# Patient Record
Sex: FEMALE | Race: BLACK OR AFRICAN AMERICAN | Employment: OTHER | URBAN - METROPOLITAN AREA
[De-identification: names, ages, dates, MRNs, and addresses within clinical notes are randomized per-mention and may not be internally consistent; named-entity substitution may affect disease eponyms.]

---

## 2017-04-15 PROBLEM — I10 HTN (HYPERTENSION), BENIGN: Chronic | Status: ACTIVE | Noted: 2017-04-15

## 2017-04-15 PROBLEM — Z95.0 PACEMAKER: Chronic | Status: ACTIVE | Noted: 2017-04-15

## 2017-04-15 PROBLEM — R55 NEAR SYNCOPE: Status: ACTIVE | Noted: 2017-04-15

## 2018-05-08 ENCOUNTER — OFFICE VISIT (OUTPATIENT)
Dept: NON INVASIVE DIAGNOSTICS | Age: 83
End: 2018-05-08
Payer: MEDICARE

## 2018-05-08 VITALS
BODY MASS INDEX: 39.34 KG/M2 | WEIGHT: 222 LBS | HEART RATE: 60 BPM | SYSTOLIC BLOOD PRESSURE: 144 MMHG | RESPIRATION RATE: 16 BRPM | DIASTOLIC BLOOD PRESSURE: 80 MMHG | HEIGHT: 63 IN

## 2018-05-08 DIAGNOSIS — Z95.0 PACEMAKER: Primary | Chronic | ICD-10-CM

## 2018-05-08 PROCEDURE — 4040F PNEUMOC VAC/ADMIN/RCVD: CPT | Performed by: NURSE PRACTITIONER

## 2018-05-08 PROCEDURE — 93280 PM DEVICE PROGR EVAL DUAL: CPT | Performed by: INTERNAL MEDICINE

## 2018-05-08 PROCEDURE — 1123F ACP DISCUSS/DSCN MKR DOCD: CPT | Performed by: NURSE PRACTITIONER

## 2018-05-08 PROCEDURE — 1090F PRES/ABSN URINE INCON ASSESS: CPT | Performed by: NURSE PRACTITIONER

## 2018-05-08 PROCEDURE — G8417 CALC BMI ABV UP PARAM F/U: HCPCS | Performed by: NURSE PRACTITIONER

## 2018-05-08 PROCEDURE — 1036F TOBACCO NON-USER: CPT | Performed by: NURSE PRACTITIONER

## 2018-05-08 PROCEDURE — G8427 DOCREV CUR MEDS BY ELIG CLIN: HCPCS | Performed by: NURSE PRACTITIONER

## 2018-05-08 PROCEDURE — 99213 OFFICE O/P EST LOW 20 MIN: CPT | Performed by: NURSE PRACTITIONER

## 2018-05-08 RX ORDER — VITAMIN B COMPLEX
1 CAPSULE ORAL DAILY
COMMUNITY

## 2018-05-08 RX ORDER — OMEGA-3S/DHA/EPA/FISH OIL 300-1000MG
900 CAPSULE ORAL DAILY
COMMUNITY
End: 2021-10-27

## 2018-08-20 ENCOUNTER — NURSE ONLY (OUTPATIENT)
Dept: NON INVASIVE DIAGNOSTICS | Age: 83
End: 2018-08-20
Payer: MEDICARE

## 2018-08-20 DIAGNOSIS — Z95.0 PACEMAKER: Primary | Chronic | ICD-10-CM

## 2018-08-20 DIAGNOSIS — I48.0 PAF (PAROXYSMAL ATRIAL FIBRILLATION) (HCC): Chronic | ICD-10-CM

## 2018-08-20 PROCEDURE — 93294 REM INTERROG EVL PM/LDLS PM: CPT | Performed by: INTERNAL MEDICINE

## 2018-08-20 PROCEDURE — 93296 REM INTERROG EVL PM/IDS: CPT | Performed by: INTERNAL MEDICINE

## 2018-08-31 ENCOUNTER — TELEPHONE (OUTPATIENT)
Dept: NON INVASIVE DIAGNOSTICS | Age: 83
End: 2018-08-31

## 2018-08-31 NOTE — PROGRESS NOTES
See [facility] [place] report. Remote monitoring reviewed over a 90 day period. End of 90 day monitoring period date of service 8.19.2018.

## 2018-09-13 ENCOUNTER — OFFICE VISIT (OUTPATIENT)
Dept: CARDIOLOGY CLINIC | Age: 83
End: 2018-09-13
Payer: MEDICARE

## 2018-09-13 VITALS
RESPIRATION RATE: 18 BRPM | WEIGHT: 217.2 LBS | BODY MASS INDEX: 38.48 KG/M2 | SYSTOLIC BLOOD PRESSURE: 132 MMHG | HEIGHT: 63 IN | DIASTOLIC BLOOD PRESSURE: 60 MMHG | HEART RATE: 62 BPM

## 2018-09-13 DIAGNOSIS — Z78.9 STATIN INTOLERANCE: ICD-10-CM

## 2018-09-13 DIAGNOSIS — M79.604 LEG PAIN, BILATERAL: ICD-10-CM

## 2018-09-13 DIAGNOSIS — N18.9 CHRONIC KIDNEY DISEASE, UNSPECIFIED CKD STAGE: ICD-10-CM

## 2018-09-13 DIAGNOSIS — Z95.0 STATUS POST PLACEMENT OF CARDIAC PACEMAKER: ICD-10-CM

## 2018-09-13 DIAGNOSIS — R60.0 EDEMA OF BOTH FEET: ICD-10-CM

## 2018-09-13 DIAGNOSIS — E78.2 MIXED HYPERLIPIDEMIA: Chronic | ICD-10-CM

## 2018-09-13 DIAGNOSIS — M79.605 LEG PAIN, BILATERAL: ICD-10-CM

## 2018-09-13 DIAGNOSIS — I48.0 PAF (PAROXYSMAL ATRIAL FIBRILLATION) (HCC): Primary | Chronic | ICD-10-CM

## 2018-09-13 PROCEDURE — 93000 ELECTROCARDIOGRAM COMPLETE: CPT | Performed by: INTERNAL MEDICINE

## 2018-09-13 PROCEDURE — 1123F ACP DISCUSS/DSCN MKR DOCD: CPT | Performed by: INTERNAL MEDICINE

## 2018-09-13 PROCEDURE — 1101F PT FALLS ASSESS-DOCD LE1/YR: CPT | Performed by: INTERNAL MEDICINE

## 2018-09-13 PROCEDURE — 1036F TOBACCO NON-USER: CPT | Performed by: INTERNAL MEDICINE

## 2018-09-13 PROCEDURE — G8417 CALC BMI ABV UP PARAM F/U: HCPCS | Performed by: INTERNAL MEDICINE

## 2018-09-13 PROCEDURE — 4040F PNEUMOC VAC/ADMIN/RCVD: CPT | Performed by: INTERNAL MEDICINE

## 2018-09-13 PROCEDURE — G8427 DOCREV CUR MEDS BY ELIG CLIN: HCPCS | Performed by: INTERNAL MEDICINE

## 2018-09-13 PROCEDURE — 1090F PRES/ABSN URINE INCON ASSESS: CPT | Performed by: INTERNAL MEDICINE

## 2018-09-13 PROCEDURE — 99214 OFFICE O/P EST MOD 30 MIN: CPT | Performed by: INTERNAL MEDICINE

## 2018-11-19 NOTE — PROGRESS NOTES
Electrophysiology Outpatient Progress Note    Arelis Lal  5/26/1931  Date of Service: 11/20/18   Referring Provider/PCP: Renita Rose DO   Cardiology: Dee Valadez MD  Electrophysiologist: Patrice Harrison DO     Patient Active Problem List    Diagnosis Date Noted    Near syncope 04/15/2017    HTN (hypertension), benign 04/15/2017    Pacemaker 04/15/2017    [facility] 11/11/2016    LVH (left ventricular hypertrophy) 11/11/2016    [facility]     Mixed hyperlipidemia 03/11/2015    Asthma 03/11/2015       Current Outpatient Prescriptions   Medication Sig Dispense Refill    pitavastatin (LIVALO) 2 MG TABS tablet 2 mg nightly       loperamide (IMODIUM) 2 MG capsule Take 2 mg by mouth as needed for Diarrhea      XARELTO 15 MG TABS tablet TAKE 1 TABLET BY MOUTH DAILY 30 tablet 11    valsartan (DIOVAN) 160 MG tablet TAKE 1 TABLET BY MOUTH DAILY 90 tablet 3    b complex vitamins capsule Take 1 capsule by mouth daily      Omega-3 Fatty Acids (OMEGA-3 FISH OIL PO) Take 900 mg by mouth daily      amLODIPine (NORVASC) 2.5 MG tablet Take 1 tablet by mouth daily 30 tablet 11    Coenzyme Q10 (COQ-10) 100 MG CAPS Take 1 capsule by mouth daily      cetirizine (ZYRTEC) 10 MG tablet Take 10 mg by mouth daily      predniSONE (DELTASONE) 5 MG tablet Take 5 mg by mouth daily       metoprolol succinate (TOPROL XL) 50 MG extended release tablet Take 1 tablet by mouth 2 times daily 30 tablet 11    Cholecalciferol (VITAMIN D) 2000 UNITS CAPS capsule Take 2,000 capsules by mouth daily      polyethyl glycol-propyl glycol 0.4-0.3 % (SYSTANE) 0.4-0.3 % ophthalmic solution 1 drop as needed for Dry Eyes      ketotifen (ZADITOR) 0.025 % ophthalmic solution 1 drop 2 times daily as needed Itchy eyes       hydrOXYzine (ATARAX) 10 MG tablet Take 10 mg by mouth every 6 hours as needed for Itching      ranitidine (ZANTAC) 150 MG tablet Take 150 mg by mouth daily      hydrALAZINE

## 2018-11-20 ENCOUNTER — OFFICE VISIT (OUTPATIENT)
Dept: NON INVASIVE DIAGNOSTICS | Age: 83
End: 2018-11-20
Payer: MEDICARE

## 2018-11-20 VITALS
BODY MASS INDEX: 37.92 KG/M2 | RESPIRATION RATE: 18 BRPM | DIASTOLIC BLOOD PRESSURE: 70 MMHG | HEIGHT: 63 IN | SYSTOLIC BLOOD PRESSURE: 122 MMHG | WEIGHT: 214 LBS

## 2018-11-20 DIAGNOSIS — Z95.0 PACEMAKER: Primary | Chronic | ICD-10-CM

## 2018-11-20 PROCEDURE — G8417 CALC BMI ABV UP PARAM F/U: HCPCS | Performed by: INTERNAL MEDICINE

## 2018-11-20 PROCEDURE — 99214 OFFICE O/P EST MOD 30 MIN: CPT | Performed by: INTERNAL MEDICINE

## 2018-11-20 PROCEDURE — 1123F ACP DISCUSS/DSCN MKR DOCD: CPT | Performed by: INTERNAL MEDICINE

## 2018-11-20 PROCEDURE — G8427 DOCREV CUR MEDS BY ELIG CLIN: HCPCS | Performed by: INTERNAL MEDICINE

## 2018-11-20 PROCEDURE — 93280 PM DEVICE PROGR EVAL DUAL: CPT | Performed by: INTERNAL MEDICINE

## 2018-11-20 PROCEDURE — 1090F PRES/ABSN URINE INCON ASSESS: CPT | Performed by: INTERNAL MEDICINE

## 2018-11-20 PROCEDURE — 1036F TOBACCO NON-USER: CPT | Performed by: INTERNAL MEDICINE

## 2018-11-20 PROCEDURE — 4040F PNEUMOC VAC/ADMIN/RCVD: CPT | Performed by: INTERNAL MEDICINE

## 2018-11-20 PROCEDURE — 1101F PT FALLS ASSESS-DOCD LE1/YR: CPT | Performed by: INTERNAL MEDICINE

## 2018-11-20 PROCEDURE — G8484 FLU IMMUNIZE NO ADMIN: HCPCS | Performed by: INTERNAL MEDICINE

## 2018-11-20 RX ORDER — LOPERAMIDE HYDROCHLORIDE 2 MG/1
2 CAPSULE ORAL PRN
Status: ON HOLD | COMMUNITY
End: 2021-06-25 | Stop reason: HOSPADM

## 2018-11-20 ASSESSMENT — ENCOUNTER SYMPTOMS
CHOKING: 0
SHORTNESS OF BREATH: 0
COUGH: 0

## 2018-11-21 ENCOUNTER — NURSE ONLY (OUTPATIENT)
Dept: NON INVASIVE DIAGNOSTICS | Age: 83
End: 2018-11-21
Payer: MEDICARE

## 2018-11-21 DIAGNOSIS — I48.0 PAF (PAROXYSMAL ATRIAL FIBRILLATION) (HCC): Chronic | ICD-10-CM

## 2018-11-21 DIAGNOSIS — Z95.0 PACEMAKER: Primary | Chronic | ICD-10-CM

## 2018-11-21 PROCEDURE — 93296 REM INTERROG EVL PM/IDS: CPT | Performed by: INTERNAL MEDICINE

## 2018-11-21 PROCEDURE — 93294 REM INTERROG EVL PM/LDLS PM: CPT | Performed by: INTERNAL MEDICINE

## 2018-11-30 ENCOUNTER — TELEPHONE (OUTPATIENT)
Dept: NON INVASIVE DIAGNOSTICS | Age: 83
End: 2018-11-30

## 2018-11-30 NOTE — TELEPHONE ENCOUNTER
----- Message from Charla Rodney RN sent at 11/30/2018 11:05 AM EST -----  Successful transmission received. Please call patient and give next appointment.

## 2019-01-06 ENCOUNTER — HOSPITAL ENCOUNTER (EMERGENCY)
Age: 84
Discharge: HOME | End: 2019-01-06
Attending: EMERGENCY MEDICINE
Payer: MEDICARE

## 2019-01-06 VITALS
HEART RATE: 60 BPM | WEIGHT: 214 LBS | HEIGHT: 63 IN | DIASTOLIC BLOOD PRESSURE: 80 MMHG | RESPIRATION RATE: 16 BRPM | BODY MASS INDEX: 37.92 KG/M2 | OXYGEN SATURATION: 94 % | TEMPERATURE: 98.1 F | SYSTOLIC BLOOD PRESSURE: 172 MMHG

## 2019-01-06 DIAGNOSIS — R04.0 EPISTAXIS: Primary | ICD-10-CM

## 2019-01-06 PROCEDURE — 99282 EMERGENCY DEPT VISIT SF MDM: CPT

## 2019-01-06 PROCEDURE — 6370000000 HC RX 637 (ALT 250 FOR IP): Performed by: EMERGENCY MEDICINE

## 2019-01-06 RX ORDER — OXYMETAZOLINE HYDROCHLORIDE 0.05 G/100ML
2 SPRAY NASAL ONCE
Status: COMPLETED | OUTPATIENT
Start: 2019-01-06 | End: 2019-01-06

## 2019-01-06 RX ADMIN — OXYMETAZOLINE HYDROCHLORIDE 2 SPRAY: 5 SPRAY NASAL at 12:57

## 2019-01-14 ENCOUNTER — ANESTHESIA (OUTPATIENT)
Dept: INTERVENTIONAL RADIOLOGY/VASCULAR | Age: 84
DRG: 982 | End: 2019-01-14
Payer: MEDICARE

## 2019-01-14 ENCOUNTER — TELEPHONE (OUTPATIENT)
Dept: ENT CLINIC | Age: 84
End: 2019-01-14

## 2019-01-14 ENCOUNTER — ANESTHESIA EVENT (OUTPATIENT)
Dept: INTERVENTIONAL RADIOLOGY/VASCULAR | Age: 84
DRG: 982 | End: 2019-01-14
Payer: MEDICARE

## 2019-01-14 ENCOUNTER — HOSPITAL ENCOUNTER (INPATIENT)
Age: 84
LOS: 1 days | Discharge: HOME | DRG: 982 | End: 2019-01-15
Attending: EMERGENCY MEDICINE | Admitting: INTERNAL MEDICINE
Payer: MEDICARE

## 2019-01-14 ENCOUNTER — APPOINTMENT (OUTPATIENT)
Dept: INTERVENTIONAL RADIOLOGY/VASCULAR | Age: 84
DRG: 982 | End: 2019-01-14
Payer: MEDICARE

## 2019-01-14 ENCOUNTER — HOSPITAL ENCOUNTER (EMERGENCY)
Age: 84
Discharge: HOME | DRG: 982 | End: 2019-01-14
Attending: EMERGENCY MEDICINE
Payer: MEDICARE

## 2019-01-14 VITALS
SYSTOLIC BLOOD PRESSURE: 155 MMHG | RESPIRATION RATE: 13 BRPM | OXYGEN SATURATION: 100 % | DIASTOLIC BLOOD PRESSURE: 75 MMHG

## 2019-01-14 VITALS
DIASTOLIC BLOOD PRESSURE: 90 MMHG | OXYGEN SATURATION: 97 % | HEART RATE: 57 BPM | TEMPERATURE: 98.8 F | RESPIRATION RATE: 16 BRPM | BODY MASS INDEX: 37.92 KG/M2 | SYSTOLIC BLOOD PRESSURE: 180 MMHG | WEIGHT: 214 LBS | HEIGHT: 63 IN

## 2019-01-14 DIAGNOSIS — I10 HYPERTENSION, UNSPECIFIED TYPE: ICD-10-CM

## 2019-01-14 DIAGNOSIS — R04.0 EPISTAXIS: Primary | ICD-10-CM

## 2019-01-14 LAB
ABO/RH: NORMAL
ANION GAP SERPL CALCULATED.3IONS-SCNC: 10 MMOL/L (ref 7–16)
ANTIBODY SCREEN: NORMAL
APTT: 32.8 SEC (ref 24.5–35.1)
BASOPHILS ABSOLUTE: 0.02 E9/L (ref 0–0.2)
BASOPHILS RELATIVE PERCENT: 0.4 % (ref 0–2)
BUN BLDV-MCNC: 23 MG/DL (ref 8–23)
CALCIUM SERPL-MCNC: 9.5 MG/DL (ref 8.6–10.2)
CHLORIDE BLD-SCNC: 104 MMOL/L (ref 98–107)
CO2: 29 MMOL/L (ref 22–29)
CREAT SERPL-MCNC: 1.2 MG/DL (ref 0.5–1)
EOSINOPHILS ABSOLUTE: 0.05 E9/L (ref 0.05–0.5)
EOSINOPHILS RELATIVE PERCENT: 0.9 % (ref 0–6)
GFR AFRICAN AMERICAN: 51
GFR NON-AFRICAN AMERICAN: 51 ML/MIN/1.73
GLUCOSE BLD-MCNC: 109 MG/DL (ref 74–99)
HCT VFR BLD CALC: 33.6 % (ref 34–48)
HCT VFR BLD CALC: 35.2 % (ref 34–48)
HEMOGLOBIN: 10.4 G/DL (ref 11.5–15.5)
HEMOGLOBIN: 10.9 G/DL (ref 11.5–15.5)
IMMATURE GRANULOCYTES #: 0.04 E9/L
IMMATURE GRANULOCYTES %: 0.8 % (ref 0–5)
INR BLD: 1.5
LYMPHOCYTES ABSOLUTE: 1.9 E9/L (ref 1.5–4)
LYMPHOCYTES RELATIVE PERCENT: 35.8 % (ref 20–42)
MCH RBC QN AUTO: 29.9 PG (ref 26–35)
MCH RBC QN AUTO: 30.4 PG (ref 26–35)
MCHC RBC AUTO-ENTMCNC: 31 % (ref 32–34.5)
MCHC RBC AUTO-ENTMCNC: 31 % (ref 32–34.5)
MCV RBC AUTO: 96.6 FL (ref 80–99.9)
MCV RBC AUTO: 98.1 FL (ref 80–99.9)
MONOCYTES ABSOLUTE: 0.57 E9/L (ref 0.1–0.95)
MONOCYTES RELATIVE PERCENT: 10.7 % (ref 2–12)
NEUTROPHILS ABSOLUTE: 2.73 E9/L (ref 1.8–7.3)
NEUTROPHILS RELATIVE PERCENT: 51.4 % (ref 43–80)
PDW BLD-RTO: 13.5 FL (ref 11.5–15)
PDW BLD-RTO: 13.6 FL (ref 11.5–15)
PLATELET # BLD: 85 E9/L (ref 130–450)
PLATELET # BLD: 96 E9/L (ref 130–450)
PLATELET CONFIRMATION: NORMAL
PLATELET CONFIRMATION: NORMAL
PMV BLD AUTO: 11.8 FL (ref 7–12)
PMV BLD AUTO: 12 FL (ref 7–12)
POTASSIUM SERPL-SCNC: 4 MMOL/L (ref 3.5–5)
PROTHROMBIN TIME: 17.3 SEC (ref 9.3–12.4)
RBC # BLD: 3.48 E12/L (ref 3.5–5.5)
RBC # BLD: 3.59 E12/L (ref 3.5–5.5)
SODIUM BLD-SCNC: 143 MMOL/L (ref 132–146)
TROPONIN: 0.08 NG/ML (ref 0–0.03)
WBC # BLD: 5.3 E9/L (ref 4.5–11.5)
WBC # BLD: 5.3 E9/L (ref 4.5–11.5)

## 2019-01-14 PROCEDURE — 86850 RBC ANTIBODY SCREEN: CPT

## 2019-01-14 PROCEDURE — 36227 PLACE CATH XTRNL CAROTID: CPT | Performed by: RADIOLOGY

## 2019-01-14 PROCEDURE — 75898 FOLLOW-UP ANGIOGRAPHY: CPT | Performed by: RADIOLOGY

## 2019-01-14 PROCEDURE — 6360000002 HC RX W HCPCS: Performed by: EMERGENCY MEDICINE

## 2019-01-14 PROCEDURE — 86900 BLOOD TYPING SEROLOGIC ABO: CPT

## 2019-01-14 PROCEDURE — 6370000000 HC RX 637 (ALT 250 FOR IP): Performed by: STUDENT IN AN ORGANIZED HEALTH CARE EDUCATION/TRAINING PROGRAM

## 2019-01-14 PROCEDURE — 36415 COLL VENOUS BLD VENIPUNCTURE: CPT

## 2019-01-14 PROCEDURE — 85027 COMPLETE CBC AUTOMATED: CPT

## 2019-01-14 PROCEDURE — 6360000002 HC RX W HCPCS: Performed by: STUDENT IN AN ORGANIZED HEALTH CARE EDUCATION/TRAINING PROGRAM

## 2019-01-14 PROCEDURE — 75894 X-RAYS TRANSCATH THERAPY: CPT | Performed by: RADIOLOGY

## 2019-01-14 PROCEDURE — 2060000000 HC ICU INTERMEDIATE R&B

## 2019-01-14 PROCEDURE — C1769 GUIDE WIRE: HCPCS

## 2019-01-14 PROCEDURE — 6360000002 HC RX W HCPCS: Performed by: RADIOLOGY

## 2019-01-14 PROCEDURE — 96374 THER/PROPH/DIAG INJ IV PUSH: CPT

## 2019-01-14 PROCEDURE — 94761 N-INVAS EAR/PLS OXIMETRY MLT: CPT

## 2019-01-14 PROCEDURE — 7100000000 HC PACU RECOVERY - FIRST 15 MIN

## 2019-01-14 PROCEDURE — 7100000001 HC PACU RECOVERY - ADDTL 15 MIN

## 2019-01-14 PROCEDURE — 6360000004 HC RX CONTRAST MEDICATION: Performed by: RADIOLOGY

## 2019-01-14 PROCEDURE — 93005 ELECTROCARDIOGRAM TRACING: CPT | Performed by: STUDENT IN AN ORGANIZED HEALTH CARE EDUCATION/TRAINING PROGRAM

## 2019-01-14 PROCEDURE — 2580000003 HC RX 258: Performed by: INTERNAL MEDICINE

## 2019-01-14 PROCEDURE — 96375 TX/PRO/DX INJ NEW DRUG ADDON: CPT

## 2019-01-14 PROCEDURE — 80048 BASIC METABOLIC PNL TOTAL CA: CPT

## 2019-01-14 PROCEDURE — 3700000001 HC ADD 15 MINUTES (ANESTHESIA)

## 2019-01-14 PROCEDURE — 85025 COMPLETE CBC W/AUTO DIFF WBC: CPT

## 2019-01-14 PROCEDURE — B314ZZZ FLUOROSCOPY OF LEFT COMMON CAROTID ARTERY: ICD-10-PCS | Performed by: RADIOLOGY

## 2019-01-14 PROCEDURE — 36222 PLACE CATH CAROTID/INOM ART: CPT | Performed by: RADIOLOGY

## 2019-01-14 PROCEDURE — 99285 EMERGENCY DEPT VISIT HI MDM: CPT

## 2019-01-14 PROCEDURE — 03LM3DZ OCCLUSION OF RIGHT EXTERNAL CAROTID ARTERY WITH INTRALUMINAL DEVICE, PERCUTANEOUS APPROACH: ICD-10-PCS | Performed by: RADIOLOGY

## 2019-01-14 PROCEDURE — 61626 TCAT PERM OCCLS/EMBOL NONCNS: CPT | Performed by: RADIOLOGY

## 2019-01-14 PROCEDURE — 86901 BLOOD TYPING SEROLOGIC RH(D): CPT

## 2019-01-14 PROCEDURE — 30905 CONTROL OF NOSEBLEED: CPT

## 2019-01-14 PROCEDURE — 3700000000 HC ANESTHESIA ATTENDED CARE

## 2019-01-14 PROCEDURE — 6370000000 HC RX 637 (ALT 250 FOR IP): Performed by: INTERNAL MEDICINE

## 2019-01-14 PROCEDURE — 6360000002 HC RX W HCPCS: Performed by: NURSE ANESTHETIST, CERTIFIED REGISTERED

## 2019-01-14 PROCEDURE — 84484 ASSAY OF TROPONIN QUANT: CPT

## 2019-01-14 PROCEDURE — 2580000003 HC RX 258: Performed by: NURSE ANESTHETIST, CERTIFIED REGISTERED

## 2019-01-14 PROCEDURE — 2500000003 HC RX 250 WO HCPCS: Performed by: NURSE ANESTHETIST, CERTIFIED REGISTERED

## 2019-01-14 PROCEDURE — 99283 EMERGENCY DEPT VISIT LOW MDM: CPT

## 2019-01-14 PROCEDURE — 85610 PROTHROMBIN TIME: CPT

## 2019-01-14 PROCEDURE — 6370000000 HC RX 637 (ALT 250 FOR IP): Performed by: EMERGENCY MEDICINE

## 2019-01-14 PROCEDURE — 03LN3DZ OCCLUSION OF LEFT EXTERNAL CAROTID ARTERY WITH INTRALUMINAL DEVICE, PERCUTANEOUS APPROACH: ICD-10-PCS | Performed by: RADIOLOGY

## 2019-01-14 PROCEDURE — 85730 THROMBOPLASTIN TIME PARTIAL: CPT

## 2019-01-14 RX ORDER — PROPOFOL 10 MG/ML
INJECTION, EMULSION INTRAVENOUS CONTINUOUS PRN
Status: DISCONTINUED | OUTPATIENT
Start: 2019-01-14 | End: 2019-01-14 | Stop reason: SDUPTHER

## 2019-01-14 RX ORDER — METOPROLOL TARTRATE 5 MG/5ML
INJECTION INTRAVENOUS PRN
Status: DISCONTINUED | OUTPATIENT
Start: 2019-01-14 | End: 2019-01-14 | Stop reason: SDUPTHER

## 2019-01-14 RX ORDER — MORPHINE SULFATE 2 MG/ML
2 INJECTION, SOLUTION INTRAMUSCULAR; INTRAVENOUS EVERY 5 MIN PRN
Status: DISCONTINUED | OUTPATIENT
Start: 2019-01-14 | End: 2019-01-14

## 2019-01-14 RX ORDER — SODIUM CHLORIDE 9 MG/ML
INJECTION, SOLUTION INTRAVENOUS CONTINUOUS PRN
Status: DISCONTINUED | OUTPATIENT
Start: 2019-01-14 | End: 2019-01-14 | Stop reason: SDUPTHER

## 2019-01-14 RX ORDER — CLONIDINE HYDROCHLORIDE 0.1 MG/1
0.1 TABLET ORAL ONCE
Status: COMPLETED | OUTPATIENT
Start: 2019-01-14 | End: 2019-01-14

## 2019-01-14 RX ORDER — ONDANSETRON 2 MG/ML
4 INJECTION INTRAMUSCULAR; INTRAVENOUS EVERY 6 HOURS PRN
Status: DISCONTINUED | OUTPATIENT
Start: 2019-01-14 | End: 2019-01-15 | Stop reason: HOSPADM

## 2019-01-14 RX ORDER — SODIUM CHLORIDE 0.9 % (FLUSH) 0.9 %
10 SYRINGE (ML) INJECTION EVERY 12 HOURS SCHEDULED
Status: DISCONTINUED | OUTPATIENT
Start: 2019-01-14 | End: 2019-01-15 | Stop reason: HOSPADM

## 2019-01-14 RX ORDER — MEPERIDINE HYDROCHLORIDE 50 MG/ML
12.5 INJECTION INTRAMUSCULAR; INTRAVENOUS; SUBCUTANEOUS EVERY 5 MIN PRN
Status: DISCONTINUED | OUTPATIENT
Start: 2019-01-14 | End: 2019-01-14

## 2019-01-14 RX ORDER — CEFAZOLIN SODIUM 2 G/50ML
SOLUTION INTRAVENOUS PRN
Status: DISCONTINUED | OUTPATIENT
Start: 2019-01-14 | End: 2019-01-14 | Stop reason: SDUPTHER

## 2019-01-14 RX ORDER — MORPHINE SULFATE 2 MG/ML
1 INJECTION, SOLUTION INTRAMUSCULAR; INTRAVENOUS EVERY 5 MIN PRN
Status: DISCONTINUED | OUTPATIENT
Start: 2019-01-14 | End: 2019-01-14

## 2019-01-14 RX ORDER — OXYMETAZOLINE HYDROCHLORIDE 0.05 G/100ML
2 SPRAY NASAL ONCE
Status: COMPLETED | OUTPATIENT
Start: 2019-01-14 | End: 2019-01-14

## 2019-01-14 RX ORDER — AMLODIPINE BESYLATE 2.5 MG/1
2.5 TABLET ORAL DAILY
Status: DISCONTINUED | OUTPATIENT
Start: 2019-01-15 | End: 2019-01-15 | Stop reason: HOSPADM

## 2019-01-14 RX ORDER — PROMETHAZINE HYDROCHLORIDE 25 MG/ML
6.25 INJECTION, SOLUTION INTRAMUSCULAR; INTRAVENOUS EVERY 10 MIN PRN
Status: DISCONTINUED | OUTPATIENT
Start: 2019-01-14 | End: 2019-01-14

## 2019-01-14 RX ORDER — HYDROCODONE BITARTRATE AND ACETAMINOPHEN 5; 325 MG/1; MG/1
2 TABLET ORAL PRN
Status: DISCONTINUED | OUTPATIENT
Start: 2019-01-14 | End: 2019-01-14

## 2019-01-14 RX ORDER — SODIUM CHLORIDE 0.9 % (FLUSH) 0.9 %
10 SYRINGE (ML) INJECTION PRN
Status: DISCONTINUED | OUTPATIENT
Start: 2019-01-14 | End: 2019-01-15 | Stop reason: HOSPADM

## 2019-01-14 RX ORDER — HYDRALAZINE HYDROCHLORIDE 50 MG/1
50 TABLET, FILM COATED ORAL 3 TIMES DAILY
Status: DISCONTINUED | OUTPATIENT
Start: 2019-01-14 | End: 2019-01-15 | Stop reason: HOSPADM

## 2019-01-14 RX ORDER — HYDRALAZINE HYDROCHLORIDE 20 MG/ML
10 INJECTION INTRAMUSCULAR; INTRAVENOUS ONCE
Status: COMPLETED | OUTPATIENT
Start: 2019-01-14 | End: 2019-01-14

## 2019-01-14 RX ORDER — HEPARIN SODIUM 10000 [USP'U]/ML
5000 INJECTION, SOLUTION INTRAVENOUS; SUBCUTANEOUS EVERY 5 MIN PRN
Status: COMPLETED | OUTPATIENT
Start: 2019-01-14 | End: 2019-01-14

## 2019-01-14 RX ORDER — METOPROLOL SUCCINATE 50 MG/1
50 TABLET, EXTENDED RELEASE ORAL 2 TIMES DAILY
Status: DISCONTINUED | OUTPATIENT
Start: 2019-01-14 | End: 2019-01-15 | Stop reason: HOSPADM

## 2019-01-14 RX ORDER — PREDNISONE 1 MG/1
5 TABLET ORAL DAILY
Status: DISCONTINUED | OUTPATIENT
Start: 2019-01-15 | End: 2019-01-15 | Stop reason: HOSPADM

## 2019-01-14 RX ORDER — VALSARTAN 160 MG/1
160 TABLET ORAL DAILY
Status: DISCONTINUED | OUTPATIENT
Start: 2019-01-15 | End: 2019-01-15 | Stop reason: HOSPADM

## 2019-01-14 RX ORDER — HYDROCODONE BITARTRATE AND ACETAMINOPHEN 5; 325 MG/1; MG/1
1 TABLET ORAL PRN
Status: DISCONTINUED | OUTPATIENT
Start: 2019-01-14 | End: 2019-01-14

## 2019-01-14 RX ORDER — HYDRALAZINE HYDROCHLORIDE 20 MG/ML
INJECTION INTRAMUSCULAR; INTRAVENOUS PRN
Status: DISCONTINUED | OUTPATIENT
Start: 2019-01-14 | End: 2019-01-14 | Stop reason: SDUPTHER

## 2019-01-14 RX ORDER — CEPHALEXIN 500 MG/1
500 CAPSULE ORAL 3 TIMES DAILY
Qty: 21 CAPSULE | Refills: 0 | Status: ON HOLD | OUTPATIENT
Start: 2019-01-14 | End: 2019-01-15 | Stop reason: HOSPADM

## 2019-01-14 RX ORDER — LABETALOL HYDROCHLORIDE 5 MG/ML
INJECTION, SOLUTION INTRAVENOUS PRN
Status: DISCONTINUED | OUTPATIENT
Start: 2019-01-14 | End: 2019-01-14 | Stop reason: SDUPTHER

## 2019-01-14 RX ORDER — METHYLPREDNISOLONE SODIUM SUCCINATE 125 MG/2ML
250 INJECTION, POWDER, LYOPHILIZED, FOR SOLUTION INTRAMUSCULAR; INTRAVENOUS ONCE
Status: COMPLETED | OUTPATIENT
Start: 2019-01-14 | End: 2019-01-14

## 2019-01-14 RX ORDER — DIPHENHYDRAMINE HYDROCHLORIDE 50 MG/ML
25 INJECTION INTRAMUSCULAR; INTRAVENOUS ONCE
Status: COMPLETED | OUTPATIENT
Start: 2019-01-14 | End: 2019-01-14

## 2019-01-14 RX ORDER — RANITIDINE 150 MG/1
150 TABLET ORAL DAILY
Status: DISCONTINUED | OUTPATIENT
Start: 2019-01-15 | End: 2019-01-15 | Stop reason: HOSPADM

## 2019-01-14 RX ORDER — ALLOPURINOL 100 MG/1
100 TABLET ORAL DAILY
Status: DISCONTINUED | OUTPATIENT
Start: 2019-01-15 | End: 2019-01-15 | Stop reason: HOSPADM

## 2019-01-14 RX ADMIN — HYDRALAZINE HYDROCHLORIDE 10 MG: 20 INJECTION INTRAMUSCULAR; INTRAVENOUS at 18:15

## 2019-01-14 RX ADMIN — METOPROLOL SUCCINATE 50 MG: 50 TABLET, EXTENDED RELEASE ORAL at 23:02

## 2019-01-14 RX ADMIN — Medication 5000 UNITS: at 17:15

## 2019-01-14 RX ADMIN — Medication 10 ML: at 23:02

## 2019-01-14 RX ADMIN — METHYLPREDNISOLONE SODIUM SUCCINATE 250 MG: 125 INJECTION, POWDER, FOR SOLUTION INTRAMUSCULAR; INTRAVENOUS at 14:25

## 2019-01-14 RX ADMIN — Medication 5000 UNITS: at 17:20

## 2019-01-14 RX ADMIN — CEFAZOLIN SODIUM 2 G: 2 SOLUTION INTRAVENOUS at 17:25

## 2019-01-14 RX ADMIN — DIPHENHYDRAMINE HYDROCHLORIDE 25 MG: 50 INJECTION, SOLUTION INTRAMUSCULAR; INTRAVENOUS at 14:26

## 2019-01-14 RX ADMIN — METOPROLOL TARTRATE 5 MG: 5 INJECTION, SOLUTION INTRAVENOUS at 18:08

## 2019-01-14 RX ADMIN — Medication 5000 UNITS: at 17:25

## 2019-01-14 RX ADMIN — IOVERSOL 280 ML: 678 INJECTION INTRA-ARTERIAL; INTRAVENOUS at 20:18

## 2019-01-14 RX ADMIN — HYDRALAZINE HYDROCHLORIDE 10 MG: 20 INJECTION INTRAMUSCULAR; INTRAVENOUS at 17:45

## 2019-01-14 RX ADMIN — SODIUM CHLORIDE: 9 INJECTION, SOLUTION INTRAVENOUS at 16:56

## 2019-01-14 RX ADMIN — HYDRALAZINE HYDROCHLORIDE 10 MG: 20 INJECTION INTRAMUSCULAR; INTRAVENOUS at 11:40

## 2019-01-14 RX ADMIN — Medication 2 SPRAY: at 05:18

## 2019-01-14 RX ADMIN — Medication 2 SPRAY: at 12:33

## 2019-01-14 RX ADMIN — CLONIDINE HYDROCHLORIDE 0.1 MG: 0.1 TABLET ORAL at 05:58

## 2019-01-14 RX ADMIN — METOPROLOL TARTRATE 5 MG: 5 INJECTION, SOLUTION INTRAVENOUS at 18:15

## 2019-01-14 RX ADMIN — Medication 5000 UNITS: at 17:35

## 2019-01-14 RX ADMIN — Medication 5000 UNITS: at 17:45

## 2019-01-14 RX ADMIN — PROPOFOL 50 MCG/KG/MIN: 10 INJECTION, EMULSION INTRAVENOUS at 17:07

## 2019-01-14 RX ADMIN — HYDRALAZINE HYDROCHLORIDE 50 MG: 50 TABLET, FILM COATED ORAL at 23:02

## 2019-01-14 RX ADMIN — Medication 5000 UNITS: at 17:30

## 2019-01-14 RX ADMIN — LABETALOL HYDROCHLORIDE 5 MG: 5 INJECTION INTRAVENOUS at 18:47

## 2019-01-14 ASSESSMENT — PULMONARY FUNCTION TESTS
PIF_VALUE: 0
PIF_VALUE: 1
PIF_VALUE: 0
PIF_VALUE: 1
PIF_VALUE: 0

## 2019-01-14 ASSESSMENT — PAIN DESCRIPTION - PAIN TYPE
TYPE: REFERRED PAIN
TYPE: SURGICAL PAIN

## 2019-01-14 ASSESSMENT — PAIN SCALES - GENERAL
PAINLEVEL_OUTOF10: 0

## 2019-01-14 ASSESSMENT — PAIN DESCRIPTION - PROGRESSION: CLINICAL_PROGRESSION: GRADUALLY WORSENING

## 2019-01-14 ASSESSMENT — ENCOUNTER SYMPTOMS
EYE DISCHARGE: 0
COUGH: 0
ABDOMINAL DISTENTION: 0
SHORTNESS OF BREATH: 0
NAUSEA: 0
EYE REDNESS: 0
SINUS PRESSURE: 0
SORE THROAT: 0
EYE PAIN: 0
VOMITING: 0
WHEEZING: 0
DIARRHEA: 0
BACK PAIN: 0

## 2019-01-14 ASSESSMENT — PAIN DESCRIPTION - ORIENTATION: ORIENTATION: RIGHT

## 2019-01-14 ASSESSMENT — PAIN DESCRIPTION - FREQUENCY: FREQUENCY: INTERMITTENT

## 2019-01-14 ASSESSMENT — PAIN DESCRIPTION - DESCRIPTORS: DESCRIPTORS: ACHING;CONSTANT;POUNDING

## 2019-01-14 ASSESSMENT — PAIN DESCRIPTION - LOCATION
LOCATION: TEETH;MOUTH
LOCATION: NOSE

## 2019-01-14 ASSESSMENT — PAIN DESCRIPTION - ONSET: ONSET: ON-GOING

## 2019-01-15 VITALS
SYSTOLIC BLOOD PRESSURE: 160 MMHG | WEIGHT: 218.38 LBS | OXYGEN SATURATION: 96 % | HEIGHT: 63 IN | TEMPERATURE: 97.9 F | HEART RATE: 63 BPM | DIASTOLIC BLOOD PRESSURE: 70 MMHG | BODY MASS INDEX: 38.7 KG/M2 | RESPIRATION RATE: 20 BRPM

## 2019-01-15 LAB
ANION GAP SERPL CALCULATED.3IONS-SCNC: 14 MMOL/L (ref 7–16)
BUN BLDV-MCNC: 24 MG/DL (ref 8–23)
CALCIUM SERPL-MCNC: 8.5 MG/DL (ref 8.6–10.2)
CHLORIDE BLD-SCNC: 103 MMOL/L (ref 98–107)
CO2: 23 MMOL/L (ref 22–29)
CREAT SERPL-MCNC: 1.2 MG/DL (ref 0.5–1)
GFR AFRICAN AMERICAN: 51
GFR NON-AFRICAN AMERICAN: 51 ML/MIN/1.73
GLUCOSE BLD-MCNC: 177 MG/DL (ref 74–99)
HCT VFR BLD CALC: 31.1 % (ref 34–48)
HEMOGLOBIN: 10 G/DL (ref 11.5–15.5)
MCH RBC QN AUTO: 31 PG (ref 26–35)
MCHC RBC AUTO-ENTMCNC: 32.2 % (ref 32–34.5)
MCV RBC AUTO: 96.3 FL (ref 80–99.9)
PDW BLD-RTO: 13.6 FL (ref 11.5–15)
PLATELET # BLD: 93 E9/L (ref 130–450)
PLATELET CONFIRMATION: NORMAL
PMV BLD AUTO: 11.8 FL (ref 7–12)
POTASSIUM SERPL-SCNC: 4.8 MMOL/L (ref 3.5–5)
RBC # BLD: 3.23 E12/L (ref 3.5–5.5)
SODIUM BLD-SCNC: 140 MMOL/L (ref 132–146)
WBC # BLD: 6 E9/L (ref 4.5–11.5)

## 2019-01-15 PROCEDURE — 85027 COMPLETE CBC AUTOMATED: CPT

## 2019-01-15 PROCEDURE — 80048 BASIC METABOLIC PNL TOTAL CA: CPT

## 2019-01-15 PROCEDURE — 36415 COLL VENOUS BLD VENIPUNCTURE: CPT

## 2019-01-15 PROCEDURE — 2500000003 HC RX 250 WO HCPCS: Performed by: OTOLARYNGOLOGY

## 2019-01-15 PROCEDURE — 2580000003 HC RX 258: Performed by: INTERNAL MEDICINE

## 2019-01-15 PROCEDURE — 6370000000 HC RX 637 (ALT 250 FOR IP): Performed by: INTERNAL MEDICINE

## 2019-01-15 RX ORDER — ACETAMINOPHEN 325 MG/1
650 TABLET ORAL EVERY 4 HOURS PRN
Status: DISCONTINUED | OUTPATIENT
Start: 2019-01-15 | End: 2019-01-15 | Stop reason: HOSPADM

## 2019-01-15 RX ADMIN — Medication 1 KIT: at 10:36

## 2019-01-15 RX ADMIN — ALLOPURINOL 100 MG: 100 TABLET ORAL at 08:22

## 2019-01-15 RX ADMIN — ACETAMINOPHEN 650 MG: 325 TABLET ORAL at 13:39

## 2019-01-15 RX ADMIN — HYDRALAZINE HYDROCHLORIDE 50 MG: 50 TABLET, FILM COATED ORAL at 08:22

## 2019-01-15 RX ADMIN — HYDRALAZINE HYDROCHLORIDE 50 MG: 50 TABLET, FILM COATED ORAL at 14:15

## 2019-01-15 RX ADMIN — Medication 10 ML: at 08:24

## 2019-01-15 RX ADMIN — VALSARTAN 160 MG: 160 TABLET ORAL at 08:24

## 2019-01-15 RX ADMIN — AMLODIPINE BESYLATE 2.5 MG: 2.5 TABLET ORAL at 08:22

## 2019-01-15 RX ADMIN — METOPROLOL SUCCINATE 50 MG: 50 TABLET, EXTENDED RELEASE ORAL at 08:22

## 2019-01-15 RX ADMIN — PREDNISONE 5 MG: 5 TABLET ORAL at 08:22

## 2019-01-15 RX ADMIN — RANITIDINE 150 MG: 150 TABLET ORAL at 08:22

## 2019-01-15 ASSESSMENT — PAIN SCALES - GENERAL
PAINLEVEL_OUTOF10: 0
PAINLEVEL_OUTOF10: 3
PAINLEVEL_OUTOF10: 0

## 2019-01-17 LAB
EKG ATRIAL RATE: 64 BPM
EKG P AXIS: 62 DEGREES
EKG P-R INTERVAL: 228 MS
EKG Q-T INTERVAL: 490 MS
EKG QRS DURATION: 174 MS
EKG QTC CALCULATION (BAZETT): 505 MS
EKG R AXIS: -66 DEGREES
EKG T AXIS: 85 DEGREES
EKG VENTRICULAR RATE: 64 BPM

## 2019-02-20 ENCOUNTER — NURSE ONLY (OUTPATIENT)
Dept: NON INVASIVE DIAGNOSTICS | Age: 84
End: 2019-02-20
Payer: MEDICARE

## 2019-02-20 DIAGNOSIS — I48.0 PAF (PAROXYSMAL ATRIAL FIBRILLATION) (HCC): Chronic | ICD-10-CM

## 2019-02-20 DIAGNOSIS — Z95.0 PACEMAKER: Primary | Chronic | ICD-10-CM

## 2019-02-20 PROCEDURE — 93294 REM INTERROG EVL PM/LDLS PM: CPT | Performed by: INTERNAL MEDICINE

## 2019-02-20 PROCEDURE — 93296 REM INTERROG EVL PM/IDS: CPT | Performed by: INTERNAL MEDICINE

## 2019-03-05 ENCOUNTER — HOSPITAL ENCOUNTER (OUTPATIENT)
Age: 84
Discharge: HOME | End: 2019-03-07
Payer: MEDICARE

## 2019-03-05 ENCOUNTER — HOSPITAL ENCOUNTER (OUTPATIENT)
Dept: GENERAL RADIOLOGY | Age: 84
Discharge: HOME | End: 2019-03-07
Payer: MEDICARE

## 2019-03-05 DIAGNOSIS — M79.5 RESIDUAL FOREIGN BODY IN SOFT TISSUE: ICD-10-CM

## 2019-03-05 PROCEDURE — 73630 X-RAY EXAM OF FOOT: CPT

## 2019-03-22 ENCOUNTER — HOSPITAL ENCOUNTER (OUTPATIENT)
Dept: INTERVENTIONAL RADIOLOGY/VASCULAR | Age: 84
Discharge: HOME | End: 2019-03-24
Payer: MEDICARE

## 2019-03-22 DIAGNOSIS — I70.213 ATHEROSCLER OF NATIVE ARTERY OF BOTH LEGS WITH INTERMIT CLAUDICATION (HCC): ICD-10-CM

## 2019-03-22 PROCEDURE — 93923 UPR/LXTR ART STDY 3+ LVLS: CPT

## 2019-05-20 ASSESSMENT — ENCOUNTER SYMPTOMS
SHORTNESS OF BREATH: 0
COUGH: 0

## 2019-05-20 NOTE — PROGRESS NOTES
Electrophysiology Outpatient Progress Note    Carrie Spring  5/26/1931  Date of Service: 5/21/19   Referring Provider/PCP: Nicholas Hutchinson DO   Cardiology: Lucio Osorio MD  Electrophysiologist: Kylie Suarez DO     Patient Active Problem List    Diagnosis Date Noted    Epistaxis 01/14/2019    Near syncope 04/15/2017    HTN (hypertension), benign 04/15/2017    Pacemaker 04/15/2017    Pulmonary HTN ([facility] 11/11/2016    LVH (left ventricular hypertrophy) 11/11/2016    PAF (paroxysmal atrial fibrillation) ([facility]      Overview Note:     Xarelto held d/t epistaxis requiring cautery  Now on Eliquis 5 mg BID      Mixed hyperlipidemia 03/11/2015    Asthma 03/11/2015       Current Outpatient Medications   Medication Sig Dispense Refill    ELIQUIS 5 MG TABS tablet Take 5 mg by mouth 2 times daily       fexofenadine (ALLEGRA ALLERGY) 180 MG tablet Take 180 mg by mouth daily      acetaminophen (TYLENOL) 500 MG tablet Take 500 mg by mouth every 6 hours as needed for Pain      valsartan (DIOVAN) 160 MG tablet TAKE 1 TABLET BY MOUTH DAILY 90 tablet 3    pitavastatin (LIVALO) 2 MG TABS tablet Take 2 mg by mouth every other day       loperamide (IMODIUM) 2 MG capsule Take 2 mg by mouth as needed for Diarrhea      b complex vitamins capsule Take 1 capsule by mouth daily      Omega-3 Fatty Acids (OMEGA-3 FISH OIL PO) Take 900 mg by mouth daily      amLODIPine (NORVASC) 2.5 MG tablet Take 1 tablet by mouth daily 30 tablet 11    Coenzyme Q10 (COQ-10) 100 MG CAPS Take 1 capsule by mouth daily      predniSONE (DELTASONE) 5 MG tablet Take 5 mg by mouth daily       metoprolol succinate (TOPROL XL) 50 MG extended release tablet Take 1 tablet by mouth 2 times daily 30 tablet 11    Cholecalciferol (VITAMIN D) 2000 UNITS CAPS capsule Take 2,000 capsules by mouth daily      polyethyl glycol-propyl glycol 0.4-0.3 % (SYSTANE) 0.4-0.3 % ophthalmic solution 1 drop as needed for Dry Eyes      ketotifen (ZADITOR) 0.025 % ophthalmic solution 1 drop 2 times daily as needed Itchy eyes       ranitidine (ZANTAC) 150 MG tablet Take 150 mg by mouth 2 times daily       hydrALAZINE (APRESOLINE) 50 MG tablet Take 1 tablet by mouth 3 times daily. 90 tablet 11    allopurinol (ZYLOPRIM) 100 MG tablet Take 100 mg by mouth daily       Probiotic Product (PROBIOTIC FORMULA PO) Take 10 Units by mouth 2 times daily        No current facility-administered medications for this visit. Allergies   Allergen Reactions    Ampicillin Itching    Iodine Itching       SUBJECTIVE: Cameron Brito presents to the office today for the management of: symptomatic bradycardia, recurrent syncope, PAF with SVR, and pacemaker in situ. She presents today for her 6 month office follow-up. From an EP perspective she is feeling well. She has had short episodes of PAF lasting 10 sec -20 min. In January she underwent endovascular embolization of bilateral internal maxillary arteries by Dr Ephraim Euceda secondary to recurrent epstaxis on Xarelto. Her Xarelto was discontinued and she was started on Eliquis 5 mg BID with no bleeding issues. Today she complains of nausea and not feeling well starting ~10 minutes prior to leaving home. She denies fever and/or chills, vomiting/diarrhea or other associated symptoms. If her symptoms continue she will notify Dr Brenda Xavier office. She follows with Dr Samaria Santiago from a cardiology perspective and was dosing well at her last visit 9/13/18. She is enrolled in [facility] remote monitoring. She denies angina, dyspnea, lightheadedness, near syncope/syncope, orthopnea and PND. Review of Systems   Constitutional: Positive for appetite change. Respiratory: Negative for cough and shortness of breath. Cardiovascular: Negative for chest pain and palpitations. Gastrointestinal: Positive for abdominal distention and nausea. Neurological: Negative for dizziness, syncope and light-headedness.    All other systems reviewed and are negative. PHYSICAL EXAM:  Vitals:    05/21/19 1029 05/21/19 1057   BP: (!) 160/78 (!) 158/70   Pulse: 60    Resp: 16    Weight: 218 lb (98.9 kg)    Height: 5' 2\" (1.575 m)      Constitutional: Oriented to person, place, and time. Well-developed and cooperative. Head: Normocephalic and atraumatic. Eyes: Conjunctivae are normal.   Neck: No hepatojugular reflux and no JVD present. Cardiovascular: S1 normal, S2 normal and intact distal pulses. Normal rate and rhythm. PMI is not displaced. Pulmonary/Chest: Effort normal and breath sounds normal. No respiratory distress. Abdominal: Soft. No tenderness. Musculoskeletal: Normal range of motion of all extremities, no muscle weakness. Neurological: Alert and oriented to person, place, and time. Gait normal.   Skin: Skin is warm and dry. No bruising, no ecchymosis and no rash noted. Extremity: No clubbing or cyanosis. No edema. Psychiatric: Normal mood and affect. Thought content normal.   Pacemaker site: stable, well healed, no evidence of erosion. Pacemaker Interrogation: (dependent)  Qasim Lomax, [place] [id number]. DOI: 5/6/15  DDD  ppm.  P wave 5.3mV  Impedance: 361 ohms   Threshold: 0.8 V @ 0.4 ms  RV R wave: Paced mV  Impedance:469 ohms   Threshold: 0.7 V @ 0.4 ms  Pacing: A: 89%  RV:99%     Battery Voltage/Longevity: 3.5 years    Arrhythmias: 1. ATRs up to 20 min. Programming: changed MV response from 8-10 to allow for better HR variability. Impressions:     1. H/O recurrent syncope in the setting of severe bradycardia, PAF with SVR  - [id number]- VASc score: 5  - Xarelto 15 mg daily based on CrCl.  - LHC 3/12/15: normal coronaries. LVEF 65%  - Echocardiogram(4/17): Normal LVEF, no significant valvular disease. Stage I DD.  - recurrent epistaxis: now on Eliquis    2. Pacemaker in situ  - BSCI  - DOI: 5/6/15  - Normal function    3. HTN    4. HLD    5. Asthma    6. Gout    7.  Paroxysmal AF  - currently in SR  - Xarelto 15 mg QD  - Now on Eliquis; see #8    8. Recurrent epistaxis  - on Xarleto  - 1/14/19: S/p  endovascular embolization of bilateral internal maxillary arteries by Dr Kassandra Madrigal:     1. Ms Tejeda's pacemaker function is stable and programmed accordingly based on the above interrogation. She complains of fatigue and nausea which started this morning. In review of her HR histogram the device was programmed to to allow for HR variability. She has had brief episodes of PAF. She is now on Eliquis with no bleeding issues. 2. No changes were made in her medications today. 3. She will send a remote transmission in 3 months followed by a 6 month office visit and device interrogation. 4. She was asked to call the office with concerns prior to her scheduled follow-up. Thank you for allowing me to participate in their care. I have spent a total of 25 minutes with the patient and her family reviewing the above stated recommendations.  And a total of >50% of that time involved face-to-face time providing counseling and or coordination of care with the other providers    TERESA Sloo-LEÓN, [address] [place] [address] Physicians      CC: Dr Ken Mobley Friends

## 2019-05-21 ENCOUNTER — OFFICE VISIT (OUTPATIENT)
Dept: NON INVASIVE DIAGNOSTICS | Age: 84
End: 2019-05-21
Payer: MEDICARE

## 2019-05-21 VITALS
HEART RATE: 60 BPM | SYSTOLIC BLOOD PRESSURE: 158 MMHG | HEIGHT: 62 IN | DIASTOLIC BLOOD PRESSURE: 70 MMHG | BODY MASS INDEX: 40.12 KG/M2 | WEIGHT: 218 LBS | RESPIRATION RATE: 16 BRPM

## 2019-05-21 DIAGNOSIS — Z95.0 PACEMAKER: Primary | ICD-10-CM

## 2019-05-21 DIAGNOSIS — I48.0 PAF (PAROXYSMAL ATRIAL FIBRILLATION) (HCC): Chronic | ICD-10-CM

## 2019-05-21 PROCEDURE — 99214 OFFICE O/P EST MOD 30 MIN: CPT | Performed by: NURSE PRACTITIONER

## 2019-05-21 PROCEDURE — G8598 ASA/ANTIPLAT THER USED: HCPCS | Performed by: NURSE PRACTITIONER

## 2019-05-21 PROCEDURE — G8417 CALC BMI ABV UP PARAM F/U: HCPCS | Performed by: NURSE PRACTITIONER

## 2019-05-21 PROCEDURE — 1123F ACP DISCUSS/DSCN MKR DOCD: CPT | Performed by: NURSE PRACTITIONER

## 2019-05-21 PROCEDURE — G8427 DOCREV CUR MEDS BY ELIG CLIN: HCPCS | Performed by: NURSE PRACTITIONER

## 2019-05-21 PROCEDURE — 93280 PM DEVICE PROGR EVAL DUAL: CPT | Performed by: NURSE PRACTITIONER

## 2019-05-21 PROCEDURE — 1036F TOBACCO NON-USER: CPT | Performed by: NURSE PRACTITIONER

## 2019-05-21 PROCEDURE — 1090F PRES/ABSN URINE INCON ASSESS: CPT | Performed by: NURSE PRACTITIONER

## 2019-05-21 PROCEDURE — 4040F PNEUMOC VAC/ADMIN/RCVD: CPT | Performed by: NURSE PRACTITIONER

## 2019-05-21 RX ORDER — APIXABAN 5 MG/1
5 TABLET, FILM COATED ORAL 2 TIMES DAILY
Status: ON HOLD | COMMUNITY
Start: 2019-05-20 | End: 2021-06-25 | Stop reason: HOSPADM

## 2019-05-21 RX ORDER — FEXOFENADINE HCL 180 MG/1
180 TABLET ORAL DAILY PRN
Status: ON HOLD | COMMUNITY
End: 2021-06-25 | Stop reason: HOSPADM

## 2019-05-21 RX ORDER — ACETAMINOPHEN 500 MG
500-1000 TABLET ORAL EVERY 6 HOURS PRN
Status: ON HOLD | COMMUNITY
End: 2021-06-25 | Stop reason: HOSPADM

## 2019-05-21 ASSESSMENT — ENCOUNTER SYMPTOMS
ABDOMINAL DISTENTION: 1
NAUSEA: 1

## 2019-05-29 ENCOUNTER — NURSE ONLY (OUTPATIENT)
Dept: NON INVASIVE DIAGNOSTICS | Age: 84
End: 2019-05-29
Payer: MEDICARE

## 2019-05-29 DIAGNOSIS — R55 NEAR SYNCOPE: ICD-10-CM

## 2019-05-29 DIAGNOSIS — I48.0 PAF (PAROXYSMAL ATRIAL FIBRILLATION) (HCC): Chronic | ICD-10-CM

## 2019-05-29 DIAGNOSIS — Z95.0 PACEMAKER: Primary | Chronic | ICD-10-CM

## 2019-05-29 PROCEDURE — 93294 REM INTERROG EVL PM/LDLS PM: CPT | Performed by: INTERNAL MEDICINE

## 2019-05-29 PROCEDURE — 93296 REM INTERROG EVL PM/IDS: CPT | Performed by: INTERNAL MEDICINE

## 2019-05-31 ENCOUNTER — OFFICE VISIT (OUTPATIENT)
Dept: CARDIOLOGY CLINIC | Age: 84
End: 2019-05-31
Payer: MEDICARE

## 2019-05-31 VITALS
WEIGHT: 217 LBS | SYSTOLIC BLOOD PRESSURE: 130 MMHG | BODY MASS INDEX: 38.45 KG/M2 | HEIGHT: 63 IN | DIASTOLIC BLOOD PRESSURE: 80 MMHG | RESPIRATION RATE: 14 BRPM | HEART RATE: 60 BPM

## 2019-05-31 DIAGNOSIS — I10 ESSENTIAL HYPERTENSION: ICD-10-CM

## 2019-05-31 DIAGNOSIS — E78.5 DYSLIPIDEMIA: ICD-10-CM

## 2019-05-31 DIAGNOSIS — I48.0 PAF (PAROXYSMAL ATRIAL FIBRILLATION) (HCC): Primary | ICD-10-CM

## 2019-05-31 PROCEDURE — 99213 OFFICE O/P EST LOW 20 MIN: CPT | Performed by: CLINICAL NURSE SPECIALIST

## 2019-05-31 PROCEDURE — 93000 ELECTROCARDIOGRAM COMPLETE: CPT | Performed by: CLINICAL NURSE SPECIALIST

## 2019-05-31 NOTE — PROGRESS NOTES
[facility] PHYSICIAN CARDIOLOGY   OFFICE VISIT        PRIMARY CARE PHYSICIAN:      Edilberto Godinez DO         ALLERGIES / SENSITIVITIES:        Allergies   Allergen Reactions    Ampicillin Itching    Iodine Itching          REVIEWED MEDICATIONS:       Current Outpatient Medications   Medication Sig Dispense Refill    ELIQUIS 5 MG TABS tablet Take 5 mg by mouth 2 times daily       fexofenadine (ALLEGRA ALLERGY) 180 MG tablet Take 180 mg by mouth daily      acetaminophen (TYLENOL) 500 MG tablet Take 500 mg by mouth every 6 hours as needed for Pain      valsartan (DIOVAN) 160 MG tablet TAKE 1 TABLET BY MOUTH DAILY 90 tablet 3    pitavastatin (LIVALO) 2 MG TABS tablet Take 2 mg by mouth every other day       loperamide (IMODIUM) 2 MG capsule Take 2 mg by mouth as needed for Diarrhea      b complex vitamins capsule Take 1 capsule by mouth daily      Omega-3 Fatty Acids (OMEGA-3 FISH OIL PO) Take 900 mg by mouth daily      amLODIPine (NORVASC) 2.5 MG tablet Take 1 tablet by mouth daily 30 tablet 11    Coenzyme Q10 (COQ-10) 100 MG CAPS Take 1 capsule by mouth daily      predniSONE (DELTASONE) 5 MG tablet Take 5 mg by mouth daily       metoprolol succinate (TOPROL XL) 50 MG extended release tablet Take 1 tablet by mouth 2 times daily 30 tablet 11    Cholecalciferol (VITAMIN D) 2000 UNITS CAPS capsule Take 2,000 capsules by mouth daily      polyethyl glycol-propyl glycol 0.4-0.3 % (SYSTANE) 0.4-0.3 % ophthalmic solution 1 drop as needed for Dry Eyes      ketotifen (ZADITOR) 0.025 % ophthalmic solution 1 drop 2 times daily as needed Itchy eyes       ranitidine (ZANTAC) 150 MG tablet Take 150 mg by mouth daily       hydrALAZINE (APRESOLINE) 50 MG tablet Take 1 tablet by mouth 3 times daily.  90 tablet 11    allopurinol (ZYLOPRIM) 100 MG tablet Take 100 mg by mouth daily       Probiotic Product (PROBIOTIC FORMULA PO) Take 10 Units by mouth 2 times daily        No current facility-administered medications for this visit. S: REASON FOR VISIT:  Mrs. Chante Julio presents today in routine follow up for her PAF and valvular heart disease. Since her last appointment here, she developed recurrent epistaxis and underwent endovascular embolization of the internal maxillary arteries. She was changed from Xarelto to Eliquis and has had no further bleeding issues. She does report swelling and weakness in her legs, and has been diagnosed with both neuropathy and spinal stenosis. She is now taking pitavastatin every other day, but wonders if it is causing weakness in her calves. She denies claudication or proximal muscle myalgias. She denies chest pain, palpitations, orthpnea, or PND. REVIEW OF SYSTEMS:     Constitutional: denies fevers, chills, night sweats, or fatigue. HEENT: denies tinnitus, or oral bleeding. Epistaxis as above. Positive for \"sinus headaches\"   Endocrine: denies temperature intolerance or weight changes   Musculoskeletal: positive for back pain, but denies other myalgias or arthralgias   Skin: she has had no recent facial rashes or peeling, and remains on prednisone   Heme/Lymph: denies unusual bruising. She has had no further bleeding since switching to Eliquis. Heart: denies chest pain or palpitations. Lower extremity edema as above. She has difficulty putting her compression stockings on, but does wear non prescription support hose. Lungs: denies orthopnea, PND, cough, or exertional dyspnea. She wheezes at times when outside. GI: denies abdominal pain, nausea, vomiting, diarrhea, or dark/bloody stools. : denies hematuria, dysuria   Psych: denies mood change, anxiety, depression. Neuro: denies syncope, numbness, or tingling. She becomes dizzy when she smells chlorine in a swimming pool or bleach. Denies positional dizziness. CARDIOVASCULAR HISTORY:     1. Obesity   2. Hypertension   3. Hyperlipidemia  A.  intolerant of Mevacor due to myalgias   4.  Paroyxsmal atrial pacemaker interrogated and adjusted by EP and amlodipine decreased. 11. Asthma    11. Anemia   12. Questionable bullous pemphigoid: later felt to be contact dermatitis per dermatology at [facility]   13. History of cholecystectomy   14. History of appendectomy   15. History of hysterectomy   16. Chronic prednisone therapy (5 mg daily for over two years) due to dermatologic condition.       SOCIAL HISTORY:   Denies tobacco, alcohol, or illicit drug use. O:  COMPLETE PHYSICAL EXAM:         /80   Pulse 60   Resp 14   Ht 5' 3\" (1.6 m)   Wt 217 lb (98.4 kg)   BMI 38.44 kg/m²       General:   Well developed well nourished elderly lady who appears younger than her stated age. No apparent acute distress. Skin                  Warm and dry, no rashes   Head & Neck:  No JVD. No carotid bruits. Mucous membranes moist.   Chest:   Respirations unlabored. Site of pacemaker left upper chest unremarkable   Lungs:   Clear to auscultation bilaterally. Heart:   Regular rate and rhythm, no murmur, rub, or gallop. Abdomen:  Soft without organomegaly or masses. Nontender, Bowel sound    Extremities:  2+ bilateral lower extremity edema. Wearing compression stockings. Feet warm to touch. Moves all extremities. Neuro:   Alert & orient x 3, no focal deficits     REVIEW OF DIAGNOSTIC TESTS:      EKG today:  atrial pacing      ASSESSMENT / DIAGNOSIS:    1. Paroxysmal atrial fibrillation    2. Symptomatic bradycardia s/p dual chamber pacemaker. Now pacemaker dependent   3. Hypertension    4. Orthostatic hypotension   5. Hyperlipidemia with past intolerance to Mevacor: now appears to be tolerating pitavastatin      TREATMENT PLAN:   1. Continue current medications   2. Lengthy discussion was held regarding her epistaxis while on Xarelto and rationale for continuing [address] with Eliquis for now.     3. She was encouraged to elevate her legs, perform range of motion exercises, and consider being measured for new compression stockings. 4. She was also encouraged to gradually increase her activity, and on the importance of physical activity and weight loss for cardiovascular health. 5. Return to see Dr. Shantell Tse in six months. She was asked to contact our office with questions or concerns prior to then. The patient's current medication list, allergies, problem list and results of all previously ordered tests were reviewed at today's visit    [address]  [facility] [address]  [phone]

## 2019-06-03 NOTE — PROGRESS NOTES
See [facility] [place] report. Remote monitoring reviewed over a 90 day period. End of 90 day monitoring period date of service 5-29-19.

## 2019-08-03 ENCOUNTER — HOSPITAL ENCOUNTER (OUTPATIENT)
Age: 84
Setting detail: OBSERVATION
Discharge: HOME | End: 2019-08-04
Attending: EMERGENCY MEDICINE | Admitting: INTERNAL MEDICINE
Payer: MEDICARE

## 2019-08-03 ENCOUNTER — APPOINTMENT (OUTPATIENT)
Dept: GENERAL RADIOLOGY | Age: 84
End: 2019-08-03
Payer: MEDICARE

## 2019-08-03 DIAGNOSIS — R07.9 CHEST PAIN, UNSPECIFIED TYPE: Primary | ICD-10-CM

## 2019-08-03 LAB
ALBUMIN SERPL-MCNC: 3.5 G/DL (ref 3.5–5.2)
ALP BLD-CCNC: 97 U/L (ref 35–104)
ALT SERPL-CCNC: 17 U/L (ref 0–32)
ANION GAP SERPL CALCULATED.3IONS-SCNC: 11 MMOL/L (ref 7–16)
AST SERPL-CCNC: 27 U/L (ref 0–31)
BACTERIA: ABNORMAL /HPF
BASOPHILS ABSOLUTE: 0.03 E9/L (ref 0–0.2)
BASOPHILS RELATIVE PERCENT: 0.4 % (ref 0–2)
BILIRUB SERPL-MCNC: 0.4 MG/DL (ref 0–1.2)
BILIRUBIN URINE: NEGATIVE
BLOOD, URINE: ABNORMAL
BUN BLDV-MCNC: 35 MG/DL (ref 8–23)
CALCIUM SERPL-MCNC: 9 MG/DL (ref 8.6–10.2)
CHLORIDE BLD-SCNC: 109 MMOL/L (ref 98–107)
CLARITY: CLEAR
CO2: 25 MMOL/L (ref 22–29)
COLOR: YELLOW
CREAT SERPL-MCNC: 1.4 MG/DL (ref 0.5–1)
EOSINOPHILS ABSOLUTE: 0.03 E9/L (ref 0.05–0.5)
EOSINOPHILS RELATIVE PERCENT: 0.4 % (ref 0–6)
GFR AFRICAN AMERICAN: 43
GFR NON-AFRICAN AMERICAN: 43 ML/MIN/1.73
GLUCOSE BLD-MCNC: 115 MG/DL (ref 74–99)
GLUCOSE URINE: NEGATIVE MG/DL
HCT VFR BLD CALC: 33.9 % (ref 34–48)
HEMOGLOBIN: 10.7 G/DL (ref 11.5–15.5)
IMMATURE GRANULOCYTES #: 0.03 E9/L
IMMATURE GRANULOCYTES %: 0.4 % (ref 0–5)
KETONES, URINE: NEGATIVE MG/DL
LACTIC ACID: 1 MMOL/L (ref 0.5–2.2)
LEUKOCYTE ESTERASE, URINE: NEGATIVE
LIPASE: 64 U/L (ref 13–60)
LYMPHOCYTES ABSOLUTE: 1.51 E9/L (ref 1.5–4)
LYMPHOCYTES RELATIVE PERCENT: 19.5 % (ref 20–42)
MCH RBC QN AUTO: 30.6 PG (ref 26–35)
MCHC RBC AUTO-ENTMCNC: 31.6 % (ref 32–34.5)
MCV RBC AUTO: 96.9 FL (ref 80–99.9)
MONOCYTES ABSOLUTE: 0.91 E9/L (ref 0.1–0.95)
MONOCYTES RELATIVE PERCENT: 11.7 % (ref 2–12)
NEUTROPHILS ABSOLUTE: 5.25 E9/L (ref 1.8–7.3)
NEUTROPHILS RELATIVE PERCENT: 67.6 % (ref 43–80)
NITRITE, URINE: NEGATIVE
PDW BLD-RTO: 13.8 FL (ref 11.5–15)
PH UA: 6 (ref 5–9)
PLATELET # BLD: 54 E9/L (ref 130–450)
PLATELET CONFIRMATION: NORMAL
PMV BLD AUTO: 13 FL (ref 7–12)
POTASSIUM SERPL-SCNC: 3.5 MMOL/L (ref 3.5–5)
PRO-BNP: 1375 PG/ML (ref 0–450)
PROTEIN UA: NEGATIVE MG/DL
RBC # BLD: 3.5 E12/L (ref 3.5–5.5)
RBC UA: ABNORMAL /HPF (ref 0–2)
SODIUM BLD-SCNC: 145 MMOL/L (ref 132–146)
SPECIFIC GRAVITY UA: 1.01 (ref 1–1.03)
TOTAL PROTEIN: 6.6 G/DL (ref 6.4–8.3)
TROPONIN: 0.04 NG/ML (ref 0–0.03)
TROPONIN: 0.05 NG/ML (ref 0–0.03)
UROBILINOGEN, URINE: 0.2 E.U./DL
WBC # BLD: 7.8 E9/L (ref 4.5–11.5)
WBC UA: ABNORMAL /HPF (ref 0–5)

## 2019-08-03 PROCEDURE — 85025 COMPLETE CBC W/AUTO DIFF WBC: CPT

## 2019-08-03 PROCEDURE — 81001 URINALYSIS AUTO W/SCOPE: CPT

## 2019-08-03 PROCEDURE — G0378 HOSPITAL OBSERVATION PER HR: HCPCS

## 2019-08-03 PROCEDURE — 84484 ASSAY OF TROPONIN QUANT: CPT

## 2019-08-03 PROCEDURE — 93005 ELECTROCARDIOGRAM TRACING: CPT | Performed by: NURSE PRACTITIONER

## 2019-08-03 PROCEDURE — 71046 X-RAY EXAM CHEST 2 VIEWS: CPT

## 2019-08-03 PROCEDURE — 80053 COMPREHEN METABOLIC PANEL: CPT

## 2019-08-03 PROCEDURE — 83690 ASSAY OF LIPASE: CPT

## 2019-08-03 PROCEDURE — 99285 EMERGENCY DEPT VISIT HI MDM: CPT

## 2019-08-03 PROCEDURE — 83880 ASSAY OF NATRIURETIC PEPTIDE: CPT

## 2019-08-03 PROCEDURE — 83605 ASSAY OF LACTIC ACID: CPT

## 2019-08-03 PROCEDURE — 36415 COLL VENOUS BLD VENIPUNCTURE: CPT

## 2019-08-03 RX ORDER — TRIAMCINOLONE ACETONIDE 1 MG/G
CREAM TOPICAL PRN
COMMUNITY
End: 2021-02-03 | Stop reason: ALTCHOICE

## 2019-08-03 RX ORDER — THIAMINE MONONITRATE (VIT B1) 100 MG
250 TABLET ORAL DAILY
Status: ON HOLD | COMMUNITY
End: 2021-06-25 | Stop reason: HOSPADM

## 2019-08-03 RX ORDER — ONDANSETRON 4 MG/1
4 TABLET, ORALLY DISINTEGRATING ORAL ONCE
Status: DISCONTINUED | OUTPATIENT
Start: 2019-08-03 | End: 2019-08-04 | Stop reason: HOSPADM

## 2019-08-03 ASSESSMENT — PAIN DESCRIPTION - PAIN TYPE: TYPE: ACUTE PAIN

## 2019-08-03 ASSESSMENT — PAIN DESCRIPTION - ORIENTATION: ORIENTATION: LEFT

## 2019-08-03 ASSESSMENT — PAIN DESCRIPTION - LOCATION: LOCATION: CHEST

## 2019-08-03 ASSESSMENT — PAIN DESCRIPTION - DESCRIPTORS: DESCRIPTORS: PRESSURE

## 2019-08-03 ASSESSMENT — PAIN SCALES - GENERAL: PAINLEVEL_OUTOF10: 6

## 2019-08-04 ENCOUNTER — APPOINTMENT (OUTPATIENT)
Dept: NUCLEAR MEDICINE | Age: 84
End: 2019-08-04
Payer: MEDICARE

## 2019-08-04 VITALS
HEART RATE: 61 BPM | DIASTOLIC BLOOD PRESSURE: 78 MMHG | OXYGEN SATURATION: 97 % | RESPIRATION RATE: 16 BRPM | HEIGHT: 63 IN | BODY MASS INDEX: 38.45 KG/M2 | WEIGHT: 217 LBS | TEMPERATURE: 98.2 F | SYSTOLIC BLOOD PRESSURE: 141 MMHG

## 2019-08-04 PROBLEM — R07.9 CHEST PAIN: Status: RESOLVED | Noted: 2019-08-03 | Resolved: 2019-08-04

## 2019-08-04 LAB
ANION GAP SERPL CALCULATED.3IONS-SCNC: -6 MMOL/L (ref 7–16)
BUN BLDV-MCNC: 32 MG/DL (ref 8–23)
CALCIUM SERPL-MCNC: 8.8 MG/DL (ref 8.6–10.2)
CHLORIDE BLD-SCNC: 114 MMOL/L (ref 98–107)
CO2: 27 MMOL/L (ref 22–29)
CREAT SERPL-MCNC: 1.4 MG/DL (ref 0.5–1)
EKG ATRIAL RATE: 64 BPM
EKG P AXIS: 72 DEGREES
EKG P-R INTERVAL: 228 MS
EKG Q-T INTERVAL: 478 MS
EKG QRS DURATION: 178 MS
EKG QTC CALCULATION (BAZETT): 493 MS
EKG R AXIS: -54 DEGREES
EKG T AXIS: 110 DEGREES
EKG VENTRICULAR RATE: 64 BPM
GFR AFRICAN AMERICAN: 43
GFR NON-AFRICAN AMERICAN: 43 ML/MIN/1.73
GLUCOSE BLD-MCNC: 99 MG/DL (ref 74–99)
LV EF: 64 %
LVEF MODALITY: NORMAL
POTASSIUM SERPL-SCNC: 3.2 MMOL/L (ref 3.5–5)
SODIUM BLD-SCNC: 135 MMOL/L (ref 132–146)
TROPONIN: 0.03 NG/ML (ref 0–0.03)
TROPONIN: 0.04 NG/ML (ref 0–0.03)

## 2019-08-04 PROCEDURE — 93010 ELECTROCARDIOGRAM REPORT: CPT | Performed by: INTERNAL MEDICINE

## 2019-08-04 PROCEDURE — 93016 CV STRESS TEST SUPVJ ONLY: CPT | Performed by: INTERNAL MEDICINE

## 2019-08-04 PROCEDURE — 3430000000 HC RX DIAGNOSTIC RADIOPHARMACEUTICAL: Performed by: RADIOLOGY

## 2019-08-04 PROCEDURE — APPSS45 APP SPLIT SHARED TIME 31-45 MINUTES: Performed by: PHYSICIAN ASSISTANT

## 2019-08-04 PROCEDURE — 6370000000 HC RX 637 (ALT 250 FOR IP): Performed by: INTERNAL MEDICINE

## 2019-08-04 PROCEDURE — G0378 HOSPITAL OBSERVATION PER HR: HCPCS

## 2019-08-04 PROCEDURE — 80048 BASIC METABOLIC PNL TOTAL CA: CPT

## 2019-08-04 PROCEDURE — 2580000003 HC RX 258: Performed by: INTERNAL MEDICINE

## 2019-08-04 PROCEDURE — 93018 CV STRESS TEST I&R ONLY: CPT | Performed by: INTERNAL MEDICINE

## 2019-08-04 PROCEDURE — 78452 HT MUSCLE IMAGE SPECT MULT: CPT

## 2019-08-04 PROCEDURE — 6360000002 HC RX W HCPCS: Performed by: INTERNAL MEDICINE

## 2019-08-04 PROCEDURE — 36415 COLL VENOUS BLD VENIPUNCTURE: CPT

## 2019-08-04 PROCEDURE — 99215 OFFICE O/P EST HI 40 MIN: CPT | Performed by: INTERNAL MEDICINE

## 2019-08-04 PROCEDURE — A9500 TC99M SESTAMIBI: HCPCS | Performed by: RADIOLOGY

## 2019-08-04 PROCEDURE — 93017 CV STRESS TEST TRACING ONLY: CPT

## 2019-08-04 PROCEDURE — 84484 ASSAY OF TROPONIN QUANT: CPT

## 2019-08-04 RX ORDER — HYDRALAZINE HYDROCHLORIDE 50 MG/1
50 TABLET, FILM COATED ORAL 3 TIMES DAILY
Status: DISCONTINUED | OUTPATIENT
Start: 2019-08-04 | End: 2019-08-04 | Stop reason: HOSPADM

## 2019-08-04 RX ORDER — CHOLECALCIFEROL (VITAMIN D3) 50 MCG
2000 TABLET ORAL DAILY
Status: DISCONTINUED | OUTPATIENT
Start: 2019-08-04 | End: 2019-08-04 | Stop reason: HOSPADM

## 2019-08-04 RX ORDER — THIAMINE MONONITRATE (VIT B1) 100 MG
100 TABLET ORAL DAILY
Status: DISCONTINUED | OUTPATIENT
Start: 2019-08-04 | End: 2019-08-04 | Stop reason: HOSPADM

## 2019-08-04 RX ORDER — VITAMIN C
1 TAB ORAL DAILY
Status: DISCONTINUED | OUTPATIENT
Start: 2019-08-04 | End: 2019-08-04 | Stop reason: HOSPADM

## 2019-08-04 RX ORDER — ALLOPURINOL 100 MG/1
100 TABLET ORAL DAILY
Status: DISCONTINUED | OUTPATIENT
Start: 2019-08-04 | End: 2019-08-04 | Stop reason: HOSPADM

## 2019-08-04 RX ORDER — FUROSEMIDE 10 MG/ML
20 INJECTION INTRAMUSCULAR; INTRAVENOUS DAILY
Status: DISCONTINUED | OUTPATIENT
Start: 2019-08-04 | End: 2019-08-04

## 2019-08-04 RX ORDER — CETIRIZINE HYDROCHLORIDE 10 MG/1
5 TABLET ORAL DAILY
Status: DISCONTINUED | OUTPATIENT
Start: 2019-08-04 | End: 2019-08-04 | Stop reason: HOSPADM

## 2019-08-04 RX ORDER — POTASSIUM CHLORIDE 20 MEQ/1
40 TABLET, EXTENDED RELEASE ORAL ONCE
Status: DISCONTINUED | OUTPATIENT
Start: 2019-08-04 | End: 2019-08-04 | Stop reason: HOSPADM

## 2019-08-04 RX ORDER — ACETAMINOPHEN 500 MG
500 TABLET ORAL EVERY 6 HOURS PRN
Status: DISCONTINUED | OUTPATIENT
Start: 2019-08-04 | End: 2019-08-04 | Stop reason: HOSPADM

## 2019-08-04 RX ORDER — OMEGA-3S/DHA/EPA/FISH OIL 300-1000MG
900 CAPSULE ORAL DAILY
Status: DISCONTINUED | OUTPATIENT
Start: 2019-08-04 | End: 2019-08-04 | Stop reason: CLARIF

## 2019-08-04 RX ORDER — SODIUM CHLORIDE 9 MG/ML
INJECTION, SOLUTION INTRAVENOUS CONTINUOUS
Status: ACTIVE | OUTPATIENT
Start: 2019-08-04 | End: 2019-08-04

## 2019-08-04 RX ORDER — LOPERAMIDE HYDROCHLORIDE 2 MG/1
2 CAPSULE ORAL PRN
Status: DISCONTINUED | OUTPATIENT
Start: 2019-08-04 | End: 2019-08-04 | Stop reason: HOSPADM

## 2019-08-04 RX ORDER — SODIUM CHLORIDE 0.9 % (FLUSH) 0.9 %
10 SYRINGE (ML) INJECTION PRN
Status: DISCONTINUED | OUTPATIENT
Start: 2019-08-04 | End: 2019-08-04 | Stop reason: HOSPADM

## 2019-08-04 RX ORDER — FAMOTIDINE 20 MG/1
20 TABLET, FILM COATED ORAL DAILY
Status: DISCONTINUED | OUTPATIENT
Start: 2019-08-04 | End: 2019-08-04 | Stop reason: HOSPADM

## 2019-08-04 RX ORDER — SIMVASTATIN 40 MG
40 TABLET ORAL NIGHTLY
Status: DISCONTINUED | OUTPATIENT
Start: 2019-08-04 | End: 2019-08-04 | Stop reason: HOSPADM

## 2019-08-04 RX ORDER — VALSARTAN 160 MG/1
160 TABLET ORAL DAILY
Status: DISCONTINUED | OUTPATIENT
Start: 2019-08-04 | End: 2019-08-04 | Stop reason: HOSPADM

## 2019-08-04 RX ORDER — METOPROLOL SUCCINATE 50 MG/1
50 TABLET, EXTENDED RELEASE ORAL 2 TIMES DAILY
Status: DISCONTINUED | OUTPATIENT
Start: 2019-08-04 | End: 2019-08-04 | Stop reason: HOSPADM

## 2019-08-04 RX ORDER — PREDNISONE 1 MG/1
5 TABLET ORAL DAILY
Status: DISCONTINUED | OUTPATIENT
Start: 2019-08-04 | End: 2019-08-04 | Stop reason: HOSPADM

## 2019-08-04 RX ORDER — AMLODIPINE BESYLATE 2.5 MG/1
2.5 TABLET ORAL DAILY
Status: DISCONTINUED | OUTPATIENT
Start: 2019-08-04 | End: 2019-08-04 | Stop reason: HOSPADM

## 2019-08-04 RX ADMIN — APIXABAN 5 MG: 5 TABLET, FILM COATED ORAL at 00:50

## 2019-08-04 RX ADMIN — Medication 34.6 MILLICURIE: at 13:05

## 2019-08-04 RX ADMIN — REGADENOSON 0.4 MG: 0.08 INJECTION, SOLUTION INTRAVENOUS at 13:04

## 2019-08-04 RX ADMIN — SODIUM CHLORIDE: 9 INJECTION, SOLUTION INTRAVENOUS at 10:45

## 2019-08-04 RX ADMIN — AMLODIPINE BESYLATE 2.5 MG: 2.5 TABLET ORAL at 09:17

## 2019-08-04 RX ADMIN — APIXABAN 5 MG: 5 TABLET, FILM COATED ORAL at 09:17

## 2019-08-04 SDOH — HEALTH STABILITY: MENTAL HEALTH: HOW OFTEN DO YOU HAVE A DRINK CONTAINING ALCOHOL?: NEVER

## 2019-08-04 ASSESSMENT — PAIN SCALES - GENERAL
PAINLEVEL_OUTOF10: 0

## 2019-08-04 ASSESSMENT — PAIN DESCRIPTION - ORIENTATION: ORIENTATION: LEFT

## 2019-08-04 ASSESSMENT — PAIN DESCRIPTION - PAIN TYPE: TYPE: ACUTE PAIN

## 2019-08-04 ASSESSMENT — PAIN DESCRIPTION - DESCRIPTORS: DESCRIPTORS: PRESSURE

## 2019-08-04 ASSESSMENT — PAIN DESCRIPTION - LOCATION: LOCATION: CHEST

## 2019-08-04 NOTE — PROCEDURES
[address]                  [address] [facility],  [facility]                              CARDIAC STRESS TEST  PATIENT NAME: Rolanda Dwyer                        :        1931  MED REC NO:   [id number]                            ROOM:       8202  ACCOUNT NO:   [de-identified]                           ADMIT DATE: 2019  PROVIDER:     Bong Ennis DO    CARDIOVASCULAR DIAGNOSTIC DEPARTMENT    DATE OF STUDY:  2019    LEXISCAN CARDIOLITE STRESS TEST    RESTING BLOOD PRESSURE:  180/86. RESTING HEART RATE:  61.    TARGET HEART RATE:  N/A. PEAK INFUSION HEART RATE:  64. PEAK INFUSION BLOOD PRESSURE:  148/60. SYMPTOMS:  None. REASON FOR TERMINATION:  Protocol completion. PROTOCOL:  She was infused with Lexiscan 0.4 mg bolus followed by  Cardiolite bolus. BASELINE EC% AV paced, 63 beats per minute. STRESS EC% AV paced. ASSESSMENT:  1. Nondiagnostic stress ECG for ischemia. 2.  Perfusion imaging is pending.         Shad Meza DO    D: 2019 13:14:52       T: 2019 13:25:50     [email or web address]  Job#: [id number]     Doc#: [id number]    CC:

## 2019-08-04 NOTE — CONSULTS
by mouth daily  5/12/17  Yes Historical Provider, MD   metoprolol succinate (TOPROL XL) 50 MG extended release tablet Take 1 tablet by mouth 2 times daily 4/17/17  Yes Tiki Swann,    Cholecalciferol (VITAMIN D) 2000 UNITS CAPS capsule Take 2,000 capsules by mouth daily   Yes Historical Provider, MD   ranitidine (ZANTAC) 150 MG tablet Take 150 mg by mouth daily    Yes Historical Provider, MD   hydrALAZINE (APRESOLINE) 50 MG tablet Take 1 tablet by mouth 3 times daily.  3/13/15  Yes Tiki Swann,    allopurinol (ZYLOPRIM) 100 MG tablet Take 100 mg by mouth daily    Yes Historical Provider, MD   triamcinolone (KENALOG) 0.1 % cream Apply topically as needed    Historical Provider, MD   acetaminophen (TYLENOL) 500 MG tablet Take 500-1,000 mg by mouth every 6 hours as needed for Pain     Historical Provider, MD   loperamide (IMODIUM) 2 MG capsule Take 2 mg by mouth as needed for Diarrhea    Historical Provider, MD   polyethyl glycol-propyl glycol 0.4-0.3 % (SYSTANE) 0.4-0.3 % ophthalmic solution 1 drop as needed for Dry Eyes    Historical Provider, MD   ketotifen (ZADITOR) 0.025 % ophthalmic solution 1 drop 2 times daily as needed Itchy eyes     Historical Provider, MD   Probiotic Product (PROBIOTIC FORMULA PO) Take 10 Units by mouth 2 times daily     Historical Provider, MD       Current Medications:    Current Facility-Administered Medications: allopurinol (ZYLOPRIM) tablet 100 mg, 100 mg, Oral, Daily  famotidine (PEPCID) tablet 20 mg, 20 mg, Oral, Daily  vitamin D capsule CAPS 4,000,000 Int'l Units, 2,000 capsule, Oral, Daily  metoprolol succinate (TOPROL XL) extended release tablet 50 mg, 50 mg, Oral, BID  predniSONE (DELTASONE) tablet 5 mg, 5 mg, Oral, Daily  amLODIPine (NORVASC) tablet 2.5 mg, 2.5 mg, Oral, Daily  vitamin B and C (TOTAL B-C) 1 tablet, 1 tablet, Oral, Daily  simvastatin (ZOCOR) tablet 40 mg, 40 mg, Oral, Nightly  loperamide (IMODIUM) capsule 2 mg, 2 mg, Oral, PRN  valsartan (DIOVAN) edema. No varicosities. Pedal pulses palpable, no clubbing or cyanosis   ABDOMEN: Soft, non-tender to light palpation. Bowel sounds present. No palpable masses no organomegaly; no abdominal bruit  MS: Good muscle strength and tone. No atrophy or abnormal movements. : Deferred  SKIN: Warm and dry no statis dermatitis or ulcers   NEURO / PSYCH: Oriented to person, place and time. Speech clear and appropriate. Follows all commands. Pleasant affect     DATA:    ECG / Tele strips: AV paced rate 64    Diagnostic:  CXR:  FINDINGS:    The heart is normal in size. Left pacemaker present.  No focal  airspace opacity. There is no pleural effusion. There is no  pneumothorax. No free air beneath diaphragm.      Impression:         No airspace opacities or pleural effusion. No intake or output data in the 24 hours ending 08/04/19 0931    Labs:   CBC:   Recent Labs     08/03/19 1753   WBC 7.8   HGB 10.7*   HCT 33.9*   PLT 54*     BMP:   Recent Labs     08/03/19 1753 08/04/19  0507    135   K 3.5 3.2*   CO2 25 27   BUN 35* 32*   CREATININE 1.4* 1.4*   LABGLOM 43 43   CALCIUM 9.0 8.8     Mag: No results for input(s): MG in the last 72 hours. Phos: No results for input(s): PHOS in the last 72 hours. TSH: No results for input(s): TSH in the last 72 hours. HgA1c: No results found for: LABA1C  No results found for: EAG  proBNP:   Recent Labs     08/03/19  1753   PROBNP 1,375*     PT/INR: No results for input(s): PROTIME, INR in the last 72 hours. APTT:No results for input(s): APTT in the last 72 hours. CARDIAC ENZYMES:  Recent Labs     08/03/19 2016 08/04/19  0028 08/04/19  0507   TROPONINI 0.04* 0.03 0.04*     FASTING LIPID PANEL:  Lab Results   Component Value Date    CHOL 144 03/12/2015    HDL 48 03/12/2015    LDLCALC 78 03/12/2015    TRIG 90 03/12/2015     LIVER PROFILE:  Recent Labs     08/03/19  1753   AST 27   ALT 17   LABALBU 3.5     A & P are to follow as per Dr. Yee Gamboa.  Jacque Delcid bowel sounds. Neuro: Full ROM X 4, EOMI, no tremors. EXT: No bilateral lower extremity edema  Skin: warm, dry, intact. Good turgor. Psych: A&O x 3, normal behavior, not anxious. Patient seen and examined. Chart, labs & data reviewed. A:  1. Atypical chest discomfort. Negative heart catheterization 2015 and negative stress test in 2017.  2. Acute on chronic renal insufficiency. Probable dehydration. 3. Chronically elevated troponins. 4. Paroxysmal atrial fibrillation on Eliquis. 5. Pacemaker. 6. Asthma. 7. Hypertension. 8. Hypercholesterolemia. 9. Possible bicuspid aortic valve. 10. Spinal stenosis. Rec:  1. Lexiscan Cardiolite stress test.  2. I will stop the Lasix. 3. Okay for discharge per cardiology if the stress test is negative.     Electronically signed by Rosa Almanza DO on 8/4/2019 at 10:13 AM

## 2019-08-26 ENCOUNTER — TELEPHONE (OUTPATIENT)
Dept: CARDIOLOGY CLINIC | Age: 84
End: 2019-08-26

## 2019-08-28 ENCOUNTER — NURSE ONLY (OUTPATIENT)
Dept: NON INVASIVE DIAGNOSTICS | Age: 84
End: 2019-08-28
Payer: MEDICARE

## 2019-08-28 DIAGNOSIS — Z95.0 PACEMAKER: Primary | ICD-10-CM

## 2019-08-28 DIAGNOSIS — I48.0 PAF (PAROXYSMAL ATRIAL FIBRILLATION) (HCC): ICD-10-CM

## 2019-08-28 PROCEDURE — 93296 REM INTERROG EVL PM/IDS: CPT | Performed by: INTERNAL MEDICINE

## 2019-08-28 PROCEDURE — 93294 REM INTERROG EVL PM/LDLS PM: CPT | Performed by: INTERNAL MEDICINE

## 2019-09-16 ENCOUNTER — TELEPHONE (OUTPATIENT)
Dept: NON INVASIVE DIAGNOSTICS | Age: 84
End: 2019-09-16

## 2019-11-11 RX ORDER — VALSARTAN 160 MG/1
TABLET ORAL
Qty: 90 TABLET | Refills: 3 | Status: SHIPPED | OUTPATIENT
Start: 2019-11-11 | End: 2021-06-23 | Stop reason: ALTCHOICE

## 2019-11-19 ENCOUNTER — OFFICE VISIT (OUTPATIENT)
Dept: NON INVASIVE DIAGNOSTICS | Age: 84
End: 2019-11-19
Payer: MEDICARE

## 2019-11-19 VITALS
DIASTOLIC BLOOD PRESSURE: 88 MMHG | BODY MASS INDEX: 36.86 KG/M2 | HEART RATE: 60 BPM | RESPIRATION RATE: 20 BRPM | HEIGHT: 63 IN | SYSTOLIC BLOOD PRESSURE: 136 MMHG | WEIGHT: 208 LBS

## 2019-11-19 DIAGNOSIS — Z95.0 PACEMAKER: Primary | Chronic | ICD-10-CM

## 2019-11-19 PROCEDURE — G8417 CALC BMI ABV UP PARAM F/U: HCPCS | Performed by: INTERNAL MEDICINE

## 2019-11-19 PROCEDURE — 93280 PM DEVICE PROGR EVAL DUAL: CPT | Performed by: INTERNAL MEDICINE

## 2019-11-19 PROCEDURE — 99214 OFFICE O/P EST MOD 30 MIN: CPT | Performed by: INTERNAL MEDICINE

## 2019-11-19 PROCEDURE — G8484 FLU IMMUNIZE NO ADMIN: HCPCS | Performed by: INTERNAL MEDICINE

## 2019-11-19 PROCEDURE — G8427 DOCREV CUR MEDS BY ELIG CLIN: HCPCS | Performed by: INTERNAL MEDICINE

## 2019-11-19 PROCEDURE — 1036F TOBACCO NON-USER: CPT | Performed by: INTERNAL MEDICINE

## 2019-11-19 PROCEDURE — 1090F PRES/ABSN URINE INCON ASSESS: CPT | Performed by: INTERNAL MEDICINE

## 2019-11-19 PROCEDURE — G8598 ASA/ANTIPLAT THER USED: HCPCS | Performed by: INTERNAL MEDICINE

## 2019-11-19 PROCEDURE — 1123F ACP DISCUSS/DSCN MKR DOCD: CPT | Performed by: INTERNAL MEDICINE

## 2019-11-19 PROCEDURE — 4040F PNEUMOC VAC/ADMIN/RCVD: CPT | Performed by: INTERNAL MEDICINE

## 2019-11-19 ASSESSMENT — ENCOUNTER SYMPTOMS
COUGH: 0
SHORTNESS OF BREATH: 0

## 2019-11-27 ENCOUNTER — NURSE ONLY (OUTPATIENT)
Dept: NON INVASIVE DIAGNOSTICS | Age: 84
End: 2019-11-27
Payer: MEDICARE

## 2019-11-27 DIAGNOSIS — I48.0 PAF (PAROXYSMAL ATRIAL FIBRILLATION) (HCC): ICD-10-CM

## 2019-11-27 DIAGNOSIS — Z95.0 PACEMAKER: Primary | ICD-10-CM

## 2019-11-27 PROCEDURE — 93296 REM INTERROG EVL PM/IDS: CPT | Performed by: INTERNAL MEDICINE

## 2019-11-27 PROCEDURE — 93294 REM INTERROG EVL PM/LDLS PM: CPT | Performed by: INTERNAL MEDICINE

## 2019-12-11 ENCOUNTER — TELEPHONE (OUTPATIENT)
Dept: NON INVASIVE DIAGNOSTICS | Age: 84
End: 2019-12-11

## 2020-02-26 ENCOUNTER — NURSE ONLY (OUTPATIENT)
Dept: NON INVASIVE DIAGNOSTICS | Age: 85
End: 2020-02-26
Payer: MEDICARE

## 2020-02-26 PROCEDURE — 93294 REM INTERROG EVL PM/LDLS PM: CPT | Performed by: INTERNAL MEDICINE

## 2020-02-26 PROCEDURE — 93296 REM INTERROG EVL PM/IDS: CPT | Performed by: INTERNAL MEDICINE

## 2020-04-13 ENCOUNTER — TELEPHONE (OUTPATIENT)
Dept: NON INVASIVE DIAGNOSTICS | Age: 85
End: 2020-04-13

## 2020-05-27 ENCOUNTER — NURSE ONLY (OUTPATIENT)
Dept: NON INVASIVE DIAGNOSTICS | Age: 85
End: 2020-05-27
Payer: MEDICARE

## 2020-05-27 PROCEDURE — 93296 REM INTERROG EVL PM/IDS: CPT | Performed by: INTERNAL MEDICINE

## 2020-05-27 PROCEDURE — 93294 REM INTERROG EVL PM/LDLS PM: CPT | Performed by: INTERNAL MEDICINE

## 2020-08-26 ENCOUNTER — NURSE ONLY (OUTPATIENT)
Dept: NON INVASIVE DIAGNOSTICS | Age: 85
End: 2020-08-26
Payer: MEDICARE

## 2020-08-26 PROCEDURE — 93294 REM INTERROG EVL PM/LDLS PM: CPT | Performed by: INTERNAL MEDICINE

## 2020-08-26 PROCEDURE — 93296 REM INTERROG EVL PM/IDS: CPT | Performed by: INTERNAL MEDICINE

## 2020-08-31 NOTE — PROGRESS NOTES
See [facility] [place] report. Remote monitoring reviewed over a 90 day period. End of 90 day monitoring period date of service 2020    [place], [place] [id number].  DOI: 5/6/15  DDDR  ppm.  P wave paced mV  Impedance: 334ohms   Threshold: NA  RV R wave: paced Impedance: 416 ohms   Threshold: 0.9 V @ 0.4 ms  Pacing: A: 89%  RV:99%     Battery Voltage/Longevity: 2.5 years    Arrhythmias:  -mult ATRs lastin sec- 2 min  -AF burden 1%    Medications:  -Diovan 160 mg QD  -Eliquis 5 mg BID (ser cr 1.4; 98.9 kg - 2019)  -Norvasc 2.5 mg QD  -Toprol XL 50 mg BID  -Hydralazine 50 mg TID    Plan:   -91 day remote transmission  -OV recall (scheduling) overdue      TERESA Flaherty-CNP, [facility]

## 2020-11-25 ENCOUNTER — NURSE ONLY (OUTPATIENT)
Dept: NON INVASIVE DIAGNOSTICS | Age: 85
End: 2020-11-25
Payer: MEDICARE

## 2020-11-25 PROCEDURE — 93294 REM INTERROG EVL PM/LDLS PM: CPT | Performed by: INTERNAL MEDICINE

## 2020-11-25 PROCEDURE — 93296 REM INTERROG EVL PM/IDS: CPT | Performed by: INTERNAL MEDICINE

## 2020-12-10 NOTE — PROGRESS NOTES
See [facility] [place] report. Remote monitoring reviewed over a 90 day period. End of 90 day monitoring period date of service 11-.

## 2021-02-01 NOTE — PROGRESS NOTES
Electrophysiology Outpatient Progress Note    Max Taveras  5/26/1931  Date of Service: 2/3/21   Referring Provider/PCP: Alok Flood DO   Cardiology: Faisal White MD  Electrophysiologist: Nixon Pulliam DO     Patient Active Problem List    Diagnosis Date Noted    Epistaxis 01/14/2019    Near syncope 04/15/2017    HTN (hypertension), benign 04/15/2017    Pacemaker 04/15/2017    Pulmonary HTN ([facility] [address] 11/11/2016    LVH (left ventricular hypertrophy) 11/11/2016    PAF (paroxysmal atrial fibrillation) ([facility] [address]      Overview Note:     Xarelto held d/t epistaxis requiring cautery  Now on Eliquis 5 mg BID      Mixed hyperlipidemia 03/11/2015    Asthma 03/11/2015       Current Outpatient Medications   Medication Sig Dispense Refill    losartan (COZAAR) 50 MG tablet take 1 tablet by mouth once daily      ESTRACE VAGINAL 0.1 MG/GM vaginal cream apply 1 gram TWICE WEEKLY      valsartan (DIOVAN) 160 MG tablet TAKE 1 TABLET BY MOUTH DAILY 90 tablet 3    vitamin B-1 (THIAMINE) 100 MG tablet Take 100 mg by mouth daily      ELIQUIS 5 MG TABS tablet Take 5 mg by mouth 2 times daily       acetaminophen (TYLENOL) 500 MG tablet Take 500-1,000 mg by mouth every 6 hours as needed for Pain       pitavastatin (LIVALO) 2 MG TABS tablet Take 2 mg by mouth every other day       loperamide (IMODIUM) 2 MG capsule Take 2 mg by mouth as needed for Diarrhea      b complex vitamins capsule Take 1 capsule by mouth daily      Omega-3 Fatty Acids (OMEGA-3 FISH OIL) 300 MG CAPS Take 900 mg by mouth daily       amLODIPine (NORVASC) 2.5 MG tablet Take 1 tablet by mouth daily 30 tablet 11    Coenzyme Q10 (COQ-10) 100 MG CAPS Take 1 capsule by mouth daily      predniSONE (DELTASONE) 5 MG tablet Take 5 mg by mouth daily       metoprolol succinate (TOPROL XL) 50 MG extended release tablet Take 1 tablet by mouth 2 times daily 30 tablet 11    Cholecalciferol (VITAMIN D) 2000 UNITS CAPS capsule Take 2,000 Units by mouth daily  polyethyl glycol-propyl glycol 0.4-0.3 % (SYSTANE) 0.4-0.3 % ophthalmic solution 1 drop as needed for Dry Eyes      hydrALAZINE (APRESOLINE) 50 MG tablet Take 1 tablet by mouth 3 times daily. 90 tablet 11    allopurinol (ZYLOPRIM) 100 MG tablet Take 100 mg by mouth daily       Probiotic Product (PROBIOTIC FORMULA PO) Take 10 Units by mouth 2 times daily       triamcinolone (KENALOG) 0.1 % cream Apply topically as needed      fexofenadine (ALLEGRA ALLERGY) 180 MG tablet Take 180 mg by mouth daily      ketotifen (ZADITOR) 0.025 % ophthalmic solution 1 drop 2 times daily as needed Itchy eyes       ranitidine (ZANTAC) 150 MG tablet Take 150 mg by mouth daily        No current facility-administered medications for this visit. Allergies   Allergen Reactions    Amlodipine     Ampicillin Itching    Iodine Itching       SUBJECTIVE:    5/21/19: Denia Rodríguez presents to the office today for the management of: symptomatic bradycardia, recurrent syncope, PAF with SVR, and pacemaker in situ. She presents today for her 6 month office follow-up. From an EP perspective she is feeling well. She has had short episodes of PAF lasting 10 sec -20 min. In January she underwent endovascular embolization of bilateral internal maxillary arteries by Dr Diana Sumner secondary to recurrent epstaxis on Xarelto. Her Xarelto was discontinued and she was started on Eliquis 5 mg BID with no bleeding issues. Today she complains of nausea and not feeling well starting ~10 minutes prior to leaving home. She denies fever and/or chills, vomiting/diarrhea or other associated symptoms. If her symptoms continue she will notify Dr Genie Domínguez office. She follows with Dr Shan Rodriguez from a cardiology perspective and was dosing well at her last visit 9/13/18. She is enrolled in [facility] remote monitoring. She denies angina, dyspnea, lightheadedness, near syncope/syncope, orthopnea and PND. 11/19/19: Since her last office visit Ms. Yates Both states she feels well. She had a stress test in August 2019 which was unremarkable. She is enrolled in [facility] remote monitoring. She denies angina, dyspnea, lightheadedness, near syncope/syncope, orthopnea and PND.    02/03/2021 Saran Patel presents for outpatient EP follow-up and device check accompanied by her daughter. Ms Pereras Donita Krishnan is followed remotely through our office clinic. Her device interrogations have been stable with an AF burden <1%. She remains on Eliquis 5 mg BID with no reported bleeding issues. She follows with Dr Monica Kent from a cardiology perspective. Today she is feeling well from an EP perspective. She does complain of fatigue and mild lightheadedness. In discussion with her, she has not eaten or hydrated. She is also doing a \"fasting to lose weight. \" We discussed the importance of maintaining hydration, not skipping meals, and limiting her caffeine consumption. Her overall AF burden today is 2% with the longest episode ~10 hours; she was asymptomatic. She denies angina, dyspnea, syncope, orthopnea and PND. Review of Systems   Respiratory: Negative. Cardiovascular: Negative. Neurological: Negative. All other systems reviewed and are negative. PHYSICAL EXAM:  Vitals:    02/03/21 1024   BP: 102/74   Pulse: 60   Resp: 18   Weight: 188 lb 12.8 oz (85.6 kg)   Height: 5' 3\" (1.6 m)     Constitutional: Oriented to person, place, and time. Well-developed and cooperative. Head: Normocephalic and atraumatic. Eyes: Conjunctivae are normal. Cardiovascular: S1 normal, S2 normal and intact distal pulses. A regular rhythm present. PMI is not displaced. Pulmonary/Chest: Effort normal and breath sounds normal. No respiratory distress. Abdominal: Soft. No tenderness. Musculoskeletal: Normal range of motion of all extremities, no muscle weakness. Neurological: Alert and oriented to person, place, and time. Gait normal.   Skin: Skin is warm and dry.  No bruising, no ecchymosis and no rash noted. Extremity: No clubbing or cyanosis. No edema. Psychiatric: Normal mood and affect. Thought content normal.  Pacemaker site: stable, well healed, no evidence of erosion. Last Stress Test 8/3/19:  Impression   1. No reversible perfusion defect. 2. Ejection fraction is 64 %. 3. Mild inferoseptal hypokinesis.           Pacemaker Interrogation: (dependent) 02/03/21  Qasim Lomax [place] [id number]. DOI: 5/6/15  DDDR  ppm.  P wave 3.5mV  Impedance: 359ohms   Threshold:0.4 V @0.5 ms  RV R wave: paced Impedance:472 ohms   Threshold:0.8 V @ 0.4 ms  Pacing: A: 88%  RV:99%     Battery Voltage/Longevity: 2.5 years    Arrhythmias: AF burden 2%  Programming: reprogrammed sensitivity due to undersensing    I have independently reviewed all of the ECGs and rhythm strips per above. I have personally reviewed the laboratory, cardiac diagnostic and radiographic testing as outlined above. I have reviewed previous records noted in [address] [facility]. Impressions:     1. Syncope  - recurrent  - [id number]- VASc score: 5  - [facility] 3/12/15: normal coronaries. LVEF 65%  - Echocardiogram(4/17): Normal LVEF, no significant valvular disease. Stage I DD. 2. Pacemaker in situ  - BSCI  - DOI: 5/6/15  - Normal function    3. HTN    4. HLD  Lab Results   Component Value Date    CHOL 144 03/12/2015     Lab Results   Component Value Date    TRIG 90 03/12/2015     Lab Results   Component Value Date    HDL 48 03/12/2015     Lab Results   Component Value Date    LDLCALC 78 03/12/2015     Lab Results   Component Value Date    LABVLDL 18 03/12/2015     No results found for: CHOLHDLRATIO    5. Asthma    6. Gout    7. Paroxysmal AF  - currently in SR  - Eliquis OAC 5 mg BID  - AF burden 2%     8. Recurrent epistaxis  - on Xarleto  - 1/14/19: S/p  endovascular embolization of bilateral internal maxillary arteries by Dr Herlinda Guadalupe   - now on Eliquis with no recurrent bleeding    9.  Sick sinus syndrome  - s/p PPM in situ      Summary/Plan:     Ms Tejeda's pacemaker function remains stable and programmed accordingly based on the above interrogation. She is on Eliquis 5 mg BID dosing for stroke risk reduction. Given her age, if her serum creatinine increases to  >/= 1.5 mg/dL or weight reduction </= 60 kg, Eliquis should be reduced to 2.5 mg BID. 1. No changes were made in her medications today. 2. 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 3. She was asked to call the office with concerns prior to follow-up. Thank you for allowing me to participate in their care. I have spent a total of 30 minutes with the patient and her family reviewing the above stated recommendations.  And a total of >50% of that time involved face-to-face time providing counseling and or coordination of care with the other providers    ALEC Mckinney, [address] [facility]        CC: Dr Singh Barfield

## 2021-02-03 ENCOUNTER — OFFICE VISIT (OUTPATIENT)
Dept: NON INVASIVE DIAGNOSTICS | Age: 86
End: 2021-02-03
Payer: MEDICARE

## 2021-02-03 VITALS
BODY MASS INDEX: 33.45 KG/M2 | HEART RATE: 60 BPM | DIASTOLIC BLOOD PRESSURE: 74 MMHG | WEIGHT: 188.8 LBS | HEIGHT: 63 IN | SYSTOLIC BLOOD PRESSURE: 102 MMHG | RESPIRATION RATE: 18 BRPM

## 2021-02-03 DIAGNOSIS — R55 NEAR SYNCOPE: ICD-10-CM

## 2021-02-03 DIAGNOSIS — Z95.0 PACEMAKER: Primary | ICD-10-CM

## 2021-02-03 PROCEDURE — G8484 FLU IMMUNIZE NO ADMIN: HCPCS | Performed by: NURSE PRACTITIONER

## 2021-02-03 PROCEDURE — 1036F TOBACCO NON-USER: CPT | Performed by: NURSE PRACTITIONER

## 2021-02-03 PROCEDURE — G8427 DOCREV CUR MEDS BY ELIG CLIN: HCPCS | Performed by: NURSE PRACTITIONER

## 2021-02-03 PROCEDURE — 1123F ACP DISCUSS/DSCN MKR DOCD: CPT | Performed by: NURSE PRACTITIONER

## 2021-02-03 PROCEDURE — 1090F PRES/ABSN URINE INCON ASSESS: CPT | Performed by: NURSE PRACTITIONER

## 2021-02-03 PROCEDURE — 93280 PM DEVICE PROGR EVAL DUAL: CPT | Performed by: NURSE PRACTITIONER

## 2021-02-03 PROCEDURE — 4040F PNEUMOC VAC/ADMIN/RCVD: CPT | Performed by: NURSE PRACTITIONER

## 2021-02-03 PROCEDURE — G8417 CALC BMI ABV UP PARAM F/U: HCPCS | Performed by: NURSE PRACTITIONER

## 2021-02-03 PROCEDURE — 99214 OFFICE O/P EST MOD 30 MIN: CPT | Performed by: NURSE PRACTITIONER

## 2021-02-03 RX ORDER — LOSARTAN POTASSIUM 50 MG/1
TABLET ORAL
COMMUNITY
Start: 2020-12-13

## 2021-02-03 RX ORDER — ESTRADIOL 0.1 MG/G
CREAM VAGINAL
COMMUNITY
Start: 2020-12-16 | End: 2022-01-07 | Stop reason: ALTCHOICE

## 2021-02-03 ASSESSMENT — ENCOUNTER SYMPTOMS: RESPIRATORY NEGATIVE: 1

## 2021-02-08 ENCOUNTER — TELEPHONE (OUTPATIENT)
Dept: CARDIOLOGY CLINIC | Age: 86
End: 2021-02-08

## 2021-02-08 NOTE — TELEPHONE ENCOUNTER
Pt called hoping the schedule an appointment. She said she was canceled and never called back.  I put her on the recall list.

## 2021-02-24 ENCOUNTER — NURSE ONLY (OUTPATIENT)
Dept: NON INVASIVE DIAGNOSTICS | Age: 86
End: 2021-02-24
Payer: MEDICARE

## 2021-02-24 DIAGNOSIS — I48.0 PAF (PAROXYSMAL ATRIAL FIBRILLATION) (HCC): ICD-10-CM

## 2021-02-24 DIAGNOSIS — Z95.0 PACEMAKER: Primary | ICD-10-CM

## 2021-02-24 PROCEDURE — 93296 REM INTERROG EVL PM/IDS: CPT | Performed by: INTERNAL MEDICINE

## 2021-02-24 PROCEDURE — 93294 REM INTERROG EVL PM/LDLS PM: CPT | Performed by: INTERNAL MEDICINE

## 2021-03-04 ENCOUNTER — OFFICE VISIT (OUTPATIENT)
Dept: CARDIOLOGY CLINIC | Age: 86
End: 2021-03-04
Payer: MEDICARE

## 2021-03-04 VITALS
DIASTOLIC BLOOD PRESSURE: 70 MMHG | HEIGHT: 63 IN | SYSTOLIC BLOOD PRESSURE: 136 MMHG | HEART RATE: 72 BPM | BODY MASS INDEX: 33.49 KG/M2 | RESPIRATION RATE: 16 BRPM | WEIGHT: 189 LBS

## 2021-03-04 DIAGNOSIS — I51.7 LVH (LEFT VENTRICULAR HYPERTROPHY): Chronic | ICD-10-CM

## 2021-03-04 DIAGNOSIS — I48.0 PAF (PAROXYSMAL ATRIAL FIBRILLATION) (HCC): Primary | Chronic | ICD-10-CM

## 2021-03-04 DIAGNOSIS — I10 HTN (HYPERTENSION), BENIGN: Chronic | ICD-10-CM

## 2021-03-04 DIAGNOSIS — I27.20 PULMONARY HTN (HCC): Chronic | ICD-10-CM

## 2021-03-04 PROCEDURE — 4040F PNEUMOC VAC/ADMIN/RCVD: CPT | Performed by: INTERNAL MEDICINE

## 2021-03-04 PROCEDURE — 1123F ACP DISCUSS/DSCN MKR DOCD: CPT | Performed by: INTERNAL MEDICINE

## 2021-03-04 PROCEDURE — G8427 DOCREV CUR MEDS BY ELIG CLIN: HCPCS | Performed by: INTERNAL MEDICINE

## 2021-03-04 PROCEDURE — 1036F TOBACCO NON-USER: CPT | Performed by: INTERNAL MEDICINE

## 2021-03-04 PROCEDURE — 1090F PRES/ABSN URINE INCON ASSESS: CPT | Performed by: INTERNAL MEDICINE

## 2021-03-04 PROCEDURE — G8484 FLU IMMUNIZE NO ADMIN: HCPCS | Performed by: INTERNAL MEDICINE

## 2021-03-04 PROCEDURE — G8417 CALC BMI ABV UP PARAM F/U: HCPCS | Performed by: INTERNAL MEDICINE

## 2021-03-04 PROCEDURE — 93000 ELECTROCARDIOGRAM COMPLETE: CPT | Performed by: INTERNAL MEDICINE

## 2021-03-04 PROCEDURE — 99213 OFFICE O/P EST LOW 20 MIN: CPT | Performed by: INTERNAL MEDICINE

## 2021-03-04 RX ORDER — LORATADINE 10 MG/1
10 TABLET ORAL PRN
Status: ON HOLD | COMMUNITY
End: 2021-06-23

## 2021-03-04 NOTE — PROGRESS NOTES
OFFICE VISIT     PRIMARY CARE PHYSICIAN:      Lance Hawthorne DO       ALLERGIES / SENSITIVITIES:        Allergies   Allergen Reactions    Amlodipine     Ampicillin Itching    Iodine Itching          REVIEWED MEDICATIONS:        Current Outpatient Medications:     IRON, FERROUS SULFATE, PO, Take 250 mg by mouth daily, Disp: , Rfl:     loratadine (CLARITIN) 10 MG tablet, Take 10 mg by mouth as needed, Disp: , Rfl:     losartan (COZAAR) 50 MG tablet, take 1 tablet by mouth once daily, Disp: , Rfl:     ESTRACE VAGINAL 0.1 MG/GM vaginal cream, apply 1 gram TWICE WEEKLY, Disp: , Rfl:     valsartan (DIOVAN) 160 MG tablet, TAKE 1 TABLET BY MOUTH DAILY, Disp: 90 tablet, Rfl: 3    vitamin B-1 (THIAMINE) 100 MG tablet, Take 100 mg by mouth daily, Disp: , Rfl:     ELIQUIS 5 MG TABS tablet, Take 5 mg by mouth 2 times daily , Disp: , Rfl:     acetaminophen (TYLENOL) 500 MG tablet, Take 500-1,000 mg by mouth every 6 hours as needed for Pain , Disp: , Rfl:     pitavastatin (LIVALO) 2 MG TABS tablet, Take 2 mg by mouth once a week , Disp: , Rfl:     loperamide (IMODIUM) 2 MG capsule, Take 2 mg by mouth as needed for Diarrhea, Disp: , Rfl:     b complex vitamins capsule, Take 1 capsule by mouth daily, Disp: , Rfl:     Omega-3 Fatty Acids (OMEGA-3 FISH OIL) 300 MG CAPS, Take 900 mg by mouth daily , Disp: , Rfl:     amLODIPine (NORVASC) 2.5 MG tablet, Take 1 tablet by mouth daily, Disp: 30 tablet, Rfl: 11    Coenzyme Q10 (COQ-10) 100 MG CAPS, Take 1 capsule by mouth daily, Disp: , Rfl:     metoprolol succinate (TOPROL XL) 50 MG extended release tablet, Take 1 tablet by mouth 2 times daily, Disp: 30 tablet, Rfl: 11    Cholecalciferol (VITAMIN D) 2000 UNITS CAPS capsule, Take 2,000 Units by mouth daily , Disp: , Rfl:     polyethyl glycol-propyl glycol 0.4-0.3 % (SYSTANE) 0.4-0.3 % ophthalmic solution, 1 drop as needed for Dry Eyes, Disp: , Rfl:     hydrALAZINE (APRESOLINE) 50 MG tablet, Take 1 tablet by mouth 3 times daily. , Disp: 90 tablet, Rfl: 11    allopurinol (ZYLOPRIM) 100 MG tablet, Take 100 mg by mouth daily , Disp: , Rfl:     Probiotic Product (PROBIOTIC FORMULA PO), Take 10 Units by mouth 2 times daily , Disp: , Rfl:     fexofenadine (ALLEGRA ALLERGY) 180 MG tablet, Take 180 mg by mouth daily, Disp: , Rfl:     predniSONE (DELTASONE) 5 MG tablet, Take 5 mg by mouth daily , Disp: , Rfl:     ketotifen (ZADITOR) 0.025 % ophthalmic solution, 1 drop 2 times daily as needed Itchy eyes , Disp: , Rfl:       S: REASON FOR VISIT:       Chief Complaint   Patient presents with    Atrial Fibrillation     over due ov; no c/o     Hypertension    Hyperlipidemia          History of Present Illness:       Office Visit for follow up of A fib, HTN, pacemaker, Pulmonary HTN   80 yr old Chris Brown with history of PAF, Pulmonary HTN, hypertension, pacemaker came for f/u visit    She is active at home   Her Pacer interrogated by [employer] EP   No hospitalizations or surgeries since last visit   Patient is compliant with all medications   Joe any exertional chest pain or short of breath   No palpitations, dizzy or syncope. No PND or orthopnea            Past Medical History:   Diagnosis Date    Anemia     Asthma     Atrial fibrillation (HCC)     [facility] retinopathy ([facility] [address]     Gout     Hyperlipidemia     Hypertension     MGUS (monoclonal gammopathy of unknown significance)     Mild tricuspid regurgitation 3/11/15    PAC (premature atrial contraction)     Pemphigoid     Pulmonary hypertension ([address] 3/11/15    mild    Symptomatic bradycardia             Past Surgical History:   Procedure Laterality Date    APPENDECTOMY      CARDIAC CATHETERIZATION  3/12/2015    Dr Josesito Keith  05/06/2015    D-PPM  ([facility])   GEMMA    CATARACT REMOVAL      CHOLECYSTECTOMY      HYSTERECTOMY          No family history on file.        Social History     Tobacco Use    Smoking status: Never Smoker    Smokeless Reviewed, A/V paced              A/P:   ASSESSMENT / PLAN:    Manuel Maldonado was seen today for atrial fibrillation, hypertension and hyperlipidemia. Follows with [facility]    Diagnoses and all orders for this visit:    PAF ([facility]oxysmal atrial fibrillation) ([facility] [address] [facility] [address] [place] [address] Road  -     EKG 12 Lead    HTN (hypertension), benign - Controlled on current Meds  -     EKG 12 Lead    S/p Pacemaker -- May 2015, Normal Fn - Follows with [facility]    Pulmonary HTN ([facility] [address] - Stable    LVH (left ventricular hypertrophy)     Dyslipidemia - On Statin    Preventive Cardiology: Low cholesterol diet, regular exercise as tolerate, and gradual weight loss discussed. Monitor BP and heart rates. Above recommendations discussed with her. All questions answered about cardiac diagnoses and cardiac medications. Continue current medications. Compliance with medications and f/u with all physicians discussed. Risk factor modification based on risk profile discussed. Call if any exertional chest pain, short of breath, dizzy or palpitations   Follow up in 9 months or earlier if needed.          [facility]  [address], [place], [address] [place] [address]  [phone]

## 2021-03-10 NOTE — PROGRESS NOTES
See [employer] [place] report. Remote monitoring reviewed over a 90 day period.   End of 90 day monitoring period date of service 03/10/2021

## 2021-04-08 ENCOUNTER — IMMUNIZATION (OUTPATIENT)
Dept: PRIMARY CARE CLINIC | Age: 86
End: 2021-04-08
Payer: MEDICARE

## 2021-04-08 PROCEDURE — 91301 COVID-19, MODERNA VACCINE 100MCG/0.5ML DOSE: CPT | Performed by: NURSE PRACTITIONER

## 2021-04-08 PROCEDURE — 0011A COVID-19, MODERNA VACCINE 100MCG/0.5ML DOSE: CPT | Performed by: NURSE PRACTITIONER

## 2021-05-06 ENCOUNTER — IMMUNIZATION (OUTPATIENT)
Dept: PRIMARY CARE CLINIC | Age: 86
End: 2021-05-06
Payer: MEDICARE

## 2021-05-06 PROCEDURE — 0012A COVID-19, MODERNA VACCINE 100MCG/0.5ML DOSE: CPT | Performed by: NURSE PRACTITIONER

## 2021-05-06 PROCEDURE — 91301 COVID-19, MODERNA VACCINE 100MCG/0.5ML DOSE: CPT | Performed by: NURSE PRACTITIONER

## 2021-06-23 ENCOUNTER — APPOINTMENT (OUTPATIENT)
Dept: ULTRASOUND IMAGING | Age: 86
End: 2021-06-23
Payer: COMMERCIAL

## 2021-06-23 ENCOUNTER — HOSPITAL ENCOUNTER (OUTPATIENT)
Age: 86
Setting detail: OBSERVATION
Discharge: HOME | End: 2021-06-25
Attending: EMERGENCY MEDICINE | Admitting: INTERNAL MEDICINE
Payer: COMMERCIAL

## 2021-06-23 ENCOUNTER — APPOINTMENT (OUTPATIENT)
Dept: CT IMAGING | Age: 86
End: 2021-06-23
Payer: COMMERCIAL

## 2021-06-23 ENCOUNTER — APPOINTMENT (OUTPATIENT)
Dept: GENERAL RADIOLOGY | Age: 86
End: 2021-06-23
Payer: COMMERCIAL

## 2021-06-23 DIAGNOSIS — I63.9 ACUTE CVA (CEREBROVASCULAR ACCIDENT) (HCC): Primary | ICD-10-CM

## 2021-06-23 LAB
ANION GAP SERPL CALCULATED.3IONS-SCNC: 8 MMOL/L (ref 7–16)
BASOPHILS ABSOLUTE: 0.02 E9/L (ref 0–0.2)
BASOPHILS RELATIVE PERCENT: 0.2 % (ref 0–2)
BILIRUBIN URINE: NEGATIVE
BLOOD, URINE: NEGATIVE
BUN BLDV-MCNC: 24 MG/DL (ref 6–23)
CALCIUM SERPL-MCNC: 9.2 MG/DL (ref 8.6–10.2)
CHLORIDE BLD-SCNC: 107 MMOL/L (ref 98–107)
CLARITY: CLEAR
CO2: 28 MMOL/L (ref 22–29)
COLOR: NORMAL
CREAT SERPL-MCNC: 0.9 MG/DL (ref 0.5–1)
EKG ATRIAL RATE: 60 BPM
EKG P-R INTERVAL: 228 MS
EKG Q-T INTERVAL: 508 MS
EKG QRS DURATION: 180 MS
EKG QTC CALCULATION (BAZETT): 508 MS
EKG R AXIS: -63 DEGREES
EKG T AXIS: 76 DEGREES
EKG VENTRICULAR RATE: 60 BPM
EOSINOPHILS ABSOLUTE: 0.04 E9/L (ref 0.05–0.5)
EOSINOPHILS RELATIVE PERCENT: 0.5 % (ref 0–6)
GFR AFRICAN AMERICAN: >60
GFR NON-AFRICAN AMERICAN: >60 ML/MIN/1.73
GLUCOSE BLD-MCNC: 86 MG/DL (ref 74–99)
GLUCOSE URINE: NEGATIVE MG/DL
HCT VFR BLD CALC: 30.3 % (ref 34–48)
HEMOGLOBIN: 9.5 G/DL (ref 11.5–15.5)
IMMATURE GRANULOCYTES #: 0.04 E9/L
IMMATURE GRANULOCYTES %: 0.5 % (ref 0–5)
KETONES, URINE: NEGATIVE MG/DL
LEUKOCYTE ESTERASE, URINE: NEGATIVE
LYMPHOCYTES ABSOLUTE: 1.15 E9/L (ref 1.5–4)
LYMPHOCYTES RELATIVE PERCENT: 14.1 % (ref 20–42)
MCH RBC QN AUTO: 29.9 PG (ref 26–35)
MCHC RBC AUTO-ENTMCNC: 31.4 % (ref 32–34.5)
MCV RBC AUTO: 95.3 FL (ref 80–99.9)
MONOCYTES ABSOLUTE: 1.19 E9/L (ref 0.1–0.95)
MONOCYTES RELATIVE PERCENT: 14.6 % (ref 2–12)
NEUTROPHILS ABSOLUTE: 5.72 E9/L (ref 1.8–7.3)
NEUTROPHILS RELATIVE PERCENT: 70.1 % (ref 43–80)
NITRITE, URINE: NEGATIVE
PDW BLD-RTO: 14.2 FL (ref 11.5–15)
PH UA: 7 (ref 5–9)
PLATELET # BLD: 38 E9/L (ref 130–450)
PLATELET CONFIRMATION: NORMAL
PMV BLD AUTO: 12 FL (ref 7–12)
POTASSIUM SERPL-SCNC: 3.5 MMOL/L (ref 3.5–5)
PROTEIN UA: NEGATIVE MG/DL
RBC # BLD: 3.18 E12/L (ref 3.5–5.5)
SODIUM BLD-SCNC: 143 MMOL/L (ref 132–146)
SPECIFIC GRAVITY UA: 1.01 (ref 1–1.03)
TROPONIN, HIGH SENSITIVITY: 71 NG/L (ref 0–9)
TROPONIN, HIGH SENSITIVITY: 90 NG/L (ref 0–9)
UROBILINOGEN, URINE: 0.2 E.U./DL
WBC # BLD: 8.2 E9/L (ref 4.5–11.5)

## 2021-06-23 PROCEDURE — 6370000000 HC RX 637 (ALT 250 FOR IP): Performed by: INTERNAL MEDICINE

## 2021-06-23 PROCEDURE — 2580000003 HC RX 258: Performed by: STUDENT IN AN ORGANIZED HEALTH CARE EDUCATION/TRAINING PROGRAM

## 2021-06-23 PROCEDURE — 2060000000 HC ICU INTERMEDIATE R&B

## 2021-06-23 PROCEDURE — 93880 EXTRACRANIAL BILAT STUDY: CPT | Performed by: RADIOLOGY

## 2021-06-23 PROCEDURE — 2580000003 HC RX 258: Performed by: INTERNAL MEDICINE

## 2021-06-23 PROCEDURE — 70450 CT HEAD/BRAIN W/O DYE: CPT

## 2021-06-23 PROCEDURE — 84484 ASSAY OF TROPONIN QUANT: CPT

## 2021-06-23 PROCEDURE — 99283 EMERGENCY DEPT VISIT LOW MDM: CPT

## 2021-06-23 PROCEDURE — 71045 X-RAY EXAM CHEST 1 VIEW: CPT

## 2021-06-23 PROCEDURE — 93005 ELECTROCARDIOGRAM TRACING: CPT | Performed by: PHYSICIAN ASSISTANT

## 2021-06-23 PROCEDURE — G0378 HOSPITAL OBSERVATION PER HR: HCPCS

## 2021-06-23 PROCEDURE — 81003 URINALYSIS AUTO W/O SCOPE: CPT

## 2021-06-23 PROCEDURE — 93010 ELECTROCARDIOGRAM REPORT: CPT | Performed by: INTERNAL MEDICINE

## 2021-06-23 PROCEDURE — 80048 BASIC METABOLIC PNL TOTAL CA: CPT

## 2021-06-23 PROCEDURE — 93880 EXTRACRANIAL BILAT STUDY: CPT

## 2021-06-23 PROCEDURE — 36415 COLL VENOUS BLD VENIPUNCTURE: CPT

## 2021-06-23 PROCEDURE — 85025 COMPLETE CBC W/AUTO DIFF WBC: CPT

## 2021-06-23 RX ORDER — METOPROLOL SUCCINATE 50 MG/1
50 TABLET, EXTENDED RELEASE ORAL 2 TIMES DAILY
Status: DISCONTINUED | OUTPATIENT
Start: 2021-06-23 | End: 2021-06-25 | Stop reason: HOSPADM

## 2021-06-23 RX ORDER — LOSARTAN POTASSIUM 50 MG/1
50 TABLET ORAL DAILY
Status: DISCONTINUED | OUTPATIENT
Start: 2021-06-23 | End: 2021-06-25 | Stop reason: HOSPADM

## 2021-06-23 RX ORDER — ALLOPURINOL 100 MG/1
100 TABLET ORAL DAILY
Status: DISCONTINUED | OUTPATIENT
Start: 2021-06-23 | End: 2021-06-25 | Stop reason: HOSPADM

## 2021-06-23 RX ORDER — ONDANSETRON 4 MG/1
4 TABLET, ORALLY DISINTEGRATING ORAL EVERY 8 HOURS PRN
Status: DISCONTINUED | OUTPATIENT
Start: 2021-06-23 | End: 2021-06-25 | Stop reason: HOSPADM

## 2021-06-23 RX ORDER — POLYETHYLENE GLYCOL 3350 17 G/17G
17 POWDER, FOR SOLUTION ORAL DAILY PRN
Status: DISCONTINUED | OUTPATIENT
Start: 2021-06-23 | End: 2021-06-25 | Stop reason: HOSPADM

## 2021-06-23 RX ORDER — SODIUM CHLORIDE 0.9 % (FLUSH) 0.9 %
5-40 SYRINGE (ML) INJECTION PRN
Status: DISCONTINUED | OUTPATIENT
Start: 2021-06-23 | End: 2021-06-25 | Stop reason: HOSPADM

## 2021-06-23 RX ORDER — ONDANSETRON 2 MG/ML
4 INJECTION INTRAMUSCULAR; INTRAVENOUS EVERY 6 HOURS PRN
Status: DISCONTINUED | OUTPATIENT
Start: 2021-06-23 | End: 2021-06-25 | Stop reason: HOSPADM

## 2021-06-23 RX ORDER — 0.9 % SODIUM CHLORIDE 0.9 %
500 INTRAVENOUS SOLUTION INTRAVENOUS ONCE
Status: COMPLETED | OUTPATIENT
Start: 2021-06-23 | End: 2021-06-23

## 2021-06-23 RX ORDER — ACETAMINOPHEN 650 MG/1
650 SUPPOSITORY RECTAL EVERY 6 HOURS PRN
Status: DISCONTINUED | OUTPATIENT
Start: 2021-06-23 | End: 2021-06-25 | Stop reason: HOSPADM

## 2021-06-23 RX ORDER — SODIUM CHLORIDE 9 MG/ML
25 INJECTION, SOLUTION INTRAVENOUS PRN
Status: DISCONTINUED | OUTPATIENT
Start: 2021-06-23 | End: 2021-06-25 | Stop reason: HOSPADM

## 2021-06-23 RX ORDER — POTASSIUM CHLORIDE 20 MEQ/1
40 TABLET, EXTENDED RELEASE ORAL ONCE
Status: COMPLETED | OUTPATIENT
Start: 2021-06-23 | End: 2021-06-23

## 2021-06-23 RX ORDER — SODIUM CHLORIDE 0.9 % (FLUSH) 0.9 %
5-40 SYRINGE (ML) INJECTION EVERY 12 HOURS SCHEDULED
Status: DISCONTINUED | OUTPATIENT
Start: 2021-06-23 | End: 2021-06-25 | Stop reason: HOSPADM

## 2021-06-23 RX ORDER — ACETAMINOPHEN 325 MG/1
650 TABLET ORAL EVERY 6 HOURS PRN
Status: DISCONTINUED | OUTPATIENT
Start: 2021-06-23 | End: 2021-06-25 | Stop reason: HOSPADM

## 2021-06-23 RX ORDER — AMLODIPINE BESYLATE 2.5 MG/1
2.5 TABLET ORAL DAILY
Status: DISCONTINUED | OUTPATIENT
Start: 2021-06-23 | End: 2021-06-25 | Stop reason: HOSPADM

## 2021-06-23 RX ORDER — HYDRALAZINE HYDROCHLORIDE 50 MG/1
50 TABLET, FILM COATED ORAL 3 TIMES DAILY
Status: DISCONTINUED | OUTPATIENT
Start: 2021-06-23 | End: 2021-06-25 | Stop reason: HOSPADM

## 2021-06-23 RX ADMIN — LOSARTAN POTASSIUM 50 MG: 50 TABLET, FILM COATED ORAL at 20:19

## 2021-06-23 RX ADMIN — AMLODIPINE BESYLATE 2.5 MG: 2.5 TABLET ORAL at 20:20

## 2021-06-23 RX ADMIN — ALLOPURINOL 100 MG: 100 TABLET ORAL at 20:20

## 2021-06-23 RX ADMIN — POTASSIUM CHLORIDE 40 MEQ: 1500 TABLET, EXTENDED RELEASE ORAL at 18:48

## 2021-06-23 RX ADMIN — METOPROLOL SUCCINATE 50 MG: 50 TABLET, EXTENDED RELEASE ORAL at 20:19

## 2021-06-23 RX ADMIN — HYDRALAZINE HYDROCHLORIDE 50 MG: 50 TABLET, FILM COATED ORAL at 20:19

## 2021-06-23 RX ADMIN — SODIUM CHLORIDE, PRESERVATIVE FREE 10 ML: 5 INJECTION INTRAVENOUS at 20:20

## 2021-06-23 RX ADMIN — SODIUM CHLORIDE 500 ML: 9 INJECTION, SOLUTION INTRAVENOUS at 11:11

## 2021-06-23 RX ADMIN — APIXABAN 5 MG: 5 TABLET, FILM COATED ORAL at 20:19

## 2021-06-23 ASSESSMENT — ENCOUNTER SYMPTOMS
VOMITING: 0
CONSTIPATION: 0
CHEST TIGHTNESS: 0
SORE THROAT: 0
WHEEZING: 0
COUGH: 0
PHOTOPHOBIA: 0
APNEA: 0
RHINORRHEA: 0
ABDOMINAL PAIN: 0
SHORTNESS OF BREATH: 0
DIARRHEA: 0
BACK PAIN: 0
NAUSEA: 0
TROUBLE SWALLOWING: 0
EYE PAIN: 0

## 2021-06-23 ASSESSMENT — PAIN DESCRIPTION - LOCATION: LOCATION: LEG

## 2021-06-23 ASSESSMENT — PAIN DESCRIPTION - PAIN TYPE: TYPE: ACUTE PAIN

## 2021-06-23 ASSESSMENT — PAIN SCALES - GENERAL
PAINLEVEL_OUTOF10: 0
PAINLEVEL_OUTOF10: 10
PAINLEVEL_OUTOF10: 0

## 2021-06-23 ASSESSMENT — PAIN DESCRIPTION - DESCRIPTORS: DESCRIPTORS: ACHING

## 2021-06-23 NOTE — PROCEDURES
6/23 6:56 pm - Please complete the MRI checklist so we can schedule pt for MRI once reviewed and cleared, thank you

## 2021-06-23 NOTE — ED TRIAGE NOTES
FIRST PROVIDER CONTACT ASSESSMENT NOTE      Department of Emergency Medicine   6/23/21  10:15 AM EDT    Chief Complaint: Extremity Weakness (left leg weakness, slurred speech, thinks she had a stroke. Symptoms started on Monday. ) and Aphasia      History of Present Illness:    Angelica Victoria is a 80 y.o. female who presents to the ED by private car for b/l extremity weakness (Left worse than right), slurred speech starting Monday. Focused Screening Exam:  Constitutional:  Alert, appears stated age and is in no distress.       *ALLERGIES*     Amlodipine, Ampicillin, and Iodine     ED Triage Vitals   BP Temp Temp src Pulse Resp SpO2 Height Weight   06/23/21 1014 06/23/21 1006 -- 06/23/21 1006 06/23/21 1006 06/23/21 1006 06/23/21 1014 06/23/21 1014   (!) 150/83 98 °F (36.7 °C)  61 16 95 % 5' 2\" (1.575 m) 180 lb (81.6 kg)        Initial Plan of Care:  Initiate Treatment-Testing, Proceed toTreatment Area When Bed Available for ED Attending/MLP to Continue Care    -----------------END OF FIRST PROVIDER CONTACT ASSESSMENT NOTE--------------  Electronically signed by Bala Lopez PA-C   DD: 6/23/21

## 2021-06-23 NOTE — ED NOTES
Bed: 22  Expected date:   Expected time:   Means of arrival:   Comments:  osb     Ladona Cooks, RN  06/23/21 0246

## 2021-06-23 NOTE — ED PROVIDER NOTES
[address]      Pt Name: Trey Fleming  MRN: [id number]  [place] 5/26/1931  Date of evaluation: 6/23/2021      CHIEF COMPLAINT       Chief Complaint   Patient presents with    Extremity Weakness     left leg weakness, slurred speech, thinks she had a stroke. Symptoms started on Monday.  Aphasia        HPI  Trey Fleming is a 80 y.o. female history of hyperlipidemia, atrial fibrillation, hypertension who presents to the emergency department with complaints of weakness. Patient normally ambulates with a cane. On Monday she began to have increased generalized weakness. She felt like her left leg was more weak than her right leg. She also felt like she had a little bit of weakness in her left upper extremity. States that symptoms of been constant since Monday. This morning she was unable to get off the toilet for approximately an hour. Family members had to help her off the toilet called EMS. Family member also states that it seems like she has mild slurred speech which also started on Monday. Patient denies any headache or injury or fall. Denies any chest pain or shortness of breath. Except as noted above the remainder of the review of systems was reviewed and negative. Review of Systems   Constitutional: Negative for chills, diaphoresis, fatigue and fever. HENT: Negative for rhinorrhea, sore throat and trouble swallowing. Eyes: Negative for photophobia and pain. Respiratory: Negative for apnea, cough, chest tightness, shortness of breath and wheezing. Cardiovascular: Negative for chest pain, palpitations and leg swelling. Gastrointestinal: Negative for abdominal pain, constipation, diarrhea, nausea and vomiting. Endocrine: Negative for polyuria. Genitourinary: Negative for difficulty urinating and dysuria. Musculoskeletal: Negative for back pain, neck pain and neck stiffness.    Skin: Negative for pallor and rash. Neurological: Positive for speech difficulty and weakness. Negative for dizziness, light-headedness and headaches. Psychiatric/Behavioral: Negative for confusion. The patient is not nervous/anxious. Physical Exam  Vitals and nursing note reviewed. Constitutional:       General: She is not in acute distress. Appearance: She is well-developed. Comments: Awake and alert. Sitting in the gurney in no obvious distress. HENT:      Head: Normocephalic and atraumatic. Right Ear: External ear normal.      Left Ear: External ear normal.      Mouth/Throat:      Mouth: Mucous membranes are moist.   Eyes:      General: No scleral icterus. Pupils: Pupils are equal, round, and reactive to light. Cardiovascular:      Rate and Rhythm: Normal rate and regular rhythm. Heart sounds: No murmur heard. Comments: 2+ radial and dorsal pedis pulses bilaterally  Pulmonary:      Effort: Pulmonary effort is normal. No respiratory distress. Breath sounds: Normal breath sounds. No wheezing. Abdominal:      Palpations: Abdomen is soft. Tenderness: There is no abdominal tenderness. There is no guarding or rebound. Musculoskeletal:         General: No tenderness or deformity. Normal range of motion. Cervical back: Normal range of motion and neck supple. Right lower leg: No edema. Left lower leg: No edema. Skin:     General: Skin is warm and dry. Capillary Refill: Capillary refill takes less than 2 seconds. Neurological:      General: No focal deficit present. Mental Status: She is alert and oriented to person, place, and time. Cranial Nerves: No cranial nerve deficit. Sensory: No sensory deficit. Motor: Weakness present. No abnormal muscle tone. Comments: 4/5 strength to the left upper extremity and left lower extremity. No facial droop. No change in sensation.    Psychiatric:         Mood and Affect: Mood normal.         Behavior: Behavior normal.          Procedures     MDM   This is a 72-year-old female with a history of atrial fibrillation, hypertension who comes from home with concern for possible stroke. Symptoms started more than 24 hours ago. Outside the window for TPA or nonalert. NIH of 2 examination. CT showed no acute intracranial abnormality. Metabolic panel showed normal electrolytes, normal renal function. EKG showed no ischemic changes troponin 90. Patient not complaining of chest pain. Patient was stable anemia. Urinalysis not consistent with urinary tract infection. Discussed with Dr. Usman Gaitan, who accepts patient for further evaluation.                --------------------------------------------- PAST HISTORY ---------------------------------------------  Past Medical History:  has a past medical history of Anemia, Asthma, Atrial fibrillation ([facility] [address] Diabetic retinopathy ([facility] [address] Gout, Hyperlipidemia, Hypertension, MGUS (monoclonal gammopathy of unknown significance), Mild tricuspid regurgitation, PAC (premature atrial contraction), Pemphigoid, Pulmonary hypertension ([address] and Symptomatic bradycardia. Past Surgical History:  has a past surgical history that includes Appendectomy; Cholecystectomy; Hysterectomy; Cataract removal; Cardiac catheterization (3/12/2015); and Cardiac pacemaker placement (05/06/2015). Social History:  reports that she has never smoked. She has never used smokeless tobacco. She reports that she does not drink alcohol and does not use drugs. Family History: family history is not on file. The patients home medications have been reviewed.     Allergies: Amlodipine, Ampicillin, and Iodine    -------------------------------------------------- RESULTS -------------------------------------------------    LABS:  Results for orders placed or performed during the hospital encounter of 06/23/21   CBC Auto Differential   Result Value Ref Range    WBC 8.2 4.5 - 11.5 E9/L    RBC 3.18 (L) 3.50 - 5.50 E12/L    Hemoglobin 9.5 (L) 11.5 - 15.5 g/dL    Hematocrit 30.3 (L) 34.0 - 48.0 %    MCV 95.3 80.0 - 99.9 fL    MCH 29.9 26.0 - 35.0 pg    MCHC 31.4 (L) 32.0 - 34.5 %    RDW 14.2 11.5 - 15.0 fL    Platelets 38 (L) 326 - 450 E9/L    MPV 12.0 7.0 - 12.0 fL    Neutrophils % 70.1 43.0 - 80.0 %    Immature Granulocytes % 0.5 0.0 - 5.0 %    Lymphocytes % 14.1 (L) 20.0 - 42.0 %    Monocytes % 14.6 (H) 2.0 - 12.0 %    Eosinophils % 0.5 0.0 - 6.0 %    Basophils % 0.2 0.0 - 2.0 %    Neutrophils Absolute 5.72 1.80 - 7.30 E9/L    Immature Granulocytes # 0.04 E9/L    Lymphocytes Absolute 1.15 (L) 1.50 - 4.00 E9/L    Monocytes Absolute 1.19 (H) 0.10 - 0.95 E9/L    Eosinophils Absolute 0.04 (L) 0.05 - 0.50 E9/L    Basophils Absolute 0.02 0.00 - 0.20 R8/A   Basic Metabolic Panel   Result Value Ref Range    Sodium 143 132 - 146 mmol/L    Potassium 3.5 3.5 - 5.0 mmol/L    Chloride 107 98 - 107 mmol/L    CO2 28 22 - 29 mmol/L    Anion Gap 8 7 - 16 mmol/L    Glucose 86 74 - 99 mg/dL    BUN 24 (H) 6 - 23 mg/dL    CREATININE 0.9 0.5 - 1.0 mg/dL    GFR Non-African American >60 >=60 mL/min/1.73    GFR African American >60     Calcium 9.2 8.6 - 10.2 mg/dL   Troponin   Result Value Ref Range    Troponin, High Sensitivity 90 (H) 0 - 9 ng/L   Urinalysis   Result Value Ref Range    Color, UA Straw Straw/Yellow    Clarity, UA Clear Clear    Glucose, Ur Negative Negative mg/dL    Bilirubin Urine Negative Negative    Ketones, Urine Negative Negative mg/dL    Specific Gravity, UA 1.010 1.005 - 1.030    Blood, Urine Negative Negative    pH, UA 7.0 5.0 - 9.0    Protein, UA Negative Negative mg/dL    Urobilinogen, Urine 0.2 <2.0 E.U./dL    Nitrite, Urine Negative Negative    Leukocyte Esterase, Urine Negative Negative   Platelet Confirmation   Result Value Ref Range    Platelet Confirmation CONFIRMED    EKG 12 Lead   Result Value Ref Range    Ventricular Rate 60 BPM    Atrial Rate 60 BPM    P-R Interval 228 ms    QRS Duration 180 ms    Q-T Interval 508 ms    QTc Calculation (Bazett) 508 ms    R Axis -63 degrees    T Axis 76 degrees       RADIOLOGY:  CT HEAD WO CONTRAST   Final Result   No acute intracranial abnormality. US CAROTID ARTERY BILATERAL    (Results Pending)   XR CHEST PORTABLE    (Results Pending)       EKG: This EKG is signed and interpreted by me. Rate: 60bpm  Rhythm: av dual paced rhythm  Interpretation: av dual paced rhythm  Comparison: stable as compared to patient's most recent EKG      ------------------------- NURSING NOTES AND VITALS REVIEWED ---------------------------  Date / Time Roomed:  6/23/2021 10:27 AM  ED Bed Assignment:  22/22    The nursing notes within the ED encounter and vital signs as below have been reviewed. Patient Vitals for the past 24 hrs:   BP Temp Pulse Resp SpO2 Height Weight   06/23/21 1014 (!) 150/83 -- -- -- -- 5' 2\" (1.575 m) 180 lb (81.6 kg)   06/23/21 1006 -- 98 °F (36.7 °C) 61 16 95 % -- --       Oxygen Saturation Interpretation: Normal    ------------------------------------------ PROGRESS NOTES ------------------------------------------    Counseling:  I have spoken with the patient and discussed todays results, in addition to providing specific details for the plan of care and counseling regarding the diagnosis and prognosis. Their questions are answered at this time and they are agreeable with the plan of admission.    --------------------------------- ADDITIONAL PROVIDER NOTES ---------------------------------  Consultations:   Spoke with Dr. Maria Del Carmen Phillips. Discussed case. They will admit the patient. This patient's ED course included: a personal history and physicial examination, re-evaluation prior to disposition, multiple bedside re-evaluations, IV medications, cardiac monitoring and continuous pulse oximetry    This patient has remained hemodynamically stable during their ED course. Diagnosis:  1.  Acute CVA (cerebrovascular accident) ([address] Disposition:  Patient's disposition: Admit to telemetry  Patient's condition is stable.          Haleigh Navarro DO  Resident  06/23/21 0234

## 2021-06-23 NOTE — PROGRESS NOTES
Danika Ann was ordered pitavastatin (Livalo) which is a nonformulary medication. Nurse is going to check with patient to see if home supply of this medication will be brought in to the hospital for inpatient use. A pharmacist will follow-up with the nurse of the patient to assess the capability of the patient to bring in the medication. If it is determined that the patient cannot supply the medication and it is not available to be dispensed from the pharmacy, a call will be placed to the ordering provider to discuss alternative options.      Kayleigh Nelson, PharmD  06/23/21 6:27 PM

## 2021-06-24 ENCOUNTER — APPOINTMENT (OUTPATIENT)
Dept: CT IMAGING | Age: 86
End: 2021-06-24
Payer: COMMERCIAL

## 2021-06-24 PROBLEM — I73.9 PVD (PERIPHERAL VASCULAR DISEASE) (HCC): Status: ACTIVE | Noted: 2021-06-24

## 2021-06-24 PROBLEM — I65.23 BILATERAL CAROTID ARTERY STENOSIS: Status: ACTIVE | Noted: 2021-06-24

## 2021-06-24 LAB
ANION GAP SERPL CALCULATED.3IONS-SCNC: 8 MMOL/L (ref 7–16)
BUN BLDV-MCNC: 24 MG/DL (ref 6–23)
CALCIUM SERPL-MCNC: 8.5 MG/DL (ref 8.6–10.2)
CHLORIDE BLD-SCNC: 109 MMOL/L (ref 98–107)
CHOLESTEROL, FASTING: 132 MG/DL (ref 0–199)
CO2: 24 MMOL/L (ref 22–29)
CREAT SERPL-MCNC: 1 MG/DL (ref 0.5–1)
GFR AFRICAN AMERICAN: >60
GFR NON-AFRICAN AMERICAN: >60 ML/MIN/1.73
GLUCOSE BLD-MCNC: 78 MG/DL (ref 74–99)
HCT VFR BLD CALC: 26.2 % (ref 34–48)
HDLC SERPL-MCNC: 42 MG/DL
HEMOGLOBIN: 8.2 G/DL (ref 11.5–15.5)
LDL CHOLESTEROL CALCULATED: 63 MG/DL (ref 0–99)
MCH RBC QN AUTO: 30.1 PG (ref 26–35)
MCHC RBC AUTO-ENTMCNC: 31.3 % (ref 32–34.5)
MCV RBC AUTO: 96.3 FL (ref 80–99.9)
PDW BLD-RTO: 14.2 FL (ref 11.5–15)
PLATELET # BLD: 20 E9/L (ref 130–450)
PLATELET CONFIRMATION: NORMAL
PMV BLD AUTO: ABNORMAL FL (ref 7–12)
POTASSIUM SERPL-SCNC: 5.2 MMOL/L (ref 3.5–5)
RBC # BLD: 2.72 E12/L (ref 3.5–5.5)
SODIUM BLD-SCNC: 141 MMOL/L (ref 132–146)
TRIGLYCERIDE, FASTING: 134 MG/DL (ref 0–149)
VLDLC SERPL CALC-MCNC: 27 MG/DL
WBC # BLD: 5.2 E9/L (ref 4.5–11.5)

## 2021-06-24 PROCEDURE — 80048 BASIC METABOLIC PNL TOTAL CA: CPT

## 2021-06-24 PROCEDURE — 36415 COLL VENOUS BLD VENIPUNCTURE: CPT

## 2021-06-24 PROCEDURE — 92523 SPEECH SOUND LANG COMPREHEN: CPT

## 2021-06-24 PROCEDURE — 80061 LIPID PANEL: CPT

## 2021-06-24 PROCEDURE — 97165 OT EVAL LOW COMPLEX 30 MIN: CPT

## 2021-06-24 PROCEDURE — 6370000000 HC RX 637 (ALT 250 FOR IP): Performed by: INTERNAL MEDICINE

## 2021-06-24 PROCEDURE — 72131 CT LUMBAR SPINE W/O DYE: CPT

## 2021-06-24 PROCEDURE — 99222 1ST HOSP IP/OBS MODERATE 55: CPT | Performed by: PSYCHIATRY & NEUROLOGY

## 2021-06-24 PROCEDURE — 97535 SELF CARE MNGMENT TRAINING: CPT

## 2021-06-24 PROCEDURE — G0378 HOSPITAL OBSERVATION PER HR: HCPCS

## 2021-06-24 PROCEDURE — 97530 THERAPEUTIC ACTIVITIES: CPT

## 2021-06-24 PROCEDURE — 2580000003 HC RX 258: Performed by: INTERNAL MEDICINE

## 2021-06-24 PROCEDURE — 85027 COMPLETE CBC AUTOMATED: CPT

## 2021-06-24 PROCEDURE — 2060000000 HC ICU INTERMEDIATE R&B

## 2021-06-24 PROCEDURE — 97161 PT EVAL LOW COMPLEX 20 MIN: CPT

## 2021-06-24 PROCEDURE — 99223 1ST HOSP IP/OBS HIGH 75: CPT | Performed by: SURGERY

## 2021-06-24 RX ORDER — ATORVASTATIN CALCIUM 10 MG/1
10 TABLET, FILM COATED ORAL NIGHTLY
Status: DISCONTINUED | OUTPATIENT
Start: 2021-06-24 | End: 2021-06-25 | Stop reason: HOSPADM

## 2021-06-24 RX ADMIN — HYDRALAZINE HYDROCHLORIDE 50 MG: 50 TABLET, FILM COATED ORAL at 08:28

## 2021-06-24 RX ADMIN — ALLOPURINOL 100 MG: 100 TABLET ORAL at 08:28

## 2021-06-24 RX ADMIN — ATORVASTATIN CALCIUM 10 MG: 10 TABLET, FILM COATED ORAL at 20:28

## 2021-06-24 RX ADMIN — METOPROLOL SUCCINATE 50 MG: 50 TABLET, EXTENDED RELEASE ORAL at 20:29

## 2021-06-24 RX ADMIN — AMLODIPINE BESYLATE 2.5 MG: 2.5 TABLET ORAL at 08:28

## 2021-06-24 RX ADMIN — METOPROLOL SUCCINATE 50 MG: 50 TABLET, EXTENDED RELEASE ORAL at 08:28

## 2021-06-24 RX ADMIN — HYDRALAZINE HYDROCHLORIDE 50 MG: 50 TABLET, FILM COATED ORAL at 14:50

## 2021-06-24 RX ADMIN — LOSARTAN POTASSIUM 50 MG: 50 TABLET, FILM COATED ORAL at 08:28

## 2021-06-24 RX ADMIN — HYDRALAZINE HYDROCHLORIDE 50 MG: 50 TABLET, FILM COATED ORAL at 20:29

## 2021-06-24 RX ADMIN — APIXABAN 5 MG: 5 TABLET, FILM COATED ORAL at 08:28

## 2021-06-24 RX ADMIN — APIXABAN 5 MG: 5 TABLET, FILM COATED ORAL at 20:29

## 2021-06-24 RX ADMIN — ACETAMINOPHEN 650 MG: 325 TABLET ORAL at 19:47

## 2021-06-24 RX ADMIN — SODIUM CHLORIDE, PRESERVATIVE FREE 10 ML: 5 INJECTION INTRAVENOUS at 08:28

## 2021-06-24 RX ADMIN — SODIUM CHLORIDE, PRESERVATIVE FREE 10 ML: 5 INJECTION INTRAVENOUS at 20:29

## 2021-06-24 ASSESSMENT — PAIN SCALES - GENERAL
PAINLEVEL_OUTOF10: 5
PAINLEVEL_OUTOF10: 0
PAINLEVEL_OUTOF10: 8
PAINLEVEL_OUTOF10: 0

## 2021-06-24 NOTE — CARE COORDINATION
SW spoke with patient in her room. She is alert and oriented x3. She states she lives at home with her daughter Jorge Little who is retired, in a 1 story home. There is a basement and the laundry is in basement, however she states she tries not to go down there at all. She states she uses a cane at home but after working with therapy this am (notes not in yet) she states she would like to look into getting a walker and shower chair at discharge. She feels this will be helpful at home. She states she does not get in the shower at home unless daughter is there to help. Her PCP is Dr Courtney Rouse, pharmacy is [facility] on [address] [place] [address]. She states history of [facility], would like to use again and is agreeable to having at discharge. Tentative referral made to Vickie at [facility]. Will need order for [facility] and a DME order for Wheeled Walker and tub bench prior to discharge.

## 2021-06-24 NOTE — PROGRESS NOTES
[address]  [address] [place] [address]        Date:2021                                                  Patient Name: Trey Fleming    MRN: [id number]    : 1931    Room: [address] [place] [address]      Evaluating OT: Binh Dunn, [address] OTR/L; [id number]      Referring Provider: Magdalena Martin DO    Specific Provider Orders/Date: OT Eval and Treat 21      Diagnosis: Acute CVA     Surgery: none     Pertinent Medical History: Asthma, mixed HLD, PAF, Pulmonary HTN, LVH, Pacemakder, Epistaxis     Recommended Adaptive Equipment: tub bench, ww - TBD      Precautions:  Fall Risk, pacemaker     Assessment of current deficits:    [x] Functional mobility  [x]ADLs  [x] Strength               [x]Cognition    [x] Functional transfers   [x] IADLs         [x] Safety Awareness   [x]Endurance    [] Fine Coordination              [x] Balance      [x] Vision/perception   []Sensation     []Gross Motor Coordination  [] ROM  [] Delirium                   [] Motor Control     OT PLAN OF CARE   OT POC based on physician orders, patient diagnosis and results of clinical assessment    Frequency/Duration: 2-4 days/wk for 2 weeks PRN   Specific OT Treatment Interventions to include:   * Instruction/training on adapted ADL techniques and AE recommendations to increase functional independence within precautions       * Training on energy conservation strategies, correct breathing pattern and techniques to improve independence/tolerance for self-care routine  * Functional transfer/mobility training/DME recommendations for increased independence, safety, and fall prevention  * Patient/Family education to increase follow through with safety techniques and functional independence  * Recommendation of environmental modifications for increased safety with functional transfers/mobility and ADLs  * Visual-perceptual training to improve environmental scanning, visual attention/focus, and oculomotor skills for increased safety/independence with functional transfers/mobility and ADLs  * Therapeutic exercise to improve motor endurance, ROM, and functional strength for ADLs/functional transfers  * Therapeutic activities to facilitate/challenge dynamic balance, stand tolerance for increased safety and independence with ADLs    Modified Dimas Scale (MRS)  Score     Description  0             No symptoms  1             No significant disability despite symptoms  2             Slight disability; able to look after own affairs  3             Moderate disability; able to ambulate without assist/ requires assist with ADLs  4             Moderate/Severe disability;requires assist to ambulate/assist with ADLs  5             Severe disability;bedridden/incontinent   6               Score: 4     Home Living: Pt lives at home in 1 story home with daughter and granddaughter with 5 steps to enter back door of home with 1 handrail. 14 stairs and one handrail to reach basement where laundry is.    Bathroom setup: tub/shower unit   Equipment owned: quad cane    Prior Level of Function: pt required assist with UB dressing due to BUE weakness and limited AROM with ADLs, pt previously completing some laundry with assist from daughter for all other IADLs; ambulated using quad cane  Driving: no    Pain Level: 0/10  Cognition: A&O: 4/4; Follows multi step directions   Memory:  good   Sequencing:  fair   Problem solving: fair   Judgement/safety:  fair     Functional Assessment:  AM-PAC Daily Activity Raw Score:    Initial Eval Status  Date: 21 Treatment Status  Date: STGs = LTGs  Time frame: 10-14 days   Feeding Stand by Assist   Opening packages and meal set up  Independent   Grooming Moderate Assist  BUE weakness/limited AROM  Increased fatigue     Min Assist  Patients baseline due to hx of decrease shoulder AROM   UB Dressing Moderate Assist   German/doff tingling   Tone: WFL   Edema: none noted    Comment: Cleared by RN to see pt. Upon arrival patient lying supine in bed with HOB elevated and agreeable to OT session. At end of session, patient lying supine in bed with HOB elevated with call light and phone within reach, all lines and tubes intact. Overall patient demonstrated decreased independence and safety during completion of ADL/functional transfer/mobility tasks. Pt would benefit from continued skilled OT to increase safety and independence with completion of ADL/IADL tasks for functional independence and quality of life. Treatment:  Pt educated on bed mobility using hand rail. Pt required vc's for hand placement on EOB for sit<> stand and proper body mechanics for ww management. Pt Min A for sit to stand due to BUE and BLE chronic weakness. Pt required vc's for sequencing/initiation of ADLs/functional transfers. Pt able to sit EOB ~5 minutes and stand at sink ~2 mins to increase core strength/balance/activity tolerance for ease with ADLs. Pt required increased time EOB \"to wake eyes up\" pt stated hx of retina complications affecting vision. Visual tracking WFL. Pt required increased time to complete ADLs/functional transfers due to management of ww and increased fatigue. Pt ambulated to bathroom requiring set up for brief changing and min assist to manage over hips. Pt completed LB wash up seated on commode. Educated on use of tub bemch for ease with showering to prevent further fatigue and energy conservation techniques. Pt appeared to have tolerated session well and appears motivated/cooperative/pleasant. Pt instructed on use of call light for assistance and fall prevention. Pt demo'ing fair understanding of education provided. Continue to educate.        Rehab Potential: Good  for established goals     LTG: maximize independence with ADLs to return to PLOF    Patient and/or family were instructed on functional diagnosis, prognosis/goals and OT plan of care. Demonstrated fair understanding. [] Malnutrition indicators have been identified and nursing has been notified to ensure a dietitian consult is ordered. Eval Complexity: Low     Evaluation time includes thorough review of current medical information, gathering information on past medical & social history & PLOF, completion of standardized testing, informal observation of tasks, consultation with other medical professions/disciplines, assessment of data & development of POC/goals. Time In: 0908  Time Out: 3800  Total Treatment Time: 15    Min Units   OT Eval Low 68418  x     OT Eval Medium 50036      OT Eval High 06766      OT Re-Eval [id number]       Therapeutic Ex 20301       Therapeutic Activities 67381       ADL/Self Care 06130  15 1   Orthotic Management 16186       Manual [id number]     Neuro Re-Ed 44163       Non-Billable Time          Evaluation Time additionally includes thorough review of current medical information, gathering information on past medical history/social history and prior level of function, interpretation of standardized testing/informal observation of tasks, assessment of data and development of plan of care and goals.           OLEKSANDR Guaman OTR/L; [id number]

## 2021-06-24 NOTE — CONSULTS
Vascular Surgery Inpatient Consultation Note      Reason for Consultation: Carotid artery stenosis. HISTORY OF PRESENT ILLNESS:                The patient is a 80 y.o. female who is admitted to the hospital for treatment of stroke symptoms of slurred speech and left leg weakness. CT scan of the head was negative for acute bleed. The patient did have a carotid ultrasound that reported bilateral disease. Vascular surgery is consulted for evaluation and treatment. The patient has history of atrial fibrillation and is chronically anticoagulated with Eliquis. There is concern that her leg pain is secondary to arterial insufficiency. IMPRESSION: Mild carotid artery disease with stroke symptoms, good arterial perfusion to both feet. The patient has palpable pulses in both feet and I do not feel that her left leg symptoms are due to arterial insufficiency. I do not feel that she requires any lower extremity testing. Also her carotid disease based upon the ultrasound is not significant. This is consistent with the carotid arteriogram she had in 2019 during embolization of a maxillary artery. RECOMMENDATIONS: No evidence of significant carotid or lower extremity arterial disease. No additional vascular testing or intervention from my standpoint. Okay for discharge from vascular surgery standpoint. Follow-up as needed.     Patient Active Problem List   Diagnosis Code    Mixed hyperlipidemia E78.2    Asthma J45.909    PAF (paroxysmal atrial fibrillation) ([facility]) I48.0    Pulmonary HTN ([facility] I27.20    LVH (left ventricular hypertrophy) I51.7    Near syncope R55    HTN (hypertension), benign I10    Pacemaker Z95.0    Epistaxis R04.0    Acute cerebrovascular accident (CVA) ([facility] I63.9    PVD (peripheral vascular disease) ([facility]) I73.9    Bilateral carotid artery stenosis I65.23       Past Medical History:   Diagnosis Date    Anemia     Asthma     Atrial fibrillation ([facility]     Diabetic retinopathy ([facility]     Gout     Hyperlipidemia     Hypertension     MGUS (monoclonal gammopathy of unknown significance)     Mild tricuspid regurgitation 3/11/15    PAC (premature atrial contraction)     Pemphigoid     Pulmonary hypertension (HCC) 3/11/15    mild    Symptomatic bradycardia         Past Surgical History:   Procedure Laterality Date    APPENDECTOMY      CARDIAC CATHETERIZATION  3/12/2015    Dr Gupta Males  05/06/2015    D[facility]PPM  ([facility])   GEMMA    CATARACT REMOVAL      CHOLECYSTECTOMY      HYSTERECTOMY         Current Medications:    sodium chloride        sodium chloride flush, sodium chloride, ondansetron **OR** ondansetron, polyethylene glycol, acetaminophen **OR** acetaminophen    atorvastatin  10 mg Oral Nightly    allopurinol  100 mg Oral Daily    amLODIPine  2.5 mg Oral Daily    apixaban  5 mg Oral BID    hydrALAZINE  50 mg Oral TID    losartan  50 mg Oral Daily    metoprolol succinate  50 mg Oral BID    sodium chloride flush  5-40 mL Intravenous 2 times per day        Allergies:  Ampicillin and Iodine    Social History     Socioeconomic History    Marital status:      Spouse name: Not on file    Number of children: Not on file    Years of education: Not on file    Highest education level: Not on file   Occupational History    Not on file   Tobacco Use    Smoking status: Never Smoker    Smokeless tobacco: Never Used   Vaping Use    Vaping Use: Never used   Substance and Sexual Activity    Alcohol use: Never     Alcohol/week: 0.0 standard drinks     Comment: rare;  drinks 1-2 cups of coffee/tea weekly    Drug use: No    Sexual activity: Not on file   Other Topics Concern    Not on file   Social History Narrative    Pt drinks lemon merle honey tea.  Coffee about 3 cups per week     Social Determinants of Health     Financial Resource Strain:     Difficulty of Paying Living Expenses:    Food Insecurity:     Worried About Running Out of Food in the Last Year:    [address] in the Last Year:    Transportation Needs:     Lack of Transportation (Medical):  Lack of Transportation (Non-Medical):    Physical Activity:     Days of Exercise per Week:     Minutes of Exercise per Session:    Stress:     Feeling of Stress :    Social Connections:     Frequency of Communication with Friends and Family:     Frequency of Social Gatherings with Friends and Family:     Attends Jewish Services:     Active Member of Clubs or Organizations:     Attends Club or Organization Meetings:     Marital Status:    Intimate Partner Violence:     Fear of Current or Ex-Partner:     Emotionally Abused:     Physically Abused:     Sexually Abused:         History reviewed. No pertinent family history.     REVIEW OF SYSTEMS:      Eyes:      Blurred vision:  No [x]/Yes []               Diplopia:   No [x]/Yes []               Vision loss:       No [x]/Yes []   Ears, nose, throat:             Hearing loss:    No [x]/Yes []      Vertigo:   No [x]/Yes []                       Swallowing problem:  No [x]/Yes []               Nose bleeds:   No [x]/Yes []      Voice hoarseness:  No [x]/Yes []  Respiratory:             Cough:   No [x]/Yes []      Pleuritic chest pain:  No [x]/Yes []                        Dyspnea:   No [x]/Yes []      Wheezing:   No [x]/Yes []  Cardiovascular:             Angina:   No [x]/Yes []      Palpitations:   No [x]/Yes []          Claudication:    No [x]/Yes []      Leg swelling:   No [x]/Yes []  Gastrointestinal:             Nausea or vomiting:  No [x]/Yes []               Abdominal pain:  No [x]/Yes []                     Intestinal bleeding: No [x]/Yes []  Musculoskeletal:             Leg pain:   No [x]/Yes []      Back/neck  pain:  No []/Yes [x]                    Weakness:   No [x]/Yes []  Neurologic:             Numbness:   No [x]/Yes []      Paralysis:   No [x]/Yes []                       Headaches:   No [x]/Yes

## 2021-06-24 NOTE — PROGRESS NOTES
Physical Therapy    Physical Therapy Initial Assessment     Name: Dai Villalta  : 1931  MRN: [id number]    Date of Service: 2021    Evaluating PT: Luisjenaro Hanson PT, [id number]    Room #:  5646/4302-L  Diagnosis:  Ac[facility] accident [facility] [I63.9]  PMHx/PSHx:  HTN, HLD, Gout, Tricuspid Regurgitation, A-Fib, Asthma, Anemia, PAC, Diabetic Retinopathy, Symptomatic Bradycardia  Precautions:  Fall Risk  Equipment Needs:  [employer]    SUBJECTIVE:  Pt lives with daughter and granddaughter in a 1 story house with 4 stair(s) and 1 rail(s) to enter. Bed is on the first floor and bath is on the first floor. Pt ambulated with [facility] prior to admission. OBJECTIVE:   Initial Evaluation  Date: 21 Treatment Date: Short Term/ Long Term   Goals   AM-PAC 6 Clicks 90/25     Was pt agreeable to Eval/treatment? Yes     Does pt have pain? No current complaints of pain     Bed Mobility  Rolling: SBA  Supine to sit: Min A  Sit to supine: Min A  Scooting: Min A  Rolling: Independent   Supine to sit: Independent   Sit to supine: Independent   Scooting: Independent    Transfers Sit to stand: Min A from EOB, Mod A from chair  Stand to sit: Min A  Stand pivot: Min A with [employer]  Sit to stand: Supervision  Stand to sit: Supervision  Stand pivot: Supervision with [employer]   Ambulation   40 feet with [employer] with Min A  100 feet with [employer] with Supervision   Stair negotiation: ascended and descended NT  >5 step(s) with 1 rail(s) with Supervision   ROM BUE: See OT Note  BLE: WFL     Strength BUE: See OT Note  BLE: 4/5     Balance Sitting EOB: SBA  Dynamic Standing: Min A with [employer]  Sitting EOB: Independent   Dynamic Standing: Supervision with [employer]     Pt is A & O x: 4 to person, place, month/year, and situation. Sensation: Pt reported numbness and tingling in B shoulders. Edema: Unremarkable. Patient education  Pt educated on hand placement during sit <> stand transfer for safety and technique.     Patient response to education:   Pt verbalized understanding Pt demonstrated skill Pt requires further education in this area   Yes Yes Reinforce     ASSESSMENT:  Conditions Requiring Skilled Therapeutic Intervention:  [x]Decreased strength     []Decreased ROM  [x]Decreased functional mobility  [x]Decreased balance   [x]Decreased endurance   [x]Decreased posture  []Decreased sensation  []Decreased coordination    []Decreased vision  [x]Decreased safety awareness   []Increased pain     Comments:    Pt was supine in bed with daughter in room upon arrival, agreeable to PT evaluation. Pt completed supine to sit transfer and scooted to EOB, Pt presented with forward flexed head and downward gaze. Sat EOB for >3 minutes, presenting with steady sitting balance. Pt completed sit to stand transfer with light assistance and pivoted to chair with [employer]. Pt presented with slow, shuffling gait and forward flexed posture. Pt sat in chair and completed sit to stand transfer with moderate assist from a lower surface. Pt ambulated in room with [employer], presenting with slow, shuffling gait, forward flexed posture, and downward gaze. Pt was cued for [employer] management and approximation for safety and technique. Pt reported feeling steadiness \"improved\" with [employer] compared to using [employer]. Pt ambulated back to bedside chair. Pt verbalized understanding to call for assistance when she wished to return to bed. Pt was left sitting in bedside chair with all questions and concerns addressed at end of session. Treatment:  Patient practiced and was instructed in the following treatment:     Therapeutic Activites:  o Bed Mobility: Pt completed supine to sit transfer, challenging strength, dynamic sitting balance, and tolerance to activity/upright. Pt completed scooting to EOB, challenging strength, dynamic sitting balance, and tolerance to activity/upright.  Pt sat EOB for >3 minutes, challenging static sitting balance and tolerance to upright.   o Transfers: Pt completed sit to stand transfers from surfaces of varying heights (1x EOB, 1x chair), challenging strength, dynamic standing balance, and tolerance to activity/upright. Pt was cued for hand positioning for technique and safety. o Ambulation: Pt ambulated with [address], challenging endurance, strength, dynamic standing balance, and tolerance to activity/upright. Pt was cued for [address] management and approximation for technique and safety. Pt was cued for upright posture during ambulation to assist with altered sensation in shoulders. Pt's/family goals:  1. Return Home. Prognosis is Good for reaching above PT goals. Patient and or family understand(s) diagnosis, prognosis, and plan of care. Yes.     PHYSICAL THERAPY PLAN OF CARE:    PT POC is established based on physician order and patient diagnosis     Referring provider/PT Order:    Start   Ordering Provider    06/23/21 1545  PT eval and treat Start: 06/23/21 1545, End: 06/23/21 1545, ONE TIME, Standing Count: 1 Occurrences, R     Order went unreviewed at transfer on Wed Jun 23, 2021  6:16 PM    Romi Course, DO      Diagnosis:  Acute cerebrovascular accident (CVA) [facility]) [I63.9]  Specific instructions for next treatment:  Increase ambulation distance with [address].    Current Treatment Recommendations:   [x] Strengthening to improve independence with functional mobility   [] ROM to improve independence with functional mobility   [x] Balance Training to improve static/dynamic balance and to reduce fall risk  [x] Endurance Training to improve activity tolerance during functional mobility   [x] Transfer Training to improve safety and independence with all functional transfers   [x] Gait Training to improve gait mechanics, endurance and asses need for appropriate assistive device  [] Stair Training in preparation for safe discharge home and/or into the community   [] Positioning to prevent skin breakdown and contractures  [x] Safety and Education Training   [x] Patient/Caregiver Education [] HEP  [] Other     PT long term treatment goals are located in above grid    Frequency of treatments: 2-5x/week x 2-3 days. Time in  1345  Time out  1410    Total Treatment Time  15 minutes     Evaluation Time includes thorough review of current medical information, gathering information on past medical history/social history and prior level of function, completion of standardized testing/informal observation of tasks, assessment of data and education on plan of care and goals.     CPT codes:  [x] Low Complexity PT evaluation 45118  [] Moderate Complexity PT evaluation 74138  [] High Complexity PT evaluation 75810  [] PT Re-evaluation 01962  [] Gait training 30775 0 minutes  [] Manual therapy 86876 0 minutes  [x] Therapeutic activities 99768 15 minutes  [] Therapeutic exercises 17944 0 minutes  [] Neuromuscular reeducation 83998 0 minutes     Melvina Chapman, PT, DPT  [id number]    Pat Spann, SPT

## 2021-06-24 NOTE — H&P
blood pressure 150/83. HEAD:  Normocephalic, atraumatic. EYES:  Pupils equal and reactive to light. Extraocular muscles intact. Fundi not well visualized. NOSE:  No obstruction, polyp, or discharge noted. MOUTH:  Mucosa without lesion. Teeth edentulous. Pharynx noninjected  without exudate. NECK:  Supple. No JVD, no thyromegaly, no carotid bruits. HEART:  Regular rate and rhythm without murmur. LUNGS:  Clear to auscultation bilaterally. ABDOMEN:  Positive bowel sounds, soft, nontender. No rebound, no  guarding, no hepatosplenomegaly. There is an umbilical hernia. BACK:  With increased thoracic kyphosis. EXTREMITIES:  There is good strength in the upper and lower extremities. LYMPH NODES:  No adenopathy noted. SKIN:  Without rash or lesion. IMPRESSION:  Slurred speech; left leg weakness; hypertension; paroxysmal  atrial fibrillation; chronic Eliquis therapy; status post pacemaker;  diabetes mellitus type 2, diet controlled; obesity; hyperlipidemia;  right carotid stenosis; anemia, chronic; thrombocytopenia, chronic;  monoclonal gammopathy of unknown significance    PLAN:  Admit. PT, OT, Speech eval.  Neurology and Vascular to see. Discharge plan home when stable.         Karlie Rasheed DO    D: 06/24/2021 7:27:39       T: 06/24/2021 7:35:28     [id number]  Job#: [id number]     Doc#: [id number]    CC:

## 2021-06-24 NOTE — HOME CARE
[facility] received referral. Will follow after discharge. Spoke with patient dtr and verified demographics. Lalo Sousa LPN, [facility].

## 2021-06-24 NOTE — PROGRESS NOTES
limits  Intensity: Within functional limits  Fluency:  Intact  Prosody Intact    RECEPTIVE LANGUAGE    Comprehension of Yes/No Questions: Within functional limits    Process  Simple Verbal Commands:   Within functional limits  Process Intermediate Verbal Commands:   Within functional limits  Process Complex Verbal Commands:     Within functional limits    Comprehension of Conversation:      Within functional limits      EXPRESSIVE LANGUAGE     Serials: Functional    Imitation:  Words   Functional   Sentences Functional    Naming:  (Modality used:  Verbal)  Confrontation Naming  Functional  Functional Description  Functional  Response Naming: Functional    Conversation:      Conversation was within functional limits    COGNITION     Attention/Orientation  Attention: Sustained attention   Orientation:  Oriented to Person, Place, Date, Reason for hospitalization    Memory   Immediate Recall: Repeated 3/3    Delayed Recall:   Recalled  2/3  Long Term Recall:   Recalled Address, Birthdate, Age, and Family    Organization/Problem Solving/Reasoning   Verbal Sequencing:   Functional        Verbal Problem solving:   Functional          CLINICAL OBSERVATIONS NOTED DURING THE EVALUATION  Within functional limits                  EDUCATION:   The Speech Language Pathologist (SLP) completed education regarding results of evaluation and that intervention is not warranted at this time. Learner: Patient  Education: Reviewed results and recommendations of this evaluation  Evaluation of Education:  Verbalizes understanding    Evaluation Time includes thorough review of current medical information, gathering information on past medical history/social history and prior level of function, completion of standardized testing/informal observation of tasks, assessment of data and education on plan of care and goals.       CPT code:    85332  eval speech sound lang comprehension      The admitting diagnosis and active problem list, as listed below have been reviewed prior to initiation of this evaluation.         ACTIVE PROBLEM LIST:   Patient Active Problem List   Diagnosis    Mixed hyperlipidemia    Asthma    PAF (paroxysmal atrial fibrillation) (HCC)    [facility] ([facility]    LVH (left ventricular hypertrophy)    Near syncope    HTN (hypertension), benign    Pacemaker    Epistaxis    Acute cerebrovascular accident (CVA) ([facility]

## 2021-06-24 NOTE — CONSULTS
[facility]  Neurology Consult    Date:  6/24/2021  Patient Name:  Trey Fleming  YOB: 1931  MRN: [id number]     PCP:  Magdalena Martin DO   Referring:  No ref. provider found      Chief Complaint: left leg weakness and pain    History obtained from: patient, chart    Assessment  Trey Fleming is a 80 y.o. female admitted for left leg weakness/pain concerning for neurogenic vs vascular claudication. It is possible she may have elements of both by history. I am less suspicious for an acute infarct at this time. Plan  CT L-spine w/o contrast  Continue home [address]  Consider need for vascular etiology of claudication  PT        History of Present Illness:  Trey Fleming is a 80 y.o. right handed female presenting for evaluation of difficulty walking and an episode of slurred speech. She noted some left leg weakness which worsened this past Tuesday (2 days ago). She states that this leg was painful while trying to go to the store and she wasn't able to walk around as she normally does prompting her daughter to get a wheelchair. She describes pain in the left calf which she describes as muscular in nature. Pain somewhat relieved by walking wheil bent over and worsened when walking up stairs. No radiating pain from low back. No paresthesiass noted. She also notes difficulty looking down due to neck stiffness this past week. She also describes pain in her shoulders for which she received a recent injection from Dr. Cira Cornelius at [address] which helped with her neck and shoulder pain. She did note some slurred speech yesterday morning while feeling weak all over while trying to get up off the toilet. No lateralizing weakness or loss of consciousness noted. She takes Eliquis for afib.     Review of Systems:  As per HPI    Medical History:   Past Medical History:   Diagnosis Date    Anemia     Asthma     Atrial fibrillation ([address]     Diabetic retinopathy ([address]     Gout  Hyperlipidemia     Hypertension     MGUS (monoclonal gammopathy of unknown significance)     Mild tricuspid regurgitation 3/11/15    PAC (premature atrial contraction)     Pemphigoid     Pulmonary hypertension (HCC) 3/11/15    mild    Symptomatic bradycardia         Surgical History:   Past Surgical History:   Procedure Laterality Date    APPENDECTOMY      CARDIAC CATHETERIZATION  3/12/2015    Dr Erin De La Garza  05/06/2015    D-PPM  ([facility])   GEMMA    CATARACT REMOVAL      CHOLECYSTECTOMY      HYSTERECTOMY          Family History:   History reviewed. No pertinent family history.     Social History:  Social History     Tobacco Use    Smoking status: Never Smoker    Smokeless tobacco: Never Used   Vaping Use    Vaping Use: Never used   Substance Use Topics    Alcohol use: Never     Alcohol/week: 0.0 standard drinks     Comment: rare;  drinks 1-2 cups of coffee/tea weekly    Drug use: No        Current Medications:      Current Facility-Administered Medications   Medication Dose Route Frequency Provider Last Rate Last Admin    allopurinol (ZYLOPRIM) tablet 100 mg  100 mg Oral Daily Lida Pleitezmer, DO   100 mg at 06/24/21 0828    amLODIPine (NORVASC) tablet 2.5 mg  2.5 mg Oral Daily Lida Tran Malmer, DO   2.5 mg at 06/24/21 4406    apixaban (ELIQUIS) tablet 5 mg  5 mg Oral BID Marcelont Marc Malmer, DO   5 mg at 06/24/21 6500    hydrALAZINE (APRESOLINE) tablet 50 mg  50 mg Oral TID Marcelont Marc Malmer, DO   50 mg at 06/24/21 8034    losartan (COZAAR) tablet 50 mg  50 mg Oral Daily Marcelont Marc Malmer, DO   50 mg at 06/24/21 6694    metoprolol succinate (TOPROL XL) extended release tablet 50 mg  50 mg Oral BID Marcelont Marc Malmer, DO   50 mg at 06/24/21 9347    pitavastatin (LIVALO) tablet 2 mg  2 mg Oral Weekly Marcelont Marc Malmer, DO        sodium chloride flush 0.9 % injection 5-40 mL  5-40 mL Intravenous 2 times per day Lida Tran Malmer, DO   10 mL at 06/24/21 0828    sodium chloride flush 0.9 % injection 5-40 mL  5-40 mL Intravenous PRN Cathalene Armida Malmer, DO        0.9 % sodium chloride infusion  25 mL Intravenous PRN Cathalene Armida Malmer, DO        ondansetron (ZOFRAN-ODT) disintegrating tablet 4 mg  4 mg Oral Q8H PRN Cathalene Armida Malmer, DO        Or    ondansetron [facility]) injection 4 mg  4 mg Intravenous Q6H PRN Cathalene Armida Malmer, DO        polyethylene glycol (GLYCOLAX) packet 17 g  17 g Oral Daily PRN Cathalene Armida Malmer, DO        acetaminophen (TYLENOL) tablet 650 mg  650 mg Oral Q6H PRN Cathalene Armida Malmer, DO        Or    acetaminophen (TYLENOL) suppository 650 mg  650 mg Rectal Q6H PRN Cathalene Armida Malmer, DO            Allergies: Allergies   Allergen Reactions    Ampicillin Itching    Iodine Itching        Physical Examination  Vitals   Vitals:    06/23/21 2216 06/24/21 0000 06/24/21 0417 06/24/21 0715   BP: (!) 147/67 (!) 166/64 (!) 148/63 (!) 128/57   Pulse: 61 64 66 65   Resp:  18 21 19   Temp:  98.9 °F (37.2 °C) 98.6 °F (37 °C) 99.2 °F (37.3 °C)   TempSrc:  Temporal Temporal Temporal   SpO2:  95% 95% 97%   Weight:       Height:            General: Patient appears in no acute distress with a normal body habitus  HEENT: Normocephalic, atraumatic  Chest: Clear to auscultation bilaterally  Heart: Regular rate and rhythm, no murmurs appreciated  Extremities: No edema or cyanosis noted    Neurologic Examination    Mental Status  Alert, and oriented to person, place and time with normal speech and language. No evidence of aphasia during conversation. No evidence of memory impairment. Attention and concentration appeared normal.     Cranial Nerves  II. Visual fields full to confrontation bilaterally. III, IV, VI: Pupils equally round and reactive to light, 3 to 2 mm bilaterally. EOMs: full, no nystagmus. V. Facial sensation intact to light touch bilaterally  VII: Facial movements symmetric and strong  VIII: Hearing intact to voice  IX,X: Palate elevates symmetrically.  No dysarthria  XI: Sternocleidomastoid and trapezius 5/5 bilaterally   XII: Tongue is midline    Motor     Right Left   Right Left   Deltoid 4* 4*  Hip Flexion 5 5   Biceps      5  5  Knee Extension 5 5   Triceps 5 5  Knee Flexion 5 5   Handgrip 5 5  Ankle Dorsiflexion 5 5       Ankle Plantarflexion 5 5   *Pain limited    Tone: Normal in all four limbs    Bulk: Diminished in right calf    Sensation  Light Touch: Intact distally in all four limbs  Temperature: Intact distally in all four limbs  Vibration: Intact distally in all four limbs    Reflexes     Right Left   Biceps 2 2   Brachioradialis 2 2   Triceps 2 2   Patellar 2 2   Achilles 1 1   ankle clonus none none     Toes down going bilaterally. Coordination  Rapid alternating movements normal in bilateral upper extremities  Finger to nose testing normal bilaterally    Gait  Deferred at present      Labs  Results for Vanessa Salinas (MRN [id number]) as of 6/24/2021 10:39   Ref.  Range 6/24/2021 07:43   Sodium Latest Ref Range: 132 - 146 mmol/L 141   Potassium Latest Ref Range: 3.5 - 5.0 mmol/L 5.2 (H)   Chloride Latest Ref Range: 98 - 107 mmol/L 109 (H)   CO2 Latest Ref Range: 22 - 29 mmol/L 24   BUN Latest Ref Range: 6 - 23 mg/dL 24 (H)   Creatinine Latest Ref Range: 0.5 - 1.0 mg/dL 1.0   Anion Gap Latest Ref Range: 7 - 16 mmol/L 8   GFR Non- Latest Ref Range: >=60 mL/min/1.73 >60   GFR African American Unknown >60   Glucose Latest Ref Range: 74 - 99 mg/dL 78   Calcium Latest Ref Range: 8.6 - 10.2 mg/dL 8.5 (L)   Cholesterol, Fasting Latest Ref Range: 0 - 199 mg/dL 132   HDL Cholesterol Latest Ref Range: >40 mg/dL 42   LDL Calculated Latest Ref Range: 0 - 99 mg/dL 63   Triglyceride, Fasting Latest Ref Range: 0 - 149 mg/dL 134   VLDL Cholesterol Calculated Latest Units: mg/dL 27   WBC Latest Ref Range: 4.5 - 11.5 E9/L 5.2   RBC Latest Ref Range: 3.50 - 5.50 E12/L 2.72 (L)   Hemoglobin Quant Latest Ref Range: 11.5 - 15.5 g/dL 8.2 (L)   Hematocrit Latest Ref Range: 34.0 - 48.0 % 26.2 (L)   MCV Latest Ref Range: 80.0 - 99.9 fL 96.3   MCH Latest Ref Range: 26.0 - 35.0 pg 30.1   MCHC Latest Ref Range: 32.0 - 34.5 % 31.3 (L)   MPV Latest Ref Range: 7.0 - 12.0 fL NOT CALC   RDW Latest Ref Range: 11.5 - 15.0 fL 14.2   Platelet Count Latest Ref Range: 130 - 450 E9/L 20 (L)   Platelet Confirmation Unknown CONFIRMED       Imaging  CUS  Impression   Atherosclerotic disease. Estimated stenosis by NASCET criteria in the proximal right carotid   artery is between 50% to 69% . Estimated stenosis by NASCET criteria in the proximal left carotid   artery is between 0%  to 49 %         CT Head w/o contrast     Impression   No acute intracranial abnormality. Lacunar infarcts noted in region of right internal capsule and left subcortical insular region. These appear new since CT head in 2017.       Electronically signed by: Steffanei Gomez DO 6/24/2021 9:48 AM

## 2021-06-25 VITALS
HEART RATE: 60 BPM | BODY MASS INDEX: 32.79 KG/M2 | HEIGHT: 62 IN | TEMPERATURE: 97.1 F | RESPIRATION RATE: 17 BRPM | WEIGHT: 178.2 LBS | DIASTOLIC BLOOD PRESSURE: 68 MMHG | SYSTOLIC BLOOD PRESSURE: 132 MMHG | OXYGEN SATURATION: 96 %

## 2021-06-25 PROBLEM — I63.9 ACUTE CEREBROVASCULAR ACCIDENT (CVA) (HCC): Status: RESOLVED | Noted: 2021-06-23 | Resolved: 2021-06-25

## 2021-06-25 PROBLEM — I63.9 ACUTE CVA (CEREBROVASCULAR ACCIDENT) (HCC): Status: RESOLVED | Noted: 2021-06-25 | Resolved: 2021-06-25

## 2021-06-25 PROBLEM — M47.27 SPONDYLOSIS OF LUMBOSACRAL SPINE WITH RADICULOPATHY: Status: ACTIVE | Noted: 2021-06-25

## 2021-06-25 PROBLEM — I63.9 ACUTE CVA (CEREBROVASCULAR ACCIDENT) (HCC): Status: ACTIVE | Noted: 2021-06-25

## 2021-06-25 PROBLEM — D69.6 THROMBOCYTOPENIA (HCC): Status: ACTIVE | Noted: 2021-06-25

## 2021-06-25 PROCEDURE — 36415 COLL VENOUS BLD VENIPUNCTURE: CPT

## 2021-06-25 PROCEDURE — 82784 ASSAY IGA/IGD/IGG/IGM EACH: CPT

## 2021-06-25 PROCEDURE — 6370000000 HC RX 637 (ALT 250 FOR IP): Performed by: INTERNAL MEDICINE

## 2021-06-25 PROCEDURE — 84165 PROTEIN E-PHORESIS SERUM: CPT

## 2021-06-25 PROCEDURE — G0378 HOSPITAL OBSERVATION PER HR: HCPCS

## 2021-06-25 PROCEDURE — 86334 IMMUNOFIX E-PHORESIS SERUM: CPT

## 2021-06-25 PROCEDURE — 2580000003 HC RX 258: Performed by: INTERNAL MEDICINE

## 2021-06-25 PROCEDURE — 99226 PR SBSQ OBSERVATION CARE/DAY 35 MINUTES: CPT | Performed by: NURSE PRACTITIONER

## 2021-06-25 PROCEDURE — 82232 ASSAY OF BETA-2 PROTEIN: CPT

## 2021-06-25 PROCEDURE — 85810 BLOOD VISCOSITY EXAMINATION: CPT

## 2021-06-25 RX ORDER — ACETAMINOPHEN 325 MG/1
650 TABLET ORAL EVERY 6 HOURS PRN
Qty: 120 TABLET | Refills: 3 | COMMUNITY
Start: 2021-06-25 | End: 2022-01-07 | Stop reason: DRUGHIGH

## 2021-06-25 RX ADMIN — LOSARTAN POTASSIUM 50 MG: 50 TABLET, FILM COATED ORAL at 08:02

## 2021-06-25 RX ADMIN — SODIUM CHLORIDE, PRESERVATIVE FREE 10 ML: 5 INJECTION INTRAVENOUS at 08:04

## 2021-06-25 RX ADMIN — HYDRALAZINE HYDROCHLORIDE 50 MG: 50 TABLET, FILM COATED ORAL at 08:02

## 2021-06-25 RX ADMIN — ACETAMINOPHEN 650 MG: 325 TABLET ORAL at 11:55

## 2021-06-25 RX ADMIN — ACETAMINOPHEN 650 MG: 325 TABLET ORAL at 04:38

## 2021-06-25 RX ADMIN — APIXABAN 5 MG: 5 TABLET, FILM COATED ORAL at 08:02

## 2021-06-25 RX ADMIN — AMLODIPINE BESYLATE 2.5 MG: 2.5 TABLET ORAL at 08:02

## 2021-06-25 RX ADMIN — ALLOPURINOL 100 MG: 100 TABLET ORAL at 08:02

## 2021-06-25 RX ADMIN — METOPROLOL SUCCINATE 50 MG: 50 TABLET, EXTENDED RELEASE ORAL at 08:02

## 2021-06-25 RX ADMIN — HYDRALAZINE HYDROCHLORIDE 50 MG: 50 TABLET, FILM COATED ORAL at 14:28

## 2021-06-25 ASSESSMENT — PAIN SCALES - GENERAL
PAINLEVEL_OUTOF10: 0
PAINLEVEL_OUTOF10: 2
PAINLEVEL_OUTOF10: 6
PAINLEVEL_OUTOF10: 3
PAINLEVEL_OUTOF10: 0

## 2021-06-25 NOTE — CARE COORDINATION
CLARICE spoke with patient in room. Discussed discharge and transition of care. She states plan is home with Vivi Espinoza. [facility] notified of discharge today and orders, spoke with Ramo Laughlin there. Patient is agreeable and would like a Wheeled Walker and Extended [employer]. Offered DME companies and she chose [id number]. Referral to Dianne Gamez, she will process and get delivery prior to discharge.

## 2021-06-25 NOTE — PROGRESS NOTES
Call placed to Dr. Aaron Marks him that oncology ok with the patient discharging and following up at the [facility].  No thinners on discharge

## 2021-06-25 NOTE — PROGRESS NOTES
x4    Appropriate attention/concentration  Intact fundus of knowledge  Intact memories    Speech: no dysarthria  Language: no aphasias    Cranial Nerves:  I: smell NA   II: visual acuity  NA   II: visual fields Full to confrontation   II: pupils HARPAL   III,VII: ptosis None   III,IV,VI: extraocular muscles  Full ROM   V: mastication Normal   V: facial light touch sensation  Normal   V,VII: corneal reflex     VII: facial muscle function - upper  Normal   VII: facial muscle function - lower Normal   VIII: hearing Normal   IX: soft palate elevation  Normal   IX,X: gag reflex    XI: trapezius strength  5/5   XI: sternocleidomastoid strength 5/5   XI: neck extension strength  5/5   XII: tongue strength  Normal     Motor:   5/5   5/5 bilateral biceps and triceps  4/5 right deltoid; 3+/5 left deltoid--limited range of motion left shoulder  4+/5 BLE    Overweight bulk; normal tone  No abnormal movements    Sensory:  LT and PP normal in all limbs  Vib mildly impaired both ankles    Coordination:   FFM and FN slow b/l but not ataxic    DTR:   Right Brachioradialis reflex 1+  Left Brachioradialis reflex 1+  Right Biceps reflex 1+  Left Biceps reflex 1+  Right Quadriceps reflex 1+  Left Quadriceps reflex 1+  Right Achilles reflex 0  Left Achilles reflex 0    ? L [place]is  No Corea's    No pathological reflexes    Laboratory/Radiology:     CBC with Differential:    Lab Results   Component Value Date    WBC 5.2 06/24/2021    RBC 2.72 06/24/2021    HGB 8.2 06/24/2021    HCT 26.2 06/24/2021    PLT 20 06/24/2021    MCV 96.3 06/24/2021    MCH 30.1 06/24/2021    MCHC 31.3 06/24/2021    RDW 14.2 06/24/2021    LYMPHOPCT 14.1 06/23/2021    MONOPCT 14.6 06/23/2021    BASOPCT 0.2 06/23/2021    MONOSABS 1.19 06/23/2021    LYMPHSABS 1.15 06/23/2021    EOSABS 0.04 06/23/2021    BASOSABS 0.02 06/23/2021     CMP:    Lab Results   Component Value Date     06/24/2021    K 5.2 06/24/2021     06/24/2021    CO2 24 06/24/2021    BUN 24 06/24/2021    CREATININE 1.0 06/24/2021    GFRAA >60 06/24/2021    LABGLOM >60 06/24/2021    GLUCOSE 78 06/24/2021    PROT 6.6 08/03/2019    LABALBU 3.5 08/03/2019    CALCIUM 8.5 06/24/2021    BILITOT 0.4 08/03/2019    ALKPHOS 97 08/03/2019    AST 27 08/03/2019    ALT 17 08/03/2019     FLP:    Lab Results   Component Value Date    TRIG 90 03/12/2015    HDL 42 06/24/2021    LDLCALC 63 06/24/2021    LABVLDL 27 06/24/2021     Ultrasound carotids: Atherosclerotic disease. Estimated stenosis by NASCET criteria in the proximal right carotid artery is between 50% to 69% . Estimated stenosis by NASCET criteria in the proximal left carotid artery is between 0%  to 49 %     CT head: Unremarkable    CT lumbar spine: Mid to lower lumbar facet arthropathy. L4-5 mild diffuse disc bulge with prominent facet arthropathy causing mild spinal stenosis. There is also L4 mild grade 1 spondylolisthesis. No spondylolysis. 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 Recommend follow-up every 3 years. All labs and images personally reviewed today    Assessment:     Lumbar spondylolisthesis with radiculopathy: Causing her LLE weakness and pain. No evidence to suggest acute stroke and none seen on CT of the head. No major spinal canal stenosis seen on CT of the lumbar spine. She also has various orthopedic complaints in shoulders and hips. No cauda equina signs or other worrisome findings on exam.    Asymptomatic right ICA stenosis    MGUS    Thrombocytopenia: platelets trending WRAF--23 today    A.  Fib    Pacer    Plan:     Continue PT/OT    Consider EDX studies of legs as OP    Recommend heme/onc consult for thrombocytopenia--defer to primary    Hold Eliquis until seen by primary    No other inpatient neuro recommendations    Signing off--follow up with me in the office in 1 month    TERESA Hernandez CNP  10:43 AM  6/25/2021

## 2021-06-25 NOTE — PROGRESS NOTES
Differential:    Lab Results   Component Value Date    WBC 5.2 06/24/2021    RBC 2.72 06/24/2021    HGB 8.2 06/24/2021    HCT 26.2 06/24/2021    PLT 20 06/24/2021    MCV 96.3 06/24/2021    MCH 30.1 06/24/2021    MCHC 31.3 06/24/2021    RDW 14.2 06/24/2021    LYMPHOPCT 14.1 06/23/2021    MONOPCT 14.6 06/23/2021    BASOPCT 0.2 06/23/2021    MONOSABS 1.19 06/23/2021    LYMPHSABS 1.15 06/23/2021    EOSABS 0.04 06/23/2021    BASOSABS 0.02 06/23/2021     CMP:    Lab Results   Component Value Date     06/24/2021    K 5.2 06/24/2021     06/24/2021    CO2 24 06/24/2021    BUN 24 06/24/2021    CREATININE 1.0 06/24/2021    GFRAA >60 06/24/2021    LABGLOM >60 06/24/2021    GLUCOSE 78 06/24/2021    PROT 6.6 08/03/2019    LABALBU 3.5 08/03/2019    CALCIUM 8.5 06/24/2021    BILITOT 0.4 08/03/2019    ALKPHOS 97 08/03/2019    AST 27 08/03/2019    ALT 17 08/03/2019     Warfarin PT/INR:    Lab Results   Component Value Date    INR 1.5 01/14/2019    INR 1.3 04/15/2017    INR 1.3 09/17/2016    PROTIME 17.3 (H) 01/14/2019    PROTIME 14.4 (H) 04/15/2017    PROTIME 14.9 (H) 09/17/2016       Assessment:    Active Problems:    PVD (peripheral vascular disease) (HCC)    Bilateral carotid artery stenosis    Spondylosis of lumbosacral spine with radiculopathy    Thrombocytopenia (HCC)  Resolved Problems:    Acute cerebrovascular accident (CVA) ([facility]    Acute CVA (cerebrovascular accident) ([facility]      Plan:  eliquis on hold for now / will hold discharge and ask hematology to see re worsening thrombocytopenia [facility]        Lou Gleason DO  12:56 PM  6/25/2021

## 2021-06-25 NOTE — PROGRESS NOTES
Subjective: The patient is awake and alert. No problems overnight. Denies chest pain, angina, dyspnea or abdominal discomfort. No nausea or vomiting. Tolerating diet. Daughter at bedside. Speech and weakness are now improved. Objective:    /68   Pulse 60   Temp 97.1 °F (36.2 °C) (Temporal)   Resp 17   Ht 5' 2\" (1.575 m)   Wt 178 lb 3.2 oz (80.8 kg)   SpO2 96%   BMI 32.59 kg/m²     General: NAD  HEENT: No thrush or mucositis, EOMI, PERRLA  Heart:  RRR, no murmurs, gallops, or rubs. Lungs:  CTA bilaterally, no wheeze, rales or rhonchi  Abd: BS present, nontender, nondistended, no masses  Extrem:  No clubbing, cyanosis, or edema  Lymphatics: No palpable adenopathy in cervical and supraclavicular regions  Skin: Intact, no petechia or purpura  Neuro: Moving all 4 extremities.   Adequate strength 5/5 bilaterally    CBC with Differential:    Lab Results   Component Value Date    WBC 5.2 06/24/2021    RBC 2.72 06/24/2021    HGB 8.2 06/24/2021    HCT 26.2 06/24/2021    PLT 20 06/24/2021    MCV 96.3 06/24/2021    MCH 30.1 06/24/2021    MCHC 31.3 06/24/2021    RDW 14.2 06/24/2021    LYMPHOPCT 14.1 06/23/2021    MONOPCT 14.6 06/23/2021    BASOPCT 0.2 06/23/2021    MONOSABS 1.19 06/23/2021    LYMPHSABS 1.15 06/23/2021    EOSABS 0.04 06/23/2021    BASOSABS 0.02 06/23/2021     CMP:    Lab Results   Component Value Date     06/24/2021    K 5.2 06/24/2021     06/24/2021    CO2 24 06/24/2021    BUN 24 06/24/2021    CREATININE 1.0 06/24/2021    GFRAA >60 06/24/2021    LABGLOM >60 06/24/2021    GLUCOSE 78 06/24/2021    PROT 6.6 08/03/2019    LABALBU 3.5 08/03/2019    CALCIUM 8.5 06/24/2021    BILITOT 0.4 08/03/2019    ALKPHOS 97 08/03/2019    AST 27 08/03/2019    ALT 17 08/03/2019      EPCP:[id number]}     Current Facility-Administered Medications:     atorvastatin (LIPITOR) tablet 10 mg, 10 mg, Oral, Nightly, Sj Ruvalcaba DO, 10 mg at 06/24/21 2028    allopurinol (ZYLOPRIM) tablet 100 mg, 100 mg, Oral, Daily, Eleuterio Curling Malmer, DO, 100 mg at 06/25/21 0802    amLODIPine (NORVASC) tablet 2.5 mg, 2.5 mg, Oral, Daily, Eleuterio Curling Malmer, DO, 2.5 mg at 06/25/21 0802    [Held by provider] apixaban (ELIQUIS) tablet 5 mg, 5 mg, Oral, BID, Eleuterio Curling Malmer, DO, 5 mg at 06/25/21 0802    hydrALAZINE (APRESOLINE) tablet 50 mg, 50 mg, Oral, TID, Eleuterio Curling Malmer, DO, 50 mg at 06/25/21 0802    losartan (COZAAR) tablet 50 mg, 50 mg, Oral, Daily, Eleuterio Curling Malmer, DO, 50 mg at 06/25/21 0802    metoprolol succinate (TOPROL XL) extended release tablet 50 mg, 50 mg, Oral, BID, Eleuterio Curling Malmer, DO, 50 mg at 06/25/21 0802    sodium chloride flush 0.9 % injection 5-40 mL, 5-40 mL, Intravenous, 2 times per day, Recardo Nabesna, DO, 10 mL at 06/25/21 0804    sodium chloride flush 0.9 % injection 5-40 mL, 5-40 mL, Intravenous, PRN, Eleuterio Curling Malmer, DO    0.9 % sodium chloride infusion, 25 mL, Intravenous, PRN, Eleuterio Curling Malmer, DO    ondansetron (ZOFRAN-ODT) disintegrating tablet 4 mg, 4 mg, Oral, Q8H PRN **OR** ondansetron (ZOFRAN) injection 4 mg, 4 mg, Intravenous, Q6H PRN, Eleuterio Curling Malmer, DO    polyethylene glycol (GLYCOLAX) packet 17 g, 17 g, Oral, Daily PRN, Eleuterio Curling Malmer, DO    acetaminophen (TYLENOL) tablet 650 mg, 650 mg, Oral, Q6H PRN, 650 mg at 06/25/21 1155 **OR** acetaminophen (TYLENOL) suppository 650 mg, 650 mg, Rectal, Q6H PRN, Eleuterio Curling Malmer, DO    CT LUMBAR SPINE WO CONTRAST   Final Result   Mid to lower lumbar facet arthropathy. L4-5 mild diffuse disc bulge with   prominent facet arthropathy causing mild spinal stenosis. There is also L4   mild grade 1 spondylolisthesis. No spondylolysis. Aortoiliac atherosclerosis with 3 cm infrarenal abdominal aortic aneurysm and   mild aneurysmal dilatation of common iliac arteries. Recommend follow-up   every 3 years. XR CHEST PORTABLE   Final Result   No acute process.          US CAROTID ARTERY BILATERAL   Final Result Atherosclerotic disease. Estimated stenosis by NASCET criteria in the proximal right carotid   artery is between 50% to 69% . Estimated stenosis by NASCET criteria in the proximal left carotid   artery is between 0%  to 49 %         CT HEAD WO CONTRAST   Final Result   No acute intracranial abnormality. Assessment:    Active Problems:    PVD (peripheral vascular disease) (HCC)    Bilateral carotid artery stenosis    Spondylosis of lumbosacral spine with radiculopathy    Thrombocytopenia (HCC)  Resolved Problems:    Acute cerebrovascular accident (CVA) ([address]    Acute CVA (cerebrovascular accident) ([address]    79 yo elderly female known to Dr. Clemencia Wilson with hx of IgM MGUS and chronic anemia thrombocytopenia. Also hx of parox Afib on Eliquis and CKD. Admitted yesterday with slurred speech and LLE weakness. Concern for CVA vs vascular claudication. Plan:  She has had a slow decline in her platelets over the last 6-12 mo. Update plasma cell dyscrasia parameters, check SPEP, Ig assay, serum viscosity  She is not a candidate for Eliquis given the thrombocytopenia. Follow up with Dr. Clemencia Wilson after discharge.           Electronically signed by Belle Gamez MD on 6/25/2021 at 1:17 PM

## 2021-06-28 LAB
ALBUMIN SERPL-MCNC: 3.1 G/DL (ref 3.5–4.7)
ALPHA-1-GLOBULIN: 0.2 G/DL (ref 0.2–0.4)
ALPHA-2-GLOBULIN: 0.5 G/FL (ref 0.5–1)
BETA GLOBULIN: 1.2 G/DL (ref 0.8–1.3)
ELECTROPHORESIS: ABNORMAL
GAMMA GLOBULIN: 1.6 G/DL (ref 0.7–1.6)
IGA: 233 MG/DL (ref 70–400)
IGG: 945 MG/DL (ref 700–1600)
IGM: 934 MG/DL (ref 40–230)
IMMUNOFIXATION RESULT, SERUM: NORMAL
TOTAL PROTEIN: 6.6 G/DL (ref 6.4–8.3)

## 2021-06-29 LAB — BETA-2 MICROGLOBULIN: 5.3 MG/L (ref 0.6–2.4)

## 2021-07-08 LAB — VISCOSITY, SERUM: 1.38 CP (ref 1.1–1.8)

## 2021-07-19 NOTE — PROGRESS NOTES
Samir. Supriya Adams M.D., F.A.C.P. Toño Leblanc, DAMIEN, APRN, CNS  Benson Ross. Melany Frederick, MSN, APRN-FNP-C  Leslee Marroquin MSN, APRN, FNP-C  Jelly MULLEN, SONA  [address] MSN, APRN, FNP-C     [address], [address]              [place] [address] [facility] is 80 y.o. right handed female     We are seeing her as a hospital follow-up for LS radiculopathy    She presents with her daughter and is a good historian    She was seen as an inpatient in June after presenting with left leg weakness beginning 2 days prior and associated with left calf pain and difficulty walking, with symptoms improving when bent over and worsen when walking up stairs. Questionable episode of slurred speech and weakness when trying to get off the toilet the day prior. Unable to have MRI due to incompatible pacemaker, but no acute events on CT of the head and it was not felt that she suffered a stroke. She was thrombocytopenic during admission with platelets of 15,385    Unfortunately, she has not yet had the recommended physical therapy as an outpatient, stating they were scheduling difficulties. She continues to note low back and bilateral lower extremity pains, with left leg weakness, difficulty ascending stairs,and decreased ability to walk distances. Her left leg and the bottoms of both feet will feel numb. No cauda equina symptoms or falls. She is using a cane, but also has a rolling walker. She is currently taking nothing for her back discomforts. She also notes bilateral shoulder, upper back, and neck pains. She had injections in both shoulders for chronic rotator cuff disease prior to admission, but this has not helped her neck pains. She denies any weakness in her arms or radicular symptoms down her arms. No recent neck imaging.     She has not yet followed up with heme-onc for her MGUS and thrombocytopenia (platelets were 20 in the hospital), and her Eliquis has been on hold since a hospitalization. There have been scheduling issues getting in with her  Heme/onc provider    She will be going to [place] to visit family at the beginning of August.    Current Outpatient Medications   Medication Sig Dispense Refill    acetaminophen (TYLENOL) 325 MG tablet Take 2 tablets by mouth every 6 hours as needed for Pain 120 tablet 3    IRON, FERROUS SULFATE, PO Take 250 mg by mouth daily      losartan (COZAAR) 50 MG tablet take 1 tablet by mouth once daily      ESTRACE VAGINAL 0.1 MG/GM vaginal cream apply 1 gram TWICE WEEKLY      pitavastatin (LIVALO) 2 MG TABS tablet Take 2 mg by mouth once a week       b complex vitamins capsule Take 1 capsule by mouth daily      Omega-3 Fatty Acids (OMEGA-3 FISH OIL) 300 MG CAPS Take 900 mg by mouth daily       amLODIPine (NORVASC) 2.5 MG tablet Take 1 tablet by mouth daily 30 tablet 11    Coenzyme Q10 (COQ-10) 100 MG CAPS Take 1 capsule by mouth daily      metoprolol succinate (TOPROL XL) 50 MG extended release tablet Take 1 tablet by mouth 2 times daily 30 tablet 11    Cholecalciferol (VITAMIN D) 2000 UNITS CAPS capsule Take 2,000 Units by mouth daily       polyethyl glycol-propyl glycol 0.4-0.3 % (SYSTANE) 0.4-0.3 % ophthalmic solution 1 drop as needed for Dry Eyes      hydrALAZINE (APRESOLINE) 50 MG tablet Take 1 tablet by mouth 3 times daily. 90 tablet 11    allopurinol (ZYLOPRIM) 100 MG tablet Take 100 mg by mouth daily        No current facility-administered medications for this visit.      Objective:     BP (!) 186/75 (Site: Left Upper Arm, Position: Sitting, Cuff Size: Medium Adult)   Pulse 63   Temp 97 °F (36.1 °C) (Infrared)   Ht 5' 2\" (1.575 m)   Wt 178 lb (80.7 kg)   SpO2 100%   BMI 32.56 kg/m²     General appearance: alert, appears stated age, cooperative and no distress  Head: normocephalic, without obvious abnormality, atraumatic  Eyes: conjunctivae/corneas clear--arcus senilis bilaterally  Neck: Mildly limited range of motion but no significant cervicalgia; spasticity to right trapezius and cervical paraspinals  Lungs: clear to auscultation bilaterally  Heart: RRR today  Extremities: normal, atraumatic, no cyanosis or edema; lipoma R wrist; wearing compression hose  Pulses: 2+ and symmetric  Skin: color, texture, turgor normal---no rashes or lesions      Mental Status: Alert, oriented x4--very pleasant    Appropriate attention/concentration  Intact fundus of knowledge  Intact memories    Speech: no dysarthria  Language: no aphasias    Cranial Nerves:  I: smell NA   II: visual acuity  NA   II: visual fields Full to confrontation   II: pupils HARPAL   III,VII: ptosis None   III,IV,VI: extraocular muscles  Full ROM   V: mastication Normal   V: facial light touch sensation  Normal   V,VII: corneal reflex     VII: facial muscle function - upper  Normal   VII: facial muscle function - lower Normal   VIII: hearing Normal   IX: soft palate elevation  Normal   IX,X: gag reflex    XI: trapezius strength  5/5   XI: sternocleidomastoid strength 5/5   XI: neck extension strength  5/5   XII: tongue strength  Normal     Motor:  5/5   5/5 bilateral biceps   5/5 b/l triceps  3+/5 b/l deltoids  Limited ROM both shoulders    4/5 L IPS; 4+/5 R IPS  5/5 both quads  5/5 b/l gastrocs and ant tibs    Overweight bulk; normal tone  No abnormal movements    Sensory:  LT normal in all limbs  Vib impaired at both wrists, intact at ankles today    Coordination:   FFM and FN slow b/l but not ataxic    Gait:  Slow, antalgic, limps on L with cane    DTR:   Right Brachioradialis reflex 1+  Left Brachioradialis reflex 1+  Right Biceps reflex 1+  Left Biceps reflex 1+  Right Quadriceps reflex 2+  Left Quadriceps reflex 2+  Right Achilles reflex 0  Left Achilles reflex 0    No Corea's    No pathological reflexes    Laboratory/Radiology:     CBC with Differential:    Lab Results   Component Value Date    WBC 5.2 06/24/2021    RBC 2.72 06/24/2021    HGB 8.2 06/24/2021    HCT 26.2 06/24/2021    PLT 20 06/24/2021    MCV 96.3 06/24/2021    MCH 30.1 06/24/2021    MCHC 31.3 06/24/2021    RDW 14.2 06/24/2021    LYMPHOPCT 14.1 06/23/2021    MONOPCT 14.6 06/23/2021    BASOPCT 0.2 06/23/2021    MONOSABS 1.19 06/23/2021    LYMPHSABS 1.15 06/23/2021    EOSABS 0.04 06/23/2021    BASOSABS 0.02 06/23/2021     CMP:    Lab Results   Component Value Date     06/24/2021    K 5.2 06/24/2021     06/24/2021    CO2 24 06/24/2021    BUN 24 06/24/2021    CREATININE 1.0 06/24/2021    GFRAA >60 06/24/2021    LABGLOM >60 06/24/2021    GLUCOSE 78 06/24/2021    PROT 6.6 06/25/2021    LABALBU 3.1 06/25/2021    CALCIUM 8.5 06/24/2021    BILITOT 0.4 08/03/2019    ALKPHOS 97 08/03/2019    AST 27 08/03/2019    ALT 17 08/03/2019     FLP:    Lab Results   Component Value Date    TRIG 90 03/12/2015    HDL 42 06/24/2021    LDLCALC 63 06/24/2021    LABVLDL 27 06/24/2021     All labs and images personally reviewed today    Assessment:     LS radiculopathy: Secondary to spondylolisthesis seen on CT of the lumbar spine and causing her leg weakness and low back pain. Her various orthopedic issues are also contributing. She does have proximal weakness in her left IPS and gait abnormalities, which may respond to physical therapy. Unfortunately, she cannot have MRI, but will get EDX studies of both legs to eval severity. We will start low-dose gabapentin as tolerated for her pain. She may need lumbar CATY in the future if she fails the above. Asymptomatic right ICA stenosis: 50-69% on R; follows with vascular. Antiplatelet contraindicated at this time. On very low dose statin    MGUS: follows with heme/onc    Thrombocytopenia: needs direction from heme/onc about safety of resuming [address]    A.  Fib: [address] on hold    Pacer    Plan:     Start gabapentin 100 mg BID PRN    EMG both legs (if able with low platelets)    Referral to [facility] PT    She will call Dr. Breanne Torres office today for direction on anticoagulant and platelets    Will send message to Dr. Rosalind Briggs myself regarding the above    Fall precautions reviewed    RTO in 3 mos or sooner TERESA Jennings - CNP  3:32 PM  7/19/2021

## 2021-07-20 ENCOUNTER — OFFICE VISIT (OUTPATIENT)
Dept: NEUROLOGY | Age: 86
End: 2021-07-20
Payer: COMMERCIAL

## 2021-07-20 VITALS
WEIGHT: 178 LBS | BODY MASS INDEX: 32.76 KG/M2 | TEMPERATURE: 97 F | HEIGHT: 62 IN | SYSTOLIC BLOOD PRESSURE: 186 MMHG | HEART RATE: 63 BPM | OXYGEN SATURATION: 100 % | DIASTOLIC BLOOD PRESSURE: 75 MMHG

## 2021-07-20 DIAGNOSIS — M47.27 SPONDYLOSIS OF LUMBOSACRAL SPINE WITH RADICULOPATHY: Primary | ICD-10-CM

## 2021-07-20 DIAGNOSIS — R29.898 LEFT LEG WEAKNESS: ICD-10-CM

## 2021-07-20 PROBLEM — R55 NEAR SYNCOPE: Status: RESOLVED | Noted: 2017-04-15 | Resolved: 2021-07-20

## 2021-07-20 PROBLEM — R04.0 EPISTAXIS: Status: RESOLVED | Noted: 2019-01-14 | Resolved: 2021-07-20

## 2021-07-20 PROCEDURE — G8417 CALC BMI ABV UP PARAM F/U: HCPCS | Performed by: NURSE PRACTITIONER

## 2021-07-20 PROCEDURE — 4040F PNEUMOC VAC/ADMIN/RCVD: CPT | Performed by: NURSE PRACTITIONER

## 2021-07-20 PROCEDURE — 1123F ACP DISCUSS/DSCN MKR DOCD: CPT | Performed by: NURSE PRACTITIONER

## 2021-07-20 PROCEDURE — G8427 DOCREV CUR MEDS BY ELIG CLIN: HCPCS | Performed by: NURSE PRACTITIONER

## 2021-07-20 PROCEDURE — 1090F PRES/ABSN URINE INCON ASSESS: CPT | Performed by: NURSE PRACTITIONER

## 2021-07-20 PROCEDURE — 1036F TOBACCO NON-USER: CPT | Performed by: NURSE PRACTITIONER

## 2021-07-20 PROCEDURE — 99214 OFFICE O/P EST MOD 30 MIN: CPT | Performed by: NURSE PRACTITIONER

## 2021-07-20 RX ORDER — GABAPENTIN 100 MG/1
100 CAPSULE ORAL 2 TIMES DAILY PRN
Qty: 60 CAPSULE | Refills: 11 | Status: SHIPPED
Start: 2021-07-20 | End: 2022-03-03

## 2021-07-20 NOTE — PATIENT INSTRUCTIONS
Patient Education        Electromyogram (EMG) and Nerve Conduction Studies: About These Tests  What are they? An electromyogram (EMG) measures the electrical activity of your muscles when you are not using them (at rest) and when you tighten them (muscle contraction). Nerve conduction studies (NCS) measure how well and how fast the nerves can send electrical signals. EMG and nerve conduction studies are often done together. If they are done together, the nerve conduction studies are done before the EMG. Why are they done? You may need an EMG to find diseases that damage your muscles or nerves or to find why you can't move your muscles (paralysis), why they feel weak, or why they twitch. These problems may include a herniated disc, amyotrophic lateral sclerosis (ALS), or myasthenia gravis (MG). You may need nerve conduction studies to find damage to the nerves that lead from the brain and spinal cord to the rest of the body. (This is called the peripheral nervous system.) These studies are often used to help find nerve disorders, such as carpal tunnel syndrome. How do you prepare for these tests?    · Wear loose-fitting clothing. You may be given a hospital gown to wear.     · The electrodes for the test are attached to your skin. Your skin needs to be clean and free of sprays, oils, creams, and lotions.     · You may be asked to sign a consent form that says you understand the risks of the test and agree to have it done. · Tell your doctor ALL the medicines, vitamins, supplements, and herbal remedies you take. Some may increase the risk of problems during your test. Your doctor will tell you if you should stop taking any of them before the test and how soon to do it.     · If you take aspirin or some other blood thinner, ask your doctor if you should stop taking it before your test. Make sure that you understand exactly what your doctor wants you to do. These medicines increase the risk of bleeding. How are the tests done? You lie on a table or bed or sit in a reclining chair so your muscles are relaxed. For an EMG:   · Your doctor will insert a needle electrode into a muscle. This will record the electrical activity while the muscle is at rest.  · Your doctor will ask you to tighten the same muscle slowly and steadily while the electrical activity is recorded. · Your doctor may move the electrode to a different area of the muscle or a different muscle. For nerve conduction studies:   · Your doctor will attach two types of electrodes to your skin. ? One type of electrode is placed over a nerve and will give the nerve an electrical pulse. ? The other type of electrode is placed over the muscle that the nerve controls. It will record how long it takes the muscle to react to the electrical pulse. How does having electromyogram (EMG) and nerve conduction studies feel? During an EMG test, you may feel a quick, sharp pain when the needle electrode is put into a muscle. With nerve conduction studies, you will be able to feel the electrical pulses. The tests make some people anxious. Keep in mind that only a very low-voltage electrical current is used. And each electrical pulse is very quick. It lasts less than a second. How long do they take? · An EMG may take 30 to 60 minutes. · Nerve conduction tests may take from 15 minutes to 1 hour or more. It depends on how many nerves and muscles your doctor tests. What happens after these tests? · After the test, you may be sore and feel a tingling in your muscles. This may last for up to 2 days. · If any of the test areas are sore:  ? Put ice or a cold pack on the area for 10 to 20 minutes at a time. Put a thin cloth between the ice and your skin. ? Take an over-the-counter pain medicine, such as acetaminophen (Tylenol), ibuprofen (Advil, Motrin), or naproxen (Aleve). Be safe with medicines. Read and follow all instructions on the label.   · You will probably be able to go home right away. It depends on the reason for the test.  · You can go back to your usual activities right away. When should you call for help? Watch closely for changes in your health, and be sure to contact your doctor if:  · Muscle pain from an EMG test gets worse or you have swelling, tenderness, or pus at any of the needle sites. · You have any problems that you think may be from the test.  · You have any questions about the test or have not received your results. Follow-up care is a key part of your treatment and safety. Be sure to make and go to all appointments, and call your doctor if you are having problems. It's also a good idea to keep a list of the medicines you take. Ask your doctor when you can expect to have your test results. Where can you learn more? Go to [email or web address] and sign in to your [employer] account. Enter Z168 in the [employer] box to learn more about \"Electromyogram (EMG) and Nerve Conduction Studies: About These Tests. \"     If you do not have an account, please click on the \"Sign Up Now\" link. Current as of: August 4, 2020               Content Version: 12.9  © 2006-2021 Healthwise, [employer]. Care instructions adapted under license by [facility] ([facility]). If you have questions about a medical condition or this instruction, always ask your healthcare professional. [place] [address] any warranty or liability for your use of this information. Patient Education        gabapentin  Pronunciation:  GA ba PEN tin  Brand:  Gralise, Horizant, Neurontin  What is the most important information I should know about gabapentin? Gabapentin can cause life-threatening breathing problems, especially if you already have a breathing disorder or if you use other medicines that can make you drowsy or slow your breathing. Seek emergency medical attention if you have very slow breathing.   Some people have thoughts about suicide or behavior changes while taking gabapentin. Stay alert to changes in your mood or symptoms. Report any new or worsening symptoms to your doctor. Do not stop using gabapentin suddenly, even if you feel fine. What is gabapentin? Gabapentin is used together with other medicines to treat partial seizures in adults and children at least 1years old. Gabapentin is also used to treat nerve pain caused by herpes virus or shingles (herpes zoster) in adults. Use only the brand and form of gabapentin your doctor has prescribed. Check your medicine each time you get a refill to make sure you receive the correct form. Gralise is used only to treat nerve pain. Horizant is used to treat nerve pain and restless legs syndrome (RLS). Neurontin is used to treat nerve pain and seizures. Gabapentin may also be used for purposes not listed in this medication guide. What should I discuss with my healthcare provider before taking gabapentin? You should not use gabapentin if you are allergic to it. Tell your doctor if you have ever had:  · breathing problems or lung disease, such as chronic obstructive pulmonary disease (COPD);  · kidney disease (or if you are on dialysis);  · diabetes;  · depression, a mood disorder, or suicidal thoughts or actions;  · a drug addiction;  · a seizure (unless you take gabapentin to treat seizures);  · liver disease;  · heart disease; or  · (for patients with RLS) if you are a day sleeper or work a night shift. Some people have thoughts about suicide while taking this medicine. Children taking gabapentin may have behavior changes. Stay alert to changes in your mood or symptoms. Report any new or worsening symptoms to your doctor. It is not known whether this medicine will harm an unborn baby. Tell your doctor if you are pregnant or plan to become pregnant. Seizure control is very important during pregnancy, and having a seizure could harm both mother and baby.  Do not start or stop taking gabapentin for seizures without your doctor's advice, and tell your doctor right away if you become pregnant. If you are pregnant, your name may be listed on a pregnancy registry to track the effects of gabapentin on the baby. It may not be safe to breastfeed while using this medicine. Ask your doctor about any risk. How should I take gabapentin? Follow all directions on your prescription label. Do not take this medicine in larger or smaller amounts or for longer than recommended. If your doctor changes your brand, strength, or type of gabapentin, your dosage needs may change. Ask your pharmacist if you have any questions about the new kind of gabapentin you receive at the pharmacy. Both Gralise and Horizant should be taken with food. Neurontin can be taken with or without food. If you break a Neurontin tablet and take only half of it, take the other half at your next dose. Any tablet that has been broken should be used as soon as possible or within a few days. Swallow the capsule  or tablet  whole and do not crush, chew, break, or open it. Measure liquid medicine carefully. Use the dosing syringe provided, or use a medicine dose-measuring device (not a kitchen spoon). Do not stop using gabapentin suddenly, even if you feel fine. Stopping suddenly may cause increased seizures. Follow your doctor's instructions about tapering your dose. In case of emergency, wear or carry medical identification to let others know you have seizures. This medicine can cause unusual results with certain medical tests. Tell any doctor who treats you that you are using gabapentin. Store gabapentin tablets and capsules at room temperature away from light and moisture. Store the liquid medicine in the refrigerator. Do not freeze. What happens if I miss a dose? Take the medicine as soon as you can, but skip the missed dose if it is almost time for your next dose. Do not take two doses at one time.   If you take Horizant: Skip the missed dose and use your next dose at the regular time. Do not use two doses of Horizant at one time. What happens if I overdose? Seek emergency medical attention or call the Poison Help line at 1-602.554.9418. What should I avoid while taking gabapentin? Avoid driving or hazardous activity until you know how this medicine will affect you. Your reactions could be impaired. Dizziness or drowsiness can cause falls, accidents, or severe injuries. Avoid taking an antacid within 2 hours before you take gabapentin. Antacids can make it harder for your body to absorb gabapentin. Avoid drinking alcohol while taking gabapentin. What are the possible side effects of gabapentin? Get emergency medical help if you have signs of an allergic reaction: hives; difficult breathing; swelling of your face, lips, tongue, or throat. Seek medical treatment if you have a serious drug reaction that can affect many parts of your body. Symptoms may include: skin rash, fever, swollen glands, muscle aches, severe weakness, unusual bruising, upper stomach pain, or yellowing of your skin or eyes. Report any new or worsening symptoms to your doctor, such as: mood or behavior changes, anxiety, panic attacks, trouble sleeping, or if you feel impulsive, irritable, agitated, hostile, aggressive, restless, hyperactive (mentally or physically), depressed, or have thoughts about suicide or hurting yourself. Call your doctor at once if you have:  · weak or shallow breathing;  · blue-colored skin, lips, fingers, and toes;  · confusion, extreme drowsiness or weakness;  · problems with balance or muscle movement;  · unusual or involuntary eye movements; or  · increased seizures. Gabapentin can cause life-threatening breathing problems. A person caring for you should seek emergency medical attention if you have slow breathing with long pauses, blue colored lips, or if you are hard to wake up.  Breathing problems may be more likely in older adults or in people with COPD. Some side effects are more likely in children taking gabapentin. Contact your doctor if the child taking this medicine has any of the following side effects:  · changes in behavior;  · memory problems;  · trouble concentrating; or  · acting restless, hostile, or aggressive. Common side effects may include:  · fever, chills, sore throat, body aches, unusual tiredness;  · jerky movements;  · headache;  · double vision;  · swelling of your legs and feet;  · tremors;  · trouble speaking;  · dizziness, drowsiness, tiredness;  · problems with balance or eye movements; or  · nausea, vomiting. This is not a complete list of side effects and others may occur. Call your doctor for medical advice about side effects. You may report side effects to FDA at 4-640-FDA-3357. What other drugs will affect gabapentin? Using gabapentin with other drugs that make you drowsy or slow your breathing can cause dangerous side effects or death. Ask your doctor before using opioid medication, a sleeping pill, cold or allergy medicine, a muscle relaxer, or medicine for anxiety or seizures. Other drugs may affect gabapentin, including prescription and over-the-counter medicines, vitamins, and herbal products. Tell your doctor about all your current medicines and any medicine you start or stop using. Where can I get more information? Your pharmacist can provide more information about gabapentin. Remember, keep this and all other medicines out of the reach of children, never share your medicines with others, and use this medication only for the indication prescribed. Every effort has been made to ensure that the information provided by [address] is accurate, up-to-date, and complete, but no guarantee is made to that effect. Drug information contained herein may be time sensitive.  [employer] information has been compiled for use by healthcare practitioners and consumers in the [place]

## 2021-07-27 ENCOUNTER — HOSPITAL ENCOUNTER (OUTPATIENT)
Dept: PHYSICAL THERAPY | Age: 86
Setting detail: THERAPIES SERIES
Discharge: HOME | End: 2021-07-27
Payer: COMMERCIAL

## 2021-07-27 PROCEDURE — 97161 PT EVAL LOW COMPLEX 20 MIN: CPT

## 2021-07-27 NOTE — PROGRESS NOTES
[address] [facility]                Phone: [phone]   Fax: [phone]    Physical Therapy Initial Evaluation  Date:  2021    Patient Name:  Leopold Catching    :  1931  MRN: [id number]    Referring Physician:  Billy Manning DO  Insurance Information:  [id number] [employer]     Evaluation date:  2021  Diagnosis:  L LE weakness  Precautions:  Pacemaker   Cert Dates:  4727 - 10/27/2021  ICD-10 Codes:  R29.90, Z72.3   Evaluating Physical Therapist:  Randie Dubin, PT, DPT      Subjective:    History/Onset of Symptoms:  Pt stated she was in the hospital at the end of /early July after not being able to get up off the commode. Pt stated she had been using quad cane prior but started using [employer] within the last couple of days due to feeling like her LE's were weaker. Pt reported difficulty when ascending steps at home and requires occasional assistance for sit>stand transfers. Per pt's daughter, pt is scheduled for an EMG of her L LE tomorrow 2021. Additional Comments:  Pt's daughter present for evaluation and assisted in providing history information. Pt lives with her daughter and granddaughter in a 1 story home with 4 steps and 1 rail to enter. Pt also has 14 steps with 1 rail to the basement. Objective:    AROM Deficits:  AROM B LE's WFL     Strength Deficits:  B hips 4-/5, B knees and ankles 4+/5    Posture:  Fair - increased thoracic kyphosis, forward head posture, slight forward flexion of lumbar spine in standing    Endurance:  Fair-poor - fatigued quickly with minimal activity    Transfers:  Sit<>stand SBA but required increased time and attempts for sit>stand     Gait:  Pt ambulated throughout PT clinic with [employer] SBA. Pt demonstrated slow gait speed but no LOB noted. Steps:  Pt ascended/descended 4 steps with 1 HR and SBQC min A. Pt used step-to pattern.     Additional Comments:  Pt's daughter stated they are going out of town to visit family this weekend and they will be gone for 2 weeks. Pt's next visit scheduled in 2 weeks when she returns. Summary/Assessment:    Pt presents to outpatient physical therapy with c/o weakness and fatigue. Pt would benefit from further PT to improve B hip strength as well as endurance. Plan:     Below checked are areas for improvement during physical therapy POC:        []  Pain reduction  []  Balance Improvement       [x]  Strengthening  []  Postural Improvement   []  Range of Motion  []  Soft Tissue Improvement    []  Gait Training   [x]  Other: Endurance Improvement     [x]  Home Exercise Program      Pt will be see for 2 visits per week for 4-6 weeks for a total of 8-12 visits to accomplish goals set below:        Short/Long Term Goals: (4-6 weeks)      1. Pt will improve B hip strength to at least 4/5 throughout. 2.  Pt will complete sit<>stand transfers modified independent. 3.  Pt will ambulate > 300' with AAD modified independent. 4.  Pt will ascend/descend 12 steps with 1 HR and AAD if needed modified independent. 5.  Pt will be independent with HEP. Pt's potential for reaching Physical Therapy goals: fair. CPT codes 7/27/2021 Units  Minutes   Low Complexity PT evaluation  66243 1    Moderate Complexity PT evaluation  91876     High Complexity PT evaluation [id number]     PT Re-evaluation  [id number]     Gait training [id number]     Manual therapy  26384     Therapeutic activities  56799     Therapeutic exercises  40280     Neuromuscular reeducation  [phone]           Time In:  1018  Time Out:  Mike Mancera PT, DPT      REC[facility]ERY [facility]  T: [phone]   F: [phone]     If you have any questions or concerns, please don't hesitate to call. Thank you for your referral.    Physician Signature:________________________________Date:__________________  By signing above, therapists plan is approved by physician.  All patients under [employer]   must be signed by physician.

## 2021-07-27 NOTE — PROGRESS NOTES
[address] [facility]                Phone: [phone]   Fax: [phone]    Physical Therapy Daily Treatment Note  Date:  2021    Patient Name:  Soheila Lima    :  1931  MRN: [id number]    Referring Physician:  Malena Alva DO  Insurance Information:  [id number] [employer]      Evaluation date:  2021  Diagnosis:  L LE weakness  Precautions:  Pacemaker   Cert Dates:  382 - 10/27/2021  ICD-10 Codes:  R29.90, Z72.3   Evaluating Physical Therapist:  Lior Donohue PT, DPT     Visit# / total visits:  -     Time In:    Time Out:      Subjective:      Exercises:   Exercise/Equipment Resistance/Repetitions Other comments      Bike                 Standing hip 3-way        Sidestepping                 Sit<>stand                 Step-ups  Forward  Lateral                Gait training                   Stair training                                                                 Assessment/Comments:      Home Exercise Program:        Treatment/Activity Tolerance:  [] Patient tolerated treatment well [] Patient limited by fatigue  [] Patient limited by pain  [] Patient limited by other medical complications  [] Other:     Prognosis: [] Good [x] Fair  [] Poor    Patient Requires Follow-up: [x] Yes  [] No    Plan:   [] Continue per plan of care [] Alter current plan (see comments)  [x] Plan of care initiated [] Hold pending MD visit [] Discharge    Plan for Next Session:  Begin hip strengthening and general conditioning exercises. Short/Long Term Goals:                         1.  Pt will improve B hip strength to at least 4/5 throughout. 2.  Pt will complete sit<>stand transfers modified independent. 3.  Pt will ambulate > 300' with AAD modified independent. 4.  Pt will ascend/descend 12 steps with 1 HR and AAD if needed modified independent.                         5.  Pt will be independent with HEP.    See Progress Note: []  Yes  [x]  No  Next due:        CPT codes 7/27/2021 Units  Minutes   Low Complexity PT evaluation  03803     Moderate Complexity PT evaluation  08759     High Complexity PT evaluation [id number]     PT Re-evaluation  [id number]     Gait training [id number]     Manual therapy  88605     Therapeutic activities  75607     Therapeutic exercises  08718     Neuromuscular reeducation  12073         Electronically signed by:  Tino Tracey, PT, DPT

## 2021-07-28 ENCOUNTER — OFFICE VISIT (OUTPATIENT)
Dept: PHYSICAL MEDICINE AND REHAB | Age: 86
End: 2021-07-28
Payer: COMMERCIAL

## 2021-07-28 VITALS — HEIGHT: 62 IN | BODY MASS INDEX: 32.76 KG/M2 | WEIGHT: 178 LBS

## 2021-07-28 DIAGNOSIS — R29.898 LEFT LEG WEAKNESS: ICD-10-CM

## 2021-07-28 DIAGNOSIS — M47.27 SPONDYLOSIS OF LUMBOSACRAL SPINE WITH RADICULOPATHY: ICD-10-CM

## 2021-07-28 PROCEDURE — 1090F PRES/ABSN URINE INCON ASSESS: CPT | Performed by: PHYSICAL MEDICINE & REHABILITATION

## 2021-07-28 PROCEDURE — G8428 CUR MEDS NOT DOCUMENT: HCPCS | Performed by: PHYSICAL MEDICINE & REHABILITATION

## 2021-07-28 PROCEDURE — 99202 OFFICE O/P NEW SF 15 MIN: CPT | Performed by: PHYSICAL MEDICINE & REHABILITATION

## 2021-07-28 PROCEDURE — 95910 NRV CNDJ TEST 7-8 STUDIES: CPT | Performed by: PHYSICAL MEDICINE & REHABILITATION

## 2021-07-28 PROCEDURE — 95886 MUSC TEST DONE W/N TEST COMP: CPT | Performed by: PHYSICAL MEDICINE & REHABILITATION

## 2021-07-28 PROCEDURE — G8417 CALC BMI ABV UP PARAM F/U: HCPCS | Performed by: PHYSICAL MEDICINE & REHABILITATION

## 2021-07-28 NOTE — PATIENT INSTRUCTIONS
Electrodiagnotic Laboratory  Acc[facility] the [facility] with Ex[facility]  LUIS Barrios D.O. [facility]  [address] [place]  Phone: [phone]  Fax: [phone]        Today you had an electrodiagnostic exam which included nerve conduction studies (NCS) and electromyography (EMG). This test evaluated the electrical activity of your nerves and muscles to help determine if you have a nerve or muscle disease. This test can help determine the location and type of a nerve or muscle problem. This will help your referring doctor diagnose your condition and determine the appropriate next step in your treatment plan. After your test:    1. There are no long lasting side effects of the test.     2. You may resume your normal activities without restrictions. 3.  Resume any medications that were stopped for the test.     4  If you have sore areas or bruising in your muscles where the needle was placed, apply a cold pack to the sore area for 15-20 minutes three to four times a day as needed for pain. The soreness should go away in about 1-2 days. 5. Your results were provided  Briefly at the end of your test and the final detailed report will be provided to your referring physician, and/or primary care physician and any other parties you requested within 1-2 days of the examination. You may wish to contact your referring provider after a few days to determine what they would like you to do next. 6.  Please call [phone] with any questions or concerns and if you develop increased body temperature/fever, swelling, tenderness, increased pain and/or drainage from the sites where the needle was placed. Thank you for choosing us for your health care needs.

## 2021-07-28 NOTE — PROGRESS NOTES
[address] [facility]  Electrodiagnostic Laboratory  *Accredited by the [address] [place] [address] with exemplary status  [address] [place]  Phone: [phone]  Fax: [phone]      Date of Examination: 07/28/21  Patient Name: Gomez Loza  is a 80y.o. year old female who was seen due to complaints of   Chief Complaint   Patient presents with    Extremity Pain     pain in the back of the leg. getting worse. pain for about 6 months. pain is 10/10.  Numbness     numbness/tingling in the calf and foot.  Extremity Weakness     decrease strength. unable to step up. Physical Exam: MSK: There is no joint effusion, deformity, instability, swelling, erythema or warmth. AROM is full in the spine and extremities. Neurologic:  Distal sensory deficit, no focal motor deficit. Reflexes 2+ and symmetric. Gait is normal.    Impression:     1. Spondylosis of lumbosacral spine with radiculopathy    2. Left leg weakness        Plan:   · EMG is indicated to evaluate the above diagnosis. Orders Placed This Encounter   Procedures    NC NEEDLE EMG EA EXTREMTY W/PARASPINL AREA COMPLETE    NC MOTOR &/SENS 7-8 NRV CNDJ PRECONF ELTRODE LIMB     · EMG was done today and showed polyneuropathy. The patient was educated about the diagnosis and the prognosis. · A work up for the cause of polyneuropathy is recommended. · Advised patient to follow up with referring provider. Thank you for allowing me to participate in the care of your patient.       Sincerely,     Madai Sharp DO

## 2021-07-28 NOTE — PROGRESS NOTES
[address] [facility]  Electrodiagnostic Laboratory  *Accredited by the [address] [place] [address] with exemplary status  [address] [facility] [place]  Phone: [phone]  Fax: [phone]    Referring Provider: TERESA Tellez -*  Primary Care Physician: Dolores Wiley DO  Patient Name: Yas Saul  Patient YOB: 1931  Gender: female  BMI: Body mass index is 32.56 kg/m². Height 5' 2\" (1.575 m), weight 178 lb (80.7 kg), not currently breastfeeding. 7/28/2021    Description of clinical problem:   Chief Complaint   Patient presents with    Extremity Pain     pain in the back of the leg. getting worse. pain for about 6 months. pain is 10/10.  Numbness     numbness/tingling in the calf and foot.  Extremity Weakness     decrease strength. unable to step up. Sensory NCS      Nerve / Sites Rec. Site Peak Lat PP Amp Segments Distance Velocity Temp.      ms µV  cm m/s °C   L Sural - Ankle (Calf)      Calf Ankle NR* NR Calf - Ankle 14 NR 31.1   L Superficial peroneal - Ankle      Lat leg Ankle NR* NR Lat leg - Ankle 10 NR 31.1   R Superficial peroneal - Ankle      Lat leg Ankle NR* NR Lat leg - Ankle 10 NR 31       Motor NCS      Nerve / Sites Muscle Onset Amplitude Segments Distance Velocity Temp.     ms mV  cm m/s °C   L Peroneal - EDB      Ankle EDB 4.64 2.4 Ankle - EDB 8  31      Fib head EDB 11.04 1.8 Fib head - Ankle 26 41 31.2      Above Knee EDB 12.76 1.8 Above Knee - Fib head 10 58 31.1       Above Knee - Ankle   31.1   R Peroneal - EDB      Ankle EDB 4.38 2.3 Ankle - EDB 8  31      Fib head EDB 10.47 2.1 Fib head - Ankle 26 43 31      Above Knee EDB 12.60 2.0 Above Knee - Fib head 10 47 31   L Tibial - AH      Ankle AH 5.99 2.4 Ankle - AH 8  31.1      Pop fossa AH 16.46 1.3 Pop fossa - Ankle 39 37 31.1       F Wave      Nerve Fmin % F    ms %   L Peroneal - EDB NR* NR   L Tibial - AH 60.10* 90   R Peroneal - EDB NR* NR       H Reflex      Nerve H Lat    ms   L Tibial - Soleus NR*   R Tibial - Soleus NR*       EMG      EMG Summary Table     Spontaneous MUAP Recruitment   Muscle Nerve Roots IA Fib PSW Fasc Amp Dur. PPP Pattern   R. Vastus medialis Femoral L2-L4 N 2+ 2+ None N N N Reduced   R. Tibialis anterior Deep peroneal (Fibular) L4-L5 N None None None N N N N   R. Gastrocnemius (Medial head) Tibial S1-S2 N None None None N N N N   R. Extensor hallucis longus Deep peroneal (Fibular) L5-S1 N None None None N N N Discrete   L. Vastus medialis Femoral L2-L4 N None None None N N N N   L. Tibialis anterior Deep peroneal (Fibular) L4-L5 N None None None N N N N   L. Gastrocnemius (Medial head) Tibial S1-S2 N 2+ None None N N N Reduced   L. Extensor hallucis longus Deep peroneal (Fibular) L5-S1 N None None None N N N N   L. Lumbar paraspinals (low)  - N None None None N N N N   L. Lumbar paraspinals (mid)  - N None None None N N N N   R. Lumbar paraspinals (low)  - N None None None N N N N   R. Lumbar paraspinals (mid)  - N None None None N N N N          Study Limitations:  none    Summary of Findings:   Nerve conduction studies:   · The following nerve conduction studies were abnormal:   · Bilateral superficial peroneal and left sural sensory responses were absent. · Bilateral peroneal F waves were absent. · Left tibial F wave was prolonged. · Bilateral tibial H reflexes were absent. · All other nerve conduction studies listed in the table above were normal in latency, amplitude and conduction velocity. Needle EMG:   · Needle EMG was performed using a concentric needle. · The following abnormalities were seen on needle EMG: positive sharp waves and fibrillation potentials were present in the right vastus medialis with decreased motor unit recruitment. Fibrillation potentials were present in the left gastrocnemius with decreased motor unit recruitment. The right extensor hallucis longus also was decreased in recruitment.     All other muscles tested, as listed in the table above demonstrated normal amplitude, duration, phases and recruitment and no active denervation signs were seen. Diagnostic Interpretation: This study was abnormal.     Electrodiagnosis: There is electrodiagnostic evidence of a peripheral polyneuropathy. · Location: bilateral symmetric; Distal.   · Nature: Marilyn.Mends  ] Axonal   [  ] Demyelinating  [  ] Mixed axonal and demyelinating     [  ] Sensory [  ] Motor               [ X] Mixed sensorimotor     [ X ] with active denervation       [  ] without active denervation  · Duration: Subacute  · Severity: severe  · Prognosis: Poor. The prognosis for recovery of axonal lesions is poor and dependant on collateral sprouting and reinnervation. Previous Study: no      Follow up EMG is recommended if clinically warranted. Technologist: Lindsay Jane  Physician:    Zain Mccormick D.O., P.T. Board Certified Physical Medicine and Rehabilitation  Board Certified Electrodiagnostic Medicine      Nerve conduction studies and electromyography were performed according to our laboratory policies and procedures which can be provided upon request. All abnormal values are identified in the table.  Laboratory normal values can also be provided upon request.       Cc: TERESA Ariza -*  Leodan Cuellar DO

## 2021-07-30 ENCOUNTER — TELEPHONE (OUTPATIENT)
Dept: NEUROLOGY | Age: 86
End: 2021-07-30

## 2021-07-30 NOTE — TELEPHONE ENCOUNTER
Left message to call the office for EMG results.     Electronically signed by Lloyd Vizcaino on 7/30/2021 at 2:02 PM

## 2021-07-30 NOTE — TELEPHONE ENCOUNTER
Please let her know that her EMG showed that she has neuropathy in both legs, which means there is damage to the smaller nerves in her legs. There was no mention of nerve damage coming from her low back on the testing. Her neuropathy is most likely due to her long standing diabetes. She must continue to keep this under control. She must also exercise as much as possible, which helps neuropathy. We can discuss this more at her office visit. We will continue the gabapentin for her pain and increase as necessary.

## 2021-08-03 NOTE — DISCHARGE SUMMARY
[address]                  [address] [facility],  [facility]                               DISCHARGE SUMMARY    PATIENT NAME: Ty Humphries                        :        1931  MED REC NO:   [id number]                            ROOM:       8506  ACCOUNT NO:   [de-identified]                           ADMIT DATE: 2021  PROVIDER:     Bang Melo DO                  DISCHARGE DATE:  2021    DISCHARGE DIAGNOSES:  1. Slurred speech. 2.  Left leg weakness. 3.  Hypertension. 4.  Paroxysmal atrial fibrillation. 5.  Chronic Eliquis therapy. 6.  Status post pacemaker. 7.  Diabetes mellitus type 2.  8.  Obesity. 9.  Hyperlipidemia. 10.  Right carotid stenosis. 11.  Chronic anemia. 12.  Thrombocytopenia. 13.  Monoclonal gammopathy of unknown significance. HOSPITAL COURSE:  The patient is a 68-year-old black female who  presented to the emergency room with left leg weakness and slurred  speech. She was admitted to the hospital.  She was seen by Vascular and  Neurology and Hematology. No vascular intervention was recommended by  Vascular. Her platelet counts were very low. Her Eliquis was held. Her symptoms resolved and she returned to her baseline level of  function. She was discharged home in stable condition on 2021. DISCHARGE MEDICATIONS:  As per discharge med rec which include,  1. Tylenol p.r.n.  2.  Allopurinol. 3.  Amlodipine. 4.  Vitamin B complex. 5.  CoQ10.  6.  Estrace vaginal cream.  7.  Hydralazine. 8.  Iron. 9.  Livalo. 10.  Losartan. 11.  Toprol XL.  12.  Omega-3 fish oil. 13.  Systane eyedrops. 14.  Vitamin D. The patient instructed to hold Eliquis until seen by her hematologist as  an outpatient. The patient instructed to follow up with Dr. Bang Melo in four days, call office for appointment.   Follow up with  Hematology,  _____ in one week and Neurology in one month, call  office for

## 2021-08-16 ENCOUNTER — TELEPHONE (OUTPATIENT)
Dept: NEUROLOGY | Age: 86
End: 2021-08-16

## 2021-08-16 NOTE — TELEPHONE ENCOUNTER
Patient would like results of EMG done on 7/28,  Electronically signed by Nadine Stark on 8/16/21 at 3:43 PM EDT

## 2021-08-17 ENCOUNTER — HOSPITAL ENCOUNTER (OUTPATIENT)
Dept: PHYSICAL THERAPY | Age: 86
Setting detail: THERAPIES SERIES
Discharge: HOME | End: 2021-08-17
Payer: COMMERCIAL

## 2021-08-17 PROCEDURE — 97110 THERAPEUTIC EXERCISES: CPT

## 2021-08-17 PROCEDURE — 97530 THERAPEUTIC ACTIVITIES: CPT

## 2021-08-17 NOTE — PROGRESS NOTES
[address] [facility]                Phone: [phone]   Fax: [phone]    Physical Therapy Daily Treatment Note  Date:  2021    Patient Name:  Joselito Guadalupe    :  1931  MRN: [id number]    Referring Physician:  Kamala Gregg DO  Insurance Information:  [id number] [employer]      Evaluation date:  2021  Diagnosis:  L LE weakness  Precautions:  Pacemaker   Cert Dates:  7902 - 10/27/2021  ICD-10 Codes:  R29.90, Z72.3   Evaluating Physical Therapist:  Willa Smith, PT, DPT     Visit# / total visits:  -     Time In:  910  Time Out:  958    Subjective:  Pt stated she feels weak but no other new complaints. Exercises:   Exercise/Equipment Resistance/Repetitions Other comments       Step One 10:00, L3               Standing hip 3-way B LE's  3x20 each direction       Sidestepping                 Sit<>stand                 Step-ups  Forward  Lateral                Gait training                   Stair training                                                                 Assessment/Comments:  Pt given verbal cues and demonstrations for all exercises this morning. Pt required frequent rest breaks due to fatigue. Home Exercise Program:        Treatment/Activity Tolerance:  [] Patient tolerated treatment well [x] Patient limited by fatigue  [] Patient limited by pain  [] Patient limited by other medical complications  [] Other:     Prognosis: [] Good [x] Fair  [] Poor    Patient Requires Follow-up: [x] Yes  [] No    Plan:   [x] Continue per plan of care [] Alter current plan (see comments)  [] Plan of care initiated [] Hold pending MD visit [] Discharge    Plan for Next Session:  Progress hip strengthening and general conditioning exercises. Short/Long Term Goals:                         1.  Pt will improve B hip strength to at least 4/5 throughout. 2.  Pt will complete sit<>stand transfers modified independent. 3.  Pt will ambulate > 300' with AAD modified independent. 4.  Pt will ascend/descend 12 steps with 1 HR and AAD if needed modified independent. 5.  Pt will be independent with HEP.     See Progress Note: []  Yes  [x]  No  Next due:        CPT codes 8/17/2021 Units  Minutes   Low Complexity PT evaluation  41048     Moderate Complexity PT evaluation  56915     High Complexity PT evaluation [id number]     PT Re-evaluation  [id number]     Gait training [id number]     Manual therapy  00959     Therapeutic activities  05755 1    Therapeutic exercises  80578 2    Neuromuscular reeducation  14405         Electronically signed by:  Reza Landry, PT, DPT

## 2021-08-17 NOTE — TELEPHONE ENCOUNTER
Please refer to my telephone encounter sent on 7/30 asking that she be called with the results.  I am not sure this got done

## 2021-08-17 NOTE — TELEPHONE ENCOUNTER
Patient was called three times, notes are attached to  Results note. I called the patient again and left another message.     Electronically signed by Karlene De Paz MA on 8/17/2021 at 11:25 AM

## 2021-08-20 ENCOUNTER — HOSPITAL ENCOUNTER (OUTPATIENT)
Dept: PHYSICAL THERAPY | Age: 86
Setting detail: THERAPIES SERIES
Discharge: HOME | End: 2021-08-20
Payer: COMMERCIAL

## 2021-08-20 PROCEDURE — 97110 THERAPEUTIC EXERCISES: CPT

## 2021-08-20 PROCEDURE — 97530 THERAPEUTIC ACTIVITIES: CPT

## 2021-08-20 NOTE — PROGRESS NOTES
[address] [facility]                Phone: [phone]   Fax: [phone]    Physical Therapy Daily Treatment Note  Date:  2021    Patient Name:  Slava Delarosa    :  1931  MRN: [id number]    Referring Physician:  Vasu Castelan DO  Insurance Information:  [employer] [employer]      Evaluation date:  2021  Diagnosis:  L LE weakness  Precautions:  Pacemaker   Cert Dates:  3/45/6318 - 10/27/2021  ICD-10 Codes:  R29.90, Z72.3   Evaluating Physical Therapist:  Richard Yates, PT, DPT     Visit# / total visits:  3/8-     Time In:  9035  Time Out:  1000    Subjective:  Pt c/o fatigue and B LE soreness following last session. Exercises:   Exercise/Equipment Resistance/Repetitions Other comments       Step One 10:00, L3               Standing hip 3-way B LE's  3x20 each direction       Sidestepping                 Sit<>stand  2x10               Step-ups  Forward  Lateral                Gait training                   Stair training                                                                 Assessment/Comments:  Pt given verbal cues and demonstrations for proper technique with sit<>stand transfers this morning. Pt fatigued quickly with exercise, especially sit<>stand, and required frequent and, at times, prolonged rest breaks. Home Exercise Program:        Treatment/Activity Tolerance:  [] Patient tolerated treatment well [x] Patient limited by fatigue  [] Patient limited by pain  [] Patient limited by other medical complications  [] Other:     Prognosis: [] Good [x] Fair  [] Poor    Patient Requires Follow-up: [x] Yes  [] No    Plan:   [x] Continue per plan of care [] Alter current plan (see comments)  [] Plan of care initiated [] Hold pending MD visit [] Discharge    Plan for Next Session:  Progress hip strengthening and general conditioning exercises. Short/Long Term Goals:                         1.  Pt will improve B hip strength to at least 4/5 throughout. 2.  Pt will complete sit<>stand transfers modified independent. 3.  Pt will ambulate > 300' with AAD modified independent. 4.  Pt will ascend/descend 12 steps with 1 HR and AAD if needed modified independent. 5.  Pt will be independent with HEP.     See Progress Note: []  Yes  [x]  No  Next due:        CPT codes 8/20/2021 Units  Minutes   Low Complexity PT evaluation  39020     Moderate Complexity PT evaluation  67623     High Complexity PT evaluation 62207     PT Re-evaluation  [id number]     Gait training 67440     Manual therapy  95552     Therapeutic activities  70301 1    Therapeutic exercises  83718 3    Neuromuscular reeducation  75826         Electronically signed by:  Xavier Tomas, PT, DPT

## 2021-08-24 ENCOUNTER — HOSPITAL ENCOUNTER (OUTPATIENT)
Dept: PHYSICAL THERAPY | Age: 86
Setting detail: THERAPIES SERIES
Discharge: HOME | End: 2021-08-24
Payer: COMMERCIAL

## 2021-08-24 PROCEDURE — 97110 THERAPEUTIC EXERCISES: CPT

## 2021-08-24 PROCEDURE — 97530 THERAPEUTIC ACTIVITIES: CPT

## 2021-08-24 NOTE — PROGRESS NOTES
[address] [facility]                Phone: [phone]   Fax: [phone]    Physical Therapy Daily Treatment Note  Date:  2021    Patient Name:  Jayne Wei    :  1931  MRN: [id number]    Referring Physician:  Mg Love DO  Insurance Information:  [id number] [employer] [employer]ordillo N4MD      Evaluation date:  2021  Diagnosis:  L LE weakness  Precautions:  Pacemaker   Cert Dates:   - 10/27/2021  ICD-10 Codes:  R29.90, Z72.3   Evaluating Physical Therapist:  Olvin Lawrence, PT, DPT     Visit# / total visits:  -     Time In:  0900  Time Out:  955    Subjective:  Pt with no new complaints this morning. Pt stated she received a shot in her eye yesterday and is having some trouble seeing this morning. Exercises:   Exercise/Equipment Resistance/Repetitions Other comments       Step One 10:00, L3               Standing hip 3-way B LE's, 1#  3x20 each direction       Sidestepping  10x R/L               Sit<>stand  3x10               Step-ups  Forward  Lateral                Gait training                   Stair training                                                                 Assessment/Comments:  Pt continues to require verbal cues throughout session for safety and hand placement with sit<>stand transfers. Pt c/o fatigue but tolerated exercises/activities better this morning compared to previous sessions. Home Exercise Program:        Treatment/Activity Tolerance:  [] Patient tolerated treatment well [x] Patient limited by fatigue  [] Patient limited by pain  [] Patient limited by other medical complications  [] Other:     Prognosis: [] Good [x] Fair  [] Poor    Patient Requires Follow-up: [x] Yes  [] No    Plan:   [x] Continue per plan of care [] Alter current plan (see comments)  [] Plan of care initiated [] Hold pending MD visit [] Discharge    Plan for Next Session:  Progress hip strengthening and general conditioning exercises.     Short/Long Term Goals: 1.  Pt will improve B hip strength to at least 4/5 throughout. 2.  Pt will complete sit<>stand transfers modified independent. 3.  Pt will ambulate > 300' with AAD modified independent. 4.  Pt will ascend/descend 12 steps with 1 HR and AAD if needed modified independent. 5.  Pt will be independent with HEP.     See Progress Note: []  Yes  [x]  No  Next due:        CPT codes 8/24/2021 Units  Minutes   Low Complexity PT evaluation  58583     Moderate Complexity PT evaluation  24695     High Complexity PT evaluation 37068     PT Re-evaluation  [id number]     Gait training 25009     Manual therapy  06803     Therapeutic activities  99569 1    Therapeutic exercises  58842 3    Neuromuscular reeducation  30695         Electronically signed by:  Rosaura Fitzpatrick, PT, DPT

## 2021-08-26 ENCOUNTER — OFFICE VISIT (OUTPATIENT)
Dept: NON INVASIVE DIAGNOSTICS | Age: 86
End: 2021-08-26
Payer: COMMERCIAL

## 2021-08-26 VITALS
DIASTOLIC BLOOD PRESSURE: 72 MMHG | HEIGHT: 62 IN | WEIGHT: 178 LBS | HEART RATE: 80 BPM | BODY MASS INDEX: 32.76 KG/M2 | SYSTOLIC BLOOD PRESSURE: 138 MMHG | RESPIRATION RATE: 18 BRPM

## 2021-08-26 DIAGNOSIS — I48.0 PAF (PAROXYSMAL ATRIAL FIBRILLATION) (HCC): Primary | ICD-10-CM

## 2021-08-26 DIAGNOSIS — Z95.0 PACEMAKER: ICD-10-CM

## 2021-08-26 PROCEDURE — G8427 DOCREV CUR MEDS BY ELIG CLIN: HCPCS | Performed by: STUDENT IN AN ORGANIZED HEALTH CARE EDUCATION/TRAINING PROGRAM

## 2021-08-26 PROCEDURE — 93000 ELECTROCARDIOGRAM COMPLETE: CPT | Performed by: STUDENT IN AN ORGANIZED HEALTH CARE EDUCATION/TRAINING PROGRAM

## 2021-08-26 PROCEDURE — 1036F TOBACCO NON-USER: CPT | Performed by: STUDENT IN AN ORGANIZED HEALTH CARE EDUCATION/TRAINING PROGRAM

## 2021-08-26 PROCEDURE — 1123F ACP DISCUSS/DSCN MKR DOCD: CPT | Performed by: STUDENT IN AN ORGANIZED HEALTH CARE EDUCATION/TRAINING PROGRAM

## 2021-08-26 PROCEDURE — 4040F PNEUMOC VAC/ADMIN/RCVD: CPT | Performed by: STUDENT IN AN ORGANIZED HEALTH CARE EDUCATION/TRAINING PROGRAM

## 2021-08-26 PROCEDURE — 99215 OFFICE O/P EST HI 40 MIN: CPT | Performed by: STUDENT IN AN ORGANIZED HEALTH CARE EDUCATION/TRAINING PROGRAM

## 2021-08-26 PROCEDURE — 1090F PRES/ABSN URINE INCON ASSESS: CPT | Performed by: STUDENT IN AN ORGANIZED HEALTH CARE EDUCATION/TRAINING PROGRAM

## 2021-08-26 PROCEDURE — G8417 CALC BMI ABV UP PARAM F/U: HCPCS | Performed by: STUDENT IN AN ORGANIZED HEALTH CARE EDUCATION/TRAINING PROGRAM

## 2021-08-26 NOTE — PATIENT INSTRUCTIONS
No medication changes at this time. Dr Miki Sanchez office will contact [facility] for clarification on plan regarding blood disorder as well as copy of recent labs. Continue remote monitoring every 91 days. Follow-up with Dr Miki Sanchez office in 1 year.

## 2021-08-27 ENCOUNTER — HOSPITAL ENCOUNTER (OUTPATIENT)
Dept: PHYSICAL THERAPY | Age: 86
Setting detail: THERAPIES SERIES
Discharge: HOME | End: 2021-08-27
Payer: COMMERCIAL

## 2021-08-27 PROCEDURE — 97110 THERAPEUTIC EXERCISES: CPT

## 2021-08-27 PROCEDURE — 97530 THERAPEUTIC ACTIVITIES: CPT

## 2021-08-27 NOTE — PROGRESS NOTES
Electrophysiology   Outpatient Progress Note  Jayne Wei  5/26/1931  Date of Service: 8/27/21   Referring Provider/PCP: Aniya Augustine DO   Cardiology: Ana Wallace MD  Electrophysiologist: Ashleigh Messer DO    SUBJECTIVE: Jayne Wei is a 80 y.o. female with a history of SND, AVB, nonvalvular PAF, HTN, mild pulmonary HTN, mild TR, DM, MGUS, peripheral neuropathy, anemia, and thrombocytopenia. She is managed by Dr Renato Sam with norvasc 2.5 mg daily, hydralazine 50 mg TID, metoprolol XL 50 mg BID, losartan 50 mg daily, and pitavastatin 2 mg weekly. She was diagnosed with atrial fibrillation in 3/2015. She presented with syncope in 5/2015 and noted to have AF with slow ventricular rate. She was referred to Dr Tank Daley, who recommended BSCI dc ppm. In 2019, she had epistaxis, which was managed by arterial embolization and change from Xarelto to apixaban. She denies any recurrence of epistaxis since that time. She last was evaluated by Dr Tank Daley in 11/19/19 and noted to have stable device function and low AF burden (10 episodes: 5 sec to 2.5 m). She was evaluated by EP NP on 2/1/21. She complained of fatigue and mild lightheadedness. She was encouraged to hydrate and change positions slowly. Since that time, she was admitted to [facility] in 6/2021. She was diagnosed with thrombocytopenia, so [address] discontinued per Hematology. Neurology evaluated patient and diagnosed peripheral neuropathy. She presents today, 8/26/21; for follow-up. She is enrolled in remote monitoring, which has reported stable device function and AF burden of 7%. She denies any complaints at this time other than her neuropathy. Prior cardiac testing:  · ECG (6/23/21): AV paced rhythm at 60 bpm.  · Pharmacologic Nuclear Stress Test (8/3/19): LVEF = 64%, mild inferoseptal hypokinesis, and no ischemia. · TTE (4/17/17): LVEF = 55%, stage I LVDD, mild RV dilation, mild TR, possible bicuspid AV.   · Pharmacologic Nuclear Stress (4/17/17): LVEF = 63%, small fixed inferoseptal likely due to subdiaphragmatic attenuation v infarct, and no ischemia. · LHC (3/12/15): normal coronaries. LVEF 65%. Past Medical History:   Diagnosis Date    Anemia     Asthma     Atrial fibrillation (HCC)     Diabetic retinopathy (HCC)     Gout     Hyperlipidemia     Hypertension     MGUS (monoclonal gammopathy of unknown significance)     Mild tricuspid regurgitation 3/11/15    PAC (premature atrial contraction)     Pemphigoid     Pulmonary hypertension ([address] 3/11/15    mild    Symptomatic bradycardia       Past Surgical History:   Procedure Laterality Date    APPENDECTOMY      CARDIAC CATHETERIZATION  3/12/2015    Dr Carolina Greene  05/06/2015    D-PPM  ([facility])   GEMMA    CATARACT REMOVAL      CHOLECYSTECTOMY      HYSTERECTOMY        Social History     Tobacco Use    Smoking status: Never Smoker    Smokeless tobacco: Never Used   Vaping Use    Vaping Use: Never used   Substance Use Topics    Alcohol use: Never     Alcohol/week: 0.0 standard drinks     Comment: rare;  drinks 1-2 cups of coffee/tea weekly    Drug use: No    History reviewed. No pertinent family history.    Current Outpatient Medications   Medication Sig Dispense Refill    acetaminophen (TYLENOL) 325 MG tablet Take 2 tablets by mouth every 6 hours as needed for Pain 120 tablet 3    IRON, FERROUS SULFATE, PO Take 250 mg by mouth daily      losartan (COZAAR) 50 MG tablet take 1 tablet by mouth once daily      ESTRACE VAGINAL 0.1 MG/GM vaginal cream apply 1 gram TWICE WEEKLY      pitavastatin (LIVALO) 2 MG TABS tablet Take 2 mg by mouth once a week       b complex vitamins capsule Take 1 capsule by mouth daily      Omega-3 Fatty Acids (OMEGA-3 FISH OIL) 300 MG CAPS Take 900 mg by mouth daily       amLODIPine (NORVASC) 2.5 MG tablet Take 1 tablet by mouth daily 30 tablet 11    Coenzyme Q10 (COQ-10) 100 MG CAPS Take 1 capsule by mouth daily      metoprolol succinate rhythm present. PMI is not displaced. Pulmonary/Chest: Effort normal and breath sounds normal. No respiratory distress. Abdominal: Soft. No tenderness. Musculoskeletal: Normal range of motion of all extremities, no muscle weakness. Neurological: Alert and oriented to person, place, and time. Gait normal.   Skin: Skin is warm and dry. No bruising, no ecchymosis and no rash noted. Extremity: No clubbing or cyanosis. No edema. Psychiatric: Normal mood and affect. Thought content normal.  Pacemaker site: stable, well healed, no evidence of erosion. Cardiac Testing Today:  · ECG (8/26/21): AP- at 80 bpm, PVC. Device Interrogation/Reprogramming (8/26/21)  Make/Model: [employer]ra [employer] Rowena  DOI: 5/6/15  Battery: 2 years  Dayanhugh Becerril therapy: DDDR 60/120 ppm  Pacing %: RA = 87%, RV = 100%  Lead function:  RA lead: sensing = 5.6 mV, impedance = 383 ohms, threshold = 0.5 V @ 0.5 msec  RV lead: sensing = paced (DEPENDENT), impedance = 461 ohms, threshold = 0.8 V @ 0.4 msec  Lead programming:  RA lead: sensitivity = 0.25 mV, output = 2.4 V @ 0.5 msec  RV lead: sensitivity = 1.5 mV, output = 0.8 V @ 0.4 msec  Arrhythmias: AF burden = 3% (longest: 5 m 51 s), 5 episodes of PMT (felt to not be true PMT per BSCI rep)  Reprogramming included: RA output decreased to 2.0 V @ 0.5 msec, minute ventilation and accelerometer response factor increased from 10 to 12 and 8 to 10 in an effort to improve patient symptoms of dyspnea with activity, PVARP shorted from 280-300 to 240-280 ms per BSCI tech recommendations  Overall device function is normal  All device programmable settings were evaluated per above and in the scanned document, along with iterative adjustments (capture thresholds) to assess and select the most appropriate final programming to provide for consistent delivery of the appropriate therapy and to verify function of the device. Assessment/Plan:   1.  SND / AVB sp BSCI dc ppm (DOI: 5/6/15)  -Device dependent.  -Not patient. · Performing a medical appropriate exam and/or evaluation.      Jumana Aranda [facility] Cardiac Electrophysiology  [address] [facility]    CC: Dr Richard Shell

## 2021-08-27 NOTE — PROGRESS NOTES
[address] [facility]                Phone: [phone]   Fax: [phone]    Physical Therapy Daily Treatment Note  Date:  2021    Patient Name:  Kandy Sanon    :  1931  MRN: [id number]    Referring Physician:  Alina Bryosn DO  Insurance Information:  [address] [employer] Gord[employer]      Evaluation date:  2021  Diagnosis:  L LE weakness  Precautions:  Pacemaker   Cert Dates:  3/26/4909 - 10/27/2021  ICD-10 Codes:  R29.90, Z72.3   Evaluating Physical Therapist:  Sundar Escobedo, PT, DPT     Visit# / total visits:  -     Time In:  0900  Time Out:  951    Subjective:  Pt reported soreness in B LE's following her last session. Exercises:   Exercise/Equipment Resistance/Repetitions Other comments       Step One 10:00, L3               Standing hip 3-way B LE's, 1#  3x20 each direction       Sidestepping  1# B LE's  3x10 R/L               Sit<>stand  3x10               Step-ups  Forward  Lateral                Gait training                   Stair training                                                                 Assessment/Comments:  Pt continues to require verbal cues throughout session for safety and hand placement with sit<>stand transfers. Pt c/o fatigue but tolerated exercises/activities well this morning. Home Exercise Program:        Treatment/Activity Tolerance:  [] Patient tolerated treatment well [x] Patient limited by fatigue  [] Patient limited by pain  [] Patient limited by other medical complications  [] Other:     Prognosis: [] Good [x] Fair  [] Poor    Patient Requires Follow-up: [x] Yes  [] No    Plan:   [x] Continue per plan of care [] Alter current plan (see comments)  [] Plan of care initiated [] Hold pending MD visit [] Discharge    Plan for Next Session:  Progress hip strengthening and general conditioning exercises. Short/Long Term Goals:                         1.  Pt will improve B hip strength to at least 4/5 throughout. 2.  Pt will complete sit<>stand transfers modified independent. 3.  Pt will ambulate > 300' with AAD modified independent. 4.  Pt will ascend/descend 12 steps with 1 HR and AAD if needed modified independent. 5.  Pt will be independent with HEP.     See Progress Note: []  Yes  [x]  No  Next due:        CPT codes 8/27/2021 Units  Minutes   Low Complexity PT evaluation  79799     Moderate Complexity PT evaluation  92357     High Complexity PT evaluation 63590     PT Re-evaluation  [id number]     Gait training 56623     Manual therapy  12343     Therapeutic activities  79013 1    Therapeutic exercises  66502 2    Neuromuscular reeducation  53869         Electronically signed by:  Rachele Francis, PT, DPT

## 2021-08-31 ENCOUNTER — HOSPITAL ENCOUNTER (OUTPATIENT)
Dept: PHYSICAL THERAPY | Age: 86
Setting detail: THERAPIES SERIES
Discharge: HOME | End: 2021-08-31
Payer: COMMERCIAL

## 2021-08-31 PROCEDURE — 97530 THERAPEUTIC ACTIVITIES: CPT

## 2021-08-31 PROCEDURE — 97110 THERAPEUTIC EXERCISES: CPT

## 2021-08-31 NOTE — PROGRESS NOTES
[address] [facility]                Phone: [phone]   Fax: [phone]    Physical Therapy Daily Treatment Note  Date:  2021    Patient Name:  Kei Weeks    :  1931  MRN: [id number]    Referring Physician:  Effie Marrero DO  Insurance Information:  [id number]      Evaluation date:  2021  Diagnosis:  L LE weakness  Precautions:  Pacemaker   Cert Dates:   - 10/27/2021  ICD-10 Codes:  R29.90, Z72.3   Evaluating Physical Therapist:  Bobbi Soriano PT, DPT     Visit# / total visits:  -     Time In:  222  Time Out:  1005    Subjective:  Pt denied anything new since her last visit. Exercises:   Exercise/Equipment Resistance/Repetitions Other comments       Step One 10:00, L3               Standing hip 3-way B LE's, 2#  3x20 each direction       Sidestepping  2# B LE's  2x10 R/L               Sit<>stand  3x10               Step-ups  Forward  Lateral                Gait training                   Stair training                                                                 Assessment/Comments:  Pt demonstrated improved technique with sit<>stand transfers this morning. Pt appeared to tolerate session better this morning compared to previous sessions. Home Exercise Program:    2021 - GTB and handout administered (standing hip 3-way, sidestepping, and sit<>stand)    Treatment/Activity Tolerance:  [] Patient tolerated treatment well [x] Patient limited by fatigue  [] Patient limited by pain  [] Patient limited by other medical complications  [] Other:     Prognosis: [] Good [x] Fair  [] Poor    Patient Requires Follow-up: [x] Yes  [] No    Plan:   [x] Continue per plan of care [] Alter current plan (see comments)  [] Plan of care initiated [] Hold pending MD visit [] Discharge    Plan for Next Session:  Progress hip strengthening and general conditioning exercises.     Short/Long Term Goals:                         1.  Pt will improve B hip strength to at least 4/5 throughout. 2.  Pt will complete sit<>stand transfers modified independent. 3.  Pt will ambulate > 300' with AAD modified independent. 4.  Pt will ascend/descend 12 steps with 1 HR and AAD if needed modified independent. 5.  Pt will be independent with HEP.     See Progress Note: []  Yes  [x]  No  Next due:        CPT codes 8/31/2021 Units  Minutes   Low Complexity PT evaluation  68010     Moderate Complexity PT evaluation  59279     High Complexity PT evaluation 47966     PT Re-evaluation  [id number]     Gait training 84060     Manual therapy  00331     Therapeutic activities  98321 1    Therapeutic exercises  65792 3    Neuromuscular reeducation  86283         Electronically signed by:  Titi Olsen, PT, DPT

## 2021-09-03 ENCOUNTER — HOSPITAL ENCOUNTER (OUTPATIENT)
Dept: PHYSICAL THERAPY | Age: 86
Setting detail: THERAPIES SERIES
Discharge: HOME | End: 2021-09-03
Payer: COMMERCIAL

## 2021-09-03 PROCEDURE — 97530 THERAPEUTIC ACTIVITIES: CPT

## 2021-09-03 PROCEDURE — 97110 THERAPEUTIC EXERCISES: CPT

## 2021-09-03 NOTE — PROGRESS NOTES
[address] [facility]                Phone: [phone]   Fax: [phone]    Physical Therapy Daily Treatment Note  Date:  9/3/2021    Patient Name:  Yas Saul    :  1931  MRN: [id number]    Referring Physician:  Dane Saha DO  Insurance Information:  [id number] [employer] [employer]      Evaluation date:  2021  Diagnosis:  L LE weakness  Precautions:  Pacemaker   Cert Dates:  6727 - 10/27/2021  ICD-10 Codes:  R29.90, Z72.3   Evaluating Physical Therapist:  Tino Tracey, PT, DPT     Visit# / total visits:  -     Time In:  902  Time Out:      Subjective:  Pt stated she tried HEP at home and has no questions regarding home exercises. Pt stated she has been stiff because she has been with her grandchildren a lot lately. Exercises:   Exercise/Equipment Resistance/Repetitions Other comments       Step One 10:00, L3               Standing hip 3-way B LE's, 2#  3x20 each direction       Sidestepping  2# B LE's  2x10 R/L               Sit<>stand  3x10               Step-ups  Forward  Lateral                Gait training                   Stair training                                                                 Assessment/Comments:  Pt required less verbal cues for hand placement with transfers this morning and also demonstrated improved form/technique. Pt also tolerated exercises well this morning.     Home Exercise Program:    2021 - GTB and handout administered (standing hip 3-way, sidestepping, and sit<>stand)    Treatment/Activity Tolerance:  [] Patient tolerated treatment well [x] Patient limited by fatigue  [] Patient limited by pain  [] Patient limited by other medical complications  [] Other:     Prognosis: [] Good [x] Fair  [] Poor    Patient Requires Follow-up: [x] Yes  [] No    Plan:   [x] Continue per plan of care [] Alter current plan (see comments)  [] Plan of care initiated [] Hold pending MD visit [] Discharge    Plan for Next Session: Progress hip strengthening and general conditioning exercises. Short/Long Term Goals:                         1.  Pt will improve B hip strength to at least 4/5 throughout. 2.  Pt will complete sit<>stand transfers modified independent. 3.  Pt will ambulate > 300' with AAD modified independent. 4.  Pt will ascend/descend 12 steps with 1 HR and AAD if needed modified independent. 5.  Pt will be independent with HEP.     See Progress Note: []  Yes  [x]  No  Next due:        CPT codes 9/3/2021 Units  Minutes   Low Complexity PT evaluation  79335     Moderate Complexity PT evaluation  88382     High Complexity PT evaluation 96946     PT Re-evaluation  [id number]     Gait training 23073     Manual therapy  03750     Therapeutic activities  78850 1    Therapeutic exercises  41351 3    Neuromuscular reeducation  07594         Electronically signed by:  Jordy Hudson, PT, DPT

## 2021-09-13 ENCOUNTER — HOSPITAL ENCOUNTER (OUTPATIENT)
Dept: PHYSICAL THERAPY | Age: 86
Setting detail: THERAPIES SERIES
Discharge: HOME | End: 2021-09-13
Payer: COMMERCIAL

## 2021-09-13 PROCEDURE — 97530 THERAPEUTIC ACTIVITIES: CPT

## 2021-09-13 PROCEDURE — 97110 THERAPEUTIC EXERCISES: CPT

## 2021-09-13 NOTE — PROGRESS NOTES
[address] [facility]                Phone: [phone]   Fax: [phone]    Physical Therapy Daily Treatment Note  Date:  2021    Patient Name:  Alaine Najjar    :  1931  MRN: [id number]    Referring Physician:  Miguelangel Sy DO  Insurance Information:  [id number] [employer]ay Gordillo [employer]      Evaluation date:  2021  Diagnosis:  L LE weakness  Precautions:  Pacemaker   Cert Dates:   - 10/27/2021  ICD-10 Codes:  R29.90, Z72.3   Evaluating Physical Therapist:  Yesenia Reyes PT, DPT     Visit# / total visits:  -     Time In:  901  Time Out:  955    Subjective:  Pt stated she worked on her HEP last week. Per pt, she needs to work on sit<>stand more. Exercises:   Exercise/Equipment Resistance/Repetitions Other comments       Step One 10:00, L3               Standing hip 3-way B LE's, 2#  3x20 each direction       Sidestepping  2# B LE's  2x10 R/L               Sit<>stand  3x12               Step-ups  Forward  Lateral                Gait training                   Stair training                                                                 Assessment/Comments:  Pt required less verbal cues for hand placement with transfers this morning and also demonstrated improved form/technique. Pt also tolerated exercises well this morning. Home Exercise Program:    2021 - GTB and handout administered (standing hip 3-way, sidestepping, and sit<>stand)    Treatment/Activity Tolerance:  [] Patient tolerated treatment well [x] Patient limited by fatigue  [] Patient limited by pain  [] Patient limited by other medical complications  [] Other:     Prognosis: [] Good [x] Fair  [] Poor    Patient Requires Follow-up: [x] Yes  [] No    Plan:   [x] Continue per plan of care [] Alter current plan (see comments)  [] Plan of care initiated [] Hold pending MD visit [] Discharge    Plan for Next Session:  Progress hip strengthening and general conditioning exercises.     Short/Long Term Goals: 1.  Pt will improve B hip strength to at least 4/5 throughout. 2.  Pt will complete sit<>stand transfers modified independent. 3.  Pt will ambulate > 300' with AAD modified independent. 4.  Pt will ascend/descend 12 steps with 1 HR and AAD if needed modified independent. 5.  Pt will be independent with HEP.     See Progress Note: []  Yes  [x]  No  Next due:        CPT codes 9/13/2021 Units  Minutes   Low Complexity PT evaluation  61782     Moderate Complexity PT evaluation  80870     High Complexity PT evaluation [id number]     PT Re-evaluation  [id number]     Gait training [id number]     Manual therapy  17747     Therapeutic activities  05507 1    Therapeutic exercises  74624 3    Neuromuscular reeducation  78770         Electronically signed by:  Lior Donohue, PT, DPT

## 2021-09-17 ENCOUNTER — HOSPITAL ENCOUNTER (OUTPATIENT)
Dept: PHYSICAL THERAPY | Age: 86
Setting detail: THERAPIES SERIES
Discharge: HOME | End: 2021-09-17
Payer: COMMERCIAL

## 2021-09-17 PROCEDURE — 97110 THERAPEUTIC EXERCISES: CPT

## 2021-09-17 PROCEDURE — 97530 THERAPEUTIC ACTIVITIES: CPT

## 2021-09-17 NOTE — DISCHARGE SUMMARY
[address] [facility]                Phone: [phone]   Fax: [phone]      Date: 9/17/2021     Referring Physician:  Effie Marrero DO  Insurance Information:  [address]      Evaluation date:  7/27/2021  Diagnosis:  L LE weakness  Precautions:  Pacemaker   Cert Dates:  7/25/1796 - 10/27/2021  ICD-10 Codes:  R29.90, Z72.3   Evaluating Physical Therapist:  Bobbi Soriano PT, ASHLEY    Physical Therapy Progress/Discharge Note    Total Visits to date:   9 Cancels/No-shows to date:  NA    Plan of Care/Treatment to date:  [x] Therapeutic Exercise    [] Modalities:  [x] Therapeutic Activity     [] Ultrasound  [] Electrical Stimulation  [] Gait Training      [] Cervical Traction   [] Lumbar Traction  [] Neuromuscular Re-education  [] Cold/hotpack [] Iontophoresis  [x] Instruction in HEP      Other:  [] Manual Therapy       []    [] Aquatic Therapy       []                          Significant Findings At Last Visit/Comments:    R hip 4/5, L hip 4-/5, B knees and ankles 4+/5      Progress towards goals:    Pt has made fair-good progress towards established physical therapy goals. Rehab Potential: [] Excellent [x] Good [x] Fair  [] Poor        Patient Status: [] Continue per initial plan of Care. [x] Patient now discharged. [] Additional visits requested, Please re-certify for additional visits:      Requested frequency/duration:  X/week for weeks    Electronically signed by:  Bobbi Soriano PT, ASHLEY    If you have any questions or concerns, please don't hesitate to call.   Thank you for your referral.

## 2021-09-17 NOTE — PROGRESS NOTES
[address] [facility]                Phone: [phone]   Fax: [phone]    Physical Therapy Daily Treatment Note  Date:  2021    Patient Name:  Miles Sepulveda    :  1931  MRN: [id number]    Referring Physician:  Andria Hills DO  Insurance Information:  [id number] Gordillo [employer]      Evaluation date:  2021  Diagnosis:  L LE weakness  Precautions:  Pacemaker   Cert Dates:   - 10/27/2021  ICD-10 Codes:  R29.90, Z72.3   Evaluating Physical Therapist:  Reza Landry, PT, DPT     Visit# / total visits:  -     Time In:  1005  Time Out:  1056    Subjective:  Pt stated she has been working on her HEP but \"not as much\" as she was before. Exercises:   Exercise/Equipment Resistance/Repetitions Other comments       Step One 10:00, L3               Standing hip 3-way B LE's, 2#  3x20 each direction       Sidestepping  2# B LE's  2x10 R/L               Sit<>stand  3x12               Step-ups  Forward  Lateral                Gait training                   Stair training                                                                 Assessment/Comments:  Pt required less verbal cues for hand placement with transfers this morning and also demonstrated improved form/technique. Pt instructed to continue with HEP on her own; pt verbalized understanding.     Home Exercise Program:    2021 - GTB and handout administered (standing hip 3-way, sidestepping, and sit<>stand)    Treatment/Activity Tolerance:  [] Patient tolerated treatment well [x] Patient limited by fatigue  [] Patient limited by pain  [] Patient limited by other medical complications  [] Other:     Prognosis: [] Good [x] Fair  [] Poor    Patient Requires Follow-up: [x] Yes  [] No    Plan:   [x] Continue per plan of care [] Alter current plan (see comments)  [] Plan of care initiated [] Hold pending MD visit [] Discharge    Plan for Next Session:  Progress hip strengthening and general conditioning

## 2021-09-29 ENCOUNTER — HOSPITAL ENCOUNTER (OUTPATIENT)
Dept: CT IMAGING | Age: 86
Discharge: HOME | End: 2021-10-01
Payer: COMMERCIAL

## 2021-09-29 DIAGNOSIS — D69.6 THROMBOCYTOPENIA, UNSPECIFIED (HCC): ICD-10-CM

## 2021-09-29 PROCEDURE — 74176 CT ABD & PELVIS W/O CONTRAST: CPT

## 2021-09-29 PROCEDURE — 6360000004 HC RX CONTRAST MEDICATION: Performed by: RADIOLOGY

## 2021-09-29 RX ADMIN — IOHEXOL 50 ML: 240 INJECTION, SOLUTION INTRATHECAL; INTRAVASCULAR; INTRAVENOUS; ORAL at 10:47

## 2021-10-25 LAB
BASOPHILS ABSOLUTE: NORMAL
BASOPHILS RELATIVE PERCENT: NORMAL
EOSINOPHILS ABSOLUTE: NORMAL
EOSINOPHILS RELATIVE PERCENT: NORMAL
HCT VFR BLD CALC: NORMAL %
HEMOGLOBIN: NORMAL
LYMPHOCYTES ABSOLUTE: NORMAL
LYMPHOCYTES RELATIVE PERCENT: NORMAL
MCH RBC QN AUTO: NORMAL PG
MCHC RBC AUTO-ENTMCNC: NORMAL G/DL
MCV RBC AUTO: NORMAL FL
MONOCYTES ABSOLUTE: NORMAL
MONOCYTES RELATIVE PERCENT: NORMAL
NEUTROPHILS ABSOLUTE: NORMAL
NEUTROPHILS RELATIVE PERCENT: NORMAL
PLATELET # BLD: NORMAL 10*3/UL
PMV BLD AUTO: NORMAL FL
RBC # BLD: NORMAL 10*6/UL
WBC # BLD: NORMAL 10*3/UL

## 2021-10-27 ENCOUNTER — OFFICE VISIT (OUTPATIENT)
Dept: NEUROLOGY | Age: 86
End: 2021-10-27
Payer: COMMERCIAL

## 2021-10-27 VITALS
SYSTOLIC BLOOD PRESSURE: 176 MMHG | HEIGHT: 62 IN | DIASTOLIC BLOOD PRESSURE: 88 MMHG | BODY MASS INDEX: 32.76 KG/M2 | OXYGEN SATURATION: 100 % | HEART RATE: 63 BPM | TEMPERATURE: 97.2 F | WEIGHT: 178 LBS

## 2021-10-27 DIAGNOSIS — G62.9 PERIPHERAL POLYNEUROPATHY: Primary | ICD-10-CM

## 2021-10-27 DIAGNOSIS — M47.27 SPONDYLOSIS OF LUMBOSACRAL SPINE WITH RADICULOPATHY: ICD-10-CM

## 2021-10-27 PROCEDURE — 1123F ACP DISCUSS/DSCN MKR DOCD: CPT | Performed by: NURSE PRACTITIONER

## 2021-10-27 PROCEDURE — 1036F TOBACCO NON-USER: CPT | Performed by: NURSE PRACTITIONER

## 2021-10-27 PROCEDURE — 1090F PRES/ABSN URINE INCON ASSESS: CPT | Performed by: NURSE PRACTITIONER

## 2021-10-27 PROCEDURE — 99214 OFFICE O/P EST MOD 30 MIN: CPT | Performed by: NURSE PRACTITIONER

## 2021-10-27 PROCEDURE — G8484 FLU IMMUNIZE NO ADMIN: HCPCS | Performed by: NURSE PRACTITIONER

## 2021-10-27 PROCEDURE — 4040F PNEUMOC VAC/ADMIN/RCVD: CPT | Performed by: NURSE PRACTITIONER

## 2021-10-27 PROCEDURE — G8427 DOCREV CUR MEDS BY ELIG CLIN: HCPCS | Performed by: NURSE PRACTITIONER

## 2021-10-27 PROCEDURE — G8417 CALC BMI ABV UP PARAM F/U: HCPCS | Performed by: NURSE PRACTITIONER

## 2021-10-27 RX ORDER — PREDNISONE 10 MG/1
20 TABLET ORAL
COMMUNITY
Start: 2021-10-18 | End: 2022-01-07

## 2021-10-27 NOTE — PROGRESS NOTES
Samir. Dai Lindo M.D., F.A.C.DIMAS Rosa, DNP, APRN, CNS  Korey Gregg. Cher Gross, MSN, APRN-FNP-C  Trinidad Neves MSN, APRN, FNP-C  Karlyn Romberg MSPAS, PA-C  [address] MSN, APRN, FNP-C     [address], [address]              [place] [address] [facility] is 80 y.o. right handed female     We are following her for LS radiculopathy and leg weakness    She presents with her daughter and remains a good historian    EDX study showed severe peripheral neuropathy but no mention of LS radiculopathy. She continues to note leg weakness, particularly on her left, and feels as if her legs will give out. They feel heavy and uncomfortable and she has low back pain but no radicular symptoms. No cauda equina symptoms. She uses a walker but continues to feel unstable. She attended PT but was discharged due to plateau and has been trying to exercise at home. She did not use the PRN low-dose gabapentin ordered at her last visit stating she feared side effects    She states that she was diabetic only for short period of time and that her last A1c's have been in the fives and she takes no diabetes medications. No history of smoking, drug use, exposure to toxic chemicals, history of chemo, or family history of neuropathy. 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 She states her platelets before starting the medication were 12-- and she follows with [facility]. No bleeding problems at present. Her anticoagulation for A. fib has been on hold. Current Outpatient Medications   Medication Sig Dispense Refill    predniSONE (DELTASONE) 10 MG tablet 20 mg       gabapentin (NEURONTIN) 100 MG capsule Take 1 capsule by mouth 2 times daily as needed (low back and leg pain/numbness).  Intended supply: 90 days 60 capsule 11    acetaminophen (TYLENOL) 325 MG tablet Take 2 tablets by mouth every 6 hours as needed for Pain 120 tablet 3    IRON, FERROUS SULFATE, PO Take 250 mg by mouth daily      losartan (COZAAR) 50 MG tablet take 1 tablet by mouth once daily      ESTRACE VAGINAL 0.1 MG/GM vaginal cream apply 1 gram TWICE WEEKLY      pitavastatin (LIVALO) 2 MG TABS tablet Take 2 mg by mouth once a week       b complex vitamins capsule Take 1 capsule by mouth daily      amLODIPine (NORVASC) 2.5 MG tablet Take 1 tablet by mouth daily 30 tablet 11    Coenzyme Q10 (COQ-10) 100 MG CAPS Take 1 capsule by mouth daily      metoprolol succinate (TOPROL XL) 50 MG extended release tablet Take 1 tablet by mouth 2 times daily 30 tablet 11    polyethyl glycol-propyl glycol 0.4-0.3 % (SYSTANE) 0.4-0.3 % ophthalmic solution 1 drop as needed for Dry Eyes      hydrALAZINE (APRESOLINE) 50 MG tablet Take 1 tablet by mouth 3 times daily. 90 tablet 11    allopurinol (ZYLOPRIM) 100 MG tablet Take 100 mg by mouth daily        No current facility-administered medications for this visit.      Objective:     BP (!) 176/88 (Site: Left Lower Arm, Position: Sitting, Cuff Size: Medium Adult)   Pulse 63   Temp 97.2 °F (36.2 °C) (Infrared)   Ht 5' 2\" (1.575 m)   Wt 178 lb (80.7 kg)   SpO2 100%   BMI 32.56 kg/m²     General appearance: alert, appears stated age, cooperative and no distress  Head: normocephalic, without obvious abnormality, atraumatic  Eyes: conjunctivae/corneas clear--arcus senilis bilaterally  Neck: Mildly limited range of motion but no cervicalgia  Lungs: clear to auscultation bilaterally  Heart: RRR today  Extremities: normal, atraumatic, no cyanosis or edema  Pulses: 2+ and symmetric  Skin: color, texture, turgor normal---no rashes or lesions      Mental Status: Alert, oriented x4--very pleasant    Appropriate attention/concentration  Intact fundus of knowledge  Intact memories    Speech: no dysarthria  Language: no aphasias    Cranial Nerves:  I: smell NA   II: visual acuity NA   II: visual fields Full to confrontation   II: pupils HARPAL   III,VII: ptosis None   III,IV,VI: extraocular muscles  Full ROM   V: mastication Normal   V: facial light touch sensation  Normal   V,VII: corneal reflex     VII: facial muscle function - upper  Normal   VII: facial muscle function - lower Normal   VIII: hearing Normal   IX: soft palate elevation  Normal   IX,X: gag reflex    XI: trapezius strength  5/5   XI: sternocleidomastoid strength 5/5   XI: neck extension strength  5/5   XII: tongue strength  Normal     Motor:  5/5 both arms with limited ROM shoulders    4/5 L IPS; 4+/5 R IPS  5/5 both quads  5/5 R gastroc  4/5 L gastroc  4+/5 b/l ant tibs    Overweight bulk; normal tone  No abnormal movements    Sensory:  LT normal in all limbs  Vib impaired at both ankles    Coordination:   FFM and FN slow b/l but not ataxic    Gait:  Slow, antalgic, limps on L and drags feet at times    DTR:   Right Brachioradialis reflex 1+  Left Brachioradialis reflex 1+  Right Biceps reflex 1+  Left Biceps reflex 1+  Right Quadriceps reflex 2+  Left Quadriceps reflex 2+  Right Achilles reflex 0  Left Achilles reflex 0    No Corea's    No pathological reflexes    Laboratory/Radiology:     No current labs to review    CT lumbar spine June 2021: Mid to lower lumbar facet arthropathy. L4-5 mild diffuse disc bulge with prominent facet arthropathy causing mild spinal stenosis. There is also L4 mild grade 1 spondylolisthesis. No spondylolysis. 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 Recommend follow-up every 3 years     EMG July 2021: Electrodiagnosis: There is electrodiagnostic evidence of a peripheral polyneuropathy.    · Location: bilateral symmetric; Distal.   · Nature: Blondell.Pay  ] Axonal   [  ] Demyelinating  [  ] Mixed axonal and demyelinating                     [  ] Sensory [  ] Motor               [ X] Mixed sensorimotor

## 2021-10-27 NOTE — PATIENT INSTRUCTIONS
Patient Education        Learning About Peripheral Neuropathy  What is peripheral neuropathy? Peripheral neuropathy is a problem that affects the peripheral nerves. These nerves lead from the spinal cord to other parts of the body. They control your sense of touch, how you feel pain and temperature, and your muscle strength. Most of the time the problem starts in the fingers and toes. As it gets worse, it moves into the limbs. It can cause pain and loss of feeling in the feet, legs, and hands. What causes it? There are several causes:  · Diabetes. This is the most common cause. If your blood sugar is too high for too long, it can damage the nerves. · Kidney problems. These can lead to toxic substances in the blood that damage nerves. · Low levels of thyroid hormone (hypothyroidism). This may cause swelling of the tissues around the nerves, which can put pressure on them. · Infectious or inflammatory diseases. Examples are HIV and Guillain-Barré syndrome. These diseases can damage the nerves. · Peripheral nerve injury. A physical injury can damage the nerves. Injuries can be from things like falls, car crashes, or playing sports. · Overusing alcohol and not eating a healthy diet. These can lead to your body not having enough of certain vitamins, such as vitamin B-12. This can damage nerves. · Being exposed to toxic substances. These include lead, mercury, and certain medicines, such as those used for chemotherapy. Sometimes the cause isn't known. What are the symptoms? Symptoms of peripheral neuropathy can occur slowly over time. The most common ones are:  · Numbness, tightness, and tingling, especially in the legs, hands, and feet. · Loss of feeling. · Burning, shooting, or stabbing pain in the legs, hands, and feet. Often the pain is worse at night. · Weakness and loss of balance. What can happen if you have it?   If peripheral neuropathy gets worse, it can lead to a complete lack of feeling in your hands or feet. This can make you more likely to injure them. It may lead to calluses and blisters. It can also lead to bone and joint problems, infection, and ulcers. For instance, small, repeated injuries to the foot may lead to bigger problems. This can happen because you can't feel the injuries. Reduced feeling in the feet can also change your step, leading to bone or joint problems. If untreated, foot problems can become so severe that the foot or lower leg may have to be amputated. But treatment can slow down peripheral neuropathy. And it's a good idea to take care to avoid injury. How is it diagnosed? To diagnose peripheral neuropathy, your doctor will ask you about:  · Your symptoms. · Your medical history. This may include your use of alcohol, risk of HIV infection, or exposure to toxic substances. · Your family's medical history, including nerve disease. Your doctor may test how well you can feel touch, temperature, and pain. You may also have blood tests. These tests will help the doctor find out if you have conditions that can cause neuropathy. Examples are diabetes, vitamin deficiencies, thyroid disease, and kidney problems. How is it treated? Treatment for peripheral neuropathy can relieve symptoms. This is done by treating the health problem that's causing it. For example, if you have diabetes, keeping your blood sugar within your target range may help. Or maybe your body lacks certain vitamins caused by drinking too much alcohol. In that case, treatment may include eating a healthy diet, taking vitamins, and stopping alcohol use. You may have physical therapy. This can increase muscle strength and help build muscle control. Over-the-counter medicine can relieve mild nerve pain. Your doctor may also prescribe medicine to help with severe pain, numbness, tingling, and weakness. If you have neuropathy in your feet, it's a good idea to have them checked during each office visit.  This can help prevent problems. Some people find that physical therapy, acupuncture, or transcutaneous electrical nerve stimulation (TENS) helps relieve pain. Follow-up care is a key part of your treatment and safety. Be sure to make and go to all appointments, and call your doctor if you are having problems. It's also a good idea to know your test results and keep a list of the medicines you take. Where can you learn more? Go to https://[email or web address]. org and sign in to your [employer] account. Enter P130 in the [employer] box to learn more about \"Learning About Peripheral Neuropathy. \"     If you do not have an account, please click on the \"Sign Up Now\" link. Current as of: August 31, 2020               Content Version: 13.0  © 2006-2021 [email or web address]. Care instructions adapted under license by [facility] ([facility]). If you have questions about a medical condition or this instruction, always ask your healthcare professional. [place] [address] any warranty or liability for your use of this information. Patient Education        gabapentin  Pronunciation:  GA ba PEN tin  Brand:  Gralise, Horizant, Neurontin  What is the most important information I should know about gabapentin? Gabapentin can cause life-threatening breathing problems, especially if you already have a breathing disorder or if you use other medicines that can make you drowsy or slow your breathing. Seek emergency medical attention if you have very slow breathing. Some people have thoughts about suicide or behavior changes while taking gabapentin. Stay alert to changes in your mood or symptoms. Report any new or worsening symptoms to your doctor. Do not stop using gabapentin suddenly, even if you feel fine. What is gabapentin? Gabapentin is used together with other medicines to treat partial seizures in adults and children at least 1years old.   Gabapentin is also used to treat nerve pain caused by herpes virus or shingles (herpes zoster) in adults. Use only the brand and form of gabapentin your doctor has prescribed. Check your medicine each time you get a refill to make sure you receive the correct form. Gralise is used only to treat nerve pain. Horizant is used to treat nerve pain and restless legs syndrome (RLS). Neurontin is used to treat nerve pain and seizures. Gabapentin may also be used for purposes not listed in this medication guide. What should I discuss with my healthcare provider before taking gabapentin? You should not use gabapentin if you are allergic to it. Tell your doctor if you have ever had:  · breathing problems or lung disease, such as chronic obstructive pulmonary disease (COPD);  · kidney disease (or if you are on dialysis);  · diabetes;  · depression, a mood disorder, or suicidal thoughts or actions;  · a drug addiction;  · a seizure (unless you take gabapentin to treat seizures);  · liver disease;  · heart disease; or  · (for patients with RLS) if you are a day sleeper or work a night shift. Some people have thoughts about suicide while taking this medicine. Children taking gabapentin may have behavior changes. Stay alert to changes in your mood or symptoms. Report any new or worsening symptoms to your doctor. It is not known whether this medicine will harm an unborn baby. Tell your doctor if you are pregnant or plan to become pregnant. Seizure control is very important during pregnancy, and having a seizure could harm both mother and baby. Do not start or stop taking gabapentin for seizures without your doctor's advice, and tell your doctor right away if you become pregnant. If you are pregnant, your name may be listed on a pregnancy registry to track the effects of gabapentin on the baby. It may not be safe to breastfeed while using this medicine. Ask your doctor about any risk. How should I take gabapentin? Follow all directions on your prescription label.  Do not take this medicine in larger or smaller amounts or for longer than recommended. If your doctor changes your brand, strength, or type of gabapentin, your dosage needs may change. Ask your pharmacist if you have any questions about the new kind of gabapentin you receive at the pharmacy. Both Gralise and Horizant should be taken with food. Neurontin can be taken with or without food. If you break a Neurontin tablet and take only half of it, take the other half at your next dose. Any tablet that has been broken should be used as soon as possible or within a few days. Swallow the capsule  or tablet  whole and do not crush, chew, break, or open it. Measure liquid medicine carefully. Use the dosing syringe provided, or use a medicine dose-measuring device (not a kitchen spoon). Do not stop using gabapentin suddenly, even if you feel fine. Stopping suddenly may cause increased seizures. Follow your doctor's instructions about tapering your dose. In case of emergency, wear or carry medical identification to let others know you have seizures. This medicine can cause unusual results with certain medical tests. Tell any doctor who treats you that you are using gabapentin. Store gabapentin tablets and capsules at room temperature away from light and moisture. Store the liquid medicine in the refrigerator. Do not freeze. What happens if I miss a dose? Take the medicine as soon as you can, but skip the missed dose if it is almost time for your next dose. Do not take two doses at one time. If you take Horizant:  Skip the missed dose and use your next dose at the regular time. Do not use two doses of Horizant at one time. What happens if I overdose? Seek emergency medical attention or call the Poison Help line at 1-201.939.7079. What should I avoid while taking gabapentin? Avoid driving or hazardous activity until you know how this medicine will affect you. Your reactions could be impaired.  Dizziness or drowsiness can cause falls, accidents, or severe injuries. Avoid taking an antacid within 2 hours before you take gabapentin. Antacids can make it harder for your body to absorb gabapentin. Avoid drinking alcohol while taking gabapentin. What are the possible side effects of gabapentin? Get emergency medical help if you have signs of an allergic reaction: hives; difficult breathing; swelling of your face, lips, tongue, or throat. Seek medical treatment if you have a serious drug reaction that can affect many parts of your body. Symptoms may include: skin rash, fever, swollen glands, muscle aches, severe weakness, unusual bruising, upper stomach pain, or yellowing of your skin or eyes. Report any new or worsening symptoms to your doctor, such as: mood or behavior changes, anxiety, panic attacks, trouble sleeping, or if you feel impulsive, irritable, agitated, hostile, aggressive, restless, hyperactive (mentally or physically), depressed, or have thoughts about suicide or hurting yourself. Call your doctor at once if you have:  · weak or shallow breathing;  · blue-colored skin, lips, fingers, and toes;  · confusion, extreme drowsiness or weakness;  · problems with balance or muscle movement;  · unusual or involuntary eye movements; or  · increased seizures. Gabapentin can cause life-threatening breathing problems. A person caring for you should seek emergency medical attention if you have slow breathing with long pauses, blue colored lips, or if you are hard to wake up. Breathing problems may be more likely in older adults or in people with COPD. Some side effects are more likely in children taking gabapentin. Contact your doctor if the child taking this medicine has any of the following side effects:  · changes in behavior;  · memory problems;  · trouble concentrating; or  · acting restless, hostile, or aggressive.   Common side effects may include:  · fever, chills, sore throat, body aches, unusual tiredness;  · jerky movements;  · headache;  · double vision;  · swelling of your legs and feet;  · tremors;  · trouble speaking;  · dizziness, drowsiness, tiredness;  · problems with balance or eye movements; or  · nausea, vomiting. This is not a complete list of side effects and others may occur. Call your doctor for medical advice about side effects. You may report side effects to FDA at 4-552-EEW-5283. What other drugs will affect gabapentin? Using gabapentin with other drugs that make you drowsy or slow your breathing can cause dangerous side effects or death. Ask your doctor before using opioid medication, a sleeping pill, cold or allergy medicine, a muscle relaxer, or medicine for anxiety or seizures. Other drugs may affect gabapentin, including prescription and over-the-counter medicines, vitamins, and herbal products. Tell your doctor about all your current medicines and any medicine you start or stop using. Where can I get more information? Your pharmacist can provide more information about gabapentin. Remember, keep this and all other medicines out of the reach of children, never share your medicines with others, and use this medication only for the indication prescribed. Every effort has been made to ensure that the information provided by [facility] [address] [address] is accurate, up-to-date, and complete, but no guarantee is made to that effect. Drug information contained herein may be time sensitive. Perk information has been compiled for use by healthcare practitioners and consumers in the [place] and therefore [employer] does not warrant that uses outside of the [place] are appropriate, unless specifically indicated otherwise. [facility]'s drug information does not endorse drugs, diagnose patients or recommend therapy.  [facility] drug information is an informational resource designed to assist licensed healthcare practitioners in caring for their patients and/or to serve consumers viewing this service as a supplement to, and not a substitute for, the expertise, skill, knowledge and judgment of healthcare practitioners. The absence of a warning for a given drug or drug combination in no way should be construed to indicate that the drug or drug combination is safe, effective or appropriate for any given patient. [facility] does not assume any responsibility for any aspect of healthcare administered with the aid of information [facility] provides. The information contained herein is not intended to cover all possible uses, directions, precautions, warnings, drug interactions, allergic reactions, or adverse effects. If you have questions about the drugs you are taking, check with your doctor, nurse or pharmacist.  Copyright 4772-8078 [address]: 17.01. Revision date: 1/26/2021. Care instructions adapted under license by [facility] ([facility]). If you have questions about a medical condition or this instruction, always ask your healthcare professional. [place] [address] any warranty or liability for your use of this information.

## 2021-10-28 ENCOUNTER — TELEPHONE (OUTPATIENT)
Dept: ADMINISTRATIVE | Age: 86
End: 2021-10-28

## 2021-10-28 PROBLEM — G62.9 PERIPHERAL POLYNEUROPATHY: Status: ACTIVE | Noted: 2021-10-28

## 2022-01-07 ENCOUNTER — OFFICE VISIT (OUTPATIENT)
Dept: CARDIOLOGY CLINIC | Age: 87
End: 2022-01-07
Payer: COMMERCIAL

## 2022-01-07 VITALS
HEART RATE: 73 BPM | OXYGEN SATURATION: 98 % | RESPIRATION RATE: 16 BRPM | HEIGHT: 63 IN | SYSTOLIC BLOOD PRESSURE: 138 MMHG | WEIGHT: 175.6 LBS | BODY MASS INDEX: 31.11 KG/M2 | DIASTOLIC BLOOD PRESSURE: 60 MMHG

## 2022-01-07 DIAGNOSIS — I51.7 LVH (LEFT VENTRICULAR HYPERTROPHY): ICD-10-CM

## 2022-01-07 DIAGNOSIS — Z95.0 STATUS POST PLACEMENT OF CARDIAC PACEMAKER: ICD-10-CM

## 2022-01-07 DIAGNOSIS — I27.20 PULMONARY HTN (HCC): ICD-10-CM

## 2022-01-07 DIAGNOSIS — I48.0 PAF (PAROXYSMAL ATRIAL FIBRILLATION) (HCC): Primary | ICD-10-CM

## 2022-01-07 DIAGNOSIS — I10 HTN (HYPERTENSION), BENIGN: ICD-10-CM

## 2022-01-07 PROCEDURE — G8427 DOCREV CUR MEDS BY ELIG CLIN: HCPCS | Performed by: INTERNAL MEDICINE

## 2022-01-07 PROCEDURE — 4040F PNEUMOC VAC/ADMIN/RCVD: CPT | Performed by: INTERNAL MEDICINE

## 2022-01-07 PROCEDURE — 1123F ACP DISCUSS/DSCN MKR DOCD: CPT | Performed by: INTERNAL MEDICINE

## 2022-01-07 PROCEDURE — 1090F PRES/ABSN URINE INCON ASSESS: CPT | Performed by: INTERNAL MEDICINE

## 2022-01-07 PROCEDURE — 93000 ELECTROCARDIOGRAM COMPLETE: CPT | Performed by: INTERNAL MEDICINE

## 2022-01-07 PROCEDURE — G8417 CALC BMI ABV UP PARAM F/U: HCPCS | Performed by: INTERNAL MEDICINE

## 2022-01-07 PROCEDURE — G8484 FLU IMMUNIZE NO ADMIN: HCPCS | Performed by: INTERNAL MEDICINE

## 2022-01-07 PROCEDURE — 99214 OFFICE O/P EST MOD 30 MIN: CPT | Performed by: INTERNAL MEDICINE

## 2022-01-07 PROCEDURE — 1036F TOBACCO NON-USER: CPT | Performed by: INTERNAL MEDICINE

## 2022-01-07 RX ORDER — CONJUGATED ESTROGENS 0.62 MG/G
CREAM VAGINAL
COMMUNITY
Start: 2021-12-08

## 2022-01-07 RX ORDER — CHLORAL HYDRATE 500 MG
CAPSULE ORAL DAILY
COMMUNITY

## 2022-01-07 RX ORDER — ELTROMBOPAG OLAMINE 75 MG/1
50 TABLET, FILM COATED ORAL DAILY
COMMUNITY

## 2022-01-07 RX ORDER — APIXABAN 5 MG/1
TABLET, FILM COATED ORAL
COMMUNITY
Start: 2022-01-03

## 2022-01-07 RX ORDER — FAMOTIDINE 20 MG
TABLET ORAL DAILY
COMMUNITY

## 2022-01-07 RX ORDER — ACETAMINOPHEN 500 MG
500 TABLET ORAL PRN
COMMUNITY

## 2022-01-07 NOTE — PROGRESS NOTES
OFFICE VISIT     PRIMARY CARE PHYSICIAN:      Candance Buck, DO       ALLERGIES / SENSITIVITIES:        Allergies   Allergen Reactions    Ampicillin Itching    Iodine Itching          REVIEWED MEDICATIONS:        Current Outpatient Medications:     ELIQUIS 5 MG TABS tablet, take 1 tablet by mouth every 12 hours, Disp: , Rfl:     PROMACTA 75 MG TABS tablet, Take 75 mg by mouth daily , Disp: , Rfl:     Probiotic Product (PROBIOTIC-10 PO), Take by mouth 2 times daily, Disp: , Rfl:     acetaminophen (TYLENOL) 500 MG tablet, Take 500 mg by mouth as needed, Disp: , Rfl:     Loperamide HCl (IMODIUM PO), Take by mouth as needed, Disp: , Rfl:     PREMARIN 0.625 MG/GM vaginal cream, insert 0.5 grams vaginally TWICE WEEKLY, Disp: , Rfl:     Vitamin D, Cholecalciferol, 25 MCG (1000 UT) CAPS, Take by mouth daily, Disp: , Rfl:     Omega-3 1000 MG CAPS, Take by mouth daily, Disp: , Rfl:     gabapentin (NEURONTIN) 100 MG capsule, Take 1 capsule by mouth 2 times daily as needed (low back and leg pain/numbness). Intended supply: 90 days, Disp: 60 capsule, Rfl: 11    losartan (COZAAR) 50 MG tablet, take 1 tablet by mouth once daily, Disp: , Rfl:     b complex vitamins capsule, Take 1 capsule by mouth daily, Disp: , Rfl:     amLODIPine (NORVASC) 2.5 MG tablet, Take 1 tablet by mouth daily, Disp: 30 tablet, Rfl: 11    metoprolol succinate (TOPROL XL) 50 MG extended release tablet, Take 1 tablet by mouth 2 times daily, Disp: 30 tablet, Rfl: 11    polyethyl glycol-propyl glycol 0.4-0.3 % (SYSTANE) 0.4-0.3 % ophthalmic solution, 1 drop as needed for Dry Eyes, Disp: , Rfl:     hydrALAZINE (APRESOLINE) 50 MG tablet, Take 1 tablet by mouth 3 times daily. , Disp: 90 tablet, Rfl: 11    allopurinol (ZYLOPRIM) 100 MG tablet, Take 100 mg by mouth daily , Disp: , Rfl:     pitavastatin (LIVALO) 2 MG TABS tablet, Take 2 mg by mouth once a week  (Patient not taking: Reported on 1/7/2022), Disp: , Rfl:       S: REASON FOR VISIT: Chief Complaint   Patient presents with    Atrial Fibrillation     10 month ov. To hosp 6/23/21 d/t CVA. Labs 6/21. Follows with [facility].   Is c/o LE edema. No other cardiac complaints.  Hypertension    Hyperlipidemia    Cerebrovascular Accident    Edema          History of Present Illness:       Office Visit for follow up of PAF, Pacemaker, HTN   80 yr old with Hx of PAF, HTN, pacer came for f/u visit   Had CVA 6/2021, seen by Neurology-leg weakness, for Neuropathy   Seen by Hem/Onc - for low Plt, off Eliquis, now resumed   Got her COVID vaccine   No hospitalizations or surgeries since last visit   Patient is compliant with all medications   Joe any exertional chest pain or short of breath   Weakness in legs -    No palpitations, dizzy or syncope. Active at home   No orthopnea   Try to watch diet          Past Medical History:   Diagnosis Date    Anemia     Asthma     Atrial fibrillation (HCC)     [facility]     Diabetic retin[facility]     Gout     Hyperlipidemia     Hypertension     MGUS (monoclonal gammopathy of unknown significance)     Mild tricuspid regurgitation 03/11/2015    PAC (premature atrial contraction)     Pemphigoid     Pulmonary hypertension ([facility] 03/11/2015    mild    Symptomatic bradycardia             Past Surgical History:   Procedure Laterality Date    APPENDECTOMY      CARDIAC CATHETERIZATION  3/12/2015    Dr Rivera Ny  05/06/2015    [facility]M  [facility])   GEMMA    CATARACT REMOVAL      CHOLECYSTECTOMY      HYSTERECTOMY          History reviewed. No pertinent family history.        Social History     Tobacco Use    Smoking status: Never Smoker    Smokeless tobacco: Never Used   Substance Use Topics    Alcohol use: Not Currently         Review of Systems:  Constitutional: negative for fever and chills, or significant weight loss  HEENT: negative for acute visual symptoms or auditory problems, no dysphagia  Respiratory: negative for cough, wheezing, or hemoptysis  Cardiovascular: negative for chest pain, palpitations, and dyspnea  Gastrointestinal: negative for abdominal pain, diarrhea, nausea and vomiting  Endocrine: Negative for polyuria and polydyspsia  Genitourinary:negative for dysuria and hematuria  Derm: negative for rash and skin lesion(s)  Neurological: negative for tingling, numbness, weakness, seizures and tremors  Endocrine: negative for polydipsia and polyuria  Musculoskeletal: negative for pain or tenderness  Psychiatric: negative for anxiety, depression, or suicidal ideations         O:  COMPLETE PHYSICAL EXAM:       /60   Pulse 73   Resp 16   Ht 5' 3\" (1.6 m)   Wt 175 lb 9.6 oz (79.7 kg)   SpO2 98%   BMI 31.11 kg/m²       General:   Patient alert, comfortable, no distress. Appears stated age. HEENT:    Pupils equal, no icterus, no nasal drainage, tongue moist.   Neck:              No masses, Thyroid not palpable. + elevated JVD, No carotid bruit. Chest:   Normal configuration, non tender. Pacer site OK   Lungs:   Clear to auscultation bilaterally, few scattered rhonchi. Cardiovascular:  Regular rhythm, 2/6 systolic murmur, No S3, no palpable thrills,    Abdomen:  Soft, Non tender, Bowel sounds normal, no palpable masses. Extremities:  + edema. Distal pulses palpable. No cyanosis, no clubbing. Skin:   Good turgor, warm and dry, no cyanosis. Musculoskeletal: No joint swelling or deformity. Neuro:   Cranial nerves grossly intact; No focal neurologic deficit. Psych:   Alert, good mood and effect. REVIEW OF DIAGNOSTIC TESTS:        Electrocardiogram: Reviewed    Labs:  Reviewed   Pacer interrogated 11/24/21 - 2 yrs on battery          A/P:   ASSESSMENT / PLAN:    Morgan Metzger was seen today for atrial fibrillation, hypertension, hyperlipidemia, cerebrovascular accident and edema.     Diagnoses and all orders for this visit:    PAF (paroxysmal atrial fibrillation) ([facility] - on Eliquis OAC  -     EKG 12 Lead     HTN (hypertension), benign - Controlled on current Meds     S/p Pacemaker - May 2015, Normal Fn - Follows with [facility]     Pulmonary HTN (HCC) - Stable     LVH (left ventricular hypertrophy)     Dyslipidemia - On Statin    Hx of Trombocytopemia - Follows with Dr Reyes Achilles     Preventive Cardiology: Low cholesterol diet, regular exercise as tolerate, and gradual weight loss discussed. Monitor BP and heart rates. Above recommendations discussed with her and her family. All questions answered about cardiac diagnoses and cardiac medications. Continue current medications. Compliance with medications and f/u with all physicians discussed. Risk factor modification based on risk profile discussed. Call if any exertional chest pain, short of breath, dizzy or palpitations   Follow up in 6 months or earlier if needed.          [facility]  [address], [place], [facility]  [phone]

## 2022-03-03 ENCOUNTER — OFFICE VISIT (OUTPATIENT)
Dept: NEUROLOGY | Age: 87
End: 2022-03-03
Payer: COMMERCIAL

## 2022-03-03 VITALS
DIASTOLIC BLOOD PRESSURE: 82 MMHG | OXYGEN SATURATION: 96 % | WEIGHT: 163 LBS | TEMPERATURE: 98.2 F | BODY MASS INDEX: 30 KG/M2 | SYSTOLIC BLOOD PRESSURE: 176 MMHG | HEIGHT: 62 IN | HEART RATE: 86 BPM

## 2022-03-03 DIAGNOSIS — G62.9 PERIPHERAL POLYNEUROPATHY: Primary | ICD-10-CM

## 2022-03-03 DIAGNOSIS — M47.27 SPONDYLOSIS OF LUMBOSACRAL SPINE WITH RADICULOPATHY: ICD-10-CM

## 2022-03-03 PROCEDURE — 1123F ACP DISCUSS/DSCN MKR DOCD: CPT | Performed by: NURSE PRACTITIONER

## 2022-03-03 PROCEDURE — G8417 CALC BMI ABV UP PARAM F/U: HCPCS | Performed by: NURSE PRACTITIONER

## 2022-03-03 PROCEDURE — G8427 DOCREV CUR MEDS BY ELIG CLIN: HCPCS | Performed by: NURSE PRACTITIONER

## 2022-03-03 PROCEDURE — 1090F PRES/ABSN URINE INCON ASSESS: CPT | Performed by: NURSE PRACTITIONER

## 2022-03-03 PROCEDURE — G8484 FLU IMMUNIZE NO ADMIN: HCPCS | Performed by: NURSE PRACTITIONER

## 2022-03-03 PROCEDURE — 1036F TOBACCO NON-USER: CPT | Performed by: NURSE PRACTITIONER

## 2022-03-03 PROCEDURE — 99214 OFFICE O/P EST MOD 30 MIN: CPT | Performed by: NURSE PRACTITIONER

## 2022-03-03 PROCEDURE — 4040F PNEUMOC VAC/ADMIN/RCVD: CPT | Performed by: NURSE PRACTITIONER

## 2022-03-03 RX ORDER — DULOXETIN HYDROCHLORIDE 30 MG/1
30 CAPSULE, DELAYED RELEASE ORAL DAILY
Qty: 90 CAPSULE | Refills: 3 | Status: SHIPPED
Start: 2022-03-03 | End: 2022-07-28

## 2022-03-03 NOTE — PATIENT INSTRUCTIONS
ever had:  · heart problems, high blood pressure;  · liver or kidney disease;  · slow digestion;  · a seizure;  · bleeding problems;  · narrow-angle glaucoma;  · bipolar disorder (manic depression);  · drug addiction or suicidal thoughts; or  · if you drink large amounts of alcohol. Some young people have thoughts about suicide when first taking an antidepressant. Your doctor should check your progress at regular visits. Your family or other caregivers should also be alert to changes in your mood or symptoms. Taking this medicine during pregnancy could harm the baby, but stopping the medicine may not be safe for you. Do not start or stop duloxetine without asking your doctor. If you are pregnant, your name may be listed on a pregnancy registry to track the effects of duloxetine on the baby. If you are breastfeeding, tell your doctor if you notice drowsiness, feeding problems, and slow weight gain in the nursing baby. How should I take duloxetine? Follow all directions on your prescription label and read all medication guides or instruction sheets. Your doctor may occasionally change your dose. Use the medicine exactly as directed. Taking duloxetine in higher doses or more often than prescribed will not make it more effective, and may increase side effects. Swallow the capsule whole and do not crush, chew, break, or open it. Take with or without food. Your blood pressure will need to be checked often. Your symptoms may not improve for up to 4 weeks. You may have unpleasant symptoms if you stop using duloxetine suddenly. Ask your doctor before stopping the medicine. Store at room temperature away from moisture and heat. What happens if I miss a dose? Take the medicine as soon as you can, but skip the missed dose if it is almost time for your next dose. Do not take two doses at one time. What happens if I overdose?   Seek emergency medical attention or call the Poison Help line at 1-364.325.5265. Overdose symptoms may include vomiting, dizziness or drowsiness, seizures, fast heartbeats, fainting, or coma. What should I avoid while taking duloxetine? Avoid driving or hazardous activity until you know how this medicine will affect you. Dizziness or drowsiness can cause falls, accidents, or severe injuries. Avoid getting up too fast from a sitting or lying position, or you may feel dizzy. Drinking alcohol may increase your risk of liver damage, especially if you take Drizalma. What are the possible side effects of duloxetine? Get emergency medical help if you have signs of an allergic reaction (hives, difficult breathing, swelling in your face or throat) or a severe skin reaction (fever, sore throat, burning eyes, skin pain, red or purple skin rash with blistering and peeling). Report any new or worsening symptoms to your doctor, such as: mood or behavior changes, anxiety, panic attacks, trouble sleeping, or if you feel impulsive, irritable, agitated, hostile, aggressive, restless, hyperactive (mentally or physically), more depressed, or have thoughts about suicide or hurting yourself. Call your doctor at once if you have:  · pounding heartbeats or fluttering in your chest;  · a light-headed feeling, like you might pass out;  · easy bruising, unusual bleeding;  · vision changes;  · painful or difficult urination;  · impotence, sexual problems;  · liver problems --right-sided upper stomach pain, itching, dark urine, jaundice (yellowing of the skin or eyes);  · low blood sodium --headache, confusion, problems with thinking or memory, weakness, feeling unsteady; or  · manic episodes --racing thoughts, increased energy, decreased need for sleep, risk-taking behavior, being agitated or talkative.   Seek medical attention right away if you have symptoms of serotonin syndrome, such as: agitation, hallucinations, fever, sweating, shivering, fast heart rate, muscle stiffness, twitching, loss of coordination, nausea, vomiting, or diarrhea. Common side effects may include:  · drowsiness;  · nausea, constipation, loss of appetite;  · dry mouth; or  · increased sweating. This is not a complete list of side effects and others may occur. Call your doctor for medical advice about side effects. You may report side effects to FDA at 3-295-QLA-6924. What other drugs will affect duloxetine? Sometimes it is not safe to use certain medications at the same time. Some drugs can affect your blood levels of other drugs you take, which may increase side effects or make the medications less effective. Ask your doctor before taking a nonsteroidal anti-inflammatory drug (NSAID) such as aspirin, ibuprofen, naproxen, Advil, Aleve, Motrin, and others. Using an NSAID with duloxetine may cause you to bruise or bleed easily. Many drugs can affect duloxetine. This includes prescription and over-the-counter medicines, vitamins, and herbal products. Not all possible interactions are listed here. Tell your doctor about all other medicines you use. Where can I get more information? Your pharmacist can provide more information about duloxetine. Remember, keep this and all other medicines out of the reach of children, never share your medicines with others, and use this medication only for the indication prescribed. Every effort has been made to ensure that the information provided by [facility] is accurate, up-to-date, and complete, but no guarantee is made to that effect. Drug information contained herein may be time sensitive. [facility] information has been compiled for use by healthcare practitioners and consumers in the [place] and therefore [facility] does not warrant that uses outside of the [place] are appropriate, unless specifically indicated otherwise. [facility] drug information does not endorse drugs, diagnose patients or recommend therapy.  [facility] drug information is an informational resource designed to assist licensed healthcare practitioners in caring for their patients and/or to serve consumers viewing this service as a supplement to, and not a substitute for, the expertise, skill, knowledge and judgment of healthcare practitioners. The absence of a warning for a given drug or drug combination in no way should be construed to indicate that the drug or drug combination is safe, effective or appropriate for any given patient. [facility] does not assume any responsibility for any aspect of healthcare administered with the aid of information [facility] provides. The information contained herein is not intended to cover all possible uses, directions, precautions, warnings, drug interactions, allergic reactions, or adverse effects. If you have questions about the drugs you are taking, check with your doctor, nurse or pharmacist.  Copyright 5571-0781 [address]. Version: 16.01. Revision date: 8/9/2021. Care instructions adapted under license by [facility] ([facility]). If you have questions about a medical condition or this instruction, always ask your healthcare professional. [place] [address] any warranty or liability for your use of this information.

## 2022-03-03 NOTE — PROGRESS NOTES
Samir. Johnny Moran M.D., F.A.C.P. Deb Altamirano, DNP, APRN, CNS  Earlene Pedraza. Javad Pardo, MSN, APRN-FNP-C  Henry Rojas MSN, APRN, FNP-C  Marlyn MULLEN, SONA  [address] MSN, APRN, FNP-C     [address], [address]              [place], [address] [facility] is 80 y.o. right handed female     We are following her for LS radiculopathy and leg weakness    She presents with her daughter and remains a good historian    She did follow-up and obtain orthotics for her legs, but found them uncomfortable and does not want to use them. She was unable to tolerate a trial of very low low-dose gabapentin and stopped this medication. She continues to feel weakness in both legs, and did not find physical therapy very beneficial.  She is using a walker, and thankfully has suffered no falls. She still feels diffuse pains in her back and legs, as well as neck stiffness and pain in both rotator cuffs. Her daughter tells me the patient will frequently nap during the day and is found in positions where her neck is extended uncomfortably    Thankfully, her MGUS and thrombocytopenia are all stable. She is back on anticoagulation for her A. fib. She is vaccinated from Covid.   Her blood pressure is elevated here x2, she took her medications and stated it was normal at PCPs office earlier this week    No chest pain or palpitations  No SOB  No vertigo, lightheadedness or loss of consciousness  No incontinence of bowels or bladder  No itching or bruising appreciated  No speech or swallowing troubles    ROS otherwise negative     Current Outpatient Medications   Medication Sig Dispense Refill    ELIQUIS 5 MG TABS tablet take 1 tablet by mouth every 12 hours      PROMACTA 75 MG TABS tablet Take 50 mg by mouth daily       Probiotic Product (PROBIOTIC-10 PO) Take by mouth 2 times daily      acetaminophen (TYLENOL) 500 MG tablet Take 500 mg by mouth as needed      Loperamide HCl (IMODIUM PO) Take by mouth as needed      PREMARIN 0.625 MG/GM vaginal cream insert 0.5 grams vaginally TWICE WEEKLY      Vitamin D, Cholecalciferol, 25 MCG (1000 UT) CAPS Take by mouth daily      Omega-3 1000 MG CAPS Take by mouth daily      losartan (COZAAR) 50 MG tablet take 1 tablet by mouth once daily      pitavastatin (LIVALO) 2 MG TABS tablet Take 2 mg by mouth once a week       b complex vitamins capsule Take 1 capsule by mouth daily      amLODIPine (NORVASC) 2.5 MG tablet Take 1 tablet by mouth daily 30 tablet 11    metoprolol succinate (TOPROL XL) 50 MG extended release tablet Take 1 tablet by mouth 2 times daily 30 tablet 11    polyethyl glycol-propyl glycol 0.4-0.3 % (SYSTANE) 0.4-0.3 % ophthalmic solution 1 drop as needed for Dry Eyes      hydrALAZINE (APRESOLINE) 50 MG tablet Take 1 tablet by mouth 3 times daily. 90 tablet 11    allopurinol (ZYLOPRIM) 100 MG tablet Take 100 mg by mouth daily        No current facility-administered medications for this visit.      Objective:     BP (!) 176/82 (Site: Left Upper Arm)   Pulse 86   Temp 98.2 °F (36.8 °C)   Ht 5' 2\" (1.575 m)   Wt 163 lb (73.9 kg)   SpO2 96%   BMI 29.81 kg/m²     General appearance: alert, appears stated age, cooperative and no distress  Head: normocephalic, without obvious abnormality, atraumatic  Eyes: conjunctivae/corneas clear--arcus senilis bilaterally  Neck: Mildly limited range of motion but no cervicalgia; spastic trapezius and cervical paraspinal  Lungs: clear to auscultation bilaterally  Heart: RRR   Extremities: normal, atraumatic, no cyanosis or edema  Pulses: 2+ and symmetric  Skin: color, texture, turgor normal---no rashes or lesions      Mental Status: Alert, oriented x4--very pleasant    Appropriate attention/concentration  Intact fundus of knowledge  Intact memories    Speech: no dysarthria  Language: no aphasias    Cranial Nerves:  I: smell NA   II: visual acuity  NA   II: visual fields Full to confrontation   II: pupils HARPAL   III,VII: ptosis None   III,IV,VI: extraocular muscles  Full ROM   V: mastication Normal   V: facial light touch sensation  Normal   V,VII: corneal reflex     VII: facial muscle function - upper  Normal   VII: facial muscle function - lower Normal   VIII: hearing Normal   IX: soft palate elevation  Normal   IX,X: gag reflex    XI: trapezius strength  5/5   XI: sternocleidomastoid strength 5/5   XI: neck extension strength  5/5   XII: tongue strength  Normal     Motor:  5/5 both arms with limited ROM shoulders    4/5 L IPS; 4+/5 R IPS  5/5 both quads  5/5 both gastrocs and anterior tibs    Overweight bulk; normal tone  No abnormal movements    Sensory:  LT normal in all limbs  Vib impaired at both ankles and wrists    Coordination:   FFM and FN slow b/l but not ataxic    Gait:  Gowers  Slow, antalgic, drags both feet left greater than right    DTR:   Right Brachioradialis reflex 1+  Left Brachioradialis reflex 1+  Right Biceps reflex 1+  Left Biceps reflex 1+  Right Quadriceps reflex 2+  Left Quadriceps reflex 2+  Right Achilles reflex 1  Left Achilles reflex 1    No Corea's    No pathological reflexes    Laboratory/Radiology:     Labs reviewed under media tab from 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 Her underlying MGUS may be contributing otherwise her neuropathy is most likely idiopathic. No clear genetic history for such and no history of diabetes or nutritional deficiencies. Unfortunately, her leg weakness and pains persist and she may benefit from addition of Cymbalta and physiatry evaluation.     Probable LS radiculopathy: Secondary to spondylolisthesis seen on CT of the lumbar spine, but not proven by EDX studies  Unfortunately, she cannot have MRI. She may benefit from lumbosacral injections as long as her thrombocytopenia remained stable    Asymptomatic right ICA stenosis: 50-69% on R; follows with vascular. She is currently anticoagulated.   On very low dose statin    MGUS: follows with heme/onc    Thrombocytopenia: Improving under hematology care    A. Fib: On anticoagulation    Pacer    Plan:     Start Cymbalta 30 mg daily    Referral to physiatry--Des    Patient declining to return to discuss orthotics    Consider pain mgmt referral     Falls and bleeding precautions reviewed    RTO in 6 mos or sooner TERESA Moreno CNP  12:10 PM  3/3/2022

## 2022-04-22 ENCOUNTER — HOSPITAL ENCOUNTER (OUTPATIENT)
Dept: ULTRASOUND IMAGING | Age: 87
Discharge: HOME | End: 2022-04-24
Payer: COMMERCIAL

## 2022-04-22 DIAGNOSIS — I65.21 OCCLUSION OF RIGHT CAROTID ARTERY: ICD-10-CM

## 2022-04-22 PROCEDURE — 93880 EXTRACRANIAL BILAT STUDY: CPT

## 2022-05-03 ENCOUNTER — OFFICE VISIT (OUTPATIENT)
Dept: PHYSICAL MEDICINE AND REHAB | Age: 87
End: 2022-05-03
Payer: COMMERCIAL

## 2022-05-03 ENCOUNTER — TELEPHONE (OUTPATIENT)
Dept: PHYSICAL MEDICINE AND REHAB | Age: 87
End: 2022-05-03

## 2022-05-03 VITALS
SYSTOLIC BLOOD PRESSURE: 178 MMHG | BODY MASS INDEX: 29.63 KG/M2 | WEIGHT: 161 LBS | HEART RATE: 83 BPM | HEIGHT: 62 IN | DIASTOLIC BLOOD PRESSURE: 82 MMHG

## 2022-05-03 DIAGNOSIS — M43.16 SPONDYLOLISTHESIS AT L4-L5 LEVEL: Primary | ICD-10-CM

## 2022-05-03 DIAGNOSIS — M16.0 PRIMARY OSTEOARTHRITIS OF BOTH HIPS: Primary | ICD-10-CM

## 2022-05-03 DIAGNOSIS — M25.559 HIP PAIN: ICD-10-CM

## 2022-05-03 DIAGNOSIS — M47.816 LUMBAR SPONDYLOSIS: ICD-10-CM

## 2022-05-03 PROCEDURE — 1123F ACP DISCUSS/DSCN MKR DOCD: CPT | Performed by: PHYSICAL MEDICINE & REHABILITATION

## 2022-05-03 PROCEDURE — 1090F PRES/ABSN URINE INCON ASSESS: CPT | Performed by: PHYSICAL MEDICINE & REHABILITATION

## 2022-05-03 PROCEDURE — 4040F PNEUMOC VAC/ADMIN/RCVD: CPT | Performed by: PHYSICAL MEDICINE & REHABILITATION

## 2022-05-03 PROCEDURE — 99214 OFFICE O/P EST MOD 30 MIN: CPT | Performed by: PHYSICAL MEDICINE & REHABILITATION

## 2022-05-03 PROCEDURE — 1036F TOBACCO NON-USER: CPT | Performed by: PHYSICAL MEDICINE & REHABILITATION

## 2022-05-03 PROCEDURE — G8417 CALC BMI ABV UP PARAM F/U: HCPCS | Performed by: PHYSICAL MEDICINE & REHABILITATION

## 2022-05-03 PROCEDURE — G8427 DOCREV CUR MEDS BY ELIG CLIN: HCPCS | Performed by: PHYSICAL MEDICINE & REHABILITATION

## 2022-05-03 NOTE — TELEPHONE ENCOUNTER
Called Dr. Feliciano Pham office and spoke with Sky Briceno to report patients high BP readings. Sky Briceno will report them to the doctor.

## 2022-05-03 NOTE — PROGRESS NOTES
Elder Umaña D.O. [facility]  [address] [facility] [address]  Phone: [phone]  Fax: [phone]        5/3/22    Chief Complaint   Patient presents with    Back Pain     New patient referred by Dr. Tequila Carroll       HPI:  Alaine Najjar is a 80y.o. year old woman seen today in follow up regarding left leg weakness. Interval history: Since the last visit the patient was seen by neurology and referred here for further evaluation and possible CATY. Reviewed her CT from June 2021 she has a mild L4-5 spondylolisthesis and significant degenerative changes. Flexion extension views have not been completed. Patient reports difficulty lifting her legs left more than right to don pants, get in car and go up stairs. .  Today, the pain is rated Pain Score:   8 where 0 is no pain and 10 is pain as bad as it can be. The pain is located in the low back,  radiates distally to the bilateral legs, and is described as aching. This pain occurs all day. The symptoms have been worse since onset. Symptoms are exacerbated by walking. Factors which relieve the pain include rest. Other associated symptoms include stiffness. Otherwise, the pain assessment has not changed since the last visit. As a part of today's visit, I have reviewed the following medical records: [facility] office notes, and:    Lab Results   Component Value Date     06/24/2021    K 5.2 (H) 06/24/2021     (H) 06/24/2021    CO2 24 06/24/2021    BUN 24 (H) 06/24/2021    CREATININE 1.0 06/24/2021    GLUCOSE 78 06/24/2021    CALCIUM 8.5 (L) 06/24/2021    PROT 6.6 06/25/2021    LABALBU 3.1 (L) 06/25/2021    BILITOT 0.4 08/03/2019    ALKPHOS 97 08/03/2019    AST 27 08/03/2019    ALT 17 08/03/2019    LABGLOM >60 06/24/2021    GFRAA >60 06/24/2021       Daughter assisted with history due to memory loss.      Past Medical History:   Diagnosis Date    Anemia     Asthma     Atrial fibrillation (HCC)  [facility]     Diabetic retinopathy [facility]     Gout     Hyperlipidemia     Hypertension     MGUS (monoclonal gammopathy of unknown significance)     Mild tricuspid regurgitation 03/11/2015    PAC (premature atrial contraction)     Pemphigoid     Pulmonary hypertension (HCC) 03/11/2015    mild    Symptomatic bradycardia        Past Surgical History:   Procedure Laterality Date    APPENDECTOMY      CARDIAC CATHETERIZATION  3/12/2015    Dr Gin Pina  05/06/2015    D-PPM  ([facility])   GEMMA    CATARACT REMOVAL      CHOLECYSTECTOMY      HYSTERECTOMY         Social History     Tobacco Use    Smoking status: Never Smoker    Smokeless tobacco: Never Used   Vaping Use    Vaping Use: Never used   Substance Use Topics    Alcohol use: Not Currently    Drug use: Never       History reviewed. No pertinent family history.     Current Outpatient Medications   Medication Sig Dispense Refill    DULoxetine (CYMBALTA) 30 MG extended release capsule Take 1 capsule by mouth daily 90 capsule 3    ELIQUIS 5 MG TABS tablet take 1 tablet by mouth every 12 hours      PROMACTA 75 MG TABS tablet Take 50 mg by mouth daily       Probiotic Product (PROBIOTIC-10 PO) Take by mouth 2 times daily      acetaminophen (TYLENOL) 500 MG tablet Take 500 mg by mouth as needed      Loperamide HCl (IMODIUM PO) Take by mouth as needed      PREMARIN 0.625 MG/GM vaginal cream insert 0.5 grams vaginally TWICE WEEKLY      Vitamin D, Cholecalciferol, 25 MCG (1000 UT) CAPS Take by mouth daily      Omega-3 1000 MG CAPS Take by mouth daily      losartan (COZAAR) 50 MG tablet take 1 tablet by mouth once daily      pitavastatin (LIVALO) 2 MG TABS tablet Take 2 mg by mouth once a week       b complex vitamins capsule Take 1 capsule by mouth daily      amLODIPine (NORVASC) 2.5 MG tablet Take 1 tablet by mouth daily 30 tablet 11    metoprolol succinate (TOPROL XL) 50 MG extended release tablet Take 1 tablet by mouth 2 times daily 30 tablet 11    polyethyl glycol-propyl glycol 0.4-0.3 % (SYSTANE) 0.4-0.3 % ophthalmic solution 1 drop as needed for Dry Eyes      hydrALAZINE (APRESOLINE) 50 MG tablet Take 1 tablet by mouth 3 times daily. 90 tablet 11    allopurinol (ZYLOPRIM) 100 MG tablet Take 100 mg by mouth daily        No current facility-administered medications for this visit. Allergies   Allergen Reactions    Ampicillin Itching    Iodine Itching       Review of Systems:  + for fatigue, malaise, weight loss, depressed mood, blurry vision, sensitive to light, hearing loss, tinnitus, seasonal allergies, watery eyes, itchy eyes, bruising easily, temperature intolerance, atrial fibrillation, no appetite, urinary urgency, joint pain, stiffness, swelling, muscle pain, muscle tenderness, difficulty walking, off balance, weakness, hair loss. No new weakness, paresthesia, incontinence of bowel or bladder, saddle anesthesia, falls or gait dysfunction. Otherwise, per HPI. Physical Exam:   Blood pressure (!) 178/82, pulse 83, height 5' 2\" (1.575 m), weight 161 lb (73 kg), not currently breastfeeding. BP Readings from Last 3 Encounters:   05/03/22 (!) 178/82   03/03/22 (!) 176/82   01/07/22 138/60     BMI Readings from Last 3 Encounters:   05/03/22 29.45 kg/m²   03/03/22 29.81 kg/m²   01/07/22 31.11 kg/m²     Wt Readings from Last 3 Encounters:   05/03/22 161 lb (73 kg)   03/03/22 163 lb (73.9 kg)   01/07/22 175 lb 9.6 oz (79.7 kg)       GENERAL: The patient is in no apparent distress. Body habitus is non-obese. HEENT: No rhinorrhea, sneezing, yawning, or lacrimation. No scleral icterus or conjunctival injection. SKIN: No piloerection. No tract marks. No rash. PSYCH: Mood and affect are appropriate. Hygiene is appropriate. CARDIOVASCULAR  Heart is regular rate and rhythm. There is no edema. RESPIRATORY: Respirations are regular and unlabored. There is no cyanosis. GASTROINTESTINAL: Soft abdomen, non-tender. MSK: There is no joint effusion, deformity, instability, swelling, erythema or warmth. AROM is full in the spine and extremities. Spinal curvatures reveal increased cervical lordosis and anterocollis. Severe ROM restriction in bilateral hips for rotation. NEURO: Gait is flexed, shuffling. Bilateral hip flexion 3+/5, otherwise, no focal sensorimotor deficit. Reflexes 2+ and symmetric in upper and absent in bilateral lower extremities. Impression:   1. Spondylolisthesis at L4-L5 level    2. Hip pain        Plan:  I have ordered the following unique test(s):  Orders Placed This Encounter   Procedures    XR LUMBAR SPINE FLEXION AND EXTENSION ONLY     Standing Status:   Future     Number of Occurrences:   1     Standing Expiration Date:   5/3/2023    XR HIP BILATERAL W AP PELVIS (2 VIEWS)     Standing Status:   Future     Number of Occurrences:   1     Standing Expiration Date:   5/4/2023     Order Specific Question:   Reason for exam:     Answer:   hip pain     Discussed CATY, patient does not wish to proceed. Continue current medications. Follow up with PCP regarding uncontrolled hypertension. Dr. Margy Ovalle notified of BP readings today in clinic.  The patient was educated about the diagnosis, prognosis, indications, risks and benefits of treatment. An opportunity to ask questions was given to the patient and questions were answered. The patient agreed to proceed with the recommended treatment as described above. Return for test results. Thank you for allowing me to participate in the care of your patient. Elmer Farley D.O., P.T.   Board Certified Physical Medicine and Rehabilitation  Board Certified Electrodiagnostic Medicine

## 2022-05-03 NOTE — TELEPHONE ENCOUNTER
----- Message from Rolly Bundy DO sent at 5/3/2022  3:16 PM EDT -----  Reviewed test abnormal, inform patient that it showed hip osteoarthritis. Please see if patient will consider PT and/or corticosteroid injection.

## 2022-05-03 NOTE — TELEPHONE ENCOUNTER
Message left for patient to go over x ray results, instructed to have patient call office back, will await return call.

## 2022-05-04 ENCOUNTER — TELEPHONE (OUTPATIENT)
Dept: PHYSICAL MEDICINE AND REHAB | Age: 87
End: 2022-05-04

## 2022-05-04 DIAGNOSIS — M53.2X6 SPINAL INSTABILITY OF LUMBAR REGION: ICD-10-CM

## 2022-05-04 DIAGNOSIS — M43.16 SPONDYLOLISTHESIS AT L4-L5 LEVEL: Primary | ICD-10-CM

## 2022-05-04 NOTE — TELEPHONE ENCOUNTER
----- Message from Claudetta Mulligan, DO sent at 5/4/2022  9:29 AM EDT -----  Reviewed test abnormal, inform patient that it showed instability. She needs evaluated by NSGY for this.

## 2022-05-04 NOTE — TELEPHONE ENCOUNTER
Called and spoke with patients daughter Monika Sierra, informed her of provider message below. She verbalized an understanding and asked that a referral be placed to [address] [place] [address]. She did not have anyone that she knew of to ask for so she would just like it placed.

## 2022-05-05 NOTE — TELEPHONE ENCOUNTER
Called and spoke to patient and she would like to try physical therapy, can you place a referral to [facility] per patient request   Thanks

## 2022-06-02 ENCOUNTER — INITIAL CONSULT (OUTPATIENT)
Dept: NEUROSURGERY | Age: 87
End: 2022-06-02
Payer: COMMERCIAL

## 2022-06-02 VITALS
WEIGHT: 161 LBS | SYSTOLIC BLOOD PRESSURE: 149 MMHG | OXYGEN SATURATION: 95 % | HEIGHT: 62 IN | RESPIRATION RATE: 20 BRPM | HEART RATE: 60 BPM | DIASTOLIC BLOOD PRESSURE: 62 MMHG | BODY MASS INDEX: 29.63 KG/M2 | TEMPERATURE: 97.9 F

## 2022-06-02 DIAGNOSIS — M54.50 CHRONIC BILATERAL LOW BACK PAIN WITHOUT SCIATICA: Primary | ICD-10-CM

## 2022-06-02 DIAGNOSIS — M25.512 CHRONIC LEFT SHOULDER PAIN: ICD-10-CM

## 2022-06-02 DIAGNOSIS — G89.29 CHRONIC BILATERAL LOW BACK PAIN WITHOUT SCIATICA: Primary | ICD-10-CM

## 2022-06-02 DIAGNOSIS — M25.511 CHRONIC RIGHT SHOULDER PAIN: ICD-10-CM

## 2022-06-02 DIAGNOSIS — G89.29 CHRONIC LEFT SHOULDER PAIN: ICD-10-CM

## 2022-06-02 DIAGNOSIS — G89.29 CHRONIC RIGHT SHOULDER PAIN: ICD-10-CM

## 2022-06-02 DIAGNOSIS — M47.9 SPONDYLOSIS: ICD-10-CM

## 2022-06-02 PROCEDURE — 1090F PRES/ABSN URINE INCON ASSESS: CPT | Performed by: STUDENT IN AN ORGANIZED HEALTH CARE EDUCATION/TRAINING PROGRAM

## 2022-06-02 PROCEDURE — 99202 OFFICE O/P NEW SF 15 MIN: CPT

## 2022-06-02 PROCEDURE — G8427 DOCREV CUR MEDS BY ELIG CLIN: HCPCS | Performed by: STUDENT IN AN ORGANIZED HEALTH CARE EDUCATION/TRAINING PROGRAM

## 2022-06-02 PROCEDURE — G8417 CALC BMI ABV UP PARAM F/U: HCPCS | Performed by: STUDENT IN AN ORGANIZED HEALTH CARE EDUCATION/TRAINING PROGRAM

## 2022-06-02 PROCEDURE — 1123F ACP DISCUSS/DSCN MKR DOCD: CPT | Performed by: STUDENT IN AN ORGANIZED HEALTH CARE EDUCATION/TRAINING PROGRAM

## 2022-06-02 PROCEDURE — 99203 OFFICE O/P NEW LOW 30 MIN: CPT | Performed by: STUDENT IN AN ORGANIZED HEALTH CARE EDUCATION/TRAINING PROGRAM

## 2022-06-02 PROCEDURE — 1036F TOBACCO NON-USER: CPT | Performed by: STUDENT IN AN ORGANIZED HEALTH CARE EDUCATION/TRAINING PROGRAM

## 2022-06-02 ASSESSMENT — ENCOUNTER SYMPTOMS
PHOTOPHOBIA: 0
BACK PAIN: 1
ABDOMINAL PAIN: 0
SHORTNESS OF BREATH: 0
TROUBLE SWALLOWING: 0

## 2022-06-02 NOTE — PROGRESS NOTES
Subjective:      Patient ID: Galina Rodriguez is a 80 y.o. female with a PMH of asthma, HTN, carotid artery stenosis, HLD, Afib s/p pacemaker insertion and PVD who presents to review XR results. Patient had XR Lumbar Flex/Ex which showed mild instability at L4-L5. Patient states she does not have much low back pain, just some here and there and denies any pain radiating down the legs. She does admit to some weakness in her legs as well as some numbness that she attributes to her neuropathy. She denies any bowel or bladder incontinence. She complains mostly of bilateral shoulder pain. She states she had torn her rotator cuff years ago and received injections for it which provided her with significant relief. She states this shoulder pain is gradually getting worse and is her main compliant. She is a nonsmoker and takes Eliquis daily. Review of Systems   Constitutional: Negative for fever. HENT: Negative for trouble swallowing. Eyes: Negative for photophobia. Respiratory: Negative for shortness of breath. Cardiovascular: Negative for chest pain. Gastrointestinal: Negative for abdominal pain. Endocrine: Negative for heat intolerance. Genitourinary: Negative for flank pain. Musculoskeletal: Positive for back pain. Negative for myalgias.        (+)Bilateral shoulder pain   Skin: Negative for wound. Neurological: Positive for weakness and numbness. Negative for headaches. Psychiatric/Behavioral: Negative for confusion. Objective:   Physical Exam  HENT:      Head: Normocephalic. Eyes:      Pupils: Pupils are equal, round, and reactive to light. Cardiovascular:      Rate and Rhythm: Normal rate. Pulmonary:      Effort: Pulmonary effort is normal.   Abdominal:      General: There is no distension. Musculoskeletal:      Cervical back: Normal range of motion. Comments: Walks with walker   Skin:     General: Skin is warm and dry. Neurological:      Mental Status: She is alert. Comments: A&Ox3  CN3-12 intact  Limited ROM of LUE secondary to pain  BUE Motor strength full  BLE 4/5 throughout   Sensation intact to light touch   Nontender to palpation of cervical and lumbar spine   Psychiatric:         Thought Content: Thought content normal.       Imagin2022 XR Lumbar Spine   Impression   Mild instability at L4-5 as outlined above.  Degenerative spondylosis.           Assessment:      -Trinidad Kennedy is 79 y/o female who presents for XR review. Lumbar Flex/Ex with mild instability at L4-L5, patient with intermittent back pain with no radiation. Will be starting PT on Monday. Has not has CATY in the back. Plan:      -Pain control and expectations discussed  -Continue PT for lower leg weakness  -Referral to [facility] for bilateral shoulder injections  -Patient not a surgical candidate due to age and multiple comorbidities, nor does patient wish to pursue any surgical interventions  -OARRS reviewed  -Please call with any questions or concerns.            Poonam Santo PA-C

## 2022-06-06 ENCOUNTER — HOSPITAL ENCOUNTER (OUTPATIENT)
Dept: PHYSICAL THERAPY | Age: 87
Setting detail: THERAPIES SERIES
Discharge: HOME | End: 2022-06-06
Payer: COMMERCIAL

## 2022-06-06 PROCEDURE — 97161 PT EVAL LOW COMPLEX 20 MIN: CPT | Performed by: PHYSICAL THERAPIST

## 2022-06-06 ASSESSMENT — PAIN DESCRIPTION - ORIENTATION: ORIENTATION: RIGHT;LEFT

## 2022-06-06 ASSESSMENT — PAIN DESCRIPTION - DESCRIPTORS: DESCRIPTORS: HEAVINESS;PRESSURE

## 2022-06-06 ASSESSMENT — PAIN DESCRIPTION - PAIN TYPE: TYPE: CHRONIC PAIN

## 2022-06-06 ASSESSMENT — PAIN SCALES - GENERAL: PAINLEVEL_OUTOF10: 8

## 2022-06-06 ASSESSMENT — PAIN DESCRIPTION - FREQUENCY: FREQUENCY: CONTINUOUS

## 2022-06-06 ASSESSMENT — PAIN DESCRIPTION - LOCATION: LOCATION: BACK;NECK;HIP;SHOULDER

## 2022-06-06 NOTE — PROGRESS NOTES
Physical Therapy      Physical Therapy: Initial Evaluation    Patient: Slava Delarosa (69 y.o. female)   Examination Date:   Plan of Care Certification Period: 2022 to        :  1931 ;    Confirmed: Yes MRN: [id number]  CSN: [id number]   Insurance: Payor: Yasmin Madiha / Plan: [address] / Product Type: *No Product type* /   Insurance ID: [id number] - (Medicare Managed) Secondary Insurance (if applicable): [address]   Referring Physician: Sherri Benites DO     PCP: Dawson Sullivan DO Visits to Date/Visits Approved: 1 /      No Show/Cancelled Appts:   /       Medical Diagnosis: Bilateral primary osteoarthritis of hip [M16.0]  Spondylosis without myelopathy or radiculopathy, lumbar region [M47.816] M16.0 (ICD-10-CM) - Primary osteoarthritis of both hipsM47.816 (ICD-10-CM) - Lumbar spondylosis  Treatment Diagnosis:       PERTINENT MEDICAL HISTORY           Medical History: Chart Reviewed: Yes   Past Medical History:   Diagnosis Date    Anemia     Asthma     Atrial fibrillation ([address]     CVA (cerebral vascular accident) ([address]     Diabetic retinopathy ([address]     Gout     Hyperlipidemia     Hypertension     MGUS (monoclonal gammopathy of unknown significance)     Mild tricuspid regurgitation 2015    PAC (premature atrial contraction)     Pemphigoid     Pulmonary hypertension ([address] 2015    mild    Symptomatic bradycardia      Surgical History:   Past Surgical History:   Procedure Laterality Date    APPENDECTOMY      CARDIAC CATHETERIZATION  3/12/2015    Dr Delgado [place]  2015    [facility]PPM  ([facility])   GEMMA    CATARACT REMOVAL      CHOLECYSTECTOMY      HYSTERECTOMY         Medications:   Current Outpatient Medications:     DULoxetine (CYMBALTA) 30 MG extended release capsule, Take 1 capsule by mouth daily, Disp: 90 capsule, Rfl: 3    ELIQUIS 5 MG TABS tablet, take 1 tablet by mouth every 12 hours, Disp: , Rfl:     PROMACTA 75 MG TABS tablet, Take 50 mg by mouth daily , Disp: , Rfl:     Probiotic Product (PROBIOTIC-10 PO), Take by mouth 2 times daily, Disp: , Rfl:     acetaminophen (TYLENOL) 500 MG tablet, Take 500 mg by mouth as needed, Disp: , Rfl:     Loperamide HCl (IMODIUM PO), Take by mouth as needed, Disp: , Rfl:     PREMARIN 0.625 MG/GM vaginal cream, insert 0.5 grams vaginally TWICE WEEKLY, Disp: , Rfl:     Vitamin D, Cholecalciferol, 25 MCG (1000 UT) CAPS, Take by mouth daily, Disp: , Rfl:     Omega-3 1000 MG CAPS, Take by mouth daily, Disp: , Rfl:     losartan (COZAAR) 50 MG tablet, take 1 tablet by mouth once daily, Disp: , Rfl:     pitavastatin (LIVALO) 2 MG TABS tablet, Take 2 mg by mouth once a week , Disp: , Rfl:     b complex vitamins capsule, Take 1 capsule by mouth daily, Disp: , Rfl:     amLODIPine (NORVASC) 2.5 MG tablet, Take 1 tablet by mouth daily, Disp: 30 tablet, Rfl: 11    metoprolol succinate (TOPROL XL) 50 MG extended release tablet, Take 1 tablet by mouth 2 times daily, Disp: 30 tablet, Rfl: 11    polyethyl glycol-propyl glycol 0.4-0.3 % (SYSTANE) 0.4-0.3 % ophthalmic solution, 1 drop as needed for Dry Eyes, Disp: , Rfl:     hydrALAZINE (APRESOLINE) 50 MG tablet, Take 1 tablet by mouth 3 times daily. , Disp: 90 tablet, Rfl: 11    allopurinol (ZYLOPRIM) 100 MG tablet, Take 100 mg by mouth daily , Disp: , Rfl:   Allergies: Ampicillin and Iodine      SUBJECTIVE EXAMINATION      ,           Subjective History:    Subjective: Patient presents to PT with c/o back and hip pain. Pain has been present for an extended time period. She reports having generalized weakness across LEs. She has difficulty with sit to stand transitions and with stair ambulation. She ambulates mainly with FWW. Does have cane however limits ambulation due to lack of balance. Pt relates lack of balance due to limited vision/hearing/neuropathy symptoms. Patient takes tylenol for symptom reduction.   Additional Pertinent Hx (if applicable):            Learning/Language: Learning  What is the preferred language of the patient/guardian?: English     Pain Screening    Pain Screening  Patient Currently in Pain: Yes  Pain Assessment: 0-10  Pain Level: 8  Best Pain Level: 6  Worst Pain Level: 10  Pain Type: Chronic pain  Pain Location: Back,Neck,Hip,Shoulder  Pain Orientation: Right,Left  Pain Descriptors: Heaviness,Pressure  Pain Frequency: Continuous    Functional Status    Dominant Hand: : Right    Social History:    Social History  Lives With: Daughter  Type of Home: House  Home Layout: One level  Home Access: Stairs to enter with rails  Entrance Stairs - Rails: Left  Entrance Stairs - Number of Steps: 3  Bathroom Shower/Tub: Tub/Shower unit  Bathroom Toilet: Standard  Bathroom Equipment: Toilet raiser  Bathroom Accessibility: Accessible         OBJECTIVE EXAMINATION   Restrictions:   Pacemaker    Review of Systems:  Follows Commands: Within Functional Limits    Palpation:   Lumbar Spine Palpation: pain noted across central LS region    Ambulation/Gait (if applicable):   Ambulation  Surface: level tile  Device: Rolling Walker  Assistance: Modified Independent  Quality of Gait: pt ambulates with FWW. fwd trunk flexion throughout with limited heel-toe mechanics  Gait Deviations: Slow Tonja,Decreased step length,Decreased step height    Balance Screen:   Balance  Posture: Poor (fwd trunk/rouinded shoulder posturing)  Sitting - Static: Good  Sitting - Dynamic: Good  Standing - Static: Fair  Standing - Dynamic: Fair      Left AROM  Right AROM       LLE- WFL      RLE- WFL         Left Strength  Right Strength         Strength LLE  L Hip Flexion: 3+/5  L Hip Extension: 3+/5  L Hip ABduction: 3+/5  L Hip ADduction: 4/5  L Knee Flexion: 4/5  L Knee Extension: 4/5    Strength RLE  R Hip Flexion: 3+/5  R Hip Extension: 3+/5  R Hip ABduction: 3+/5  R Hip ADduction: 4/5  R Knee Flexion: 4/5  R Knee Extension: 4/5     Lumbar Assessment AROM Lumbar Spine   Lumbar spine general AROM: Limited 25 %all planes       Trunk Strength     Trunk Strength  Trunk Flexion: 3+/5  Trunk Extension: 3+/5        ASSESSMENT     Impression: Assessment: Patient presents to PT with c/o back/hip pain; limited AROM/strength across trunk/B hips with hindered posturing and overall functional gait    Body Structures, Functions, Activity Limitations Requiring Skilled Therapeutic Intervention: Decreased functional mobility ,Decreased ROM,Decreased strength,Decreased balance,Decreased posture,Increased pain    Statement of Medical Necessity: Physical Therapy is both indicated and medically necessary as outlined in the POC to increase the likelihood of meeting the functionally related goals stated below. Patient's Activity Tolerance:        Patient's rehabilitation potential/prognosis is considered to be:  Fair    Factors which may impact rehabilitation potential include:          GOALS   Patient Goal(s):   to reduce pain   Short Term Goals Completed by  3 weeks  Goal Status    increase AROM of trunk to within 90% functional limits       increase strength of trunk/B hips to grossly 4/5 improving upright posture and functional gait       decrease pain to < 5/10 across back/B hips with activity                      Long Term Goals Completed by  5 weeks  Goal Status     increase AROM of trunk to [facility]          increase strength of trunk/B hips to grossly 4 to 4+/5 improving upright posture and functional gait        decrease pain to < 3/10 across back/B hips with activity       pt demonstrates independence with HEP/symptom management                 TREATMENT PLAN       Requires PT Follow-Up: Yes    Pt. actively involved in establishing Plan of Care and Goals: Yes  Patient/ Caregiver education and instruction:               Treatment may include any combination of the following: Strengthening,ROM,Gait training,Manual Therapy - Soft Tissue Mobilization,Home exercise program,Safety education & training,Patient/Caregiver education & training,Modalities     Frequency / Duration:  Patient to be seen 2 for 5 weeks      Eval Complexity:    Decision Making: Low Complexity    PT Treatment Completed:  N/A - Evaluation Only    Therapy Time  Individual Time In: [facility] Time Out: [address] [place]  Minutes: 35        Therapist Signature: Kylie Ramachandran, PT    Date: 7/9/2040     I certify that the above Therapy Services are being furnished while the patient is under my care. I agree with the treatment plan and certify that this therapy is necessary. [de-identified] Signature:  ___________________________   Date:_______                                                                   Ivelisse Ramey DO        Physician Comments: _______________________________________________    Please sign and return to [facility]. Please fax to the location listed below.  Tamir Banks for this referral!    [address] [facility]  [address] [address]  Dept: [address]: [phone]       POC NOTE

## 2022-06-08 ENCOUNTER — HOSPITAL ENCOUNTER (OUTPATIENT)
Dept: PHYSICAL THERAPY | Age: 87
Setting detail: THERAPIES SERIES
Discharge: HOME | End: 2022-06-08
Payer: COMMERCIAL

## 2022-06-08 PROCEDURE — 97110 THERAPEUTIC EXERCISES: CPT

## 2022-06-08 NOTE — PROGRESS NOTES
[facility]  Phone: [phone] Fax: [phone]       Physical Therapy Daily Treatment Note  Date:  2022    Patient Name:  Nico Isabel    :  1931  MRN: [id number]    Restrictions/Precautions:    Diagnosis:  Bilateral primary osteoarthritis of hip [M16.0]  Spondylosis without myelopathy or radiculopathy, lumbar region [M47.816] M16.0 (ICD-10-CM) - Primary osteoarthritis of both hipsM47.816 (ICD-10-CM) - Lumbar spondylosis  Treatment Diagnosis:    Insurance/Certification information: [address]   Referring Physician: Dr. Radha Umaña of care signed (Y/N):    Visit# / total visits:  2/5 weeks  Pain level: /10  Time In: 9:45       Time Out: 10:35         Subjective:  Pt to therapy with daughter observing. Pt states she is not moving around much at home. Exercises:  Exercise/Equipment Resistance/Repetitions Other comments   Bike    4 min    Seated flexion w/ball   10 x    Chest stretch  10\" 5 x  Seated-arms back   LTR 10 x B    SAQ 10 x B    LAQ 10 x B           Sit to stand from 24\" box   3 x                                                                                      Other Therapeutic Activities:  Adjusted walker height up to facilitate more upright posture. Home Exercise Program:  (22) Began instruction in breaking up prolonged sitting time with short walks around the house and standing marching/toe raises at her walker. Manual Treatments:      Modalities:      Timed Code Treatment Minutes:  45    Total Treatment Minutes:  50    Treatment/Activity Tolerance:  [x] Patient tolerated treatment well [] Patient limited by fatigue  [] Patient limited by pain  [] Patient limited by other medical complications  [x] Other: Good tolerance for initiation of exercises. Pt did note a pulling sensation across abdomen. Daugther reports she is being assessed for possible hernia. No other c/o.      Plan:   [x] Continue per plan of care [] Alter current plan (see comments)  [] Plan of care initiated [] Hold pending MD visit [] Discharge  Plan for Next Session:         Treatment Charges: Mins Units   Initial Evaluation     Re-Evaluation     Ther Exercise         TE 45 3   Manual Therapy     MT     Ther Activities        TA     Gait Training          GT     Neuro Re-education NR     Modalities     Non-Billable Service Time     Other 5    Total Time/Units 50 3     Electronically signed by:   Trinity Benjamin

## 2022-06-14 ENCOUNTER — HOSPITAL ENCOUNTER (OUTPATIENT)
Dept: PHYSICAL THERAPY | Age: 87
Setting detail: THERAPIES SERIES
Discharge: HOME | End: 2022-06-14
Payer: COMMERCIAL

## 2022-06-14 PROCEDURE — 97530 THERAPEUTIC ACTIVITIES: CPT

## 2022-06-14 PROCEDURE — 97110 THERAPEUTIC EXERCISES: CPT

## 2022-06-14 NOTE — PROGRESS NOTES
[facility]  Phone: [phone] Fax: [phone]       Physical Therapy Daily Treatment Note  Date:  2022    Patient Name:  Leopold Catching    :  1931  MRN: [id number]    Restrictions/Precautions:    Diagnosis:  Bilateral primary osteoarthritis of hip [M16.0]  Spondylosis without myelopathy or radiculopathy, lumbar region [M47.816] M16.0 (ICD-10-CM) - Primary osteoarthritis of both hipsM47.816 (ICD-10-CM) - Lumbar spondylosis  Treatment Diagnosis:    Insurance/Certification information: KulwinderMell Elias 48   Referring Physician: Dr. Ricardo Austin of care signed (Y/N):    Visit# / total visits:  3/5 weeks  Pain level: /10  Time In: 9:40      Time Out: 10:25         Subjective:  Pt to therapy with daughter observing. Pt c/o \"stiffness\" in LLE. During session pt voiced that she feels stiffness in calf muscle and feeling of weakness. Denies any pain. Discussed with daughter who states this is not a new complaint. Advised to inform physician again if it persists. Pt states she is performing HEP without issues. Exercises:  Exercise/Equipment Resistance/Repetitions Other comments   Bike    6 min    Seated flexion w/ball   10 x    Chest stretch  10\" 5 x  Seated-arms back   LTR 10 x B    Hip flex w/knees flexed 10 x B    SAQ 10 x 2 B    LAQ 10 x 2 B           Sit to stand from 24\" box   7 x           Toe raises     10 x    Side stepping  2 laps                                                              Other Therapeutic Activities:  Ambulated out to front parking lot to daughter's car using ww. Pt negotiated down slight decline to ramp access with close SBA. Transferred into car with SBA. Home Exercise Program:  (22) Began instruction in breaking up prolonged sitting time with short walks around the house and standing marching/toe raises at her walker.      Manual Treatments:      Modalities:      Timed Code Treatment Minutes:  45    Total Treatment Minutes: 45    Treatment/Activity Tolerance:  [x] Patient tolerated treatment well [] Patient limited by fatigue  [] Patient limited by pain  [] Patient limited by other medical complications  [x] Other: Pt reported LLE felt better after ex. Good tolerance for progression of ex and ambulation today. Plan:   [x] Continue per plan of care [] Alter current plan (see comments)  [] Plan of care initiated [] Hold pending MD visit [] Discharge  Plan for Next Session:         Treatment Charges: Mins Units   Initial Evaluation     Re-Evaluation     Ther Exercise         TE 35 2   Manual Therapy     MT     Ther Activities        TA 10 1   Gait Training          GT     Neuro Re-education NR     Modalities     Non-Billable Service Time     Other     Total Time/Units 45 3     Electronically signed by:   Trinity Benjamin

## 2022-06-16 ENCOUNTER — HOSPITAL ENCOUNTER (OUTPATIENT)
Dept: PHYSICAL THERAPY | Age: 87
Setting detail: THERAPIES SERIES
Discharge: HOME | End: 2022-06-16
Payer: COMMERCIAL

## 2022-06-16 PROCEDURE — 97110 THERAPEUTIC EXERCISES: CPT

## 2022-06-16 PROCEDURE — 97530 THERAPEUTIC ACTIVITIES: CPT

## 2022-06-16 NOTE — PROGRESS NOTES
[facility]  Phone: [phone] Fax: [phone]       Physical Therapy Daily Treatment Note  Date:  2022    Patient Name:  Kandy Sanon    :  1931  MRN: [id number]    Restrictions/Precautions:    Diagnosis:  Bilateral primary osteoarthritis of hip [M16.0]  Spondylosis without myelopathy or radiculopathy, lumbar region [M47.816] M16.0 (ICD-10-CM) - Primary osteoarthritis of both hipsM47.816 (ICD-10-CM) - Lumbar spondylosis  Treatment Diagnosis:    Insurance/Certification information: Luc Elias 48   Referring Physician: Dr. Robins Nones of care signed (Y/N):    Visit# / total visits:  4/5 weeks  Pain level: /10  Time In: 9:45      Time Out: 10:45         Subjective:  Pt reports mild soreness in LE's from exercises. Voices awareness that she needs to walk more. Exercises:  Exercise/Equipment Resistance/Repetitions Other comments   Bike    7 min    Seated flexion w/ball   10 x    Chest stretch  10\" 5 x  Seated-arms back   LTR 10 x B    Hip flex w/knees flexed 10 x B    SAQ 10 x 2 B    LAQ 10 x 2 B           Sit to stand from 24\" box   7 x nt          Toe raises     10 x nt   Side stepping  2 laps nt                                                             Other Therapeutic Activities:  Ambulated 80' x 1 and 220' x 1 using ww and SBA    Home Exercise Program:  (22) Began instruction in breaking up prolonged sitting time with short walks around the house and standing marching/toe raises at her walker. Manual Treatments:      Modalities:      Timed Code Treatment Minutes:  50    Total Treatment Minutes:  60    Treatment/Activity Tolerance:  [x] Patient tolerated treatment well [] Patient limited by fatigue  [] Patient limited by pain  [] Patient limited by other medical complications  [x] Other: Good tolerance for ex/activities with rest breaks.      Plan:   [x] Continue per plan of care [] Alter current plan (see comments)  [] Plan of care initiated [] Hold pending MD visit [] Discharge  Plan for Next Session:         Treatment Charges: Mins Units   Initial Evaluation     Re-Evaluation     Ther Exercise         TE 35 2   Manual Therapy     MT     Ther Activities        TA 15 1   Gait Training          GT     Neuro Re-education NR     Modalities     Non-Billable Service Time     Other 10    Total Time/Units 60 3     Electronically signed by:   Trinity Benjamin

## 2022-06-21 ENCOUNTER — HOSPITAL ENCOUNTER (OUTPATIENT)
Dept: PHYSICAL THERAPY | Age: 87
Setting detail: THERAPIES SERIES
Discharge: HOME | End: 2022-06-21
Payer: COMMERCIAL

## 2022-06-21 PROCEDURE — 97110 THERAPEUTIC EXERCISES: CPT

## 2022-06-21 PROCEDURE — 97530 THERAPEUTIC ACTIVITIES: CPT

## 2022-06-21 NOTE — PROGRESS NOTES
[facility]  Phone: [phone] Fax: [phone]       Physical Therapy Daily Treatment Note  Date:  2022    Patient Name:  Kandy Sanon    :  1931  MRN: [id number]    Restrictions/Precautions:    Diagnosis:  Bilateral primary osteoarthritis of hip [M16.0]  Spondylosis without myelopathy or radiculopathy, lumbar region [M47.816] M16.0 (ICD-10-CM) - Primary osteoarthritis of both hipsM47.816 (ICD-10-CM) - Lumbar spondylosis  Treatment Diagnosis:    Insurance/Certification information: KulwinderMell Cardonasallysebleainsley 48   Referring Physician: Dr. Robins Nones of care signed (Y/N):    Visit# / total visits:  5/5 weeks  Pain level: /10  Time In: 9:25      Time Out: 10:15         Subjective:  Pt has no new c/o. States she prepared a meal taking rest breaks and sitting to complete the tasks. Exercises:  Exercise/Equipment Resistance/Repetitions Other comments   Bike    8 min    Seated flexion w/ball   10 x    Chest stretch  10\" 5 x  Seated-arms back   LTR 10 x B    Hip flex w/knees flexed 10 x B    SAQ 15 x 2 B    LAQ 10 x 2 B           Sit to stand from 24\" box    2 x 5           Toe raises     10 x nt   Side stepping  2 laps nt                                                             Other Therapeutic Activities:  Ambulated 220' x 1 using ww and SBA. Negotiated 4 steps x 2 using 2 rails and CGA. Ambulated to front entrance and descended curb w/CGA. Car transfer SBA. Home Exercise Program:  (22) Began instruction in breaking up prolonged sitting time with short walks around the house and standing marching/toe raises at her walker.      Manual Treatments:      Modalities:      Timed Code Treatment Minutes:  50    Total Treatment Minutes:  50    Treatment/Activity Tolerance:  [x] Patient tolerated treatment well [] Patient limited by fatigue  [] Patient limited by pain  [] Patient limited by other medical complications  [x] Other: Increased endurance for exercise and mobility activities today. Pt is demonstrating less fwd flexed posture with increased awareness of correct posture. Plan:   [x] Continue per plan of care [] Alter current plan (see comments)  [] Plan of care initiated [] Hold pending MD visit [] Discharge  Plan for Next Session:         Treatment Charges: Mins Units   Initial Evaluation     Re-Evaluation     Ther Exercise         TE 30 2   Manual Therapy     MT     Ther Activities        TA 20 1   Gait Training          GT     Neuro Re-education NR     Modalities     Non-Billable Service Time     Other     Total Time/Units 50 3     Electronically signed by:   Trinity Benjamin

## 2022-06-23 ENCOUNTER — HOSPITAL ENCOUNTER (OUTPATIENT)
Dept: PHYSICAL THERAPY | Age: 87
Setting detail: THERAPIES SERIES
Discharge: HOME | End: 2022-06-23
Payer: COMMERCIAL

## 2022-06-23 PROCEDURE — 97530 THERAPEUTIC ACTIVITIES: CPT

## 2022-06-23 PROCEDURE — 97110 THERAPEUTIC EXERCISES: CPT

## 2022-06-23 NOTE — PROGRESS NOTES
[facility]  Phone: [phone] Fax: [phone]       Physical Therapy Daily Treatment Note  Date:  2022    Patient Name:  Kandy Sanon    :  1931  MRN: [id number]    Restrictions/Precautions:    Diagnosis:  Bilateral primary osteoarthritis of hip [M16.0]  Spondylosis without myelopathy or radiculopathy, lumbar region [M47.816] M16.0 (ICD-10-CM) - Primary osteoarthritis of both hipsM47.816 (ICD-10-CM) - Lumbar spondylosis  Treatment Diagnosis:    Insurance/Certification information: KulwinderMell Cardonamaira 48   Referring Physician: Dr. Robins Nones of care signed (Y/N):    Visit# / total visits:  6/5 weeks  Pain level: /10  Time In: 9:15      Time Out: 10:15         Subjective:  Pt states her shoulders are feeling better since having injections. She does note mild UE fatigue related to working on a craft yesterday. Pt again reports she feels better since initiating therapy. Exercises:  Exercise/Equipment Resistance/Repetitions Other comments   Bike    10 min    Seated flexion w/ball   10 x    Chest stretch  10\" 5 x  Seated-arms back   LTR 10 x B    Hip flex w/knees flexed 10 x B    SAQ 15 x 2 B    LAQ 10 x 2 B           Sit to stand from 24\" box    10 x           Toe raises     10 x nt   Side stepping  2 laps nt                                                             Other Therapeutic Activities:  Ambulated 220' x 1 using ww and SBA. Ambulated to front entrance w/good awareness of manipulating walker over threshold and rugs. .    Home Exercise Program:  (22) Began instruction in breaking up prolonged sitting time with short walks around the house and standing marching/toe raises at her walker.      Manual Treatments:      Modalities:      Timed Code Treatment Minutes:  55    Total Treatment Minutes:  60    Treatment/Activity Tolerance:  [x] Patient tolerated treatment well [] Patient limited by fatigue  [] Patient limited by pain  [] Patient limited by other medical complications  [] Other:      Plan:   [x] Continue per plan of care [] Alter current plan (see comments)  [] Plan of care initiated [] Hold pending MD visit [] Discharge  Plan for Next Session:         Treatment Charges: Mins Units   Initial Evaluation     Re-Evaluation     Ther Exercise         TE 35 2   Manual Therapy     MT     Ther Activities        TA 20 1   Gait Training          GT     Neuro Re-education NR     Modalities     Non-Billable Service Time     Other 5    Total Time/Units 60 3     Electronically signed by:   Trinity Benjamin

## 2022-06-28 ENCOUNTER — HOSPITAL ENCOUNTER (OUTPATIENT)
Dept: PHYSICAL THERAPY | Age: 87
Setting detail: THERAPIES SERIES
Discharge: HOME | End: 2022-06-28
Payer: COMMERCIAL

## 2022-06-28 NOTE — FLOWSHEET NOTE
[facility]  Phone: [phone] Fax: [phone]     Physical Therapy  Cancellation/No-show Note  Patient Name:  Keira Daley  :  1931   Date:  2022    For today's appointment patient:  [x]  Cancelled  []  Rescheduled appointment  []  No-show     Reason given by patient:  [x]  Patient ill  []  Conflicting appointment  []  No transportation    []  Conflict with work  []  No reason given  []  Other:     Comments:      Electronically signed by:   Trinity Benjamin

## 2022-06-30 ENCOUNTER — HOSPITAL ENCOUNTER (OUTPATIENT)
Dept: PHYSICAL THERAPY | Age: 87
Setting detail: THERAPIES SERIES
Discharge: HOME | End: 2022-06-30
Payer: COMMERCIAL

## 2022-06-30 NOTE — FLOWSHEET NOTE
[facility]  Phone: [phone] Fax: [phone]     Physical Therapy  Cancellation/No-show Note  Patient Name:  Wyatt Juarez  :  1931   Date:  2022    For today's appointment patient:  [x]  Cancelled  []  Rescheduled appointment  []  No-show     Reason given by patient:  [x]  Patient ill  []  Conflicting appointment  []  No transportation    []  Conflict with work  []  No reason given  []  Other:     Comments:  Pt has bronchitis. Will call back to reschedule. Electronically signed by:   Trinity Benjamin

## 2022-07-07 ENCOUNTER — HOSPITAL ENCOUNTER (OUTPATIENT)
Dept: PHYSICAL THERAPY | Age: 87
Setting detail: THERAPIES SERIES
Discharge: HOME | End: 2022-07-07
Payer: COMMERCIAL

## 2022-07-07 PROCEDURE — 97110 THERAPEUTIC EXERCISES: CPT

## 2022-07-07 NOTE — PROGRESS NOTES
care [] Alter current plan (see comments)  [] Plan of care initiated [] Hold pending MD visit [] Discharge  Plan for Next Session:         Treatment Charges: Mins Units   Initial Evaluation     Re-Evaluation     Ther Exercise         TE 40 3   Manual Therapy     MT     Ther Activities        TA 5 --   Gait Training          GT     Neuro Re-education NR     Modalities     Non-Billable Service Time     Other     Total Time/Units 45 3     Electronically signed by:   Trinity Benjamin

## 2022-07-12 ENCOUNTER — HOSPITAL ENCOUNTER (OUTPATIENT)
Dept: PHYSICAL THERAPY | Age: 87
Setting detail: THERAPIES SERIES
Discharge: HOME | End: 2022-07-12
Payer: COMMERCIAL

## 2022-07-12 PROCEDURE — 97110 THERAPEUTIC EXERCISES: CPT

## 2022-07-12 NOTE — PROGRESS NOTES
[facility]  Phone: [phone] Fax: [phone]       Physical Therapy Daily Treatment Note  Date:  2022    Patient Name:  Fernando Alonso    :  1931  MRN: [id number]    Restrictions/Precautions:    Diagnosis:  Bilateral primary osteoarthritis of hip [M16.0]  Spondylosis without myelopathy or radiculopathy, lumbar region [M47.816] M16.0 (ICD-10-CM) - Primary osteoarthritis of both hipsM47.816 (ICD-10-CM) - Lumbar spondylosis  Treatment Diagnosis:    Insurance/Certification information: KulwinderMell Cardonamaira 48   Referring Physician: Dr. Manisha Ordoñez of care signed (Y/N):    Visit# / total visits:  8/ weeks  Pain level: /10  Time In: 10:05     Time Out: 10:50         Subjective:  Pt states she is still feeling tired related to recent illness which she reported to physician. States he encouraged her to cont therapy. Advised pt that we will cont exercise as tolerated with rest breaks. Exercises:  Exercise/Equipment Resistance/Repetitions Other comments   Bike    4 min    Seated flexion w/ball   10 x    Chest stretch  10\" 5 x  Seated-arms back   LTR 10 x B    Hip flex w/knees flexed 2 x 5 B    SAQ 10 x, 7 x B    LAQ 2 x 10 B           Sit to stand from 24\" box    2 x 5           Toe raises     10 x nt   Side stepping  2 laps nt                                                             Other Therapeutic Activities:  Ambulated 80' x 1 using ww and SBA. Ambulated to front entrance w/SBA. Frequent rest breaks. .    Home Exercise Program:  (22) Began instruction in breaking up prolonged sitting time with short walks around the house and standing marching/toe raises at her walker.      Manual Treatments:      Modalities:      Timed Code Treatment Minutes:  45    Total Treatment Minutes:  45    Treatment/Activity Tolerance:  [x] Patient tolerated treatment well [] Patient limited by fatigue  [] Patient limited by pain  [] Patient limited by other medical complications  [x] Other:Pt tolerated gradual progression of exercises and gait today. Plan:   [x] Continue per plan of care [] Alter current plan (see comments)  [] Plan of care initiated [] Hold pending MD visit [] Discharge  Plan for Next Session:         Treatment Charges: Mins Units   Initial Evaluation     Re-Evaluation     Ther Exercise         TE 40 3   Manual Therapy     MT     Ther Activities        TA 5 --   Gait Training          GT     Neuro Re-education NR     Modalities     Non-Billable Service Time     Other     Total Time/Units 45 3     Electronically signed by:   Trinity Benjamin

## 2022-07-14 ENCOUNTER — HOSPITAL ENCOUNTER (OUTPATIENT)
Dept: PHYSICAL THERAPY | Age: 87
Setting detail: THERAPIES SERIES
Discharge: HOME | End: 2022-07-14
Payer: COMMERCIAL

## 2022-07-14 PROCEDURE — 97110 THERAPEUTIC EXERCISES: CPT

## 2022-07-14 NOTE — PROGRESS NOTES
[facility]  Phone: [phone] Fax: [phone]       Physical Therapy Daily Treatment Note  Date:  2022    Patient Name:  Valarie Sommer    :  1931  MRN: [id number]    Restrictions/Precautions:    Diagnosis:  Bilateral primary osteoarthritis of hip [M16.0]  Spondylosis without myelopathy or radiculopathy, lumbar region [M47.816] M16.0 (ICD-10-CM) - Primary osteoarthritis of both hipsM47.816 (ICD-10-CM) - Lumbar spondylosis  Treatment Diagnosis:    Insurance/Certification information: KulwinderMell Cardonamaira 48   Referring Physician: Dr. Rajesh Anne of care signed (Y/N):    Visit# / total visits:   weeks  Pain level: /10  Time In: 10:15     Time Out: 11:00         Subjective:  Pt states she is feeling less tired this morning. Exercises:  Exercise/Equipment Resistance/Repetitions Other comments   Bike    5 min    Seated flexion/rot w/ball   10 x    Chest stretch  10\" 5 x  Seated-arms back   LTR 10 x B    Hip flex w/knees flexed 2 x 5 B    SAQ 2 x 10 B    LAQ 2 x 10 B           Sit to stand from 24\" box    2 x 5           Toe raises     10 x    Side stepping  2 laps                                                              Other Therapeutic Activities:  . Ambulated to front entrance w/SBA. Frequent rest breaks. .    Home Exercise Program:  (22) Began instruction in breaking up prolonged sitting time with short walks around the house and standing marching/toe raises at her walker. (22) Outlined written HEP and reviewed with patient.    Lower Trunk Rotations - 1 x daily - 10 reps  Supine Hip Flexion - 1 x daily - 10 reps  Supine Knee Extension Strengthening - 1 x daily - 10 reps  Seated Long Arc Quad - 1 x daily - 7 x weekly - 3 sets - 10 reps  Heel Raises with Counter Support - 1 x daily - 10 reps  Side Stepping with Counter Support - 1 x daily - 10 reps      Manual Treatments:      Modalities:      Timed Code Treatment Minutes:  45    Total Treatment Minutes: 45    Treatment/Activity Tolerance:  [x] Patient tolerated treatment well [] Patient limited by fatigue  [] Patient limited by pain  [] Patient limited by other medical complications  [x] Other: Gradual improvement in overall strength and endurance since recent illness allowing pt to resume exercise program with rest breaks. Plan:   [x] Continue per plan of care [] Alter current plan (see comments)  [] Plan of care initiated [] Hold pending MD visit [] Discharge  Plan for Next Session:         Treatment Charges: Mins Units   Initial Evaluation     Re-Evaluation     Ther Exercise         TE 40 3   Manual Therapy     MT     Ther Activities        TA 5 --   Gait Training          GT     Neuro Re-education NR     Modalities     Non-Billable Service Time     Other     Total Time/Units 45 3     Electronically signed by:   Trinity Benjamin

## 2022-07-19 ENCOUNTER — HOSPITAL ENCOUNTER (OUTPATIENT)
Dept: PHYSICAL THERAPY | Age: 87
Setting detail: THERAPIES SERIES
Discharge: HOME | End: 2022-07-19
Payer: COMMERCIAL

## 2022-07-19 PROCEDURE — 97110 THERAPEUTIC EXERCISES: CPT

## 2022-07-19 NOTE — PROGRESS NOTES
55    Treatment/Activity Tolerance:  [x] Patient tolerated treatment well [] Patient limited by fatigue  [] Patient limited by pain  [] Patient limited by other medical complications  [x] Other: Pt has completed current plan of care and met PT goals. Increased trunk AROM. Increased strength allowing pt to demonstrate more upright posture. Pt reports she is performing HEP. Plan:   [] Continue per plan of care [] Alter current plan (see comments)  [] Plan of care initiated [] Hold pending MD visit [x] Discharge  Plan for Next Session:         Treatment Charges: Mins Units   Initial Evaluation     Re-Evaluation     Ther Exercise         TE 40 3   Manual Therapy     MT     Ther Activities        TA 5 --   Gait Training          GT     Neuro Re-education NR     Modalities     Non-Billable Service Time     Other 10    Total Time/Units 55 3     Electronically signed by:   Trinity Benjamin

## 2022-07-27 ENCOUNTER — HOSPITAL ENCOUNTER (INPATIENT)
Age: 87
LOS: 1 days | Discharge: HOME | DRG: 312 | End: 2022-07-28
Attending: EMERGENCY MEDICINE | Admitting: INTERNAL MEDICINE
Payer: COMMERCIAL

## 2022-07-27 ENCOUNTER — APPOINTMENT (OUTPATIENT)
Dept: GENERAL RADIOLOGY | Age: 87
DRG: 312 | End: 2022-07-27
Payer: COMMERCIAL

## 2022-07-27 ENCOUNTER — APPOINTMENT (OUTPATIENT)
Dept: CT IMAGING | Age: 87
DRG: 312 | End: 2022-07-27
Payer: COMMERCIAL

## 2022-07-27 DIAGNOSIS — R55 SYNCOPE AND COLLAPSE: Primary | ICD-10-CM

## 2022-07-27 PROBLEM — I10 PRIMARY HYPERTENSION: Status: ACTIVE | Noted: 2017-04-15

## 2022-07-27 PROBLEM — Z95.0 CARDIAC PACEMAKER IN SITU: Status: ACTIVE | Noted: 2017-04-15

## 2022-07-27 PROBLEM — I95.1 ORTHOSTATIC HYPOTENSION: Status: ACTIVE | Noted: 2022-07-27

## 2022-07-27 PROBLEM — I44.2 COMPLETE HEART BLOCK (HCC): Status: ACTIVE | Noted: 2022-07-27

## 2022-07-27 LAB
ALBUMIN SERPL-MCNC: 3.6 G/DL (ref 3.5–5.2)
ALP BLD-CCNC: 98 U/L (ref 35–104)
ALT SERPL-CCNC: 11 U/L (ref 0–32)
ANION GAP SERPL CALCULATED.3IONS-SCNC: 13 MMOL/L (ref 7–16)
AST SERPL-CCNC: 17 U/L (ref 0–31)
BACTERIA: ABNORMAL /HPF
BASOPHILS ABSOLUTE: 0.03 E9/L (ref 0–0.2)
BASOPHILS RELATIVE PERCENT: 0.6 % (ref 0–2)
BILIRUB SERPL-MCNC: 0.3 MG/DL (ref 0–1.2)
BILIRUBIN URINE: NEGATIVE
BLOOD, URINE: NEGATIVE
BUN BLDV-MCNC: 23 MG/DL (ref 6–23)
CALCIUM SERPL-MCNC: 9.4 MG/DL (ref 8.6–10.2)
CHLORIDE BLD-SCNC: 106 MMOL/L (ref 98–107)
CLARITY: CLEAR
CO2: 25 MMOL/L (ref 22–29)
COLOR: YELLOW
CREAT SERPL-MCNC: 1.2 MG/DL (ref 0.5–1)
EKG ATRIAL RATE: 66 BPM
EKG P AXIS: 78 DEGREES
EKG P-R INTERVAL: 226 MS
EKG Q-T INTERVAL: 466 MS
EKG QRS DURATION: 178 MS
EKG QTC CALCULATION (BAZETT): 488 MS
EKG R AXIS: -52 DEGREES
EKG T AXIS: 100 DEGREES
EKG VENTRICULAR RATE: 66 BPM
EOSINOPHILS ABSOLUTE: 0.06 E9/L (ref 0.05–0.5)
EOSINOPHILS RELATIVE PERCENT: 1.3 % (ref 0–6)
EPITHELIAL CELLS, UA: ABNORMAL /HPF
GFR AFRICAN AMERICAN: 51
GFR NON-AFRICAN AMERICAN: 51 ML/MIN/1.73
GLUCOSE BLD-MCNC: 92 MG/DL (ref 74–99)
GLUCOSE URINE: NEGATIVE MG/DL
HCT VFR BLD CALC: 27.6 % (ref 34–48)
HEMOGLOBIN: 8.6 G/DL (ref 11.5–15.5)
IMMATURE GRANULOCYTES #: 0.02 E9/L
IMMATURE GRANULOCYTES %: 0.4 % (ref 0–5)
KETONES, URINE: NEGATIVE MG/DL
LEUKOCYTE ESTERASE, URINE: ABNORMAL
LYMPHOCYTES ABSOLUTE: 1.24 E9/L (ref 1.5–4)
LYMPHOCYTES RELATIVE PERCENT: 26.4 % (ref 20–42)
MCH RBC QN AUTO: 29.8 PG (ref 26–35)
MCHC RBC AUTO-ENTMCNC: 31.2 % (ref 32–34.5)
MCV RBC AUTO: 95.5 FL (ref 80–99.9)
MONOCYTES ABSOLUTE: 0.62 E9/L (ref 0.1–0.95)
MONOCYTES RELATIVE PERCENT: 13.2 % (ref 2–12)
NEUTROPHILS ABSOLUTE: 2.72 E9/L (ref 1.8–7.3)
NEUTROPHILS RELATIVE PERCENT: 58.1 % (ref 43–80)
NITRITE, URINE: NEGATIVE
PDW BLD-RTO: 14.7 FL (ref 11.5–15)
PH UA: 6 (ref 5–9)
PLATELET # BLD: 96 E9/L (ref 130–450)
PLATELET CONFIRMATION: NORMAL
PMV BLD AUTO: 12.5 FL (ref 7–12)
POTASSIUM SERPL-SCNC: 4.2 MMOL/L (ref 3.5–5)
PROTEIN UA: NEGATIVE MG/DL
RBC # BLD: 2.89 E12/L (ref 3.5–5.5)
RBC UA: ABNORMAL /HPF (ref 0–2)
SARS-COV-2, NAAT: NOT DETECTED
SODIUM BLD-SCNC: 144 MMOL/L (ref 132–146)
SPECIFIC GRAVITY UA: <=1.005 (ref 1–1.03)
TOTAL PROTEIN: 6.7 G/DL (ref 6.4–8.3)
TROPONIN, HIGH SENSITIVITY: 72 NG/L (ref 0–9)
TROPONIN, HIGH SENSITIVITY: 72 NG/L (ref 0–9)
UROBILINOGEN, URINE: 0.2 E.U./DL
WBC # BLD: 4.7 E9/L (ref 4.5–11.5)
WBC UA: ABNORMAL /HPF (ref 0–5)

## 2022-07-27 PROCEDURE — 70450 CT HEAD/BRAIN W/O DYE: CPT

## 2022-07-27 PROCEDURE — 2140000000 HC CCU INTERMEDIATE R&B

## 2022-07-27 PROCEDURE — 6370000000 HC RX 637 (ALT 250 FOR IP): Performed by: INTERNAL MEDICINE

## 2022-07-27 PROCEDURE — 93280 PM DEVICE PROGR EVAL DUAL: CPT | Performed by: INTERNAL MEDICINE

## 2022-07-27 PROCEDURE — 97530 THERAPEUTIC ACTIVITIES: CPT

## 2022-07-27 PROCEDURE — 73552 X-RAY EXAM OF FEMUR 2/>: CPT

## 2022-07-27 PROCEDURE — 73502 X-RAY EXAM HIP UNI 2-3 VIEWS: CPT

## 2022-07-27 PROCEDURE — 93005 ELECTROCARDIOGRAM TRACING: CPT | Performed by: NURSE PRACTITIONER

## 2022-07-27 PROCEDURE — 80053 COMPREHEN METABOLIC PANEL: CPT

## 2022-07-27 PROCEDURE — G0378 HOSPITAL OBSERVATION PER HR: HCPCS

## 2022-07-27 PROCEDURE — 85025 COMPLETE CBC W/AUTO DIFF WBC: CPT

## 2022-07-27 PROCEDURE — 97161 PT EVAL LOW COMPLEX 20 MIN: CPT

## 2022-07-27 PROCEDURE — 99285 EMERGENCY DEPT VISIT HI MDM: CPT

## 2022-07-27 PROCEDURE — 6360000002 HC RX W HCPCS: Performed by: INTERNAL MEDICINE

## 2022-07-27 PROCEDURE — 71045 X-RAY EXAM CHEST 1 VIEW: CPT

## 2022-07-27 PROCEDURE — 96374 THER/PROPH/DIAG INJ IV PUSH: CPT

## 2022-07-27 PROCEDURE — 71250 CT THORAX DX C-: CPT

## 2022-07-27 PROCEDURE — 73564 X-RAY EXAM KNEE 4 OR MORE: CPT

## 2022-07-27 PROCEDURE — 84484 ASSAY OF TROPONIN QUANT: CPT

## 2022-07-27 PROCEDURE — 87635 SARS-COV-2 COVID-19 AMP PRB: CPT

## 2022-07-27 PROCEDURE — 2580000003 HC RX 258: Performed by: INTERNAL MEDICINE

## 2022-07-27 PROCEDURE — 99223 1ST HOSP IP/OBS HIGH 75: CPT | Performed by: INTERNAL MEDICINE

## 2022-07-27 PROCEDURE — 72125 CT NECK SPINE W/O DYE: CPT

## 2022-07-27 PROCEDURE — 97165 OT EVAL LOW COMPLEX 30 MIN: CPT

## 2022-07-27 PROCEDURE — 81001 URINALYSIS AUTO W/SCOPE: CPT

## 2022-07-27 RX ORDER — ONDANSETRON 4 MG/1
4 TABLET, ORALLY DISINTEGRATING ORAL EVERY 8 HOURS PRN
Status: DISCONTINUED | OUTPATIENT
Start: 2022-07-27 | End: 2022-07-28 | Stop reason: HOSPADM

## 2022-07-27 RX ORDER — AMLODIPINE BESYLATE 5 MG/1
2.5 TABLET ORAL DAILY
Status: DISCONTINUED | OUTPATIENT
Start: 2022-07-27 | End: 2022-07-27

## 2022-07-27 RX ORDER — LOSARTAN POTASSIUM 50 MG/1
50 TABLET ORAL DAILY
Status: DISCONTINUED | OUTPATIENT
Start: 2022-07-27 | End: 2022-07-28 | Stop reason: HOSPADM

## 2022-07-27 RX ORDER — ALLOPURINOL 100 MG/1
100 TABLET ORAL DAILY
Status: DISCONTINUED | OUTPATIENT
Start: 2022-07-27 | End: 2022-07-28 | Stop reason: HOSPADM

## 2022-07-27 RX ORDER — SODIUM CHLORIDE 0.9 % (FLUSH) 0.9 %
5-40 SYRINGE (ML) INJECTION PRN
Status: DISCONTINUED | OUTPATIENT
Start: 2022-07-27 | End: 2022-07-28 | Stop reason: HOSPADM

## 2022-07-27 RX ORDER — ACETAMINOPHEN 325 MG/1
650 TABLET ORAL EVERY 6 HOURS PRN
Status: DISCONTINUED | OUTPATIENT
Start: 2022-07-27 | End: 2022-07-28 | Stop reason: HOSPADM

## 2022-07-27 RX ORDER — POLYETHYLENE GLYCOL 3350 17 G/17G
17 POWDER, FOR SOLUTION ORAL DAILY PRN
Status: DISCONTINUED | OUTPATIENT
Start: 2022-07-27 | End: 2022-07-28 | Stop reason: HOSPADM

## 2022-07-27 RX ORDER — ATORVASTATIN CALCIUM 10 MG/1
10 TABLET, FILM COATED ORAL WEEKLY
Status: DISCONTINUED | OUTPATIENT
Start: 2022-07-27 | End: 2022-07-28 | Stop reason: HOSPADM

## 2022-07-27 RX ORDER — AMLODIPINE BESYLATE 5 MG/1
5 TABLET ORAL DAILY
Status: ON HOLD | COMMUNITY
End: 2022-07-28 | Stop reason: HOSPADM

## 2022-07-27 RX ORDER — SODIUM CHLORIDE 0.9 % (FLUSH) 0.9 %
5-40 SYRINGE (ML) INJECTION EVERY 12 HOURS SCHEDULED
Status: DISCONTINUED | OUTPATIENT
Start: 2022-07-27 | End: 2022-07-27 | Stop reason: SDUPTHER

## 2022-07-27 RX ORDER — ENOXAPARIN SODIUM 100 MG/ML
30 INJECTION SUBCUTANEOUS DAILY
Status: DISCONTINUED | OUTPATIENT
Start: 2022-07-27 | End: 2022-07-27

## 2022-07-27 RX ORDER — SODIUM CHLORIDE 0.9 % (FLUSH) 0.9 %
5-40 SYRINGE (ML) INJECTION PRN
Status: DISCONTINUED | OUTPATIENT
Start: 2022-07-27 | End: 2022-07-27 | Stop reason: SDUPTHER

## 2022-07-27 RX ORDER — POLYVINYL ALCOHOL 14 MG/ML
1 SOLUTION/ DROPS OPHTHALMIC PRN
Status: DISCONTINUED | OUTPATIENT
Start: 2022-07-27 | End: 2022-07-28 | Stop reason: HOSPADM

## 2022-07-27 RX ORDER — SODIUM CHLORIDE 0.9 % (FLUSH) 0.9 %
5-40 SYRINGE (ML) INJECTION EVERY 12 HOURS SCHEDULED
Status: DISCONTINUED | OUTPATIENT
Start: 2022-07-27 | End: 2022-07-28 | Stop reason: HOSPADM

## 2022-07-27 RX ORDER — SODIUM CHLORIDE 9 MG/ML
INJECTION, SOLUTION INTRAVENOUS PRN
Status: DISCONTINUED | OUTPATIENT
Start: 2022-07-27 | End: 2022-07-28 | Stop reason: HOSPADM

## 2022-07-27 RX ORDER — DULOXETIN HYDROCHLORIDE 30 MG/1
30 CAPSULE, DELAYED RELEASE ORAL DAILY
Status: DISCONTINUED | OUTPATIENT
Start: 2022-07-27 | End: 2022-07-28 | Stop reason: HOSPADM

## 2022-07-27 RX ORDER — ACETAMINOPHEN 325 MG/1
650 TABLET ORAL EVERY 4 HOURS PRN
Status: DISCONTINUED | OUTPATIENT
Start: 2022-07-27 | End: 2022-07-27 | Stop reason: SDUPTHER

## 2022-07-27 RX ORDER — ONDANSETRON 2 MG/ML
4 INJECTION INTRAMUSCULAR; INTRAVENOUS EVERY 6 HOURS PRN
Status: DISCONTINUED | OUTPATIENT
Start: 2022-07-27 | End: 2022-07-28 | Stop reason: HOSPADM

## 2022-07-27 RX ORDER — METOPROLOL SUCCINATE 50 MG/1
50 TABLET, EXTENDED RELEASE ORAL 2 TIMES DAILY
Status: DISCONTINUED | OUTPATIENT
Start: 2022-07-27 | End: 2022-07-28 | Stop reason: HOSPADM

## 2022-07-27 RX ORDER — SODIUM CHLORIDE 9 MG/ML
INJECTION, SOLUTION INTRAVENOUS PRN
Status: DISCONTINUED | OUTPATIENT
Start: 2022-07-27 | End: 2022-07-27 | Stop reason: SDUPTHER

## 2022-07-27 RX ORDER — HYDRALAZINE HYDROCHLORIDE 25 MG/1
50 TABLET, FILM COATED ORAL 3 TIMES DAILY
Status: DISCONTINUED | OUTPATIENT
Start: 2022-07-27 | End: 2022-07-28 | Stop reason: HOSPADM

## 2022-07-27 RX ORDER — ACETAMINOPHEN 650 MG/1
650 SUPPOSITORY RECTAL EVERY 6 HOURS PRN
Status: DISCONTINUED | OUTPATIENT
Start: 2022-07-27 | End: 2022-07-28 | Stop reason: HOSPADM

## 2022-07-27 RX ADMIN — LOSARTAN POTASSIUM 50 MG: 50 TABLET, FILM COATED ORAL at 11:55

## 2022-07-27 RX ADMIN — HYDRALAZINE HYDROCHLORIDE 50 MG: 25 TABLET, FILM COATED ORAL at 13:54

## 2022-07-27 RX ADMIN — APIXABAN 5 MG: 5 TABLET, FILM COATED ORAL at 11:55

## 2022-07-27 RX ADMIN — HYDRALAZINE HYDROCHLORIDE 50 MG: 25 TABLET, FILM COATED ORAL at 21:47

## 2022-07-27 RX ADMIN — ALLOPURINOL 100 MG: 100 TABLET ORAL at 11:55

## 2022-07-27 RX ADMIN — CEFTRIAXONE 1000 MG: 1 INJECTION, POWDER, FOR SOLUTION INTRAMUSCULAR; INTRAVENOUS at 11:57

## 2022-07-27 RX ADMIN — METOPROLOL SUCCINATE 50 MG: 50 TABLET, FILM COATED, EXTENDED RELEASE ORAL at 11:55

## 2022-07-27 RX ADMIN — METOPROLOL SUCCINATE 50 MG: 50 TABLET, FILM COATED, EXTENDED RELEASE ORAL at 21:47

## 2022-07-27 RX ADMIN — Medication 10 ML: at 12:10

## 2022-07-27 RX ADMIN — APIXABAN 5 MG: 5 TABLET, FILM COATED ORAL at 21:47

## 2022-07-27 RX ADMIN — ATORVASTATIN CALCIUM 10 MG: 10 TABLET, FILM COATED ORAL at 21:47

## 2022-07-27 RX ADMIN — ACETAMINOPHEN 650 MG: 325 TABLET ORAL at 06:56

## 2022-07-27 RX ADMIN — Medication 10 ML: at 21:48

## 2022-07-27 ASSESSMENT — ENCOUNTER SYMPTOMS
ABDOMINAL DISTENTION: 0
BACK PAIN: 0
DIARRHEA: 0
STRIDOR: 0
VOMITING: 0
NAUSEA: 0
ABDOMINAL PAIN: 0
COLOR CHANGE: 0
COUGH: 0
WHEEZING: 0
CONSTIPATION: 0
SHORTNESS OF BREATH: 0

## 2022-07-27 ASSESSMENT — PAIN DESCRIPTION - PAIN TYPE
TYPE: ACUTE PAIN
TYPE: ACUTE PAIN

## 2022-07-27 ASSESSMENT — PAIN SCALES - GENERAL
PAINLEVEL_OUTOF10: 6
PAINLEVEL_OUTOF10: 0
PAINLEVEL_OUTOF10: 6
PAINLEVEL_OUTOF10: 4

## 2022-07-27 ASSESSMENT — PAIN DESCRIPTION - DESCRIPTORS
DESCRIPTORS: DULL;ACHING
DESCRIPTORS: ACHING;DULL

## 2022-07-27 ASSESSMENT — PAIN DESCRIPTION - LOCATION
LOCATION: LEG;KNEE
LOCATION: KNEE

## 2022-07-27 ASSESSMENT — LIFESTYLE VARIABLES
HOW MANY STANDARD DRINKS CONTAINING ALCOHOL DO YOU HAVE ON A TYPICAL DAY: PATIENT DOES NOT DRINK
HOW OFTEN DO YOU HAVE A DRINK CONTAINING ALCOHOL: NEVER

## 2022-07-27 ASSESSMENT — PAIN DESCRIPTION - ORIENTATION
ORIENTATION: LEFT
ORIENTATION: LEFT

## 2022-07-27 ASSESSMENT — PAIN - FUNCTIONAL ASSESSMENT: PAIN_FUNCTIONAL_ASSESSMENT: 0-10

## 2022-07-27 ASSESSMENT — PAIN DESCRIPTION - FREQUENCY: FREQUENCY: CONTINUOUS

## 2022-07-27 NOTE — PROGRESS NOTES
Occupational Therapy  OCCUPATIONAL THERAPY INITIAL EVALUATION    [address] [address] [address]      Date:2022                                                Patient Name: Carlos Black  MRN: [id number]  : 1931  Room: [address] [place] [address]    Evaluating OT: Slovak Parent OTR/L #[id number]     Referring Provider: Bran Hernandez DO  Specific Provider Orders/Date: OT eval and treat 22    Diagnosis: Syncope and collapse [R55]   Pt admitted to hospital following syncopal fall at home and LLE pain     Pertinent Medical History:  has a past medical history of Anemia, Asthma, Atrial fibrillation ([address] CVA (cerebral vascular accident) ([address] Diabetic retinopathy ([address] Gout, Hyperlipidemia, Hypertension, MGUS (monoclonal gammopathy of unknown significance), Mild tricuspid regurgitation, PAC (premature atrial contraction), Pemphigoid, Pulmonary hypertension ([address] and Symptomatic bradycardia.        Precautions:  Fall Risk    Assessment of current deficits    [x] Functional mobility  [x]ADLs  [x] Strength               []Cognition    [x] Functional transfers   [x] IADLs         [x] Safety Awareness   [x]Endurance    [] Fine Coordination              [x] Balance      [] Vision/perception   []Sensation     []Gross Motor Coordination  [] ROM  [] Delirium                   [] Motor Control     OT PLAN OF CARE   OT POC based on physician orders, patient diagnosis and results of clinical assessment    Frequency/Duration 1-3 days/wk for 2 weeks PRN   Specific OT Treatment Interventions to include:   * Instruction/training on adapted ADL techniques and AE recommendations to increase functional independence within precautions       * Training on energy conservation strategies, correct breathing pattern and techniques to improve independence/tolerance for self-care routine  * Functional transfer/mobility training/DME recommendations for increased independence, Problem: Infection  Goal: Will remain free from infection  Outcome: PROGRESSING AS EXPECTED  Reviewed hand hygiene w pt; Pt states understanding    Problem: Mobility  Goal: Risk for activity intolerance will decrease  Outcome: PROGRESSING SLOWER THAN EXPECTED  Pt with decreasing activity tolerance over last few days; Discussed importance of mobility and set expectation that pt be OOB to chair minimum 3x/day today and progress to more often as tolerated; Pt states understanding       safety, and fall prevention  * Patient/Family education to increase follow through with safety techniques and functional independence  * Recommendation of environmental modifications for increased safety with functional transfers/mobility and ADLs  * Therapeutic exercise to improve motor endurance, ROM, and functional strength for ADLs/functional transfers  * Therapeutic activities to facilitate/challenge dynamic balance, stand tolerance for increased safety and independence with ADLs      Recommended Adaptive Equipment:  TBD     Home Living: Pt lives with daughter in a 1 story home with 4 ADAMARIS and 1 hand rail    Bathroom setup: tub/shower combo   Equipment owned: w/w    Prior Level of Function: mod I with ADLs , assist prn with IADLs; ambulated with w/w   Driving: no   Occupation: na    Pain Level: Pt reports 7/10 L LE pain;  Therapist facilitated repositioning to address pain      Cognition: A&O: 4/4; Follows 2 step directions   Memory:  good   Sequencing:  good   Problem solving:  good   Judgement/safety:  fair     Functional Assessment:  AM-PAC Daily Activity Raw Score: 16/24   Initial Eval Status  Date: 7/27/22 Treatment Status  Date: STGs = LTGs  Time frame: 10-14 days   Feeding Independent      Grooming Minimal Assist     Standing   Modified [place]    UB Dressing Minimal Assist   Modified [place]    LB Dressing Moderate Assist   Modified [place]    Bathing Moderate Assist  Modified [place]    Toileting Moderate Assist   Modified [place]    Bed Mobility  Supine to sit: NT   Sit to supine: NT   Supine to sit: Modified [place]   Sit to supine: Modified [place]    Functional Transfers Moderate Assist     Progressed to min A   Modified [place]    Functional Mobility Moderate Assist     Ambulated short distances with w/w; limited due to L knee pain; progressed to min A  Modified [place]    Balance Sitting:     Static:  SBA    Dynamic:SBA  Standing: Min A     Activity Tolerance F-  G   Visual/  Perceptual Glasses: yes  Wfl                  Vitals:    Seated: 154/75, 67  Standin/68, 71  Seated post ambulation: 155/70, 64      Hand Dominance right   Strength ROM Additional Info:    RUE   3-/5 proximal  3+/5 distal Shoulder 90 degrees  Elbow wfl  good  and wfl FMC/dexterity noted during ADL tasks     LUE 3-/5 proximal  3+/5 distal Shoulder 90 degrees  Elbow wfl  good  and wfl FMC/dexterity noted during ADL tasks     Hearing: wfl  Sensation:wfl  Tone: wfl  Edema:none noted     Comments: Upon arrival patient supine in bed and agreeable to OT Session. Therapist educated pt on role of OT. At end of session, patient seated in chair with call light and phone within reach, all lines and tubes intact. Overall patient demonstrated decreased independence and safety during completion of ADL/functional transfer/mobility tasks. Pt would benefit from continued skilled OT to increase safety and independence with completion of ADL/IADL tasks for functional independence and quality of life. Treatment: OT treatment provided this date includes: Facilitation of sitting balance (impacting ADLs; addressing posture, weight shifting, dynamic reaching), functional transfers, standing tolerance tasks (addressing posture, balance and activity tolerance; impacting ADLs and functional activity) and functional ambulation tasks with w/w (cuing on posture, w/w management and safety) - skilled cuing on hand placement, posture, body mechanics, energy conservation techniques and safety. Therapist facilitated self-care retraining: UB/LB self-care tasks (gown, socks), simulated toileting hygiene task and grooming tasks while educating pt on modified techniques, posture, safety and energy conservation techniques. Skilled monitoring of HR, O2 sats and pts response to treatment.       Rehab Potential: Good for established goals     Patient / Family Goal: return home      Patient and/or family were instructed on functional diagnosis, prognosis/goals and OT plan of care. Demonstrated fair understanding. Eval Complexity: Low    Time In: 1420  Time Out: 1445  Total Treatment Time: 10 minutes    Min Units   OT Eval Low 97165  x  1   OT Eval Medium 80728      OT Eval High 22738      OT Re-Eval [id number]       Therapeutic Ex 31348       Therapeutic Activities 01172  8  1   ADL/Self Care 36558  2     Orthotic Management 14938       Manual 06617     Neuro Re-Ed 92369       Non-Billable Time          Evaluation Time additionally includes thorough review of current medical information, gathering information on past medical history/social history and prior level of function, interpretation of standardized testing/informal observation of tasks, assessment of data and development of plan of care and goals.           Hellen Eaton OTR/L #[id number]

## 2022-07-27 NOTE — ED PROVIDER NOTES
[address]      Pt Name: Aisha William  MRN: [id number]  [place] 5/26/1931  Date of evaluation: 7/27/2022      CHIEF COMPLAINT       Chief Complaint   Patient presents with    Loss of Consciousness    Fall    Leg Pain        HPI  Aisha William is a 80 y.o. female history of CAD, A. fib on Eliquis, with a pacemaker [employer], presents after syncopal episode and fall. Patient states that she was going to the kitchen to get something while using her walker. States that she does not remember the event but she remembers that she was on the floor on her back. She suspect she fell backwards and hit the back of her head on the floor of her home. She is complaining of left knee pain, that is dull, aching, mild in severity and nonradiating. Exacerbated with movement. Alleviated with rest.  Has not taken any medications measure weight symptoms. Denies fevers, chills, nausea, vomiting, chest pain, shortness of breath, abdominal pain, flank pain, dysuria, hematuria, diarrhea, constipation, new rashes or sores, or melena. Except as noted above the remainder of the review of systems was reviewed and negative. Review of Systems   Constitutional:  Negative for activity change, appetite change, diaphoresis, fatigue, fever and unexpected weight change. HENT:  Negative for congestion. Eyes:  Negative for visual disturbance. Respiratory:  Negative for cough, shortness of breath, wheezing and stridor. Cardiovascular:  Negative for chest pain, palpitations and leg swelling. Gastrointestinal:  Negative for abdominal distention, abdominal pain, constipation, diarrhea, nausea and vomiting. Endocrine: Negative for polyphagia and polyuria. Genitourinary:  Negative for dysuria and hematuria. Musculoskeletal:  Negative for back pain. Skin:  Negative for color change, pallor, rash and wound.    Neurological:  Negative for dizziness and syncope. Hematological:  Negative for adenopathy. Does not bruise/bleed easily. Psychiatric/Behavioral:  Negative for agitation. Physical Exam  Vitals and nursing note reviewed. Constitutional:       General: She is not in acute distress. Appearance: Normal appearance. She is not ill-appearing or diaphoretic. HENT:      Head: Normocephalic and atraumatic. Comments: No cephalhematoma. Right Ear: External ear normal.      Left Ear: External ear normal.      Nose: Nose normal.   Eyes:      Extraocular Movements: Extraocular movements intact. Pupils: Pupils are equal, round, and reactive to light. Neck:      Comments: No C-spine T-spine L-spine tenderness palpation. No paraspinal tenderness palpation. Cardiovascular:      Rate and Rhythm: Normal rate. Rhythm irregular. Pulses: Normal pulses. Heart sounds: Normal heart sounds. Comments: Radial pulses 2+ equal bilateral.  Dorsal pedal pulses 2+ equal bilateral.  Pulmonary:      Effort: Pulmonary effort is normal.      Breath sounds: Normal breath sounds. Abdominal:      General: Abdomen is flat. Palpations: Abdomen is soft. Tenderness: There is no abdominal tenderness. There is no right CVA tenderness, left CVA tenderness or rebound. Negative signs include Mcdonald's sign, Rovsing's sign and McBurney's sign. Musculoskeletal:         General: Tenderness present. Cervical back: Normal range of motion. Right lower leg: No edema. Left lower leg: No edema. Comments: Left hip nontender with palpation. No obvious deformity. Mild tenderness palpation of left knee. Not erythematous. Not warm to touch. Skin:     General: Skin is warm and dry. Capillary Refill: Capillary refill takes less than 2 seconds. Neurological:      General: No focal deficit present. Mental Status: She is alert and oriented to person, place, and time.    Psychiatric:         Mood and Affect: Mood normal.        Procedures     MDM  Number of Diagnoses or Management Options  Diagnosis management comments: 70-year-old female history of CAD with a [employer] pacer presents after syncopal episode and fall at home. Vitals here within normal limits. On physical exam patient is no acute distress. She speaking full sentences. Alert and orient x3. She has no C, T, L-spine tenderness palpation. Head is normocephalic atraumatic. No cephalhematoma appreciated. Chest nontender with palpation. Abdomen soft nontender no rebound or guarding. No tenderness patient left hip. Mild tenderness palpation of left knee. No swelling. Not erythematous. Not warm to touch. Radial and dorsal pedal pulses are 2+ equal bilateral.  Diagnostic labs and imaging interpreted reviewed. Imaging negative for any acute process. Patient has no fractures. No intracranial bleed. EKG shows no signs of ischemia. Delta troponin negative. Patient's pacemaker was interrogated. Shows no signs of any cardiac of event during patient's syncopal episode and fall. .  Patient to be admitted to the hospital for syncopal episode and collapse. Amount and/or Complexity of Data Reviewed  Decide to obtain previous medical records or to obtain history from someone other than the patient: yes         ED Course as of 07/27/22 0728   Wed Jul 27, 2022   0059 Patient on Eliquis. Has not missed any doses. Low suspicion patient has a pulmonary embolism. [JV]   0140 Hemoglobin Quant(!): 8.6  H&H is stable from previous labs performed. [JV]   0141 Platelet Count(!): 96  History of thrombocytopenia. Previous platelet count was 20 [JV]   0152 Patient's cardiologist is Dr. Mayank Taylor. EP is Dr. Christina Armenta. [JV]   0320 Delta [place] negative. [JV]   [address] [place] interrogation performed. Patient had no events at time of syncopal episode and collapse. Read of imaging still pending.   Patient to be admitted to the hospital. [JV]      ED Course User Index  [JV] Lesly Sampson MD             --------------------------------------------- PAST HISTORY ---------------------------------------------  Past Medical History:  has a past medical history of Anemia, Asthma, Atrial fibrillation ([facility] CVA (cerebral vascular accident) ([facility] Diabetic retinopathy ([facility] Gout, Hyperlipidemia, Hypertension, MGUS (monoclonal gammopathy of unknown significance), Mild tricuspid regurgitation, PAC (premature atrial contraction), Pemphigoid, Pulmonary hypertension ([facility] and Symptomatic bradycardia. Past Surgical History:  has a past surgical history that includes Appendectomy; Cholecystectomy; Hysterectomy; Cataract removal; Cardiac catheterization (3/12/2015); and Cardiac pacemaker placement (05/06/2015). Social History:  reports that she has never smoked. She has never used smokeless tobacco. She reports that she does not currently use alcohol. She reports that she does not use drugs. Family History: family history is not on file. The patients home medications have been reviewed.     Allergies: Ampicillin and Iodine    -------------------------------------------------- RESULTS -------------------------------------------------    LABS:  Results for orders placed or performed during the hospital encounter of 07/27/22   COVID-19, Rapid    Specimen: Nasopharyngeal Swab   Result Value Ref Range    SARS-CoV-2, NAAT Not Detected Not Detected   Troponin   Result Value Ref Range    Troponin, High Sensitivity 72 (H) 0 - 9 ng/L   CBC with Auto Differential   Result Value Ref Range    WBC 4.7 4.5 - 11.5 E9/L    RBC 2.89 (L) 3.50 - 5.50 E12/L    Hemoglobin 8.6 (L) 11.5 - 15.5 g/dL    Hematocrit 27.6 (L) 34.0 - 48.0 %    MCV 95.5 80.0 - 99.9 fL    MCH 29.8 26.0 - 35.0 pg    MCHC 31.2 (L) 32.0 - 34.5 %    RDW 14.7 11.5 - 15.0 fL    Platelets 96 (L) 013 - 450 E9/L    MPV 12.5 (H) 7.0 - 12.0 fL    Neutrophils % 58.1 43.0 - 80.0 %    Immature Granulocytes % 0.4 0.0 - 5.0 Roomed:  7/27/2022 12:56 AM  ED Bed Assignment:  18A/18A-18    The nursing notes within the ED encounter and vital signs as below have been reviewed. Patient Vitals for the past 24 hrs:   BP Temp Temp src Pulse Resp SpO2 Weight   07/27/22 0645 (!) 166/88 98.2 °F (36.8 °C) -- 68 16 96 % --   07/27/22 0630 (!) 166/88 -- -- -- -- -- --   07/27/22 0030 (!) 148/71 98.1 °F (36.7 °C) Oral 65 16 96 % 154 lb (69.9 kg)   07/27/22 0015 -- -- -- 60 -- 97 % --       Oxygen Saturation Interpretation: Normal    ------------------------------------------ PROGRESS NOTES ------------------------------------------    Counseling:  I have spoken with the patient and discussed todays results, in addition to providing specific details for the plan of care and counseling regarding the diagnosis and prognosis. Their questions are answered at this time and they are agreeable with the plan of admission.    --------------------------------- ADDITIONAL PROVIDER NOTES ---------------------------------  Consultations:  Spoke with Dr. Gurmeet Alicia. Discussed case. They will admit the patient. This patient's ED course included: a personal history and physicial examination, re-evaluation prior to disposition, multiple bedside re-evaluations, IV medications, cardiac monitoring, and continuous pulse oximetry    This patient has remained hemodynamically stable during their ED course. Diagnosis:  1. Syncope and collapse        Disposition:  Patient's disposition: Admit to telemetry  Patient's condition is fair.           Jacques Purdy MD  Resident  07/27/22 3627

## 2022-07-27 NOTE — ED TRIAGE NOTES
Patient presents to ed with complaints of syncope and fall. Patient endorses hitting head and is on thinners. Patient endorses left left and left knee pain. Patient has hx of a fib and has a pacemaker. Nad.

## 2022-07-27 NOTE — PLAN OF CARE
Problem: Chronic Conditions and Co-morbidities  Goal: Patient's chronic conditions and co-morbidity symptoms are monitored and maintained or improved  Outcome: Progressing Towards Goal     Problem: Discharge Planning  Goal: Discharge to home or other facility with appropriate resources  Outcome: Progressing Towards Goal  Flowsheets (Taken 7/27/2022 1710)  Discharge to home or other facility with appropriate resources: Identify barriers to discharge with patient and caregiver     Problem: Pain  Goal: Verbalizes/displays adequate comfort level or baseline comfort level  Outcome: Progressing Towards Goal     Problem: Safety - Adult  Goal: Free from fall injury  Outcome: Progressing Towards Goal     Problem: ABCDS Injury Assessment  Goal: Absence of physical injury  Outcome: Progressing Towards Goal     Problem: Skin/Tissue Integrity  Goal: Absence of new skin breakdown  Description: 1. Monitor for areas of redness and/or skin breakdown  2. Assess vascular access sites hourly  3. Every 4-6 hours minimum:  Change oxygen saturation probe site  4. Every 4-6 hours:  If on nasal continuous positive airway pressure, respiratory therapy assess nares and determine need for appliance change or resting period.   Outcome: Progressing Towards Goal

## 2022-07-27 NOTE — Clinical Note
Discharge Plan[de-identified] Other/[facility])   Telemetry/Cardiac Monitoring Required?: Yes

## 2022-07-27 NOTE — ED NOTES
Department of Emergency Medicine  FIRST PROVIDER TRIAGE NOTE             Independent MLLESLIE           7/27/22  12:32 AM EDT    Date of Encounter: 7/27/22   MRN: [id number]      HPI: Greg Hall is a 80 y.o. female who presents to the ED for Loss of Consciousness, Fall, and Leg Pain  Patient presents emergency department after having an unwitnessed syncopal event. Patient is on Eliquis. She is unaware of what happened she is unclear if she tripped on something but it is believed she had a syncopal episode. Patient with past history of syncopal episodes prior to her pacemaker. Patient did strike the back of her head. No recent illness reported. Patient is complaining of left patella pain. ROS: Negative for cp or sob. PE: Gen Appearance/Constitutional: alert  HEENT: NC/NT. PERRLA,  Airway patent. Initial Plan of Care: All treatment areas with department are currently occupied. Plan to order/Initiate the following while awaiting opening in ED: labs, EKG, and imaging studies.   Initiate Treatment-Testing, Proceed toTreatment Area When Bed Available for ED Attending/MLP to Continue Care    Electronically signed by TERESA Granados CNP   DD: 7/27/22       TERESA Granados CNP  07/27/22 0034

## 2022-07-27 NOTE — CONSULTS
[facility],2[address] Floor and [facility] [place]- Electrophysiology  Consultation Report  PATIENT: Isabella Cortés RECORD NUMBER: [id number]  DATE OF SERVICE:  7/27/2022  ATTENDING ELECTROPHYSIOLOGIST: Dr Yana Multani   PRIMARY ELECTROPHYSIOLOGIST: Dr Ck Darling: No ref. provider found and Gianna Melo DO  CHIEF COMPLAINT: fall   Admitting diagnosis: fall possible syncope     HPI: This is a 80 y.o. female with the PMH as noted below significant for atrial fibrillation on eliquis, dual-chamber pacemaker in situ, CAD and history of syncope. She presented to the hospital for fall/?syncope. Patient states she was going into the kitchen in the evening using her walker and states that she woke up on the floor on her back, suspect she fell backwards but does not recall the details. She hit the back of her head on the floor and left knee. Denies any warning signs. She is on multiple medications for her blood pressure but had not taken them in at least 5 hours. She is unsure whether she tripped or how she fell. She presented to the hospital, stable electrolytes, anemia which appears chronic possible asymptomatic bacteruria. Pacemaker was checked and normal function noted, short episodes of atrial fibrillation noted but patient does have a history of this and they were brief episodes. Cardiac electrophysiology service is consulted for possible syncope with pacemaker     Prior cardiac testing:  ECG (7/27/2022): AV paced rhythm at 66 bpm.  Pharmacologic Nuclear Stress Test (8/3/19): LVEF = 64%, mild inferoseptal hypokinesis, and no ischemia. TTE (4/17/17): LVEF = 55%, stage I LVDD, mild RV dilation, mild TR, possible bicuspid AV. Pharmacologic Nuclear Stress (4/17/17): LVEF = 63%, small fixed inferoseptal likely due to subdiaphragmatic attenuation v infarct, and no ischemia. LHC (3/12/15): normal coronaries. LVEF 65%.      Patient Active Problem List    Diagnosis Date Noted    Syncope and collapse 07/27/2022     Priority: Medium    Peripheral polyneuropathy 10/28/2021    Spondylosis of lumbosacral spine with radiculopathy 06/25/2021    Thrombocytopenia ([facility] [address] 06/25/2021    PVD (peripheral vascular disease) ([facility] 06/24/2021    Bilateral carotid artery stenosis 06/24/2021    HTN (hypertension), benign 04/15/2017    Pacemaker 04/15/2017    Pulmonary HTN ([facility] [address] 11/11/2016    PAF (paroxysmal atrial fibrillation) ([facility]      Overview Note:     Xarelto held d/t epistaxis requiring cautery  Now on Eliquis 5 mg BID      Mixed hyperlipidemia 03/11/2015    Asthma 03/11/2015       Past Medical History:   Diagnosis Date    Anemia     Asthma     Atrial fibrillation (HCC)     CVA (cerebral vascular accident) ([facility] [address]     Diabetic retinopathy ([facility]     Gout     Hyperlipidemia     Hypertension     MGUS (monoclonal gammopathy of unknown significance)     Mild tricuspid regurgitation 03/11/2015    PAC (premature atrial contraction)     Pemphigoid     Pulmonary hypertension ([facility] [address] 03/11/2015    mild    Symptomatic bradycardia        History reviewed. No pertinent family history.     Social History     Tobacco Use    Smoking status: Never    Smokeless tobacco: Never   Substance Use Topics    Alcohol use: Not Currently       Current Facility-Administered Medications   Medication Dose Route Frequency Provider Last Rate Last Admin    sodium chloride flush 0.9 % injection 5-40 mL  5-40 mL IntraVENous 2 times per day Rico William DO        sodium chloride flush 0.9 % injection 5-40 mL  5-40 mL IntraVENous PRN Rico Ruvalcaba, DO        0.9 % sodium chloride infusion   IntraVENous PRN Rico Ruvalcaba DO        acetaminophen (TYLENOL) tablet 650 mg  650 mg Oral Q4H PRN Rico Ruvalcaba DO   650 mg at 07/27/22 0656    ondansetron (ZOFRAN-ODT) disintegrating tablet 4 mg  4 mg Oral Q8H PRN Rico Ruvalcaba DO        Or    ondansetron [facility]) injection 4 mg  4 mg IntraVENous Q6H PRN iRco Ruvalcaba, DO        allopurinol (ZYLOPRIM) tablet 100 mg  100 mg Oral Daily Gloriajean Prude Malmer, DO        amLODIPine (NORVASC) tablet 2.5 mg  2.5 mg Oral Daily Gloriajean Prude Malmer, DO        DULoxetine (CYMBALTA) extended release capsule 30 mg  30 mg Oral Daily Gloriajean Prude Malmer, DO        apixaban (ELIQUIS) tablet 5 mg  5 mg Oral BID Gloriajean Prude Malmer, DO        hydrALAZINE (APRESOLINE) tablet 50 mg  50 mg Oral TID Gloriajean Prude Malmer, DO        losartan (COZAAR) tablet 50 mg  50 mg Oral Daily Gloriajean Prude Malmer, DO        metoprolol succinate (TOPROL XL) extended release tablet 50 mg  50 mg Oral BID Gloriajean Prude Malmer, DO        atorvastatin (LIPITOR) tablet 10 mg  10 mg Oral Weekly Gloriajean Prude Malmer, DO        polyvinyl alcohol (LIQUIFILM TEARS) 1.4 % ophthalmic solution 1 drop  1 drop Both Eyes PRN Gloriajean Prude Malmer, DO        cefTRIAXone (ROCEPHIN) 1,000 mg in sterile water 10 mL IV syringe  1,000 mg IntraVENous Q24H Gloriajean Prude Malmer, DO            Allergies   Allergen Reactions    Ampicillin Itching    Iodine Itching     ROS:   Constitutional: Negative for fever, activity change and appetite change. HENT: Negative for epistaxis or JVD  Eyes: Negative for diploplia, blurred vision. Respiratory: Negative for cough, chest tightness, shortness of breath and wheezing. Cardiovascular: pertinent positives in HPI  Gastrointestinal: Negative for abdominal pain and blood in stool. All other review of systems are negative     PHYSICAL EXAM:   Vitals:    07/27/22 0630 07/27/22 0645 07/27/22 0700 07/27/22 0828   BP: (!) 166/88 (!) 166/88 (!) 163/76 (!) 150/81   Pulse:  68 67 70   Resp:  16 14    Temp:  98.2 °F (36.8 °C)  97.8 °F (36.6 °C)   TempSrc:    Oral   SpO2:  96% 97% 100%   Weight:          Constitutional: Well-developed, no acute distress  Eyes: conjunctivae normal, no xanthelasma   Ears, Nose, Throat: oral mucosa moist, no cyanosis   CV: no JVD.   No leg edema, laterally displaced PMI, normal S1 and S2 at the left sternal border and apex, grade 2/6 systolic ejection murmur at the aortic area and the left sternal border  Lungs: clear to auscultation bilaterally, normal respiratory effort without used of accessory muscles  Abdomen: soft, non-tender, bowel sounds present, no masses or hepatomegaly   Musculoskeletal: no digital clubbing, no edema   Skin: warm, no rashes     I have personally reviewed the laboratory, cardiac diagnostic and radiographic testing as outlined below:    Data:    Recent Labs     07/27/22  0046   WBC 4.7   HGB 8.6*   HCT 27.6*   PLT 96*     Recent Labs     07/27/22  0046      K 4.2      CO2 25   BUN 23   CREATININE 1.2*   CALCIUM 9.4      Lab Results   Component Value Date/Time    MG 1.9 01/02/2018 03:25 PM     No results for input(s): TSH in the last 72 hours. No results for input(s): INR in the last 72 hours. CT Chest without contrast 7/27/2022    Impression   No evidence of acute traumatic injury. Dilated pulmonary trunk suggesting pulmonary hypertension. Multiple small bilateral noncalcified pulmonary nodules, largest measuring 5   mm in the left lower lobe. RECOMMENDATIONS:   5 mm solid pulmonary nodule:     Telemetry: Av paced     EKG:     Echocardiogram: 4/17/2017  EF 55%       Stress Test: 8/3/2019   Impression   1. No reversible perfusion defect. 2. Ejection fraction is 64 %. 3. Mild inferoseptal hypokinesis.        Device Interrogation:   Underlying rhythm: device dependent   Mode: DDDR   Battery Voltage/Longevity:  approx 1 year     Pacing: A: 66%  RV: 99%    P wave: 6.2 mV  Impedance: 353 ohms   threshold : 2.0V @0.5 ms    RV R wave: NA mV  Impedance: 358 ohms   Threshold: 1.3 V @ 0.4 ms--now programmed to 3 Faviola@eYeka ms  Episodes: ATR events on 7/25/22 at ~ 7-8 am hours; PMH at 120 bmp R ~ 2215 on 7/26/22  Reprogramming included:   Overall device function is normal    All device programmable settings were evaluated per above and in the scanned document, along with iterative adjustments (capture thresholds) to assess and select the most appropriate final programming to provide for consistent delivery of the appropriate therapy and to verify function of the device. I have independently reviewed all of the ECGs and rhythm strips per above     Assessment/Plan: This is a 80 y.o. female with a history as above significant for PAF and dual-chamber pacemaker in situ  who presents with fall possible syncope. Pacemaker was checked and no significant arrhythmias. She is on multiple blood pressure medications and orthostatic blood pressures will be checked, were negative last evening on admission but she was noted to be orthostatic earlier today    1. Dual-chamber pacemaker in situ  -SND, AV block: Device dependent per notes, device is not MRI compatible  -Stable device function and no significant arrhythmias on review  RV pacing voltage increased to 3 V, 0.4 ms pulse width. Follow-up in the office after discharge in 2 months for device evaluation and assessment of battery longevity which is currently 11 months    2. Fall/possible syncope  Patient denies recurrent episodes of syncope or falls although she does ambulate with a walker due to left knee pain. Continue to monitor orthostatic blood pressures. Discontinue amlodipine to keep systolic blood pressure more than 140 mmHg    3.  Nonvalvular paroxysmal atrial fibrillation  -Diagnosed March 2015, elevated [id number] of at least 5, on Eliquis; has bled equals 2 also patient has recent fall and states she has fallen in the past  -On Eliquis 5 mg twice daily without bleeding or issues    4. Anemia-thrombocytopenia  -Appears chronic but hemoglobin is lower than baseline.  -Hemoglobin has been 8-8.5 in the past 1 to 2 months and in the past baseline was 10-11; platelets 96, this appears to be her baseline  -Denies any bleeding    5.  Hypertension  -On multiple antihypertensives which could cause orthostatic changes  -On hydralazine 3 times daily, losartan, amlodipine and metoprolol    Recommendations:  Orthostatic blood pressure checks every shift  Will stop amlodipine which can exacerbate orthostatic changes and she is on a low dose in addition to other medications for blood pressure control that can add to orthostatic blood pressure changes  Continue hydralazine to 50 mg 3 times daily for now, monitor blood pressure  Pacemaker checked and reprogrammed as noted  Patient encouraged to ambulate with a walker in the hospital    I have spent a total of 60-70 minutes with the patient reviewing the above stated recommendations. And a total of >50% of that time involved face-to-face time providing counseling and or coordination of care with the other providers, reviewing records/tests, counseling/education of the patient, ordering medications/tests/procedures, coordinating care, and documenting clinical information in the EHR. Thank you for allowing me to participate in your patient's care. Please call me if there are any questions or concerns.        Almaj [address]  Cardiac Electrophysiology  [facility]) [facility]  [facility] [place]  7/27/22

## 2022-07-27 NOTE — PROGRESS NOTES
Physical Therapy  Physical Therapy Initial Assessment     Name: Harsh Varma  : 1931  MRN: [id number]      Date of Service: 2022    Evaluating PT:  Kaiden Larsen PT, DPT [id number]    Room #:  3638/2839-R  Diagnosis:  Syncope and collapse [R55]  PMHx/PSHx:  anemia, asthma, a fib, CVA, gout, HLD, HTN, PAC, diabetic retinopathy   Procedure/Surgery:  none this admission  Precautions:  Falls  Equipment Needs:  TBD pending pt progress    SUBJECTIVE:    Pt lives with daughter in a 1 story home with 3 stairs to enter and 1 rail. Bed is on 1st floor and bath is on 1st floor. Pt ambulated with FWW PTA. OBJECTIVE:   Initial Evaluation  Date: 22 Treatment Short Term/ Long Term   Goals   AM-PAC 6 Clicks      Was pt agreeable to Eval/treatment? yes     Does pt have pain? /10 LLE pain     Bed Mobility  Rolling: NT  Supine to sit: NT  Sit to supine: NT  Scooting: NT  Rolling: Independent   Supine to sit: Independent   Sit to supine: Independent   Scooting: Independent    Transfers Sit to stand: ModA  Stand to sit: ModA  Stand pivot: NT  Sit to stand: SBA  Stand to sit: SBA  Stand pivot: SBA with [employer]   Ambulation    8 feet with [employer] ModA + chair follow  >50 feet with [employer] Mod I   Stair negotiation: ascended and descended  NT  3 steps with 1 rail Miroslava   ROM BUE:  Defer to OT note  BLE:  LLE limited by pain, RLE WFL     Strength BUE:  Defer to OT note  BLE:  grossly 3/5  WFL   Balance Sitting EOB:  Nt  Dynamic Standing:  ModA with [employer] + chair follow  Sitting EOB:  Independent   Dynamic Standing:   Mod I with [employer]     Pt is A & O x 4  Sensation:  pt reports B feet neuropathy  Edema:  none noted    Patient education  Pt educated on role of PT, safety during mobility    Patient response to education:   Pt verbalized understanding Pt demonstrated skill Pt requires further education in this area   yes yes yes     ASSESSMENT:    Conditions Requiring Skilled Therapeutic Intervention:    [x]Decreased strength     [x]Decreased ROM  [x]Decreased functional mobility  [x]Decreased balance   [x]Decreased endurance   []Decreased posture  []Decreased sensation  []Decreased coordination   []Decreased vision  []Decreased safety awareness   [x]Increased pain       Comments:  patient sitting in chair in room upon entry and agreeable to PT evaluation. Pt able to stand from chair with significant lift assist and cues for hand placement. Pt able to ambulate with step to gait pattern using WW, balance assist, and chair follow for safety. Pt limited at this time by LLE pain. Pt returned to chair at end of session with all needs met. Education provided on potential need for rehabilitation at [facility] for improved outcomes. Patient to benefit from continued skilled PT at [facility] to improve functional mobility and decrease fall risk. Treatment:  Patient practiced and was instructed in the following treatment:    Transfer Training: Verbal and tactile cueing provided for sequencing and safety during mobility. Manual assist provided for completion of task  Gait Training: Ambulation with WW and verbal cues for proper technique and safety. Manual assist provided for completion of task   Patient Education: Education provided on rehabilitation at [facility] for improved outcomes    Pt's/ family goals   1. None stated    Prognosis is good for reaching above PT goals. Patient and or family understand(s) diagnosis, prognosis, and plan of care.   yes    PHYSICAL THERAPY PLAN OF CARE:    PT POC is established based on physician order and patient diagnosis     Referring provider/PT Order:    07/27/22 0900  PT eval and treat  Start:  07/27/22 0900,   End:  07/27/22 0900,   ONE TIME,   Standing Count:  1 Occurrences,   R         Caridad Ruvalcaba, DO      Diagnosis:  Syncope and collapse [R55]  Specific instructions for next treatment:  progress mobility as appropriate    Current Treatment Recommendations:     [x] Strengthening to improve independence with

## 2022-07-27 NOTE — H&P
[address]                  [address] [facility]  [facility]                              HISTORY AND PHYSICAL    PATIENT NAME: German Cerna                        :        1931  MED REC NO:   [id number]                            ROOM:       6411  ACCOUNT NO:   [de-identified]                           ADMIT DATE: 2022  PROVIDER:     Mila Carrillo DO    CHIEF COMPLAINT:  Status post fall. HISTORY OF PRESENT ILLNESS:  The patient is a 80-year-old black female  who presented to the emergency room after she fell at home, walking with  her walker. At about 10:30 p.m. on 2022, fell, hit the back of  her head and hit her left leg. She is on Coumadin. She has a  pacemaker. She is not sure whether she passed out. She was seen in the  emergency room. Diagnostic evaluation was remarkable for bacteriuria. She was admitted for further evaluation and treatment. PAST MEDICAL HISTORY:  Paroxysmal atrial fibrillation, chronic Eliquis  therapy, hypertension, hyperlipidemia, diabetes mellitus type 2,  obesity, monoclonal gammopathy of unknown significance, ITP. PAST SURGICAL HISTORY:  Appendectomy, cholecystectomy, hysterectomy,  bilateral eye cataract surgery, cardiac catheterization, pacemaker  placement. SOCIAL HISTORY:  No tobacco, no alcohol. REVIEW OF SYSTEMS:  Remarkable for above-stated chief complaint plus  allergy to AMPICILLIN and IODINE. She has achiness on SIMVASTATIN and  LOVASTATIN. MEDICATIONS PRIOR TO ADMISSION:  Cymbalta, Eliquis, Promacta, probiotic,  Tylenol, loperamide, Premarin vaginal cream, vitamin D, Omega-3,  losartan, Livalo, vitamin B complex, Norvasc, Toprol-XL, Systane  eyedrops, Apresoline, allopurinol. PRIMARY CARE PROVIDER:  Mila Carrillo DO    SOCIAL HISTORY:  No tobacco, no alcohol.     PHYSICAL EXAMINATION:  GENERAL APPEARANCE:  Reveals a 80-year-old black female who is alert and  oriented x3, cooperative and a good historian. VITAL SIGNS:  On admission, temperature 98.1, pulse 60, respirations 16,  blood pressure 148/71. HEENT:  Head:  Normocephalic, atraumatic. Eyes:  Pupils are equal and  reactive to light. Extraocular muscles intact. Fundi not well  visualized. Nose:  No obstruction, polyp or discharge noted. Mouth:   Mucosa without lesion. Teeth:  Edentulous. Pharynx:  Noninjected  without exudate. NECK:  Supple. No JVD. No thyromegaly. No carotid bruits. HEART:  Regular rate and rhythm without murmur. LUNGS:  Clear to auscultation bilaterally. ABDOMEN:  Positive bowel sounds, soft, nontender. No rebound. No  guarding. No hepatosplenomegaly. No masses. BACK:  With increased thoracic kyphosis. EXTREMITIES:  There is some tenderness to palpation in the left knee. LYMPH NODES:  No adenopathy noted. SKIN:  Without rash or lesion. IMPRESSION:  Status post fall; contusion, left knee; contusion, skull;  hypertension; paroxysmal atrial fibrillation; chronic Eliquis therapy;  status post pacemaker; diabetes mellitus type 2; obesity;  hyperlipidemia; right carotid stenosis; chronic anemia;  thrombocytopenia, chronic; monoclonal gammopathy of unknown  significance; bacteriuria. PLAN:  Admit. Cardiac telemetry. PT, OT eval.   for  discharge planning. EP to see. Discharge plan home when stable. Urine  C and S. Empiric antibiotics.         Adela Becker DO    D: 07/27/2022 9:02:45       T: 07/27/2022 9:05:09     [email or web address]  Job#: [id number]     Doc#: [id number]    CC:

## 2022-07-27 NOTE — ED NOTES
Ck Hays pacemaker interrogated. Ck Hays contacted and faxed report over. If verbal report needed, may call back ([phone]).      Lizbeth Delvalle  07/27/22 [id number]

## 2022-07-28 ENCOUNTER — TELEPHONE (OUTPATIENT)
Dept: NON INVASIVE DIAGNOSTICS | Age: 87
End: 2022-07-28

## 2022-07-28 VITALS
DIASTOLIC BLOOD PRESSURE: 61 MMHG | TEMPERATURE: 98.1 F | HEART RATE: 59 BPM | RESPIRATION RATE: 16 BRPM | BODY MASS INDEX: 28.17 KG/M2 | OXYGEN SATURATION: 98 % | WEIGHT: 154 LBS | SYSTOLIC BLOOD PRESSURE: 122 MMHG

## 2022-07-28 PROCEDURE — 6370000000 HC RX 637 (ALT 250 FOR IP): Performed by: INTERNAL MEDICINE

## 2022-07-28 PROCEDURE — 99231 SBSQ HOSP IP/OBS SF/LOW 25: CPT | Performed by: NURSE PRACTITIONER

## 2022-07-28 PROCEDURE — 2580000003 HC RX 258: Performed by: INTERNAL MEDICINE

## 2022-07-28 PROCEDURE — 6360000002 HC RX W HCPCS: Performed by: INTERNAL MEDICINE

## 2022-07-28 PROCEDURE — 96376 TX/PRO/DX INJ SAME DRUG ADON: CPT

## 2022-07-28 PROCEDURE — G0378 HOSPITAL OBSERVATION PER HR: HCPCS

## 2022-07-28 RX ORDER — CEFDINIR 300 MG/1
300 CAPSULE ORAL DAILY
Qty: 5 CAPSULE | Refills: 0 | Status: SHIPPED | OUTPATIENT
Start: 2022-07-28 | End: 2022-08-02

## 2022-07-28 RX ADMIN — LOSARTAN POTASSIUM 50 MG: 50 TABLET, FILM COATED ORAL at 08:39

## 2022-07-28 RX ADMIN — ALLOPURINOL 100 MG: 100 TABLET ORAL at 08:39

## 2022-07-28 RX ADMIN — CEFTRIAXONE 1000 MG: 1 INJECTION, POWDER, FOR SOLUTION INTRAMUSCULAR; INTRAVENOUS at 10:28

## 2022-07-28 RX ADMIN — HYDRALAZINE HYDROCHLORIDE 50 MG: 25 TABLET, FILM COATED ORAL at 08:39

## 2022-07-28 RX ADMIN — APIXABAN 5 MG: 5 TABLET, FILM COATED ORAL at 08:39

## 2022-07-28 RX ADMIN — ACETAMINOPHEN 650 MG: 325 TABLET, FILM COATED ORAL at 02:16

## 2022-07-28 RX ADMIN — Medication 10 ML: at 08:39

## 2022-07-28 RX ADMIN — METOPROLOL SUCCINATE 50 MG: 50 TABLET, FILM COATED, EXTENDED RELEASE ORAL at 08:39

## 2022-07-28 ASSESSMENT — PAIN SCALES - GENERAL
PAINLEVEL_OUTOF10: 7
PAINLEVEL_OUTOF10: 0

## 2022-07-28 ASSESSMENT — PAIN DESCRIPTION - ORIENTATION: ORIENTATION: LEFT

## 2022-07-28 ASSESSMENT — PAIN DESCRIPTION - DESCRIPTORS: DESCRIPTORS: ACHING;DISCOMFORT

## 2022-07-28 ASSESSMENT — PAIN DESCRIPTION - LOCATION: LOCATION: KNEE

## 2022-07-28 NOTE — PROGRESS NOTES
[facility],2Nd Floor and Vascular [place]- Electrophysiology  Consultation Report  PATIENT: 171Jose Eduardo Cortés RECORD NUMBER: [id number]  DATE OF SERVICE:  7/28/2022  ATTENDING ELECTROPHYSIOLOGIST: Dr Genny Tapia   PRIMARY ELECTROPHYSIOLOGIST: Dr Khanh Aguilar: No ref. provider found and Bo Escobedo DO  CHIEF COMPLAINT: fall   Admitting diagnosis: fall possible syncope     HPI: This is a 80 y.o. female with the PMH as noted below significant for atrial fibrillation on eliquis, dual-chamber pacemaker in situ, CAD and history of syncope. She presented to the hospital for fall/?syncope. Patient states she was going into the kitchen in the evening using her walker and states that she woke up on the floor on her back, suspect she fell backwards but does not recall the details. She hit the back of her head on the floor and left knee. Denies any warning signs. She is on multiple medications for her blood pressure but had not taken them in at least 5 hours. She is unsure whether she tripped or how she fell. She presented to the hospital, stable electrolytes, anemia which appears chronic possible asymptomatic bacteruria. Pacemaker was checked and normal function noted, short episodes of atrial fibrillation noted but patient does have a history of this and they were brief episodes. Cardiac electrophysiology service is consulted for possible syncope with pacemaker    7/28/2022: she has been up and in room abulating with walker without issues. Feels good overall. Prior cardiac testing:  ECG (7/27/22): AV paced rhythm at 66 bpm.  Pharmacologic Nuclear Stress Test (8/3/19): LVEF = 64%, mild inferoseptal hypokinesis, and no ischemia. TTE (4/17/17): LVEF = 55%, stage I LVDD, mild RV dilation, mild TR, possible bicuspid AV. Pharmacologic Nuclear Stress (4/17/17): LVEF = 63%, small fixed inferoseptal likely due to subdiaphragmatic attenuation v infarct, and no ischemia.   [facility] (3/12/15): normal coronaries. LVEF 65%. Patient Active Problem List    Diagnosis Date Noted    Syncope and collapse 07/27/2022     Priority: Medium    Complete heart block ([facility] [address] 07/27/2022     Priority: Medium    Orthostatic hypotension 07/27/2022     Priority: Medium    Peripheral polyneuropathy 10/28/2021    Spondylosis of lumbosacral spine with radiculopathy 06/25/2021    Thrombocytopenia ([facility] [address] 06/25/2021    PVD (peripheral vascular disease) ([facility] [address] 06/24/2021    Bilateral carotid artery stenosis 06/24/2021    Primary hypertension 04/15/2017    Cardiac pacemaker in situ 04/15/2017    Pulmonary HTN ([facility] [address] 11/11/2016    PAF (paroxysmal atrial fibrillation) ([facility] [address]      Overview Note:     Xarelto held d/t epistaxis requiring cautery  Now on Eliquis 5 mg BID      Mixed hyperlipidemia 03/11/2015    Asthma 03/11/2015       Past Medical History:   Diagnosis Date    Anemia     Asthma     Atrial fibrillation (HCC)     CVA (cerebral vascular accident) ([address]     Diabetic retinopathy ([facility] [address]     Gout     Hyperlipidemia     Hypertension     MGUS (monoclonal gammopathy of unknown significance)     Mild tricuspid regurgitation 03/11/2015    PAC (premature atrial contraction)     Pemphigoid     Pulmonary hypertension ([address] 03/11/2015    mild    Symptomatic bradycardia        History reviewed. No pertinent family history.     Social History     Tobacco Use    Smoking status: Never    Smokeless tobacco: Never   Substance Use Topics    Alcohol use: Not Currently       Current Facility-Administered Medications   Medication Dose Route Frequency Provider Last Rate Last Admin    ondansetron (ZOFRAN-ODT) disintegrating tablet 4 mg  4 mg Oral Q8H PRN Clarion Punch Malmer, DO        Or    ondansetron [facility]) injection 4 mg  4 mg IntraVENous Q6H PRN Clarion Punch Malmer, DO        allopurinol (ZYLOPRIM) tablet 100 mg  100 mg Oral Daily David Punch Malmer, DO   100 mg at 07/28/22 0839    DULoxetine (CYMBALTA) extended release capsule 30 mg  30 mg Oral Daily vaginal cream insert 0.5 grams vaginally TWICE WEEKLY      Vitamin D, Cholecalciferol, 25 MCG (1000 UT) CAPS Take by mouth daily      Omega-3 1000 MG CAPS Take by mouth daily      losartan (COZAAR) 50 MG tablet take 1 tablet by mouth once daily      pitavastatin (LIVALO) 2 MG TABS tablet Take 2 mg by mouth once a week       b complex vitamins capsule Take 1 capsule by mouth daily      metoprolol succinate (TOPROL XL) 50 MG extended release tablet Take 1 tablet by mouth 2 times daily 30 tablet 11    polyethyl glycol-propyl glycol 0.4-0.3 % (SYSTANE) 0.4-0.3 % ophthalmic solution 1 drop as needed for Dry Eyes      hydrALAZINE (APRESOLINE) 50 MG tablet Take 1 tablet by mouth 3 times daily. 90 tablet 11    allopurinol (ZYLOPRIM) 100 MG tablet Take 100 mg by mouth daily           Allergies   Allergen Reactions    Ampicillin Itching    Iodine Itching     ROS:   Constitutional: Negative for fever, activity change and appetite change. HENT: Negative for epistaxis or JVD  Eyes: Negative for diploplia, blurred vision. Respiratory: Negative for cough, chest tightness, shortness of breath and wheezing. Cardiovascular: pertinent positives in HPI  Gastrointestinal: Negative for abdominal pain and blood in stool. All other review of systems are negative     PHYSICAL EXAM:   Vitals:    07/27/22 0700 07/27/22 0828 07/27/22 2130 07/28/22 0802   BP: (!) 163/76 (!) 150/81 (!) 159/59 122/61   Pulse: 67 70 59 59   Resp: 14  16 16   Temp:  97.8 °F (36.6 °C) 98.1 °F (36.7 °C)    TempSrc:  Oral Oral    SpO2: 97% 100% 100% 98%   Weight:          Constitutional: Well-developed, no acute distress  Eyes: conjunctivae normal, no xanthelasma   Ears, Nose, Throat: oral mucosa moist, no cyanosis   CV: no JVD.   No leg edema, laterally displaced PMI, normal S1 and S2 at the left sternal border and apex, grade 2/6 systolic ejection murmur at the aortic area and the left sternal border  Lungs: clear to auscultation bilaterally, normal respiratory effort without used of accessory muscles  Abdomen: soft, non-tender, bowel sounds present, no masses or hepatomegaly   Musculoskeletal: no digital clubbing, no edema   Skin: warm, no rashes     I have personally reviewed the laboratory, cardiac diagnostic and radiographic testing as outlined below:    Data:    Recent Labs     07/27/22  0046   WBC 4.7   HGB 8.6*   HCT 27.6*   PLT 96*     Recent Labs     07/27/22  0046      K 4.2      CO2 25   BUN 23   CREATININE 1.2*   CALCIUM 9.4      Lab Results   Component Value Date/Time    MG 1.9 01/02/2018 03:25 PM     No results for input(s): TSH in the last 72 hours. No results for input(s): INR in the last 72 hours. CT Chest without contrast 7/28/2022    Impression   No evidence of acute traumatic injury. Dilated pulmonary trunk suggesting pulmonary hypertension. Multiple small bilateral noncalcified pulmonary nodules, largest measuring 5   mm in the left lower lobe. RECOMMENDATIONS:   5 mm solid pulmonary nodule:     Telemetry: Av paced     EKG:     Echocardiogram: 4/17/2017 EF 55%       Stress Test: 8/3/2019   Impression   1. No reversible perfusion defect. 2. Ejection fraction is 64 %. 3. Mild inferoseptal hypokinesis.        Device Interrogation:   Underlying rhythm: device dependent   Mode: DDDR   Battery Voltage/Longevity:  approx 1 year     Pacing: A: 66%  RV: 99%    P wave: 6.2 mV  Impedance: 353 ohms   threshold : 2.0V @0.5 ms    RV R wave: NA mV  Impedance: 358 ohms   Threshold: 1.3 V @ 0.4 ms--now programmed to 3 [email or web address] ms  Episodes: ATR events on 7/25/22 at ~ 7-8 am hours; PMH at 120 bmp R ~ 2215 on 7/26/22  Reprogramming included:   Overall device function is normal    All device programmable settings were evaluated per above and in the scanned document, along with iterative adjustments (capture thresholds) to assess and select the most appropriate final programming to provide for consistent delivery of the appropriate therapy and to verify function of the device. I have independently reviewed all of the ECGs and rhythm strips per above     Assessment/Plan: This is a 80 y.o. female with a history as above significant for PAF and dual-chamber pacemaker in situ  who presents with fall possible syncope. Pacemaker was checked and no significant arrhythmias. She is on multiple blood pressure medications and orthostatic blood pressures will be checked, were negative last evening on admission but she was noted to be orthostatic earlier today    1. Dual-chamber pacemaker in situ  -SND, AV block: Device dependent per notes, device is not MRI compatible  -Stable device function and no significant arrhythmias on review  RV pacing voltage increased to 3 V, 0.4 ms pulse width. Follow-up in the office after discharge in 2 months for device evaluation and assessment of battery longevity which is currently 11 months    2. Fall/possible syncope  Patient denies recurrent episodes of syncope or falls although she does ambulate with a walker due to left knee pain. Continue to monitor orthostatic blood pressures- she did have + orthos evening of admission. Discontinue amlodipine to keep systolic blood pressure more than 140 mmHg    3.  Nonvalvular paroxysmal atrial fibrillation  -Diagnosed March 2015, elevated [id number] of at least 5, on Eliquis; has bled equals 2 also patient has recent fall and states she has fallen in the past  -On Eliquis 5 mg twice daily without bleeding or issues    4. Anemia-thrombocytopenia  -Appears chronic but hemoglobin is lower than baseline.  -Hemoglobin has been 8-8.5 in the past 1 to 2 months and in the past baseline was 10-11; platelets 96, this appears to be her baseline  -Denies any bleeding    5.  Hypertension  -On multiple antihypertensives which could cause orthostatic changes  -On hydralazine 3 times daily, losartan, amlodipine and metoprolol    Recommendations:  Stop norvasc Continue hydralazine to 50 mg 3 times daily for now, monitor blood pressure  Pacemaker checked and reprogrammed as noted  Follow up in office as schedule in Oct     I have spent a total of 20 minutes with the patient reviewing the above stated recommendations. And a total of >50% of that time involved face-to-face time providing counseling and or coordination of care with the other providers, reviewing records/tests, counseling/education of the patient, ordering medications/tests/procedures, coordinating care, and documenting clinical information in the EHR. Thank you for allowing me to participate in your patient's care. Please call me if there are any questions or concerns.        Hung Drummond, APRN - CNP  Cardiac Electrophysiology  [facility]) Phys[facility]  [facility] [place]:Electrophysiology  1:29 PM  7/28/2022

## 2022-07-28 NOTE — PLAN OF CARE
Problem: Chronic Conditions and Co-morbidities  Goal: Patient's chronic conditions and co-morbidity symptoms are monitored and maintained or improved  Outcome: Completed     Problem: Discharge Planning  Goal: Discharge to home or other facility with appropriate resources  Outcome: Completed  Flowsheets (Taken 7/28/2022 0802)  Discharge to home or other facility with appropriate resources: Identify barriers to discharge with patient and caregiver     Problem: Pain  Goal: Verbalizes/displays adequate comfort level or baseline comfort level  Outcome: Completed     Problem: Safety - Adult  Goal: Free from fall injury  Outcome: Completed     Problem: ABCDS Injury Assessment  Goal: Absence of physical injury  Outcome: Completed     Problem: Skin/Tissue Integrity  Goal: Absence of new skin breakdown  Description: 1. Monitor for areas of redness and/or skin breakdown  2. Assess vascular access sites hourly  3. Every 4-6 hours minimum:  Change oxygen saturation probe site  4. Every 4-6 hours:  If on nasal continuous positive airway pressure, respiratory therapy assess nares and determine need for appliance change or resting period.   Outcome: Completed

## 2022-07-28 NOTE — PROGRESS NOTES
Hospital Medicine    Subjective:  pt alert conversive ambulating w/o difficulty      Current Facility-Administered Medications:     ondansetron (ZOFRAN-ODT) disintegrating tablet 4 mg, 4 mg, Oral, Q8H PRN **OR** ondansetron (ZOFRAN) injection 4 mg, 4 mg, IntraVENous, Q6H PRN, Aaliyah Ruvalcaba,     allopurinol (ZYLOPRIM) tablet 100 mg, 100 mg, Oral, Daily, Aaliyah Ruvalcaba, DO, 100 mg at 07/27/22 1155    DULoxetine (CYMBALTA) extended release capsule 30 mg, 30 mg, Oral, Daily, Aaliyah Ruvalcaba DO    apixaban Alyse Ben) tablet 5 mg, 5 mg, Oral, BID, Aaliyah Ruvalcaba, DO, 5 mg at 07/27/22 2147    hydrALAZINE (APRESOLINE) tablet 50 mg, 50 mg, Oral, TID, Aaliyah Ruvalcaba, DO, 50 mg at 07/27/22 2147    losartan (COZAAR) tablet 50 mg, 50 mg, Oral, Daily, Aaliyah Ruvalcaba, DO, 50 mg at 07/27/22 1155    metoprolol succinate (TOPROL XL) extended release tablet 50 mg, 50 mg, Oral, BID, Aaliyah Ruvalcaba, DO, 50 mg at 07/27/22 2147    atorvastatin (LIPITOR) tablet 10 mg, 10 mg, Oral, Weekly, Aaliyah Ruvalcaba, DO, 10 mg at 07/27/22 2147    polyvinyl alcohol (LIQUIFILM TEARS) 1.4 % ophthalmic solution 1 drop, 1 drop, Both Eyes, PRN, Aaliyah Ruvalcaba DO    sodium chloride flush 0.9 % injection 5-40 mL, 5-40 mL, IntraVENous, 2 times per day, Marilee Corner, DO, 10 mL at 07/27/22 2148    sodium chloride flush 0.9 % injection 5-40 mL, 5-40 mL, IntraVENous, PRN, Aailyah Ruvalcaba,     0.9 % sodium chloride infusion, , IntraVENous, PRN, Aaliyah Ruvalcaba, DO    polyethylene glycol (GLYCOLAX) packet 17 g, 17 g, Oral, Daily PRN, Aaliyah Ruvalcaba DO    acetaminophen (TYLENOL) tablet 650 mg, 650 mg, Oral, Q6H PRN, 650 mg at 07/28/22 0216 **OR** acetaminophen (TYLENOL) suppository 650 mg, 650 mg, Rectal, Q6H PRN, Aaliyah Ruvalcaba DO    cefTRIAXone (ROCEPHIN) 1,000 mg in sterile water 10 mL IV syringe, 1,000 mg, IntraVENous, Q24H, Aaliyah Ruvalcaba DO, 1,000 mg at 07/27/22 1157    Objective:    BP (!) 159/59   Pulse 59   Temp 98.1 °F (36.7 °C) (Oral)   Resp 16   Wt 154 lb (69.9 kg)   SpO2 100%   BMI 28.17 kg/m²     Heart:  reg  Lungs:  ctab  Abd: + bs soft nontender  Extrem:  min edema legs    CBC with Differential:    Lab Results   Component Value Date/Time    WBC 4.7 07/27/2022 12:46 AM    RBC 2.89 07/27/2022 12:46 AM    HGB 8.6 07/27/2022 12:46 AM    HCT 27.6 07/27/2022 12:46 AM    PLT 96 07/27/2022 12:46 AM    MCV 95.5 07/27/2022 12:46 AM    MCH 29.8 07/27/2022 12:46 AM    MCHC 31.2 07/27/2022 12:46 AM    RDW 14.7 07/27/2022 12:46 AM    LYMPHOPCT 26.4 07/27/2022 12:46 AM    MONOPCT 13.2 07/27/2022 12:46 AM    BASOPCT 0.6 07/27/2022 12:46 AM    MONOSABS 0.62 07/27/2022 12:46 AM    LYMPHSABS 1.24 07/27/2022 12:46 AM    EOSABS 0.06 07/27/2022 12:46 AM    BASOSABS 0.03 07/27/2022 12:46 AM     CMP:    Lab Results   Component Value Date/Time     07/27/2022 12:46 AM    K 4.2 07/27/2022 12:46 AM     07/27/2022 12:46 AM    CO2 25 07/27/2022 12:46 AM    BUN 23 07/27/2022 12:46 AM    CREATININE 1.2 07/27/2022 12:46 AM    GFRAA 51 07/27/2022 12:46 AM    LABGLOM 51 07/27/2022 12:46 AM    GLUCOSE 92 07/27/2022 12:46 AM    PROT 6.7 07/27/2022 12:46 AM    LABALBU 3.6 07/27/2022 12:46 AM    CALCIUM 9.4 07/27/2022 12:46 AM    BILITOT 0.3 07/27/2022 12:46 AM    ALKPHOS 98 07/27/2022 12:46 AM    AST 17 07/27/2022 12:46 AM    ALT 11 07/27/2022 12:46 AM     Warfarin PT/INR:    Lab Results   Component Value Date    INR 1.5 01/14/2019    INR 1.3 04/15/2017    INR 1.3 09/17/2016    PROTIME 17.3 (H) 01/14/2019    PROTIME 14.4 (H) 04/15/2017    PROTIME 14.9 (H) 09/17/2016       Assessment:    Principal Problem:    Syncope and collapse  Active Problems:    Complete heart block (HCC)    Orthostatic hypotension    Primary hypertension    Cardiac pacemaker in situ  Resolved Problems:    * No resolved hospital problems.  *      Plan:  Dc home norvasc stopped per monse Hernandez DO  7:05 AM  7/28/2022

## 2022-07-28 NOTE — TELEPHONE ENCOUNTER
----- Message from Jed Carr MD sent at 7/27/2022  6:32 PM EDT -----  Jossy Robertson  October is 3 months but ok. Pacemaker should be checked that day too. MR  ----- Message -----  From: Thelma Gandhi  Sent: 7/27/2022   2:48 PM EDT  To: Jed Carr MD    Patient has an appointment October 20, 2022 with Dr. Pati Braden, can patient wait until then or does she need sooner appointment? Please advise, thank you   ----- Message -----  From: Jed Carr MD  Sent: 7/27/2022   1:11 PM EDT  To: Jenaro Wong    Ms. Rod Sales was hospitalized with an episode of syncope and has a pacemaker that is 8years old.   The device is a [employer] dual-chamber pacemaker and I would suggest having it checked in the office by the clinic or Dr. Pati Braden office visit within 2 months of discharge from the hospital.  She will probably go home in the next 24 to 48-hours

## 2022-10-20 ENCOUNTER — OFFICE VISIT (OUTPATIENT)
Dept: NON INVASIVE DIAGNOSTICS | Age: 87
End: 2022-10-20
Payer: COMMERCIAL

## 2022-10-20 VITALS
RESPIRATION RATE: 16 BRPM | SYSTOLIC BLOOD PRESSURE: 142 MMHG | OXYGEN SATURATION: 98 % | BODY MASS INDEX: 27.82 KG/M2 | WEIGHT: 151.2 LBS | HEIGHT: 62 IN | HEART RATE: 59 BPM | DIASTOLIC BLOOD PRESSURE: 70 MMHG

## 2022-10-20 DIAGNOSIS — R94.31 ABNORMAL ELECTROCARDIOGRAM (ECG) (EKG): ICD-10-CM

## 2022-10-20 DIAGNOSIS — I48.0 PAF (PAROXYSMAL ATRIAL FIBRILLATION) (HCC): Primary | ICD-10-CM

## 2022-10-20 DIAGNOSIS — Z95.0 PACEMAKER: ICD-10-CM

## 2022-10-20 PROBLEM — B37.2 CANDIDIASIS OF SKIN: Status: ACTIVE | Noted: 2022-10-20

## 2022-10-20 PROBLEM — H01.009 BLEPHARITIS: Status: ACTIVE | Noted: 2022-10-20

## 2022-10-20 PROBLEM — M81.0 OSTEOPOROSIS: Status: ACTIVE | Noted: 2022-10-20

## 2022-10-20 PROBLEM — M75.100 TEAR OF ROTATOR CUFF: Status: ACTIVE | Noted: 2022-10-20

## 2022-10-20 PROBLEM — E79.0 BLOOD URATE RAISED: Status: ACTIVE | Noted: 2022-10-20

## 2022-10-20 PROBLEM — K42.0 IRREDUCIBLE UMBILICAL HERNIA: Status: ACTIVE | Noted: 2022-10-20

## 2022-10-20 PROBLEM — H61.20 IMPACTED CERUMEN: Status: ACTIVE | Noted: 2022-10-20

## 2022-10-20 PROBLEM — I70.213 INTERMITTENT CLAUDICATION OF BOTH LOWER EXTREMITIES DUE TO ATHEROSCLEROSIS (HCC): Status: ACTIVE | Noted: 2022-10-20

## 2022-10-20 PROBLEM — H11.32 CONJUNCTIVAL HEMORRHAGE, LEFT EYE: Status: ACTIVE | Noted: 2022-10-20

## 2022-10-20 PROBLEM — H91.90 HEARING LOSS: Status: ACTIVE | Noted: 2022-10-20

## 2022-10-20 PROBLEM — D47.2 MONOCLONAL GAMMOPATHY OF UNKNOWN SIGNIFICANCE (MGUS): Status: ACTIVE | Noted: 2022-10-20

## 2022-10-20 PROBLEM — Z96.1 PRESENCE OF INTRAOCULAR LENS: Status: ACTIVE | Noted: 2022-10-20

## 2022-10-20 PROBLEM — J06.9 UPPER RESPIRATORY TRACT INFECTION: Status: ACTIVE | Noted: 2022-10-20

## 2022-10-20 PROBLEM — D64.9 ANEMIA: Status: ACTIVE | Noted: 2022-10-20

## 2022-10-20 PROBLEM — N81.10 PROLAPSE OF VAGINAL WALL: Status: ACTIVE | Noted: 2022-10-20

## 2022-10-20 PROCEDURE — 1090F PRES/ABSN URINE INCON ASSESS: CPT | Performed by: STUDENT IN AN ORGANIZED HEALTH CARE EDUCATION/TRAINING PROGRAM

## 2022-10-20 PROCEDURE — G8484 FLU IMMUNIZE NO ADMIN: HCPCS | Performed by: STUDENT IN AN ORGANIZED HEALTH CARE EDUCATION/TRAINING PROGRAM

## 2022-10-20 PROCEDURE — 99215 OFFICE O/P EST HI 40 MIN: CPT | Performed by: STUDENT IN AN ORGANIZED HEALTH CARE EDUCATION/TRAINING PROGRAM

## 2022-10-20 PROCEDURE — 1123F ACP DISCUSS/DSCN MKR DOCD: CPT | Performed by: STUDENT IN AN ORGANIZED HEALTH CARE EDUCATION/TRAINING PROGRAM

## 2022-10-20 PROCEDURE — 1036F TOBACCO NON-USER: CPT | Performed by: STUDENT IN AN ORGANIZED HEALTH CARE EDUCATION/TRAINING PROGRAM

## 2022-10-20 PROCEDURE — G8417 CALC BMI ABV UP PARAM F/U: HCPCS | Performed by: STUDENT IN AN ORGANIZED HEALTH CARE EDUCATION/TRAINING PROGRAM

## 2022-10-20 PROCEDURE — G8427 DOCREV CUR MEDS BY ELIG CLIN: HCPCS | Performed by: STUDENT IN AN ORGANIZED HEALTH CARE EDUCATION/TRAINING PROGRAM

## 2022-10-20 NOTE — PATIENT INSTRUCTIONS
You will be contacted to schedule echocardiogram.  No medication changes at this time. Continue remote monitoring of pacemaker every 91 days. When pacemaker battery reaches elective replacement indicator, you will be schedule for battery change. Follow-up with Dr Amelia Butts office in 6 months.

## 2022-10-20 NOTE — PROGRESS NOTES
Electrophysiology   Outpatient Progress Note  Lang Wilson  5/26/1931  Date of Service: 10/20/22   Referring Provider/PCP: Kemi Perez DO   Cardiology: Ramo Stark MD  Electrophysiologist: Sourav Patrick DO    SUBJECTIVE: Lang Wilson is a 80 y.o. female with a history of SND sp BSCI dc ppm (DOI: 5/6/15), 3* AVB, nonvalvular PAF, HTN, mild pulmonary HTN, mild TR, DM, MGUS, peripheral neuropathy, anemia, and thrombocytopenia. She is managed by Dr Marcia Hayden with apixaban 5 mg BID, hydralazine 50 mg TID, losartan 50 mg daily, metoprolol XL 50 mg BID, and pitavastatin 2 mg weekly. In 3/2015, she was diagnosed with AF. In 5/2015, she had syncope and noted to have AF with slow ventricular rate, which was treated by Dr James Lester with BSCI dc PPM. In 2019, she had epistaxis, which was managed by arterial embolization and change from Xarelto to apixaban. In 6/2021, she was admitted for peripheral neuropathy and thrombocytopenia (20), so [address] held temporarily. Platelet count improved and [address] restarted . In 7/2022, she was admitted for [place], which was treated with discontinuation of  amlodipine. She presents today, 10/20/22; for follow-up. She is enrolled in remote monitoring, which has reported stable device function and AF burden of 5%. She denies any complaints at this time other than her neuropathy. Prior cardiac testing:  ECG (7/27/22): AV paced rhythm at 66 bpm.  ECG (8/26/21): AP- at 80 bpm, PVC. ECG (6/23/21): AV paced rhythm at 60 bpm.  Pharmacologic Nuclear Stress Test (8/3/19): LVEF = 64%, mild inferoseptal hypokinesis, and no ischemia. TTE (4/17/17): LVEF = 55%, stage I LVDD, mild RV dilation, mild TR, possible bicuspid AV. Pharmacologic Nuclear Stress (4/17/17): LVEF = 63%, small fixed inferoseptal likely due to subdiaphragmatic attenuation v infarct, and no ischemia. LHC (3/12/15): normal coronaries. LVEF 65%.     Past Medical History:   Diagnosis Date    Anemia     Asthma     Atrial fibrillation [facility])     CVA (cerebral vascular accident) ([facility] [address]     Diabetic retinopathy ([facility]     Gout     Hyperlipidemia     Hypertension     MGUS (monoclonal gammopathy of unknown significance)     Mild tricuspid regurgitation 03/11/2015    PAC (premature atrial contraction)     Pemphigoid     Pulmonary hypertension ([facility] 03/11/2015    mild    Symptomatic bradycardia       Past Surgical History:   Procedure Laterality Date    APPENDECTOMY      CARDIAC CATHETERIZATION  3/12/2015    Dr Harish Alvarado  05/06/2015    D-PPM  ([facility])   GEMMA    CATARACT REMOVAL      CHOLECYSTECTOMY      HYSTERECTOMY (CERVIX STATUS UNKNOWN)        Social History     Tobacco Use    Smoking status: Never    Smokeless tobacco: Never   Vaping Use    Vaping Use: Never used   Substance Use Topics    Alcohol use: Not Currently    Drug use: Never    No family history on file.    Current Outpatient Medications   Medication Sig Dispense Refill    ELIQUIS 5 MG TABS tablet take 1 tablet by mouth every 12 hours      PROMACTA 75 MG TABS tablet Take 50 mg by mouth daily       Probiotic Product (PROBIOTIC-10 PO) Take by mouth 2 times daily      acetaminophen (TYLENOL) 500 MG tablet Take 500 mg by mouth as needed      Loperamide HCl (IMODIUM PO) Take by mouth as needed      PREMARIN 0.625 MG/GM vaginal cream insert 0.5 grams vaginally TWICE WEEKLY      Vitamin D, Cholecalciferol, 25 MCG (1000 UT) CAPS Take by mouth daily      Omega-3 1000 MG CAPS Take by mouth daily      losartan (COZAAR) 50 MG tablet take 1 tablet by mouth once daily      pitavastatin (LIVALO) 2 MG TABS tablet Take 2 mg by mouth once a week       b complex vitamins capsule Take 1 capsule by mouth daily      metoprolol succinate (TOPROL XL) 50 MG extended release tablet Take 1 tablet by mouth 2 times daily 30 tablet 11    polyethyl glycol-propyl glycol 0.4-0.3 % (SYSTANE) 0.4-0.3 % ophthalmic solution 1 drop as needed for Dry Eyes      hydrALAZINE (APRESOLINE) 50 MG tablet Take 1 tablet by mouth 3 times daily. 90 tablet 11    allopurinol (ZYLOPRIM) 100 MG tablet Take 100 mg by mouth daily        No current facility-administered medications for this visit. Allergies   Allergen Reactions    Ampicillin Itching    Iodine Itching     ROS:   Constitutional: Negative for fever, activity change and appetite change. HENT: Negative for epistaxis, difficulty swallowing. Eyes: Negative for blurred vision or double vision. Respiratory: Negative for cough, chest tightness, shortness of breath and wheezing. Cardiovascular: Negative for chest pain, palpitations and leg swelling. Gastrointestinal: Negative for abdominal pain, heartburn, or blood in stool. Genitourinary: Negative for hematuria, burning or frequency. Musculoskeletal: Negative for myalgias, stiffness, or swelling. Skin: Negative for rash, pain, or lumps. Neurological: Negative for syncope, seizures, or headaches. Psychiatric/Behavioral: Negative for confusion and agitation. The patient is not nervous/anxious. PHYSICAL EXAM:  There were no vitals filed for this visit. Constitutional: Oriented to person, place, and time. Well-developed and cooperative. Head: Normocephalic and atraumatic. Eyes: Conjunctivae are normal. Cardiovascular: S1 normal, S2 normal and intact distal pulses. A regular rhythm present. PMI is not displaced. Pulmonary/Chest: Effort normal and breath sounds normal. No respiratory distress. Abdominal: Soft. No tenderness. Musculoskeletal: Normal range of motion of all extremities, no muscle weakness. Neurological: Alert and oriented to person, place, and time. Gait normal.   Skin: Skin is warm and dry. No bruising, no ecchymosis and no rash noted. Extremity: No clubbing or cyanosis. No edema. Psychiatric: Normal mood and affect. Thought content normal.  Pacemaker site: stable, well healed, no evidence of erosion. Cardiac Testing Today:  ECG (10/20/22):  Atrial and ventricular paced rhythm. Device Interrogation/Reprogramming 10/20/2022  Make/Model: [facility] [id number]  DOI: 5/6/2015  Battery: Less than 3 months  Daun Favre therapy: DDDR  bpm, AV delays of 180 ms sensed and 220 ms paced ppm  Pacing %: RA = 70%, = 99%  Lead function:  RA lead: sensing = 5.5 mV, impedance = 391 ohms, threshold = 0.6 V @0.5 msec  RV lead: sensing = paced  mV, impedance = 396 ohms, threshold = 1.3 V @0.4 msec  Lead programming:  RA lead: sensitivity = 0.25 mV, output = 2.0 V @0.5 msec  RV lead: sensitivity = 1.5 mV, output = 3.0 V @0.4 msec  Arrhythmias: AF burden = 4% since 8/26/2021 (10 events, most recent 10/20/2022 of 37 seconds duration, episode of PMT are not true PMT but upper rate limit behavior  Reprogramming included: None  Overall device function is normal  All device programmable settings were evaluated per above and in the scanned document, along with iterative adjustments (capture thresholds) to assess and select the most appropriate final programming to provide for consistent delivery of the appropriate therapy and to verify function of the device. Assessment/Plan:   1. SND and 3* AV block sp BSCI dc PPM (DOI: 5/6/15)   -Device is not MRI compatible. -Stable device function.  -Battery nearing DOUG. Plan for battery change once reaches DOUG. Patient to hold zhiwo for 5 days before and after given her history of thrombocytopenia. She has ampicillin allergy, so will use Vancomycin with TYRX followed by doxycycline 100 mg BID x 5 days. Prior to this procedure, would recommend limited TTE to assess LV function given her high RV pacing burden.  -Continue remote monitoring of PPM every 91 days.  -Follow-up with my office in 6 months. 2.  OH  - Norvasc stopped in 7/2022 for [place].  - OH VS today: normal.     3.  Nonvalvular paroxysmal AF  -Initially diagnosed March 2015. -[id number] = 5 (age, sex, HTN, DM). Recommend [address] in females with score of 2 or more.  DOAC preferred.  -Continue apixaban 5 mg BID. While on DOAC, recommend annual monitoring of CMP and CBC. -AF burden on interrogation of PPM today is 4%. Appears asymptomatic. Continue to monitor. 4.  Anemia-thrombocytopenia  -Chronic issue.  -Management per Hematology (Dr Merlinda Nakayama at [facility]). -In general, would avoid [address] in patient's with platelet count < 96,095. Most recent was 96,000. Thank you for allowing me to participate in their care. I spent a total of 40 minutes reviewing previous notes, test results, and face to face with the patient discussing the diagnosis and importance of compliance with the treatment plan as well as documenting on the day of the visit. Time of the day of service includes:  Preparing to see the patient (eg. Review of the medical record, such as tests). Obtaining and/or reviewing separately obtained history. Ordering tests. Communicating results to the patient/family/caregiver. Counseling/educating the patient/family/caregiver. Documenting clinical information in the patients electronic record. Coordination of care for the patient. Performing a medical appropriate exam and/or evaluation.      [facility] Cardiac Electrophysiology  [address] [facility]    CC: Dr Arlin Ashby

## 2022-10-21 ENCOUNTER — TELEPHONE (OUTPATIENT)
Dept: NON INVASIVE DIAGNOSTICS | Age: 87
End: 2022-10-21

## 2022-10-21 NOTE — TELEPHONE ENCOUNTER
I spoke with patient's daughter, Aline Diaz. She is aware of the need for the echocardiogram before her mother's pacemaker generator replacement. She is aware of the possibility of device upgrade to CRT. We can schedule echo.     Margaret Emerson RN, BSN  [facility]

## 2022-10-21 NOTE — TELEPHONE ENCOUNTER
----- Message from Kizzy Colby sent at 10/20/2022  2:53 PM EDT -----  Regarding: FW: Echo  Please discuss w/ pt  ----- Message -----  From: Dennis Garcia DO  Sent: 10/20/2022   2:02 PM EDT  To: Kizzy Colby  Subject: Echo                                             Her battery is nearing replacement. Please schedule for limited TTE prior to battery replacement in order to assess need for upgrade to CRT.     Dennis Garcia DO

## 2022-10-26 ENCOUNTER — TELEPHONE (OUTPATIENT)
Dept: CARDIOLOGY | Age: 87
End: 2022-10-26

## 2022-10-27 ENCOUNTER — HOSPITAL ENCOUNTER (OUTPATIENT)
Dept: CARDIOLOGY | Age: 87
Discharge: HOME | End: 2022-10-27
Payer: COMMERCIAL

## 2022-10-27 DIAGNOSIS — Z95.0 PACEMAKER: ICD-10-CM

## 2022-10-27 DIAGNOSIS — R94.31 ABNORMAL ELECTROCARDIOGRAM (ECG) (EKG): ICD-10-CM

## 2022-10-27 LAB
LV EF: 58 %
LVEF MODALITY: NORMAL

## 2022-10-27 PROCEDURE — 93308 TTE F-UP OR LMTD: CPT | Performed by: PSYCHIATRY & NEUROLOGY

## 2022-11-07 ENCOUNTER — TELEPHONE (OUTPATIENT)
Dept: NON INVASIVE DIAGNOSTICS | Age: 87
End: 2022-11-07

## 2022-11-07 NOTE — TELEPHONE ENCOUNTER
----- Message from Simone Blanco DO sent at 11/6/2022 10:25 PM EST -----  Regarding: echo  Echo stable. No need to upgrade device.     Simone Blanco DO

## 2022-11-22 ENCOUNTER — TELEPHONE (OUTPATIENT)
Dept: NON INVASIVE DIAGNOSTICS | Age: 87
End: 2022-11-22

## 2022-11-22 DIAGNOSIS — I48.0 PAF (PAROXYSMAL ATRIAL FIBRILLATION) (HCC): ICD-10-CM

## 2022-11-22 DIAGNOSIS — D69.6 THROMBOCYTOPENIA (HCC): Primary | ICD-10-CM

## 2022-11-22 NOTE — TELEPHONE ENCOUNTER
----- Message from TERESA Hutchins CNP sent at 11/16/2022  3:42 PM EST -----  Regarding: RE: DOUG  I would start with outpatient labs to repeat. She was at 80 and this last patient had a count of 105 and we did not do anything the day prior to admit. But this patient is Dr Michael Lam PCP, which we could admit to him and give her a transfusion of plt day before if needed. He is reasonable. Thanks. Tessa Castaneda   ----- Message -----  From: Sam Fernandez  Sent: 11/16/2022  12:28 PM EST  To: TERESA Hutchins CNP  Subject: FW: DOUG                                            ----- Message -----  From: Donte Tang DO  Sent: 11/14/2022   9:45 PM EST  To: Sam Fernandez  Subject: DOUG                                              Battery at PHRQL. Please contact patient's Hematology doctor regarding thrombocytopenia. May need platelet transfusion prior to procedure. Please schedule for MIGUEL visit and labs prior to procedure.     Hold apixaban for 24 hours prior to procedure.    -Donte Tang DO

## 2022-11-22 NOTE — TELEPHONE ENCOUNTER
I spoke to Mat Porras and her daughter and she will come to the office tomorrow for labs to determine her platelet count. If stable then I will schedule the PGR w/ TB next week. If low then will schedule the admission for infusion one day prior to the procedure.

## 2022-11-23 ENCOUNTER — NURSE ONLY (OUTPATIENT)
Dept: NON INVASIVE DIAGNOSTICS | Age: 87
End: 2022-11-23
Payer: COMMERCIAL

## 2022-11-23 DIAGNOSIS — I48.0 PAF (PAROXYSMAL ATRIAL FIBRILLATION) (HCC): Primary | Chronic | ICD-10-CM

## 2022-11-23 DIAGNOSIS — D69.6 THROMBOCYTOPENIA (HCC): ICD-10-CM

## 2022-11-23 DIAGNOSIS — I48.0 PAF (PAROXYSMAL ATRIAL FIBRILLATION) (HCC): ICD-10-CM

## 2022-11-23 LAB
ANION GAP SERPL CALCULATED.3IONS-SCNC: 9 MMOL/L (ref 7–16)
BASOPHILS ABSOLUTE: 0.04 E9/L (ref 0–0.2)
BASOPHILS RELATIVE PERCENT: 0.9 % (ref 0–2)
BUN BLDV-MCNC: 24 MG/DL (ref 6–23)
CALCIUM SERPL-MCNC: 10.2 MG/DL (ref 8.6–10.2)
CHLORIDE BLD-SCNC: 107 MMOL/L (ref 98–107)
CO2: 29 MMOL/L (ref 22–29)
CREAT SERPL-MCNC: 1.1 MG/DL (ref 0.5–1)
EOSINOPHILS ABSOLUTE: 0.11 E9/L (ref 0.05–0.5)
EOSINOPHILS RELATIVE PERCENT: 2.3 % (ref 0–6)
GFR SERPL CREATININE-BSD FRML MDRD: 47 ML/MIN/1.73
GLUCOSE BLD-MCNC: 73 MG/DL (ref 74–99)
HCT VFR BLD CALC: 29.4 % (ref 34–48)
HEMOGLOBIN: 9 G/DL (ref 11.5–15.5)
IMMATURE GRANULOCYTES #: 0.03 E9/L
IMMATURE GRANULOCYTES %: 0.6 % (ref 0–5)
LYMPHOCYTES ABSOLUTE: 1.16 E9/L (ref 1.5–4)
LYMPHOCYTES RELATIVE PERCENT: 24.7 % (ref 20–42)
MAGNESIUM: 2.1 MG/DL (ref 1.6–2.6)
MCH RBC QN AUTO: 30.1 PG (ref 26–35)
MCHC RBC AUTO-ENTMCNC: 30.6 % (ref 32–34.5)
MCV RBC AUTO: 98.3 FL (ref 80–99.9)
MONOCYTES ABSOLUTE: 0.7 E9/L (ref 0.1–0.95)
MONOCYTES RELATIVE PERCENT: 14.9 % (ref 2–12)
NEUTROPHILS ABSOLUTE: 2.65 E9/L (ref 1.8–7.3)
NEUTROPHILS RELATIVE PERCENT: 56.6 % (ref 43–80)
PDW BLD-RTO: 14.5 FL (ref 11.5–15)
PLATELET # BLD: 127 E9/L (ref 130–450)
PMV BLD AUTO: 13.8 FL (ref 7–12)
POTASSIUM SERPL-SCNC: 4.9 MMOL/L (ref 3.5–5)
RBC # BLD: 2.99 E12/L (ref 3.5–5.5)
SODIUM BLD-SCNC: 145 MMOL/L (ref 132–146)
WBC # BLD: 4.7 E9/L (ref 4.5–11.5)

## 2022-11-23 PROCEDURE — 36415 COLL VENOUS BLD VENIPUNCTURE: CPT | Performed by: STUDENT IN AN ORGANIZED HEALTH CARE EDUCATION/TRAINING PROGRAM

## 2022-11-23 NOTE — PROGRESS NOTES
Lab work drawn today from Right Arm . Patient tolerated procedure well.     Electronically signed by Josef Bennett MA on 11/23/2022 at 10:29 AM

## 2022-12-15 ENCOUNTER — HOSPITAL ENCOUNTER (OUTPATIENT)
Dept: CARDIAC CATH/INVASIVE PROCEDURES | Age: 87
Discharge: HOME | End: 2022-12-15
Payer: COMMERCIAL

## 2022-12-15 ENCOUNTER — HOSPITAL ENCOUNTER (OUTPATIENT)
Dept: GENERAL RADIOLOGY | Age: 87
Discharge: HOME | End: 2022-12-17
Payer: COMMERCIAL

## 2022-12-15 ENCOUNTER — ANESTHESIA EVENT (OUTPATIENT)
Dept: CARDIAC CATH/INVASIVE PROCEDURES | Age: 87
End: 2022-12-15

## 2022-12-15 ENCOUNTER — ANESTHESIA (OUTPATIENT)
Dept: CARDIAC CATH/INVASIVE PROCEDURES | Age: 87
End: 2022-12-15

## 2022-12-15 VITALS
HEIGHT: 63 IN | BODY MASS INDEX: 26.58 KG/M2 | HEART RATE: 64 BPM | WEIGHT: 150 LBS | TEMPERATURE: 96.7 F | RESPIRATION RATE: 18 BRPM | OXYGEN SATURATION: 98 %

## 2022-12-15 LAB
ANION GAP SERPL CALCULATED.3IONS-SCNC: 7 MMOL/L (ref 7–16)
ANISOCYTOSIS: ABNORMAL
BASOPHILS ABSOLUTE: 0 E9/L (ref 0–0.2)
BASOPHILS RELATIVE PERCENT: 0.1 % (ref 0–2)
BUN BLDV-MCNC: 36 MG/DL (ref 6–23)
CALCIUM SERPL-MCNC: 9.6 MG/DL (ref 8.6–10.2)
CHLORIDE BLD-SCNC: 110 MMOL/L (ref 98–107)
CO2: 26 MMOL/L (ref 22–29)
CREAT SERPL-MCNC: 1 MG/DL (ref 0.5–1)
EOSINOPHILS ABSOLUTE: 0 E9/L (ref 0.05–0.5)
EOSINOPHILS RELATIVE PERCENT: 0.7 % (ref 0–6)
GFR SERPL CREATININE-BSD FRML MDRD: 53 ML/MIN/1.73
GLUCOSE BLD-MCNC: 97 MG/DL (ref 74–99)
HCT VFR BLD CALC: 28.1 % (ref 34–48)
HEMOGLOBIN: 9.1 G/DL (ref 11.5–15.5)
HYPOCHROMIA: ABNORMAL
LYMPHOCYTES ABSOLUTE: 1.05 E9/L (ref 1.5–4)
LYMPHOCYTES RELATIVE PERCENT: 14.8 % (ref 20–42)
MAGNESIUM: 2.2 MG/DL (ref 1.6–2.6)
MCH RBC QN AUTO: 29.2 PG (ref 26–35)
MCHC RBC AUTO-ENTMCNC: 32.4 % (ref 32–34.5)
MCV RBC AUTO: 90.1 FL (ref 80–99.9)
MONOCYTES ABSOLUTE: 0.35 E9/L (ref 0.1–0.95)
MONOCYTES RELATIVE PERCENT: 5.2 % (ref 2–12)
NEUTROPHILS ABSOLUTE: 5.6 E9/L (ref 1.8–7.3)
NEUTROPHILS RELATIVE PERCENT: 80 % (ref 43–80)
OVALOCYTES: ABNORMAL
PDW BLD-RTO: 14.9 FL (ref 11.5–15)
PLATELET # BLD: 81 E9/L (ref 130–450)
PLATELET CONFIRMATION: NORMAL
PMV BLD AUTO: ABNORMAL FL (ref 7–12)
POIKILOCYTES: ABNORMAL
POLYCHROMASIA: ABNORMAL
POTASSIUM SERPL-SCNC: 4.6 MMOL/L (ref 3.5–5)
RBC # BLD: 3.12 E12/L (ref 3.5–5.5)
SCHISTOCYTES: ABNORMAL
SODIUM BLD-SCNC: 143 MMOL/L (ref 132–146)
TARGET CELLS: ABNORMAL
WBC # BLD: 7 E9/L (ref 4.5–11.5)

## 2022-12-15 PROCEDURE — 6360000002 HC RX W HCPCS

## 2022-12-15 PROCEDURE — 6360000002 HC RX W HCPCS: Performed by: STUDENT IN AN ORGANIZED HEALTH CARE EDUCATION/TRAINING PROGRAM

## 2022-12-15 PROCEDURE — 2580000003 HC RX 258: Performed by: STUDENT IN AN ORGANIZED HEALTH CARE EDUCATION/TRAINING PROGRAM

## 2022-12-15 PROCEDURE — 2580000003 HC RX 258

## 2022-12-15 PROCEDURE — 6370000000 HC RX 637 (ALT 250 FOR IP): Performed by: STUDENT IN AN ORGANIZED HEALTH CARE EDUCATION/TRAINING PROGRAM

## 2022-12-15 PROCEDURE — C1785 PMKR, DUAL, RATE-RESP: HCPCS

## 2022-12-15 PROCEDURE — C1889 IMPLANT/INSERT DEVICE, NOC: HCPCS

## 2022-12-15 PROCEDURE — 93005 ELECTROCARDIOGRAM TRACING: CPT | Performed by: STUDENT IN AN ORGANIZED HEALTH CARE EDUCATION/TRAINING PROGRAM

## 2022-12-15 PROCEDURE — 2500000003 HC RX 250 WO HCPCS

## 2022-12-15 PROCEDURE — 80048 BASIC METABOLIC PNL TOTAL CA: CPT

## 2022-12-15 PROCEDURE — 33228 REMV&REPLC PM GEN DUAL LEAD: CPT

## 2022-12-15 PROCEDURE — 71045 X-RAY EXAM CHEST 1 VIEW: CPT

## 2022-12-15 PROCEDURE — 2720000010 HC SURG SUPPLY STERILE

## 2022-12-15 PROCEDURE — 85025 COMPLETE CBC W/AUTO DIFF WBC: CPT

## 2022-12-15 PROCEDURE — 83735 ASSAY OF MAGNESIUM: CPT

## 2022-12-15 PROCEDURE — 2709999900 HC NON-CHARGEABLE SUPPLY

## 2022-12-15 PROCEDURE — 36415 COLL VENOUS BLD VENIPUNCTURE: CPT

## 2022-12-15 RX ORDER — SODIUM CHLORIDE 0.9 % (FLUSH) 0.9 %
5-40 SYRINGE (ML) INJECTION EVERY 12 HOURS SCHEDULED
Status: DISCONTINUED | OUTPATIENT
Start: 2022-12-15 | End: 2022-12-16 | Stop reason: HOSPADM

## 2022-12-15 RX ORDER — DOXYCYCLINE HYCLATE 100 MG
100 TABLET ORAL 2 TIMES DAILY
Qty: 10 TABLET | Refills: 0 | Status: SHIPPED | OUTPATIENT
Start: 2022-12-15 | End: 2022-12-20

## 2022-12-15 RX ORDER — LIDOCAINE HYDROCHLORIDE 20 MG/ML
INJECTION, SOLUTION INFILTRATION; PERINEURAL PRN
Status: DISCONTINUED | OUTPATIENT
Start: 2022-12-15 | End: 2022-12-15 | Stop reason: SDUPTHER

## 2022-12-15 RX ORDER — SODIUM CHLORIDE 0.9 % (FLUSH) 0.9 %
5-40 SYRINGE (ML) INJECTION PRN
Status: DISCONTINUED | OUTPATIENT
Start: 2022-12-15 | End: 2022-12-16 | Stop reason: HOSPADM

## 2022-12-15 RX ORDER — MIDAZOLAM HYDROCHLORIDE 1 MG/ML
INJECTION INTRAMUSCULAR; INTRAVENOUS PRN
Status: DISCONTINUED | OUTPATIENT
Start: 2022-12-15 | End: 2022-12-15 | Stop reason: SDUPTHER

## 2022-12-15 RX ORDER — SODIUM CHLORIDE 9 MG/ML
INJECTION, SOLUTION INTRAVENOUS PRN
Status: DISCONTINUED | OUTPATIENT
Start: 2022-12-15 | End: 2022-12-16 | Stop reason: HOSPADM

## 2022-12-15 RX ORDER — ACETAMINOPHEN 325 MG/1
650 TABLET ORAL EVERY 4 HOURS PRN
Status: DISCONTINUED | OUTPATIENT
Start: 2022-12-15 | End: 2022-12-16 | Stop reason: HOSPADM

## 2022-12-15 RX ORDER — SODIUM CHLORIDE 9 MG/ML
INJECTION, SOLUTION INTRAVENOUS CONTINUOUS PRN
Status: DISCONTINUED | OUTPATIENT
Start: 2022-12-15 | End: 2022-12-15 | Stop reason: SDUPTHER

## 2022-12-15 RX ORDER — PROPOFOL 10 MG/ML
INJECTION, EMULSION INTRAVENOUS CONTINUOUS PRN
Status: DISCONTINUED | OUTPATIENT
Start: 2022-12-15 | End: 2022-12-15 | Stop reason: SDUPTHER

## 2022-12-15 RX ORDER — ACETAMINOPHEN 325 MG/1
650 TABLET ORAL EVERY 4 HOURS PRN
OUTPATIENT
Start: 2022-12-15

## 2022-12-15 RX ORDER — SODIUM CHLORIDE 0.9 % (FLUSH) 0.9 %
5-40 SYRINGE (ML) INJECTION EVERY 12 HOURS SCHEDULED
OUTPATIENT
Start: 2022-12-15

## 2022-12-15 RX ORDER — SODIUM CHLORIDE 0.9 % (FLUSH) 0.9 %
5-40 SYRINGE (ML) INJECTION PRN
OUTPATIENT
Start: 2022-12-15

## 2022-12-15 RX ORDER — FENTANYL CITRATE 50 UG/ML
INJECTION, SOLUTION INTRAMUSCULAR; INTRAVENOUS PRN
Status: DISCONTINUED | OUTPATIENT
Start: 2022-12-15 | End: 2022-12-15 | Stop reason: SDUPTHER

## 2022-12-15 RX ORDER — SODIUM CHLORIDE 9 MG/ML
INJECTION, SOLUTION INTRAVENOUS PRN
OUTPATIENT
Start: 2022-12-15

## 2022-12-15 RX ADMIN — MIDAZOLAM 1 MG: 1 INJECTION INTRAMUSCULAR; INTRAVENOUS at 16:05

## 2022-12-15 RX ADMIN — LIDOCAINE HYDROCHLORIDE 40 MG: 20 INJECTION, SOLUTION INFILTRATION; PERINEURAL at 16:10

## 2022-12-15 RX ADMIN — SODIUM CHLORIDE: 9 INJECTION, SOLUTION INTRAVENOUS at 15:57

## 2022-12-15 RX ADMIN — ACETAMINOPHEN 650 MG: 325 TABLET ORAL at 18:15

## 2022-12-15 RX ADMIN — PROPOFOL 50 MCG/KG/MIN: 10 INJECTION, EMULSION INTRAVENOUS at 16:10

## 2022-12-15 RX ADMIN — FENTANYL CITRATE 50 MCG: 50 INJECTION, SOLUTION INTRAMUSCULAR; INTRAVENOUS at 16:11

## 2022-12-15 RX ADMIN — VANCOMYCIN HYDROCHLORIDE 1000 MG: 1 INJECTION, POWDER, LYOPHILIZED, FOR SOLUTION INTRAVENOUS at 16:16

## 2022-12-15 ASSESSMENT — PAIN SCALES - GENERAL: PAINLEVEL_OUTOF10: 4

## 2022-12-15 NOTE — ANESTHESIA PRE PROCEDURE
Department of Anesthesiology  Preprocedure Note       Name:  Niels Thakkar   Age:  80 y.o.  :  1931                                          MRN:  [id number]         Date:  12/15/2022      Surgeon: * Surgery not found *    Procedure:     Medications prior to admission:   Prior to Admission medications    Medication Sig Start Date End Date Taking? Authorizing Provider   DULoxetine (CYMBALTA) 30 MG extended release capsule Take 1 capsule by mouth daily  Patient not taking: Reported on 2022 3/3/22 7/28/22  Mally TERESA Greenwood CNP   ELIQUIS 5 MG TABS tablet take 1 tablet by mouth every 12 hours 1/3/22   Historical Provider, MD   PROMACTA 75 MG TABS tablet Take 50 mg by mouth daily     Historical Provider, MD   Probiotic Product (PROBIOTIC-10 PO) Take by mouth 2 times daily    Historical Provider, MD   acetaminophen (TYLENOL) 500 MG tablet Take 500 mg by mouth as needed    Historical Provider, MD   Loperamide HCl (IMODIUM PO) Take by mouth as needed    Historical Provider, MD   PREMARIN 0.625 MG/GM vaginal cream insert 0.5 grams vaginally TWICE WEEKLY 21   Historical Provider, MD   Vitamin D, Cholecalciferol, 25 MCG (1000 UT) CAPS Take by mouth daily    Historical Provider, MD   [place]-3 1000 MG CAPS Take by mouth daily    Historical Provider, MD   losartan (COZAAR) 50 MG tablet take 1 tablet by mouth once daily 20   Historical Provider, MD   pitavastatin (LIVALO) 2 MG TABS tablet Take 2 mg by mouth once a week     Historical Provider, MD   b complex vitamins capsule Take 1 capsule by mouth daily    Historical Provider, MD   metoprolol succinate (TOPROL XL) 50 MG extended release tablet Take 1 tablet by mouth 2 times daily 17   Luis Ruvalcaba DO   polyethyl glycol-propyl glycol 0.4-0.3 % (SYSTANE) 0.4-0.3 % ophthalmic solution 1 drop as needed for Dry Eyes    Historical Provider, MD   hydrALAZINE (APRESOLINE) 50 MG tablet Take 1 tablet by mouth 3 times daily.  3/13/15   Aylin Knutson DO allopurinol (ZYLOPRIM) 100 MG tablet Take 100 mg by mouth daily     Historical Provider, MD       Current medications:    Current Outpatient Medications   Medication Sig Dispense Refill    ELIQUIS 5 MG TABS tablet take 1 tablet by mouth every 12 hours      PROMACTA 75 MG TABS tablet Take 50 mg by mouth daily       Probiotic Product (PROBIOTIC-10 PO) Take by mouth 2 times daily      acetaminophen (TYLENOL) 500 MG tablet Take 500 mg by mouth as needed      Loperamide HCl (IMODIUM PO) Take by mouth as needed      PREMARIN 0.625 MG/GM vaginal cream insert 0.5 grams vaginally TWICE WEEKLY      Vitamin D, Cholecalciferol, 25 MCG (1000 UT) CAPS Take by mouth daily      Omega-3 1000 MG CAPS Take by mouth daily      losartan (COZAAR) 50 MG tablet take 1 tablet by mouth once daily      pitavastatin (LIVALO) 2 MG TABS tablet Take 2 mg by mouth once a week       b complex vitamins capsule Take 1 capsule by mouth daily      metoprolol succinate (TOPROL XL) 50 MG extended release tablet Take 1 tablet by mouth 2 times daily 30 tablet 11    polyethyl glycol-propyl glycol 0.4-0.3 % (SYSTANE) 0.4-0.3 % ophthalmic solution 1 drop as needed for Dry Eyes      hydrALAZINE (APRESOLINE) 50 MG tablet Take 1 tablet by mouth 3 times daily. 90 tablet 11    allopurinol (ZYLOPRIM) 100 MG tablet Take 100 mg by mouth daily        Current Facility-Administered Medications   Medication Dose Route Frequency Provider Last Rate Last Admin    vancomycin (VANCOCIN) 1,000 mg in dextrose 5 % 250 mL IVPB ([id number])  1,000 mg IntraVENous On Call to [address],            Allergies:     Allergies   Allergen Reactions    Ampicillin Itching    Iodine Itching       Problem List:    Patient Active Problem List   Diagnosis Code    Mixed hyperlipidemia E78.2    Asthma J45.909    PAF (paroxysmal atrial fibrillation) (HCC) I48.0    Pulmonary HTN ([facility] [address] I27.20    Primary hypertension I10    Cardiac pacemaker in situ Z95.0    PVD (peripheral vascular disease) ([facility]) I73.9    Bilateral carotid artery stenosis I65.23    Spondylosis of lumbosacral spine with radiculopathy M47.27    Thrombocytopenia ([facility]) D69.6    Peripheral polyneuropathy G62.9    Syncope and collapse R55    Complete heart block ([facility]) I44.2    Orthostatic hypotension I95.1    Anemia D64.9    Blepharitis H01.009    Blood urate raised E79.0    Candidiasis of skin B37.2    Conjunctival hemorrhage, left eye H11.32    Diabetic macular edema ([facility]) E11.311    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.39    Hearing loss H91.90    Hernia of urinary bladder [id number]    History of cardiac pacemaker in situ Z95.0    Impacted cerumen H61.20    Intermittent claudication of both lower extremities due to atherosclerosis ([facility]) I70.213    Irreducible umbilical hernia G04.4    Lens replaced by other means Z96.1    Mild nonproliferative diabetic retinopathy ([facility]) O61.7542    Monoclonal gammopathy of unknown significance (MGUS) D47.2    Osteoporosis M81.0    Presence of intraocular lens Z96.1    Prolapse of vaginal wall N81.10    Tear film insufficiency H04.129    Tear of rotator cuff M75.100    Diabetes mellitus ([facility]) E11.9    Upper respiratory tract infection J06.9       Past Medical History:        Diagnosis Date    Anemia     Asthma     Atrial fibrillation ([facility])     [facility]re[facility] accident) [facility] [address] [facility]     Diabetic retinopathy ([facility] [facility]     Gout     Hx of blood clots     Hyperlipidemia     Hypertension     MGUS (monoclonal gammopathy of unknown significance)     Mild tricuspid regurgitation 03/11/2015    PAC (premature atrial contraction)     Pemphigoid     Pulmonary hypertension ([facility]) 03/11/2015    mild    Symptomatic bradycardia        Past Surgical History:        Procedure Laterality Date    APPENDECTOMY      CARDIAC CATHETERIZATION  3/12/2015    Dr Key Santiago  05/06/2015    [facility]M  ([facility] GEMMA    CATARACT REMOVAL      CHOLECYSTECTOMY      HYSTERECTOMY (CERVIX STATUS UNKNOWN)         Social History:    Social History     Tobacco Use    Smoking status: Never    Smokeless tobacco: Never   Substance Use Topics    Alcohol use: Not Currently                                Counseling given: Not Answered      Vital Signs (Current):   Vitals:    12/15/22 1401   Pulse: 64   Resp: 18   Temp: (!) 35.9 °C (96.7 °F)   SpO2: 98%   Weight: 150 lb (68 kg)   Height: 5' 3\" (1.6 m)                                              BP Readings from Last 3 Encounters:   10/20/22 (!) 142/70   07/28/22 122/61   06/02/22 (!) 149/62       NPO Status:  > 8 hours                                                                               BMI:   Wt Readings from Last 3 Encounters:   12/15/22 150 lb (68 kg)   10/20/22 151 lb 3.2 oz (68.6 kg)   07/27/22 154 lb (69.9 kg)     Body mass index is 26.57 kg/m². CBC:   Lab Results   Component Value Date/Time    WBC 4.7 11/23/2022 10:13 AM    RBC 2.99 11/23/2022 10:13 AM    HGB 9.0 11/23/2022 10:13 AM    HCT 29.4 11/23/2022 10:13 AM    MCV 98.3 11/23/2022 10:13 AM    RDW 14.5 11/23/2022 10:13 AM     11/23/2022 10:13 AM       CMP:   Lab Results   Component Value Date/Time     11/23/2022 10:13 AM    K 4.9 11/23/2022 10:13 AM     11/23/2022 10:13 AM    CO2 29 11/23/2022 10:13 AM    BUN 24 11/23/2022 10:13 AM    CREATININE 1.1 11/23/2022 10:13 AM    GFRAA 51 07/27/2022 12:46 AM    LABGLOM 47 11/23/2022 10:13 AM    GLUCOSE 73 11/23/2022 10:13 AM    PROT 6.7 07/27/2022 12:46 AM    CALCIUM 10.2 11/23/2022 10:13 AM    BILITOT 0.3 07/27/2022 12:46 AM    ALKPHOS 98 07/27/2022 12:46 AM    AST 17 07/27/2022 12:46 AM    ALT 11 07/27/2022 12:46 AM       POC Tests: No results for input(s): POCGLU, POCNA, POCK, POCCL, POCBUN, POCHEMO, POCHCT in the last 72 hours.     Coags:   Lab Results   Component Value Date/Time    PROTIME 17.3 01/14/2019 01:42 PM    INR 1.5 01/14/2019 01:42 PM    APTT 32.8 01/14/2019 01:42 PM       HCG (If Applicable): No results found for: PREGTESTUR, PREGSERUM, HCG, HCGQUANT     ABGs: No results found for: PHART, PO2ART, WQB3OVX, URQ6SYH, BEART, B1FYZOUO     Type & Screen (If Applicable):  No results found for: LABABO, LABRH    Drug/Infectious Status (If Applicable):  No results found for: HIV, HEPCAB    COVID-19 Screening (If Applicable):   Lab Results   Component Value Date/Time    COVID19 Not Detected 07/27/2022 12:46 AM           Anesthesia Evaluation  Patient summary reviewed and Nursing notes reviewed no history of anesthetic complications:   Airway: Mallampati: II  TM distance: <3 FB     Mouth opening: > = 3 FB   Dental:    (+) edentulous      Pulmonary:   (+) decreased breath sounds asthma:                            Cardiovascular:  Exercise tolerance: poor (<4 METS),   (+) hypertension: moderate, valvular problems/murmurs:, pacemaker (For generator change): pacemaker, dysrhythmias (CHB): atrial fibrillation, hyperlipidemia        Rhythm: regular  Rate: normal                    Neuro/Psych:   (+) CVA:, neuromuscular disease (Peripheral polyneuropathy):,             GI/Hepatic/Renal:             Endo/Other:    (+) DiabetesType II DM, , blood dyscrasia: anemia:., .                 Abdominal:             Vascular: Other Findings:           Anesthesia Plan      MAC     ASA 3       Induction: intravenous. Anesthetic plan and risks discussed with patient. Plan discussed with attending.                     TERESA Orona CRNA   12/15/2022

## 2022-12-15 NOTE — H&P
Electrophysiology   H&P Note  Vish Pineda  5/26/1931  Date of Service: 10/20/22   Referring Provider/PCP: Sienna Crow DO   Cardiology: Sara Weber MD  Electrophysiologist: Zully Hallman DO    SUBJECTIVE: Vish Pineda is a 80 y.o. female with a history of SND/3*AVB sp BSCI dc ppm (DOI: 5/6/15 with Guidant RA lead and [employer] Ra[employer]l RV lead-Dr Darryl Flanagan), nonvalvular PAF, HTN, mild pulmonary HTN, mild TR, DM, MGUS, peripheral neuropathy, anemia, and thrombocytopenia. She is managed by Dr Annie Henley with apixaban 5 mg BID, hydralazine 50 mg TID, losartan 50 mg daily, metoprolol XL 50 mg BID, and pitavastatin 2 mg weekly. In 3/2015, she was diagnosed with AF. In 5/2015, she had syncope and noted to have AF with slow ventricular rate, which was treated by Dr Darryl Flanagan with BSCI dc PPM with Guidant RA lead and [employer] RV lead. Unclear why those leads were chosen as this renders the device non-MRI conditional. In 2019, she had epistaxis, which was treated with arterial embolization and change from Xarelto to apixaban. In 6/2021, she was admitted for peripheral neuropathy and thrombocytopenia (20), so [address] held temporarily. Platelet count improved and [address] restarted . In 7/2022, she was admitted for [place], which was treated with discontinuation of  amlodipine. She presents today, 12/15/2022; for outpatient generator change. She is enrolled in remote monitoring, which most recently reported stable device function, RA pacing 92%, RV pacing 99%, and AF burden of 1%. She denies any complaints at this time other than her neuropathy. Her last dose of apixaban was 24 hours ago. .    Prior cardiac testing:  TTE (10/27/22): LVEF = 55-60%, mild concentric LVH, stage I LVDD, mild RV dilation, LAE, mild pulmonary HTN. ECG (7/27/22): AV paced rhythm at 66 bpm.  ECG (8/26/21): AP- at 80 bpm, PVC.   ECG (6/23/21): AV paced rhythm at 60 bpm.  Pharmacologic Nuclear Stress Test (8/3/19): LVEF = 64%, mild inferoseptal hypokinesis, and no ischemia. TTE (4/17/17): LVEF = 55%, stage I LVDD, mild RV dilation, mild TR, possible bicuspid AV. Pharmacologic Nuclear Stress (4/17/17): LVEF = 63%, small fixed inferoseptal likely due to subdiaphragmatic attenuation v infarct, and no ischemia. LHC (3/12/15): normal coronaries. LVEF 65%. Past Medical History:   Diagnosis Date    Anemia     Asthma     Atrial fibrillation (HCC)     CVA (cerebral vascular accident) ([facility]     Diabetic retinopathy ([facility]     Gout     Hyperlipidemia     Hypertension     MGUS (monoclonal gammopathy of unknown significance)     Mild tricuspid regurgitation 03/11/2015    PAC (premature atrial contraction)     Pemphigoid     Pulmonary hypertension ([facility] 03/11/2015    mild    Symptomatic bradycardia       Past Surgical History:   Procedure Laterality Date    APPENDECTOMY      CARDIAC CATHETERIZATION  3/12/2015    Dr Yasir Meade  05/06/2015    D-PPM  ([facility])   GEMMA    CATARACT REMOVAL      CHOLECYSTECTOMY      HYSTERECTOMY (CERVIX STATUS UNKNOWN)        Social History     Tobacco Use    Smoking status: Never    Smokeless tobacco: Never   Vaping Use    Vaping Use: Never used   Substance Use Topics    Alcohol use: Not Currently    Drug use: Never    No family history on file.    Current Outpatient Medications   Medication Sig Dispense Refill    ELIQUIS 5 MG TABS tablet take 1 tablet by mouth every 12 hours      PROMACTA 75 MG TABS tablet Take 50 mg by mouth daily       Probiotic Product (PROBIOTIC-10 PO) Take by mouth 2 times daily      acetaminophen (TYLENOL) 500 MG tablet Take 500 mg by mouth as needed      Loperamide HCl (IMODIUM PO) Take by mouth as needed      PREMARIN 0.625 MG/GM vaginal cream insert 0.5 grams vaginally TWICE WEEKLY      Vitamin D, Cholecalciferol, 25 MCG (1000 UT) CAPS Take by mouth daily      Omega-3 1000 MG CAPS Take by mouth daily      losartan (COZAAR) 50 MG tablet take 1 tablet by mouth once daily      pitavastatin (LIVALO) 2 MG TABS tablet Take 2 mg by mouth once a week       b complex vitamins capsule Take 1 capsule by mouth daily      metoprolol succinate (TOPROL XL) 50 MG extended release tablet Take 1 tablet by mouth 2 times daily 30 tablet 11    polyethyl glycol-propyl glycol 0.4-0.3 % (SYSTANE) 0.4-0.3 % ophthalmic solution 1 drop as needed for Dry Eyes      hydrALAZINE (APRESOLINE) 50 MG tablet Take 1 tablet by mouth 3 times daily. 90 tablet 11    allopurinol (ZYLOPRIM) 100 MG tablet Take 100 mg by mouth daily        No current facility-administered medications for this encounter. Allergies   Allergen Reactions    Ampicillin Itching    Iodine Itching     ROS:   Constitutional: Negative for fever, activity change and appetite change. HENT: Negative for epistaxis, difficulty swallowing. Eyes: Negative for blurred vision or double vision. Respiratory: Negative for cough, chest tightness, shortness of breath and wheezing. Cardiovascular: Negative for chest pain, palpitations and leg swelling. Gastrointestinal: Negative for abdominal pain, heartburn, or blood in stool. Genitourinary: Negative for hematuria, burning or frequency. Musculoskeletal: Negative for myalgias, stiffness, or swelling. Skin: Negative for rash, pain, or lumps. Neurological: Negative for syncope, seizures, or headaches. Psychiatric/Behavioral: Negative for confusion and agitation. The patient is not nervous/anxious. PHYSICAL EXAM:  Pulse 64   Temp (!) 96.7 °F (35.9 °C)   Resp 18   Ht 5' 3\" (1.6 m)   Wt 150 lb (68 kg)   SpO2 98%   BMI 26.57 kg/m²    Constitutional: Oriented to person, place, and time. Well-developed and cooperative. Head: Normocephalic and atraumatic. Eyes: Conjunctivae are normal. Cardiovascular: S1 normal, S2 normal and intact distal pulses. A regular rhythm present. PMI is not displaced. Pulmonary/Chest: Effort normal and breath sounds normal. No respiratory distress. Abdominal: Soft. No tenderness. Musculoskeletal: Normal range of motion of all extremities, no muscle weakness. Neurological: Alert and oriented to person, place, and time. Gait normal.   Skin: Skin is warm and dry. No bruising, no ecchymosis and no rash noted. Extremity: No clubbing or cyanosis. No edema. Psychiatric: Normal mood and affect. Thought content normal.  Pacemaker site: stable, well healed, no evidence of erosion. Assessment/Plan:   1. SND and 3* AV block sp BSCI dc PPM (DOI: 5/6/15 with Guidant RA lead and [employer] Ra[facility]l RV lead-Dr Sabrina Coto)   -Device is not MRI compatible due to leads (Guidant RA lead and [employer] Ra[facility]l RV lead). Unclear why those leads were chosen. -Stable device function.  -Battery at DOUG. Recommend battery change. Last dose of apixaban was 24 hours ago. Plan to restart apixaban 5 days after due to her history of thrombocytopenia. She has ampicillin allergy, so will use Vancomycin with TYRX followed by doxycycline 100 mg BID x 5 days.    -Recommend monitoring for pacing induced CM every 1-2 years. TTE 10/2022 with LVEF of 55-60%. -Continue remote monitoring of PPM every 91 days.  -Follow-up with Device Clinic on 12/27/22.  -Follow-up with EP provider in 3 months. 2.  OH  - Norvasc stopped in 7/2022 for [place]. OH VS normalized. 3.  Nonvalvular paroxysmal AF  -Initially diagnosed March 2015. -[id number] = 5 (age, sex, HTN, DM). Recommend [address] in females with score of 2 or more. DOAC preferred.  -Continue apixaban 5 mg BID. While on DOAC, recommend annual monitoring of CMP and CBC. -AF burden on interrogation of PPM has been </= 5% historically. Appears asymptomatic with LVEF > 50%. Continue to monitor. 4.  Anemia-thrombocytopenia  -Chronic issue.  -Management per Hematology ([facility] Dony [facility] at [facility]). -In general, would avoid [address] in patient's with platelet count < 28,337. Most recent was 96,000. Thank you for allowing me to participate in their care.      I spent a total of 40 minutes reviewing previous notes, test results, and face to face with the patient discussing the diagnosis and importance of compliance with the treatment plan as well as documenting on the day of the visit[place]  [address] [facility] Cardiac Electrophysiology  . [facility]    CC: Dr Lynn Funk

## 2022-12-15 NOTE — DISCHARGE INSTRUCTIONS
[facility] Cardiac Electrophysiology Pacemaker Generator Change Patient Discharge Instructions      Medications: resume prescribed medications, EXCEPT apixaban (Eliquis). RESTART apixaban (Eliquis) on 12/20/22. START doxycycline 100 mg tablet, take 1 tablet by mouth twice a day for 5 days. Script sent to [facility]. Follow-Up:   -Device Clinic: You will be seen on 12/27/22 at 11:00 am at the [address] [place] [address]  for an incision check and brief pacemaker evaluation.    -Dr Kishore Burt office: you will be contacted to schedule appointment with Dr Kishore Burt office in ~3 months. If you are not contacted within 10 business days, please call the office at [phone] to schedule. Incision Care: White pressure dressing: Remove white pressure dressing in 24 hours, evening of Friday 12/16/22. Brown Aquacel bandage: located under the white dressing and over your incision. Leave it on for 1 week. Remove it on Thursday 12/22/22. Surgical glue: located under the brown bandage and over the incision. Do NOT remove. It is there to prevent infection. It will fall off on its own over ~6 weeks. Daily monitoring: Check the incision site on your chest daily. Notify the office at [phone] if you develop any redness, swelling, drainage, warmth, or fever greater than 100 degrees. Bathing:  No soaking in a bathtub, hot tub, or pool for at least two weeks AND until cleared at device clinic. You may shower, but do NOT spray water directly at or scrub at the incision site. Carefully pat area dry with a clean towel when you get out of the shower, do NOT rub. Activity: You may continue regular daily activities tomorrow. You also may shower starting tomorrow. ID Card: You will have a temporary ID card until a permanent card is sent to you by the device company. The permanent card will look like a s license or credit card and should arrive within 8 weeks. Carry your ID card with you at all times.      [facility]  [address]  [address] [address],  [address], [address]  [phone]

## 2022-12-16 ENCOUNTER — TELEPHONE (OUTPATIENT)
Dept: CARDIAC CATH/INVASIVE PROCEDURES | Age: 87
End: 2022-12-16

## 2022-12-16 ENCOUNTER — TELEPHONE (OUTPATIENT)
Dept: NON INVASIVE DIAGNOSTICS | Age: 87
End: 2022-12-16

## 2022-12-16 LAB
EKG ATRIAL RATE: 67 BPM
EKG P AXIS: 66 DEGREES
EKG P-R INTERVAL: 200 MS
EKG Q-T INTERVAL: 474 MS
EKG QRS DURATION: 140 MS
EKG QTC CALCULATION (BAZETT): 500 MS
EKG R AXIS: -53 DEGREES
EKG T AXIS: 110 DEGREES
EKG VENTRICULAR RATE: 67 BPM

## 2022-12-16 NOTE — OP NOTE
Operative Note      Patient: Vandana Aldana  YOB: 1931  MRN: [id number]    Date of Procedure: 12/15/2022    Patient History: Vandana Aldana is a 80 y.o. female with a history of SND/3*AVB sp BSCI dc ppm (DOI: 5/6/15 with Guidant RA lead and [employer] Ra[facility]l RV lead-Dr Giana Thomas), nonvalvular PAF, HTN, mild pulmonary HTN, mild TR, DM, MGUS, peripheral neuropathy, anemia, and thrombocytopenia. She is managed by Dr Dalton Acuna with apixaban 5 mg BID, hydralazine 50 mg TID, losartan 50 mg daily, metoprolol XL 50 mg BID, and pitavastatin 2 mg weekly. In 3/2015, she was diagnosed with AF. In 5/2015, she had syncope and noted to have AF with slow ventricular rate, which was treated by Dr Giana Thomas with BSCI dc PPM with Guidant RA lead and [employer] Ra[employer]l RV lead. Unclear why those leads were chosen as this renders the device non-MRI conditional. In 2019, she had epistaxis, which was treated with arterial embolization and change from Xarelto to apixaban. In 6/2021, she was admitted for peripheral neuropathy and thrombocytopenia (20), so [address] held temporarily. Platelet count improved and [address] restarted . In 7/2022, she was admitted for [place], which was treated with discontinuation of  amlodipine. She presents today, 12/15/2022; for outpatient generator change. She is enrolled in remote monitoring, which most recently reported stable device function, RA pacing 92%, RV pacing 99%, and AF burden of 1%. She denies any complaints at this time other than her neuropathy. Her last dose of apixaban was 24 hours ago. Pre-Op Diagnosis: pacemaker battery depletion    Post-Op Diagnosis: same       Procedure:   Removal of permanent pacemaker pulse generator with replacement of pacemaker pulse generator; dual lead system (CPT code: 86000)    Surgeon: Zenobia Mcfarlane DO     Assistant: None     Anesthesia: see separate Anesthesia report     Estimated Blood Loss (mL): 10 mL     Complications: none  ? Detailed description of procedure:  Verbal and written informed consent was obtained from the patient. Patient was transported to the EP lab in a fasting state. The cardiac implanted electronic device was interrogated and noted to have stable device and lead function was compared to prior. The rate response feature was turned off. The patient is pacemaker dependent, so the device was reprogrammed to an asynchronous pacing mode. The defibrillator pads were attached to the patient. The left upper chest was inspected for hair at the anticipated incision site within the clavipectoral triangle and if present was removed with electric clippers. The site was then prepared with chlorhexidine gluconate and draped in a sterile fashion. Parenteral Vancomycin was administered within 1 hour prior to incision. Monitored anesthesia care was administered by anesthesia provider during the procedure. Local anesthesia with 2% lidocaine HCl was applied to the area of planned incision. An incision was made over the pocket. The incision was of adequate with to allow extraction of the pulse generator and lead connector assembly without undue force. A combination of manual dissection with a hemostat and User[employer] PlasmaBlade was used to locate the generator and leads in the pocket. PlasmaBalde uses a proprietary power output waveform to deliver energy along the exposed edge of a thin, insulated electrode; was used as shown to have less lead damage and shorter procedure time compared to traditional electrocautery and/or scissor dissection. Settings of CUT 5 and COAG 6 were used as shown to have less risk of lead damage compared to higher outputs. Dissection of the posterior capsule was not needed in order to facilitate lead connector mobility and avoid tension during the generator change. After accessing the pocket. The leads and generator were removed without issue. Each lead was disconnected from the old pulse generator.  The visible portions of leads were inspected and no evidence of outer insulation breach was observed. Lead function was assessed via clips to PSA and determined to be stable/adequate (sensing: A >/= 1 mV, v >/= 3 mV; capture: </= 2.5 V @ 0.5 msec). Each lead was then connected to the new pulse generator (ACCOLADE MRI DR IS-1 EL). The pocket was irrigated with antibacterial solution. The leads and generator were placed in a TYRX absorbable antibacterial envelope (minocycline and rifampin), then back in the pocket. SURGIFLO hemostatic matrix with thrombin was injected into the pocket above and below the device. The pocket was closed with multiple layers of suture. An absorbable 2-0 Vicryl violet braided suture with CT-1 needle was used to approximate the clavipectoral deep fascial layer with running suture technique with deep bites of equal spacing in order to isolate the pocket, restore anterior wall structure, and prevent device erosion. An absorbable 2-0 Vicryl violet braided suture with CT-1 needle was used to approximate the subcutaneous tissue with running suture technique with deep bites of equal spacing. An absorbable 4-0 Monocryl suture with reverse cutting needle was used to approximate the subcuticular tissue with minimal tension through running suture technique with closely spaced bites and buried sutured knot at the ends. Dermabond was placed over the incision. An Aquacel Ag surgical dressing was placed over the incision. Final lead assessment:  RA: sensing = 5.1 mV, impedance = 344 ohms, capture threshold = 0.5 V @ 0.4 msec  RV: sensing = paced mV, impedance = 355 ohms, capture threshold = 1.0 V @ 0.4 msec    The device was programmed:   DDDR 60/120 ppm   AV delays: 150 msec (sense) and 180 msec (pace)   Lead programming:   o RA lead: sensitivity =  0.25 mV, output = 3.5 V @ 0.4 msec   o RV lead: sensitivity =  1.5 mV, output =  3.5 V @ 0.4 msec.      SUMMARY: Successful generator change of [employer] dual chamber pacemaker implant in the setting of battery depletion, sinus node dysfunction, and AV block.      Electronically signed by Donte Tang DO on 12/15/2022 at 10:22 PM

## 2022-12-16 NOTE — TELEPHONE ENCOUNTER
Patient had generator change done by Dr Nancy Rmairez on 12/15/2022. Device updated in [employer], and new remote communicator ordered as well.

## 2022-12-16 NOTE — ANESTHESIA POSTPROCEDURE EVALUATION
Department of Anesthesiology  Postprocedure Note    Patient: Lang Wilson  MRN: [id number]  YOB: 1931  Date of evaluation: 12/15/2022      Procedure Summary     Date: 12/15/22 Room / Location: [facility] CATH LAB    Anesthesia Start: 4846 Anesthesia Stop: 2025    Procedure: PACEMAKER GENERATOR CHANGE W ANESTHESIA Diagnosis:       Presence of cardiac pacemaker      Abnormal electrocardiogram (ECG) (EKG)    Scheduled Providers: TERESA Ceron - CRNA;  Bailey Carbone MD Responsible Provider: Darryl Ann MD    Anesthesia Type: MAC ASA Status: 3          Anesthesia Type: MAC    Bianca Phase I:      Bianca Phase II:        Anesthesia Post Evaluation    Patient location during evaluation: PACU  Patient participation: complete - patient participated  Level of consciousness: awake  Airway patency: patent  Nausea & Vomiting: no nausea and no vomiting  Complications: no  Cardiovascular status: hemodynamically stable  Respiratory status: acceptable  Hydration status: stable

## 2022-12-20 ENCOUNTER — TELEPHONE (OUTPATIENT)
Dept: NON INVASIVE DIAGNOSTICS | Age: 87
End: 2022-12-20

## 2022-12-20 NOTE — TELEPHONE ENCOUNTER
Patient called back. Ok to reschedule appointment to 12/29/22 at 1130.     Sae Davalos RN, BSN  [facility]

## 2022-12-20 NOTE — TELEPHONE ENCOUNTER
I left a message to call the office about rescheduling 12/27/22 appointment to 12/29. There is no provider in the office on 12/27.     Ritu Sands RN, BSN  [facility]

## 2022-12-20 NOTE — TELEPHONE ENCOUNTER
I left a message on patient's daughter, Jodie, voicemail. We need to move her mother's appointment to Thursday, 12/29/22 at 1130. I asked Ninfa Sena to call the office regarding this.     Jacinto Morin RN, BSN  [facility]

## 2022-12-29 ENCOUNTER — NURSE ONLY (OUTPATIENT)
Dept: NON INVASIVE DIAGNOSTICS | Age: 87
End: 2022-12-29

## 2022-12-29 DIAGNOSIS — Z95.0 PACEMAKER: Primary | ICD-10-CM

## 2022-12-29 NOTE — PATIENT INSTRUCTIONS
You may shower starting now    Call if any signs or symptoms of infection [phone] ext: [phone]  Fevers, chills, redness, swelling or drainage.        [facility] up home  [address] [place], [place] [address] support (home monitoring) [phone]

## 2023-01-01 ENCOUNTER — APPOINTMENT (OUTPATIENT)
Dept: GENERAL RADIOLOGY | Age: 88
End: 2023-01-01
Payer: COMMERCIAL

## 2023-01-01 ENCOUNTER — HOSPITAL ENCOUNTER (OUTPATIENT)
Age: 88
Setting detail: OBSERVATION
End: 2023-05-22
Attending: EMERGENCY MEDICINE | Admitting: INTERNAL MEDICINE
Payer: COMMERCIAL

## 2023-01-01 VITALS — WEIGHT: 159 LBS | BODY MASS INDEX: 29.08 KG/M2 | TEMPERATURE: 97.3 F

## 2023-01-01 DIAGNOSIS — J96.02 ACUTE RESPIRATORY FAILURE WITH HYPOXIA AND HYPERCAPNIA (HCC): Primary | ICD-10-CM

## 2023-01-01 DIAGNOSIS — Z71.89 DNR (DO NOT RESUSCITATE) DISCUSSION: ICD-10-CM

## 2023-01-01 DIAGNOSIS — J96.01 ACUTE RESPIRATORY FAILURE WITH HYPOXIA AND HYPERCAPNIA (HCC): Primary | ICD-10-CM

## 2023-01-01 LAB
ACANTHOCYTES: ABNORMAL
ALBUMIN SERPL-MCNC: 2.9 G/DL (ref 3.5–5.2)
ALP SERPL-CCNC: 231 U/L (ref 35–104)
ALT SERPL-CCNC: 40 U/L (ref 0–32)
ANION GAP SERPL CALCULATED.3IONS-SCNC: 8 MMOL/L (ref 7–16)
ANISOCYTOSIS: ABNORMAL
AST SERPL-CCNC: 106 U/L (ref 0–31)
B.E.: 3.8 MMOL/L (ref -3–3)
BASOPHILS # BLD: 0.11 E9/L (ref 0–0.2)
BASOPHILS NFR BLD: 0.9 % (ref 0–2)
BILIRUB SERPL-MCNC: 0.3 MG/DL (ref 0–1.2)
BNP BLD-MCNC: 5365 PG/ML (ref 0–450)
BUN SERPL-MCNC: 55 MG/DL (ref 6–23)
BURR CELLS: ABNORMAL
CALCIUM SERPL-MCNC: 11.6 MG/DL (ref 8.6–10.2)
CHLORIDE SERPL-SCNC: 107 MMOL/L (ref 98–107)
CO2 SERPL-SCNC: 28 MMOL/L (ref 22–29)
COHB: 0.5 % (ref 0–1.5)
CREAT SERPL-MCNC: 1.2 MG/DL (ref 0.5–1)
CRITICAL: ABNORMAL
DATE ANALYZED: ABNORMAL
DATE OF COLLECTION: ABNORMAL
EKG ATRIAL RATE: 60 BPM
EKG P AXIS: 116 DEGREES
EKG P-R INTERVAL: 174 MS
EKG Q-T INTERVAL: 538 MS
EKG QRS DURATION: 192 MS
EKG QTC CALCULATION (BAZETT): 538 MS
EKG R AXIS: -39 DEGREES
EKG T AXIS: 111 DEGREES
EKG VENTRICULAR RATE: 60 BPM
EOSINOPHIL # BLD: 0 E9/L (ref 0.05–0.5)
EOSINOPHIL NFR BLD: 0.2 % (ref 0–6)
ERYTHROCYTE [DISTWIDTH] IN BLOOD BY AUTOMATED COUNT: 17.2 FL (ref 11.5–15)
GLUCOSE SERPL-MCNC: 153 MG/DL (ref 74–99)
HCO3: 29.8 MMOL/L (ref 22–26)
HCT VFR BLD AUTO: 27.4 % (ref 34–48)
HGB BLD-MCNC: 8.4 G/DL (ref 11.5–15.5)
HHB: 6.4 % (ref 0–5)
LAB: ABNORMAL
LYMPHOCYTES # BLD: 0.37 E9/L (ref 1.5–4)
LYMPHOCYTES NFR BLD: 2.6 % (ref 20–42)
Lab: ABNORMAL
MCH RBC QN AUTO: 29 PG (ref 26–35)
MCHC RBC AUTO-ENTMCNC: 30.7 % (ref 32–34.5)
MCV RBC AUTO: 94.5 FL (ref 80–99.9)
METHB: 0.3 % (ref 0–1.5)
MODE: ABNORMAL
MONOCYTES # BLD: 0.49 E9/L (ref 0.1–0.95)
MONOCYTES NFR BLD: 4.3 % (ref 2–12)
NEUTROPHILS # BLD: 11.32 E9/L (ref 1.8–7.3)
NEUTS SEG NFR BLD: 92.2 % (ref 43–80)
O2 CONTENT: 12.5 ML/DL
O2 SATURATION: 93.5 % (ref 92–98.5)
O2HB: 92.8 % (ref 94–97)
OPERATOR ID: 187
OVALOCYTES: ABNORMAL
PATIENT TEMP: 37 C
PCO2: 52.7 MMHG (ref 35–45)
PH BLOOD GAS: 7.37 (ref 7.35–7.45)
PLATELET # BLD AUTO: 55 E9/L (ref 130–450)
PLATELET CONFIRMATION: NORMAL
PMV BLD AUTO: ABNORMAL FL (ref 7–12)
PO2: 76.2 MMHG (ref 75–100)
POIKILOCYTES: ABNORMAL
POLYCHROMASIA: ABNORMAL
POTASSIUM SERPL-SCNC: 4.5 MMOL/L (ref 3.5–5)
PROT SERPL-MCNC: 5.9 G/DL (ref 6.4–8.3)
RBC # BLD AUTO: 2.9 E12/L (ref 3.5–5.5)
SODIUM SERPL-SCNC: 143 MMOL/L (ref 132–146)
SOURCE, BLOOD GAS: ABNORMAL
TARGET CELLS: ABNORMAL
THB: 9.5 G/DL (ref 11.5–16.5)
TIME ANALYZED: 1708
TROPONIN, HIGH SENSITIVITY: 96 NG/L (ref 0–9)
VACUOLATED NEUTROPHILS: ABNORMAL
WBC # BLD: 12.3 E9/L (ref 4.5–11.5)

## 2023-01-01 PROCEDURE — 2580000003 HC RX 258: Performed by: STUDENT IN AN ORGANIZED HEALTH CARE EDUCATION/TRAINING PROGRAM

## 2023-01-01 PROCEDURE — G0378 HOSPITAL OBSERVATION PER HR: HCPCS

## 2023-01-01 PROCEDURE — 84484 ASSAY OF TROPONIN QUANT: CPT

## 2023-01-01 PROCEDURE — 80053 COMPREHEN METABOLIC PANEL: CPT

## 2023-01-01 PROCEDURE — 82805 BLOOD GASES W/O2 SATURATION: CPT

## 2023-01-01 PROCEDURE — 71045 X-RAY EXAM CHEST 1 VIEW: CPT

## 2023-01-01 PROCEDURE — 93010 ELECTROCARDIOGRAM REPORT: CPT | Performed by: INTERNAL MEDICINE

## 2023-01-01 PROCEDURE — 94660 CPAP INITIATION&MGMT: CPT

## 2023-01-01 PROCEDURE — 83880 ASSAY OF NATRIURETIC PEPTIDE: CPT

## 2023-01-01 PROCEDURE — 99285 EMERGENCY DEPT VISIT HI MDM: CPT

## 2023-01-01 PROCEDURE — 93005 ELECTROCARDIOGRAM TRACING: CPT | Performed by: STUDENT IN AN ORGANIZED HEALTH CARE EDUCATION/TRAINING PROGRAM

## 2023-01-01 PROCEDURE — 6360000002 HC RX W HCPCS: Performed by: INTERNAL MEDICINE

## 2023-01-01 PROCEDURE — 85025 COMPLETE CBC W/AUTO DIFF WBC: CPT

## 2023-01-01 RX ORDER — BISACODYL 10 MG
10 SUPPOSITORY, RECTAL RECTAL
COMMUNITY

## 2023-01-01 RX ORDER — NIFEDIPINE 60 MG/1
60 TABLET, EXTENDED RELEASE ORAL DAILY PRN
COMMUNITY

## 2023-01-01 RX ORDER — 0.9 % SODIUM CHLORIDE 0.9 %
500 INTRAVENOUS SOLUTION INTRAVENOUS ONCE
Status: COMPLETED | OUTPATIENT
Start: 2023-01-01 | End: 2023-01-01

## 2023-01-01 RX ORDER — NOREPINEPHRINE BIT/0.9 % NACL 16MG/250ML
1-100 INFUSION BOTTLE (ML) INTRAVENOUS CONTINUOUS
Status: DISCONTINUED | OUTPATIENT
Start: 2023-01-01 | End: 2023-01-01

## 2023-01-01 RX ORDER — LORAZEPAM 2 MG/ML
1 INJECTION INTRAMUSCULAR EVERY 4 HOURS PRN
Status: DISCONTINUED | OUTPATIENT
Start: 2023-01-01 | End: 2023-01-01 | Stop reason: HOSPADM

## 2023-01-01 RX ORDER — MORPHINE SULFATE 2 MG/ML
2 INJECTION, SOLUTION INTRAMUSCULAR; INTRAVENOUS EVERY 4 HOURS PRN
Status: DISCONTINUED | OUTPATIENT
Start: 2023-01-01 | End: 2023-01-01 | Stop reason: HOSPADM

## 2023-01-01 RX ADMIN — SODIUM CHLORIDE 500 ML: 9 INJECTION, SOLUTION INTRAVENOUS at 18:21

## 2023-01-01 RX ADMIN — MORPHINE SULFATE 2 MG: 2 INJECTION, SOLUTION INTRAMUSCULAR; INTRAVENOUS at 23:34

## 2023-01-04 ENCOUNTER — TELEPHONE (OUTPATIENT)
Dept: NON INVASIVE DIAGNOSTICS | Age: 88
End: 2023-01-04

## 2023-01-04 NOTE — TELEPHONE ENCOUNTER
Patient called in to device clinic stating that her remote communicator is not working. Patient had a generator change done on 12/15/2022. Per [employer], the new transmitter had been ordered, but not yet shipped.

## 2023-03-25 ENCOUNTER — HOSPITAL ENCOUNTER (INPATIENT)
Age: 88
LOS: 6 days | Discharge: HOME | DRG: 378 | End: 2023-03-31
Attending: EMERGENCY MEDICINE | Admitting: INTERNAL MEDICINE
Payer: COMMERCIAL

## 2023-03-25 ENCOUNTER — APPOINTMENT (OUTPATIENT)
Dept: GENERAL RADIOLOGY | Age: 88
DRG: 378 | End: 2023-03-25
Payer: COMMERCIAL

## 2023-03-25 DIAGNOSIS — D64.9 ANEMIA, UNSPECIFIED TYPE: Primary | ICD-10-CM

## 2023-03-25 DIAGNOSIS — K92.2 GASTROINTESTINAL HEMORRHAGE, UNSPECIFIED GASTROINTESTINAL HEMORRHAGE TYPE: ICD-10-CM

## 2023-03-25 DIAGNOSIS — R05.1 ACUTE COUGH: ICD-10-CM

## 2023-03-25 LAB
ABO + RH BLD: NORMAL
ALBUMIN SERPL-MCNC: 3.6 G/DL (ref 3.5–5.2)
ALP SERPL-CCNC: 117 U/L (ref 35–104)
ALT SERPL-CCNC: 20 U/L (ref 0–32)
ANION GAP SERPL CALCULATED.3IONS-SCNC: 8 MMOL/L (ref 7–16)
AST SERPL-CCNC: 27 U/L (ref 0–31)
BASOPHILS # BLD: 0.03 E9/L (ref 0–0.2)
BASOPHILS NFR BLD: 0.3 % (ref 0–2)
BILIRUB SERPL-MCNC: 0.4 MG/DL (ref 0–1.2)
BLD GP AB SCN SERPL QL: NORMAL
BNP BLD-MCNC: ABNORMAL PG/ML (ref 0–450)
BUN SERPL-MCNC: 43 MG/DL (ref 6–23)
CALCIUM SERPL-MCNC: 9.4 MG/DL (ref 8.6–10.2)
CHLORIDE SERPL-SCNC: 110 MMOL/L (ref 98–107)
CO2 SERPL-SCNC: 25 MMOL/L (ref 22–29)
CREAT SERPL-MCNC: 1.1 MG/DL (ref 0.5–1)
EOSINOPHIL # BLD: 0.11 E9/L (ref 0.05–0.5)
EOSINOPHIL NFR BLD: 1 % (ref 0–6)
ERYTHROCYTE [DISTWIDTH] IN BLOOD BY AUTOMATED COUNT: 15.8 FL (ref 11.5–15)
GLUCOSE SERPL-MCNC: 92 MG/DL (ref 74–99)
HCT VFR BLD AUTO: 22.4 % (ref 34–48)
HGB BLD-MCNC: 7.3 G/DL (ref 11.5–15.5)
IMM GRANULOCYTES # BLD: 0.31 E9/L
IMM GRANULOCYTES NFR BLD: 2.8 % (ref 0–5)
INR BLD: 1.5
LYMPHOCYTES # BLD: 0.84 E9/L (ref 1.5–4)
LYMPHOCYTES NFR BLD: 7.7 % (ref 20–42)
MCH RBC QN AUTO: 28.6 PG (ref 26–35)
MCHC RBC AUTO-ENTMCNC: 32.6 % (ref 32–34.5)
MCV RBC AUTO: 87.8 FL (ref 80–99.9)
MONOCYTES # BLD: 1.06 E9/L (ref 0.1–0.95)
MONOCYTES NFR BLD: 9.7 % (ref 2–12)
NEUTROPHILS # BLD: 8.55 E9/L (ref 1.8–7.3)
NEUTS SEG NFR BLD: 78.5 % (ref 43–80)
PLATELET # BLD AUTO: 101 E9/L (ref 130–450)
PMV BLD AUTO: 12.9 FL (ref 7–12)
POTASSIUM SERPL-SCNC: 4.6 MMOL/L (ref 3.5–5)
PROT SERPL-MCNC: 6.4 G/DL (ref 6.4–8.3)
PROTHROMBIN TIME: 16.6 SEC (ref 9.3–12.4)
RBC # BLD AUTO: 2.55 E12/L (ref 3.5–5.5)
SODIUM SERPL-SCNC: 143 MMOL/L (ref 132–146)
TROPONIN, HIGH SENSITIVITY: 69 NG/L (ref 0–9)
TROPONIN, HIGH SENSITIVITY: 80 NG/L (ref 0–9)
WBC # BLD: 10.9 E9/L (ref 4.5–11.5)

## 2023-03-25 PROCEDURE — 71046 X-RAY EXAM CHEST 2 VIEWS: CPT

## 2023-03-25 PROCEDURE — 86850 RBC ANTIBODY SCREEN: CPT

## 2023-03-25 PROCEDURE — 6370000000 HC RX 637 (ALT 250 FOR IP): Performed by: INTERNAL MEDICINE

## 2023-03-25 PROCEDURE — 2140000000 HC CCU INTERMEDIATE R&B

## 2023-03-25 PROCEDURE — 85610 PROTHROMBIN TIME: CPT

## 2023-03-25 PROCEDURE — 86923 COMPATIBILITY TEST ELECTRIC: CPT

## 2023-03-25 PROCEDURE — 93005 ELECTROCARDIOGRAM TRACING: CPT | Performed by: NURSE PRACTITIONER

## 2023-03-25 PROCEDURE — 86901 BLOOD TYPING SEROLOGIC RH(D): CPT

## 2023-03-25 PROCEDURE — 86900 BLOOD TYPING SEROLOGIC ABO: CPT

## 2023-03-25 PROCEDURE — 83880 ASSAY OF NATRIURETIC PEPTIDE: CPT

## 2023-03-25 PROCEDURE — 85025 COMPLETE CBC W/AUTO DIFF WBC: CPT

## 2023-03-25 PROCEDURE — 99285 EMERGENCY DEPT VISIT HI MDM: CPT

## 2023-03-25 PROCEDURE — 80053 COMPREHEN METABOLIC PANEL: CPT

## 2023-03-25 PROCEDURE — 84484 ASSAY OF TROPONIN QUANT: CPT

## 2023-03-25 RX ORDER — ATORVASTATIN CALCIUM 10 MG/1
10 TABLET, FILM COATED ORAL DAILY
Status: DISCONTINUED | OUTPATIENT
Start: 2023-03-26 | End: 2023-03-31 | Stop reason: HOSPADM

## 2023-03-25 RX ORDER — METOPROLOL SUCCINATE 50 MG/1
50 TABLET, EXTENDED RELEASE ORAL 2 TIMES DAILY
Status: DISCONTINUED | OUTPATIENT
Start: 2023-03-25 | End: 2023-03-31 | Stop reason: HOSPADM

## 2023-03-25 RX ORDER — VITAMIN C
1 TAB ORAL DAILY
Status: DISCONTINUED | OUTPATIENT
Start: 2023-03-26 | End: 2023-03-31 | Stop reason: HOSPADM

## 2023-03-25 RX ORDER — ALLOPURINOL 100 MG/1
100 TABLET ORAL DAILY
Status: DISCONTINUED | OUTPATIENT
Start: 2023-03-26 | End: 2023-03-31 | Stop reason: HOSPADM

## 2023-03-25 RX ORDER — HYDRALAZINE HYDROCHLORIDE 50 MG/1
50 TABLET, FILM COATED ORAL 3 TIMES DAILY
Status: DISCONTINUED | OUTPATIENT
Start: 2023-03-25 | End: 2023-03-28

## 2023-03-25 RX ORDER — LOSARTAN POTASSIUM 100 MG/1
50 TABLET ORAL ONCE
Status: ON HOLD | COMMUNITY
End: 2023-03-31 | Stop reason: HOSPADM

## 2023-03-25 RX ORDER — ACETAMINOPHEN 500 MG
500 TABLET ORAL EVERY 4 HOURS PRN
Status: DISCONTINUED | OUTPATIENT
Start: 2023-03-25 | End: 2023-03-31 | Stop reason: HOSPADM

## 2023-03-25 RX ORDER — METHYLPREDNISOLONE 4 MG/1
TABLET ORAL
Status: ON HOLD | COMMUNITY
Start: 2022-08-09 | End: 2023-03-31 | Stop reason: HOSPADM

## 2023-03-25 RX ORDER — ELTROMBOPAG OLAMINE 50 MG/1
TABLET, FILM COATED ORAL
COMMUNITY
Start: 2023-02-21

## 2023-03-25 RX ORDER — POLYVINYL ALCOHOL 14 MG/ML
1 SOLUTION/ DROPS OPHTHALMIC PRN
Status: DISCONTINUED | OUTPATIENT
Start: 2023-03-25 | End: 2023-03-31 | Stop reason: HOSPADM

## 2023-03-25 RX ORDER — LOSARTAN POTASSIUM 50 MG/1
50 TABLET ORAL DAILY
Status: DISCONTINUED | OUTPATIENT
Start: 2023-03-26 | End: 2023-03-31 | Stop reason: HOSPADM

## 2023-03-25 RX ADMIN — HYDRALAZINE HYDROCHLORIDE 50 MG: 50 TABLET, FILM COATED ORAL at 22:02

## 2023-03-25 RX ADMIN — METOPROLOL SUCCINATE 50 MG: 50 TABLET, EXTENDED RELEASE ORAL at 22:02

## 2023-03-25 NOTE — ED PROVIDER NOTES
non toxic in NAD  Head: Normocephalic and atraumatic  Eyes: PERRL, EOMI, conjunctive normal, sclera non icteric  Mouth: Oropharynx clear, handling secretions, no trismus, no asymmetry of the posterior oropharynx or uvular edema  Neck: Supple, full ROM, s  Respiratory: Lungs clear to auscultation bilaterally, no wheezes, rales, or rhonchi. Not in respiratory distress  Cardiovascular:  Regular rate. Regular rhythm. 2+ distal pulses  Chest: No chest wall tenderness  GI:  Abdomen Soft, Non tender, Non distended. .  Heme positive stools with chaperone present  Musculoskeletal: Moves all extremities x 4. Warm and well perfused,   Integument: skin warm and dry. No rashes. Lymphatic: no lymphadenopathy noted  Neurologic: GCS 15, no focal deficits,   Psychiatric: Normal Affect      Medical Decision Making:    Patient with cough for 5 days. Also fatigue and shortness of breath. Chest x-ray clear. H&H no significant decrease from previous. Consideration of GI bleed/anemia/DEB/imbalance/COVID/flu/pneumonia/bronchitis/URI. Patient heme-positive. On Eliquis. No extensive bleeding currently so no emergent reversal of anticoagulation. Vital signs stable. Will admit for further inpatient care.      -------------------------------------------------- RESULTS -------------------------------------------------  I have personally reviewed all laboratory and imaging results for this patient. Results are listed below.      LABS:  Results for orders placed or performed during the hospital encounter of 03/25/23   CBC with Auto Differential   Result Value Ref Range    WBC 10.9 4.5 - 11.5 E9/L    RBC 2.55 (L) 3.50 - 5.50 E12/L    Hemoglobin 7.3 (L) 11.5 - 15.5 g/dL    Hematocrit 22.4 (L) 34.0 - 48.0 %    MCV 87.8 80.0 - 99.9 fL    MCH 28.6 26.0 - 35.0 pg    MCHC 32.6 32.0 - 34.5 %    RDW 15.8 (H) 11.5 - 15.0 fL    Platelets 770 (L) 208 - 450 E9/L    MPV 12.9 (H) 7.0 - 12.0 fL    Neutrophils % 78.5 43.0 - 80.0 %    Immature Granulocytes % 2.8 0.0 - 5.0 %    Lymphocytes % 7.7 (L) 20.0 - 42.0 %    Monocytes % 9.7 2.0 - 12.0 %    Eosinophils % 1.0 0.0 - 6.0 %    Basophils % 0.3 0.0 - 2.0 %    Neutrophils Absolute 8.55 (H) 1.80 - 7.30 E9/L    Immature Granulocytes # 0.31 E9/L    Lymphocytes Absolute 0.84 (L) 1.50 - 4.00 E9/L    Monocytes Absolute 1.06 (H) 0.10 - 0.95 E9/L    Eosinophils Absolute 0.11 0.05 - 0.50 E9/L    Basophils Absolute 0.03 0.00 - 0.20 E9/L   Comprehensive Metabolic Panel w/ Reflex to MG   Result Value Ref Range    Sodium 143 132 - 146 mmol/L    Potassium reflex Magnesium 4.6 3.5 - 5.0 mmol/L    Chloride 110 (H) 98 - 107 mmol/L    CO2 25 22 - 29 mmol/L    Anion Gap 8 7 - 16 mmol/L    Glucose 92 74 - 99 mg/dL    BUN 43 (H) 6 - 23 mg/dL    Creatinine 1.1 (H) 0.5 - 1.0 mg/dL    Est, Glom Filt Rate 47 >=60 mL/min/1.73    Calcium 9.4 8.6 - 10.2 mg/dL    Total Protein 6.4 6.4 - 8.3 g/dL    Albumin 3.6 3.5 - 5.2 g/dL    Total Bilirubin 0.4 0.0 - 1.2 mg/dL    Alkaline Phosphatase 117 (H) 35 - 104 U/L    ALT 20 0 - 32 U/L    AST 27 0 - 31 U/L   Troponin   Result Value Ref Range    Troponin, High Sensitivity 80 (H) 0 - 9 ng/L   Troponin   Result Value Ref Range    Troponin, High Sensitivity 69 (H) 0 - 9 ng/L   Brain Natriuretic Peptide   Result Value Ref Range    Pro-BNP 13,163 (H) 0 - 450 pg/mL   Protime-INR   Result Value Ref Range    Protime 16.6 (H) 9.3 - 12.4 sec    INR 1.5    EKG 12 Lead   Result Value Ref Range    Ventricular Rate 60 BPM    Atrial Rate 60 BPM    P-R Interval 182 ms    QRS Duration 182 ms    Q-T Interval 508 ms    QTc Calculation (Bazett) 508 ms    P Axis 84 degrees    R Axis -70 degrees    T Axis 84 degrees   TYPE AND SCREEN   Result Value Ref Range    ABO/Rh A POS     Antibody Screen NEG        RADIOLOGY:  Interpreted by Radiologist.  XR CHEST (2 VW)   Final Result   1. Cardiomegaly. There are no findings of failure or pneumonia. EKG:  This EKG is signed and interpreted by the EP.     Time:

## 2023-03-25 NOTE — ED NOTES
Department of Emergency Medicine  FIRST PROVIDER TRIAGE NOTE             [facility]           3/25/23  1:46 PM EDT    Date of Encounter: 3/25/23   MRN: [id number]      HPI: Nehemias Casper is a 80 y.o. female who presents to the ED for No chief complaint on file. Patient presents to the ER stating that the last 2 nights she has been experiencing a cough. She denies chest pain. She has been having SOB. She went to an Urgent care prior to coming here and they advised she should come to the ER. She was tested for flu and COVID today - both were negative. ROS: Negative for cp, abd pain, back pain, or fever. PE: Gen Appearance/Constitutional: alert  HEENT: NC/NT. PERRLA,  Airway patent. Initial Plan of Care: All treatment areas with department are currently occupied. Plan to order/Initiate the following while awaiting opening in ED: labs, EKG, and imaging studies.   Initiate Treatment-Testing, Proceed toTreatment Area When Bed Available for ED Attending/MLP to Continue Care    Electronically signed by TERESA Walls CNP   DD: 3/25/23      TERESA Walls CNP  03/25/23 5622

## 2023-03-25 NOTE — ED NOTES
Rectal exam performed by dr tucker with rn as chaperone.  Dr tucker states positive hemoccult stool      Chary King RN  03/25/23 8076

## 2023-03-26 LAB
BASOPHILS # BLD: 0.03 E9/L (ref 0–0.2)
BASOPHILS NFR BLD: 0.5 % (ref 0–2)
EKG ATRIAL RATE: 60 BPM
EKG P AXIS: 84 DEGREES
EKG P-R INTERVAL: 182 MS
EKG Q-T INTERVAL: 508 MS
EKG QRS DURATION: 182 MS
EKG QTC CALCULATION (BAZETT): 508 MS
EKG R AXIS: -70 DEGREES
EKG T AXIS: 84 DEGREES
EKG VENTRICULAR RATE: 60 BPM
EOSINOPHIL # BLD: 0.16 E9/L (ref 0.05–0.5)
EOSINOPHIL NFR BLD: 2.5 % (ref 0–6)
ERYTHROCYTE [DISTWIDTH] IN BLOOD BY AUTOMATED COUNT: 16.2 FL (ref 11.5–15)
HCT VFR BLD AUTO: 23.7 % (ref 34–48)
HGB BLD-MCNC: 7.1 G/DL (ref 11.5–15.5)
IMM GRANULOCYTES # BLD: 0.06 E9/L
IMM GRANULOCYTES NFR BLD: 0.9 % (ref 0–5)
LYMPHOCYTES # BLD: 0.84 E9/L (ref 1.5–4)
LYMPHOCYTES NFR BLD: 12.9 % (ref 20–42)
MCH RBC QN AUTO: 28.2 PG (ref 26–35)
MCHC RBC AUTO-ENTMCNC: 30 % (ref 32–34.5)
MCV RBC AUTO: 94 FL (ref 80–99.9)
MONOCYTES # BLD: 0.76 E9/L (ref 0.1–0.95)
MONOCYTES NFR BLD: 11.7 % (ref 2–12)
NEUTROPHILS # BLD: 4.67 E9/L (ref 1.8–7.3)
NEUTS SEG NFR BLD: 71.5 % (ref 43–80)
PLATELET # BLD AUTO: 119 E9/L (ref 130–450)
PMV BLD AUTO: 12.6 FL (ref 7–12)
RBC # BLD AUTO: 2.52 E12/L (ref 3.5–5.5)
WBC # BLD: 6.5 E9/L (ref 4.5–11.5)

## 2023-03-26 PROCEDURE — 2140000000 HC CCU INTERMEDIATE R&B

## 2023-03-26 PROCEDURE — 6370000000 HC RX 637 (ALT 250 FOR IP): Performed by: INTERNAL MEDICINE

## 2023-03-26 PROCEDURE — 85025 COMPLETE CBC W/AUTO DIFF WBC: CPT

## 2023-03-26 PROCEDURE — 93010 ELECTROCARDIOGRAM REPORT: CPT | Performed by: INTERNAL MEDICINE

## 2023-03-26 PROCEDURE — 36415 COLL VENOUS BLD VENIPUNCTURE: CPT

## 2023-03-26 RX ORDER — HYDRALAZINE HYDROCHLORIDE 25 MG/1
25 TABLET, FILM COATED ORAL EVERY 4 HOURS PRN
Status: DISCONTINUED | OUTPATIENT
Start: 2023-03-26 | End: 2023-03-31 | Stop reason: HOSPADM

## 2023-03-26 RX ADMIN — HYDRALAZINE HYDROCHLORIDE 50 MG: 50 TABLET, FILM COATED ORAL at 21:48

## 2023-03-26 RX ADMIN — HYDRALAZINE HYDROCHLORIDE 50 MG: 50 TABLET, FILM COATED ORAL at 14:34

## 2023-03-26 RX ADMIN — METOPROLOL SUCCINATE 50 MG: 50 TABLET, EXTENDED RELEASE ORAL at 21:48

## 2023-03-26 RX ADMIN — ALLOPURINOL 100 MG: 100 TABLET ORAL at 09:22

## 2023-03-26 RX ADMIN — Medication 1 TABLET: at 09:22

## 2023-03-26 RX ADMIN — LOSARTAN POTASSIUM 50 MG: 50 TABLET, FILM COATED ORAL at 09:22

## 2023-03-26 RX ADMIN — ATORVASTATIN CALCIUM 10 MG: 10 TABLET, FILM COATED ORAL at 09:22

## 2023-03-26 RX ADMIN — HYDRALAZINE HYDROCHLORIDE 25 MG: 25 TABLET, FILM COATED ORAL at 05:29

## 2023-03-26 RX ADMIN — HYDRALAZINE HYDROCHLORIDE 50 MG: 50 TABLET, FILM COATED ORAL at 09:22

## 2023-03-26 RX ADMIN — METOPROLOL SUCCINATE 50 MG: 50 TABLET, EXTENDED RELEASE ORAL at 09:22

## 2023-03-26 RX ADMIN — HYDRALAZINE HYDROCHLORIDE 25 MG: 25 TABLET, FILM COATED ORAL at 01:17

## 2023-03-26 NOTE — PATIENT CARE CONFERENCE
P Quality Flow/Interdisciplinary Rounds Progress Note        Quality Flow Rounds held on March 26, 2023    Disciplines Attending:  Bedside Nurse, , , and Nursing Unit Leadership    Taylor Alexander was admitted on 3/25/2023  4:53 PM    Anticipated Discharge Date:       Disposition:    Luca Score:  Luca Scale Score: 18    Readmission Risk              Risk of Unplanned Readmission:  17           Discussed patient goal for the day, patient clinical progression, and barriers to discharge.   The following Goal(s) of the Day/Commitment(s) have been identified: report labs/diagnostics       Oma Martino RN  March 26, 2023

## 2023-03-26 NOTE — PROGRESS NOTES
Comprehensive Nutrition Assessment    Type and Reason for Visit:  Initial, Positive Nutrition Screen    Nutrition Recommendations/Plan:   Continue current diet & ADAT  Start Gelatein BID to promote oral intake  Will monitor     Malnutrition Assessment:  Malnutrition Status: At risk for malnutrition (Comment) (03/26/23 8258)    Context:  Chronic Illness     Findings of the 6 clinical characteristics of malnutrition:  Energy Intake:  Mild decrease in energy intake (Comment) (difficult to assess longterm intake at this time)  Weight Loss:  Mild weight loss (specify amount and time period) (~2.6% in ~ 5 mo)     Body Fat Loss:  Unable to assess     Muscle Mass Loss:  Unable to assess    Fluid Accumulation:  No significant fluid accumulation     Strength:  Not Performed    Nutrition Assessment:    pt adm d/t SOB/cough- Covid negative ;PMhx of AFIB, CVA, DM, HTN; pt w/ suspected GIB; will start ONS to promote oral intake and monitor. Nutrition Related Findings:    +1 edema; I/O WNL; alert; U/L dentures; active BS Wound Type: None       Current Nutrition Intake & Therapies:    Average Meal Intake: %, 26-50%  Average Supplements Intake: None Ordered  ADULT DIET; Clear Liquid  ADULT ORAL NUTRITION SUPPLEMENT; Breakfast, Lunch; Other Oral Supplement; Gelatein    Anthropometric Measures:  Height: 5' 2\" (157.5 cm)  Ideal Body Weight (IBW): 110 lbs (50 kg)    Admission Body Weight: 147 lb 11.2 oz (67 kg) (3/25-SS)  Current Body Weight: 147 lb 11.2 oz (67 kg) (3/25-SS), 134.3 % IBW. Weight Source: Standing Scale  Current BMI (kg/m2): 27  Usual Body Weight: 151 lb 3 oz (68.6 kg) (10/20/22-actual)  % Weight Change (Calculated): -2.3  Weight Adjustment For: No Adjustment                 BMI Categories: Overweight (BMI 25.0-29. 9)    Estimated Daily Nutrient Needs:  Energy Requirements Based On: Formula  Weight Used for Energy Requirements: Current  Energy (kcal/day): 6318-7964  Weight Used for Protein Requirements:

## 2023-03-26 NOTE — H&P
hours  PROMACTA 75 MG TABS tablet, Take 50 mg by mouth daily   Probiotic Product (PROBIOTIC-10 PO), Take by mouth 2 times daily  acetaminophen (TYLENOL) 500 MG tablet, Take 500 mg by mouth as needed  Loperamide HCl (IMODIUM PO), Take by mouth as needed  PREMARIN 0.625 MG/GM vaginal cream, insert 0.5 grams vaginally TWICE WEEKLY  Vitamin D, Cholecalciferol, 25 MCG (1000 UT) CAPS, Take by mouth daily  Omega-3 1000 MG CAPS, Take by mouth daily  losartan (COZAAR) 50 MG tablet, take 1 tablet by mouth once daily  pitavastatin (LIVALO) 2 MG TABS tablet, Take 2 mg by mouth once a week   b complex vitamins capsule, Take 1 capsule by mouth daily  metoprolol succinate (TOPROL XL) 50 MG extended release tablet, Take 1 tablet by mouth 2 times daily  polyethyl glycol-propyl glycol 0.4-0.3 % (SYSTANE) 0.4-0.3 % ophthalmic solution, 1 drop as needed for Dry Eyes  hydrALAZINE (APRESOLINE) 50 MG tablet, Take 1 tablet by mouth 3 times daily. allopurinol (ZYLOPRIM) 100 MG tablet, Take 100 mg by mouth daily     Allergies:  Ampicillin and Iodine    Social History:   Social History     Socioeconomic History    Marital status:       Spouse name: Not on file    Number of children: Not on file    Years of education: Not on file    Highest education level: Not on file   Occupational History    Not on file   Tobacco Use    Smoking status: Never    Smokeless tobacco: Never   Vaping Use    Vaping Use: Never used   Substance and Sexual Activity    Alcohol use: Not Currently    Drug use: Never    Sexual activity: Not on file   Other Topics Concern    Not on file   Social History Narrative    Drinks 1coffee/tea/hot ari daily     Social Determinants of Health     Financial Resource Strain: Not on file   Food Insecurity: Not on file   Transportation Needs: Not on file   Physical Activity: Not on file   Stress: Not on file   Social Connections: Not on file   Intimate Partner Violence: Not on file   Housing Stability: Not on file         Family

## 2023-03-27 LAB
ANION GAP SERPL CALCULATED.3IONS-SCNC: 8 MMOL/L (ref 7–16)
BUN SERPL-MCNC: 26 MG/DL (ref 6–23)
CALCIUM SERPL-MCNC: 9.7 MG/DL (ref 8.6–10.2)
CHLORIDE SERPL-SCNC: 108 MMOL/L (ref 98–107)
CO2 SERPL-SCNC: 26 MMOL/L (ref 22–29)
CREAT SERPL-MCNC: 0.9 MG/DL (ref 0.5–1)
ERYTHROCYTE [DISTWIDTH] IN BLOOD BY AUTOMATED COUNT: 16.2 FL (ref 11.5–15)
FERRITIN SERPL-MCNC: 117 NG/ML
FOLATE SERPL-MCNC: 11.5 NG/ML (ref 4.8–24.2)
GLUCOSE SERPL-MCNC: 85 MG/DL (ref 74–99)
HCT VFR BLD AUTO: 25.8 % (ref 34–48)
HCT VFR BLD AUTO: 27.1 % (ref 34–48)
HGB BLD-MCNC: 8 G/DL (ref 11.5–15.5)
IMMATURE RETIC FRACT: 26.8 % (ref 3–15.9)
IRON SATN MFR SERPL: 34 % (ref 15–50)
IRON SERPL-MCNC: 135 MCG/DL (ref 37–145)
MCH RBC QN AUTO: 29 PG (ref 26–35)
MCHC RBC AUTO-ENTMCNC: 31 % (ref 32–34.5)
MCV RBC AUTO: 93.5 FL (ref 80–99.9)
PLATELET # BLD AUTO: 115 E9/L (ref 130–450)
PMV BLD AUTO: 13.7 FL (ref 7–12)
POTASSIUM SERPL-SCNC: 4.3 MMOL/L (ref 3.5–5)
RBC # BLD AUTO: 2.76 E12/L (ref 3.5–5.5)
REASON FOR REJECTION: NORMAL
REJECTED TEST: NORMAL
RETIC HGB EQUIVALENT: 34.1 PG (ref 28.2–36.6)
RETICS/RBC NFR: 1.6 % (ref 0.4–1.9)
RETICULOCYTE ABSOLUTE COUNT: 0.05 E12/L
SODIUM SERPL-SCNC: 142 MMOL/L (ref 132–146)
TIBC SERPL-MCNC: 397 MCG/DL (ref 250–450)
VIT B12 SERPL-MCNC: >2000 PG/ML (ref 211–946)
WBC # BLD: 7.1 E9/L (ref 4.5–11.5)

## 2023-03-27 PROCEDURE — 36415 COLL VENOUS BLD VENIPUNCTURE: CPT

## 2023-03-27 PROCEDURE — 82607 VITAMIN B-12: CPT

## 2023-03-27 PROCEDURE — 80048 BASIC METABOLIC PNL TOTAL CA: CPT

## 2023-03-27 PROCEDURE — 85027 COMPLETE CBC AUTOMATED: CPT

## 2023-03-27 PROCEDURE — 82728 ASSAY OF FERRITIN: CPT

## 2023-03-27 PROCEDURE — C9113 INJ PANTOPRAZOLE SODIUM, VIA: HCPCS | Performed by: STUDENT IN AN ORGANIZED HEALTH CARE EDUCATION/TRAINING PROGRAM

## 2023-03-27 PROCEDURE — 6370000000 HC RX 637 (ALT 250 FOR IP): Performed by: INTERNAL MEDICINE

## 2023-03-27 PROCEDURE — 2140000000 HC CCU INTERMEDIATE R&B

## 2023-03-27 PROCEDURE — 6360000002 HC RX W HCPCS: Performed by: STUDENT IN AN ORGANIZED HEALTH CARE EDUCATION/TRAINING PROGRAM

## 2023-03-27 PROCEDURE — 82746 ASSAY OF FOLIC ACID SERUM: CPT

## 2023-03-27 PROCEDURE — 6370000000 HC RX 637 (ALT 250 FOR IP): Performed by: STUDENT IN AN ORGANIZED HEALTH CARE EDUCATION/TRAINING PROGRAM

## 2023-03-27 PROCEDURE — 82668 ASSAY OF ERYTHROPOIETIN: CPT

## 2023-03-27 PROCEDURE — 83540 ASSAY OF IRON: CPT

## 2023-03-27 PROCEDURE — 85045 AUTOMATED RETICULOCYTE COUNT: CPT

## 2023-03-27 PROCEDURE — 83550 IRON BINDING TEST: CPT

## 2023-03-27 RX ORDER — SUCRALFATE ORAL 1 G/10ML
1 SUSPENSION ORAL EVERY 6 HOURS SCHEDULED
Status: DISCONTINUED | OUTPATIENT
Start: 2023-03-27 | End: 2023-03-27

## 2023-03-27 RX ORDER — SODIUM CHLORIDE 9 MG/ML
INJECTION, SOLUTION INTRAVENOUS PRN
Status: DISCONTINUED | OUTPATIENT
Start: 2023-03-27 | End: 2023-03-27

## 2023-03-27 RX ORDER — PANTOPRAZOLE SODIUM 40 MG/10ML
40 INJECTION, POWDER, LYOPHILIZED, FOR SOLUTION INTRAVENOUS 2 TIMES DAILY
Status: DISCONTINUED | OUTPATIENT
Start: 2023-03-27 | End: 2023-03-29

## 2023-03-27 RX ORDER — SUCRALFATE 1 G/1
1 TABLET ORAL EVERY 6 HOURS SCHEDULED
Status: DISCONTINUED | OUTPATIENT
Start: 2023-03-27 | End: 2023-03-29

## 2023-03-27 RX ADMIN — PANTOPRAZOLE SODIUM 40 MG: 40 INJECTION, POWDER, FOR SOLUTION INTRAVENOUS at 20:28

## 2023-03-27 RX ADMIN — ATORVASTATIN CALCIUM 10 MG: 10 TABLET, FILM COATED ORAL at 08:27

## 2023-03-27 RX ADMIN — HYDRALAZINE HYDROCHLORIDE 50 MG: 50 TABLET, FILM COATED ORAL at 08:29

## 2023-03-27 RX ADMIN — PANTOPRAZOLE SODIUM 40 MG: 40 INJECTION, POWDER, FOR SOLUTION INTRAVENOUS at 14:44

## 2023-03-27 RX ADMIN — HYDRALAZINE HYDROCHLORIDE 25 MG: 25 TABLET, FILM COATED ORAL at 12:09

## 2023-03-27 RX ADMIN — HYDRALAZINE HYDROCHLORIDE 50 MG: 50 TABLET, FILM COATED ORAL at 20:28

## 2023-03-27 RX ADMIN — ALLOPURINOL 100 MG: 100 TABLET ORAL at 08:29

## 2023-03-27 RX ADMIN — POLYVINYL ALCOHOL 1 DROP: 14 SOLUTION/ DROPS OPHTHALMIC at 20:28

## 2023-03-27 RX ADMIN — HYDRALAZINE HYDROCHLORIDE 50 MG: 50 TABLET, FILM COATED ORAL at 14:44

## 2023-03-27 RX ADMIN — Medication 1 TABLET: at 08:29

## 2023-03-27 RX ADMIN — SUCRALFATE 1 G: 1 TABLET ORAL at 17:45

## 2023-03-27 RX ADMIN — LOSARTAN POTASSIUM 50 MG: 50 TABLET, FILM COATED ORAL at 08:27

## 2023-03-27 RX ADMIN — HYDRALAZINE HYDROCHLORIDE 25 MG: 25 TABLET, FILM COATED ORAL at 23:05

## 2023-03-27 RX ADMIN — METOPROLOL SUCCINATE 50 MG: 50 TABLET, EXTENDED RELEASE ORAL at 08:29

## 2023-03-27 RX ADMIN — HYDRALAZINE HYDROCHLORIDE 25 MG: 25 TABLET, FILM COATED ORAL at 04:31

## 2023-03-27 RX ADMIN — HYDRALAZINE HYDROCHLORIDE 25 MG: 25 TABLET, FILM COATED ORAL at 00:56

## 2023-03-27 RX ADMIN — METOPROLOL SUCCINATE 50 MG: 50 TABLET, EXTENDED RELEASE ORAL at 20:28

## 2023-03-27 RX ADMIN — POLYVINYL ALCOHOL 1 DROP: 14 SOLUTION/ DROPS OPHTHALMIC at 13:45

## 2023-03-27 RX ADMIN — SUCRALFATE 1 G: 1 TABLET ORAL at 23:05

## 2023-03-27 RX ADMIN — SUCRALFATE 1 G: 1 TABLET ORAL at 14:43

## 2023-03-27 NOTE — PROGRESS NOTES
Subjective:  Chart reviewed  Patient presented to ED with cough  Hgb 7.3, FOBT+ on eliquis (atrial fibrillation)  BNP 13,163, echo 93/2675 stage I diastolic failure  PT/INR 30.6/7.6, LFTs normal  The patient is awake and alert. No problems overnight  Continued dry cough, sometime with PO intake, daughter believes more at bedtime and associated with increased sinus drainage  Promacta brought in from home to continue  Right inguinal bruising, uncertain how she got it  Tolerating diet. Denies chest pain, angina, dyspnea, abdominal discomfort, nausea/vomiting and diarrhea/constipation, nose bleeds, hemoptysis, blood in her urine       Objective:    BP (!) 172/74   Pulse 64   Temp 97.5 °F (36.4 °C) (Temporal)   Resp 16   Ht 5' 2\" (1.575 m)   Wt 147 lb 11.2 oz (67 kg)   SpO2 94%   BMI 27.01 kg/m²     General: NAD, awake and alert looks younger than stated age  HEENT: normocephalic/atraumatic, mucosa dry without ulcerations,thrush or mucositis, EOMI, PERRLA, sclera anicteric, conjuntiva pink  NECK: supple, trachea midline  Heart:  IRRR, no murmurs, gallops, or rubs.   Lungs:  CTA bilaterally, no wheeze, rales or rhonchi  Abd: BS present, nontender, nondistended, no masses  Extrem:  No clubbing, cyanosis, or edema  Lymphatics: No palpable adenopathy in cervical and supraclavicular regions  : no webb  Skin: ecchymosis in right groin, non-tender and not palpable, no petechia or purpura    CBC with Differential:    Lab Results   Component Value Date/Time    WBC 6.5 03/26/2023 05:08 AM    RBC 2.52 03/26/2023 05:08 AM    HGB 7.1 03/26/2023 05:08 AM    HCT 23.7 03/26/2023 05:08 AM     03/26/2023 05:08 AM    MCV 94.0 03/26/2023 05:08 AM    MCH 28.2 03/26/2023 05:08 AM    MCHC 30.0 03/26/2023 05:08 AM    RDW 16.2 03/26/2023 05:08 AM    LYMPHOPCT 12.9 03/26/2023 05:08 AM    MONOPCT 11.7 03/26/2023 05:08 AM    BASOPCT 0.5 03/26/2023 05:08 AM    MONOSABS 0.76 03/26/2023 05:08 AM    LYMPHSABS 0.84 03/26/2023 05:08 pneumonia. 80year old woman with history of IgM MGUS diagnosed 2010 who follows outpatient with Dr. Bee Mao last seen in the office 3/6/2023. MGUS parameters are stable. 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 daily. Renal insufficiency anemia, Stage III CKD. A. fib status post pacemaker pacemaker battery replaced January 2023. CVA in June 2021  She is on Eliquis and aspirin for her atrial fibrillation. As long as platelet count remains above 50,000 and she can stay on these medications. She has renal insufficiency anemia but they would rather not initiate the Aranesp. Monitor MGUS  parameters. The IgM level has been stable. She will continue with small little meals to maintain her weight. She is on gabapentin and Tylenol to help alleviate some of her arthritis and arthralgias. She is on a 3 month follow-up. A/P  -ITP, controlled on Promacta 50 mg daily should continue  -anemia, FOBT+ likely multifactorial with concern for GI blood loss causing acute worsening  - Hgb 7.3 - 7.1 - 8, improved today no transfusion indicated   -for surgery evaluation for EGD/colonoscopy,  -iron studies, B12/folate, retic count and EPO level (deferred Aranep at outpatient visit)  -cough, ?swallow evaluation  -IgM MGUS 2010, follow no current treatments indicated    Thank you for allowing us to participate in the care of Manpreet Kumarmogino. Marlee Mas PA-C  [phone]    Electronically signed by SYLVIA Faith on 3/27/2023 at 8:47 AM    Note: This report was completed using [employer] voiced recognition software. Every effort has been made to ensure accuracy; however, inadvertent computerized transcription errors may be present.

## 2023-03-27 NOTE — ACP (ADVANCE CARE PLANNING)
Advance Care Planning   Healthcare Decision Maker:    Primary Decision Maker: Dorinda Zhong - [phone]    Secondary Decision Maker: Drew Ramachandran - [phone]    Click here to complete Healthcare Decision Makers including selection of the Healthcare Decision Maker Relationship (ie \"Primary\").

## 2023-03-27 NOTE — PROGRESS NOTES
Gerri Suarez called regarding patient consult to gen/surg from 3/25 2130.  Consult placed via perfect serve for anemia to gen/surg residents

## 2023-03-27 NOTE — CONSULTS
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)    Hearing loss    Hernia of urinary bladder    History of cardiac pacemaker in situ    Impacted cerumen    Intermittent claudication of both lower extremities due to atherosclerosis (HCC)    Irreducible umbilical hernia    Lens replaced by other means    Mild nonproliferative diabetic retinopathy (HCC)    Monoclonal gammopathy of unknown significance (MGUS)    Osteoporosis    Presence of intraocular lens    Prolapse of vaginal wall    Tear film insufficiency    Tear of rotator cuff    Diabetes mellitus ([address]    Upper respiratory tract infection    GI bleed       Past Medical History:   Diagnosis Date    Anemia     Asthma     Atrial fibrillation (HCC)     CVA (cerebral vascular accident) ([address]     Diabetic retinopathy ([address]     Gout     Hx of blood clots     Hyperlipidemia     Hypertension     MGUS (monoclonal gammopathy of unknown significance)     Mild tricuspid regurgitation 03/11/2015    PAC (premature atrial contraction)     Pemphigoid     Pulmonary hypertension ([address] 03/11/2015    mild    Symptomatic bradycardia        Past Surgical History:   Procedure Laterality Date    APPENDECTOMY      CARDIAC CATHETERIZATION  03/12/2015    Dr Zeus Buchanan  05/06/2015    D-PPM  ([facility])   GEMMA    CATARACT REMOVAL      CHOLECYSTECTOMY      HYSTERECTOMY (CERVIX STATUS UNKNOWN)      PACEMAKER CHANGE Left 12/15/2022    [employer] Dual PPM-GR (Dr. Kwok Ing)       Prior to Admission medications    Medication Sig Start Date End Date Taking?  Authorizing Provider left lung infiltrate is noted. There is a dual lead cardiac pacer on the left. Minimal pleuroparenchymal scarring is seen within the left costophrenic angle. 1. Cardiomegaly. There are no findings of failure or pneumonia. I have reviewed the relevant labs/imaging from this admission and my interpretation is included in my assessment and plan. ASSESSMENT:  80 y.o. female with recent history of melenic stools. She has not required any blood transfusions since she has been in the hospital.  Her Eliquis has been held which she takes for atrial fibrillation. Plan for the following:      PLAN:  - empiric IV ppi bid, carafate  - monitor Hgb  - plan for esophagogastroduodenoscopy w/ possible intervention on 3/28 w/ Dr. Quique Arnold  - npo midnight, procedure consent ordered      Tiki Diehl MD  General Surgery Resident, PGY-4    Electronically signed on 3/27/23 at 12:58 PM EDT    The patient was seen and examined and the chart was reviewed. The patient will undergo an EGD. The patient will be continued on the proton pump inhibitor.

## 2023-03-27 NOTE — PATIENT CARE CONFERENCE
[facility] Quality Flow/Interdisciplinary Rounds Progress Note        Quality Flow Rounds held on March 27, 2023    Disciplines Attending:  Bedside Nurse, , , and Nursing Unit Leadership    [place] was admitted on 3/25/2023  4:53 PM    Anticipated Discharge Date:       Disposition:    Luca Score:  Luca Scale Score: 18    Readmission Risk              Risk of Unplanned Readmission:  17           Discussed patient goal for the day, patient clinical progression, and barriers to discharge.   The following Goal(s) of the Day/Commitment(s) have been identified:  Labs - Report Results and consults to see      Nakita Mayo RN  March 27, 2023

## 2023-03-27 NOTE — PROGRESS NOTES
[facility]                                Progress note    Subjective: The patient is awake and alert. Up in bed visiting with family     Objective:    BP (!) 170/80   Pulse 62   Temp 97 °F (36.1 °C) (Temporal)   Resp 18   Ht 5' 2\" (1.575 m)   Wt 147 lb 11.2 oz (67 kg)   SpO2 98%   BMI 27.01 kg/m²     In: 1380 [P.O.:1380]  Out: 400   In: 1380   Out: 400 [Urine:400]    General appearance: NAD, conversant  HEENT: AT/NC, MMM  Neck: FROM, supple  Lungs: Clear to auscultation  CV: RRR, no MRGs, pacer   Vasc: Radial pulses 2+  Abdomen: Soft, non-tender; no masses or HSM  Extremities: No peripheral edema or digital cyanosis  Skin: no rash, lesions or ulcers  Psych: Alert and oriented to person, place and time  Neuro: Alert and interactive     Recent Labs     03/25/23  1400 03/26/23  0508 03/27/23  1111   WBC 10.9 6.5 7.1   HGB 7.3* 7.1* 8.0*   HCT 22.4* 23.7* 25.8*   * 119* 115*       Recent Labs     03/25/23  1400 03/27/23  1111    142   K 4.6 4.3   * 108*   CO2 25 26   BUN 43* 26*   CREATININE 1.1* 0.9   CALCIUM 9.4 9.7       Assessment:    Principal Problem:    GI bleed  Resolved Problems:    * No resolved hospital problems.  *      Plan:  Patient is a 80-year-old female admitted to [facility] for  GI bleed  -Monitor labs  -H&H 7.1/23.7 > 8.0/25.8  -Continue to hold Eliquis and ASA  -General surgery following  -PPI twice daily  -Carafate q6h  -Plans for EGD 4/65-QF she is not certain that she wants this-prefers conservative management    Hypertension  -Continue home medications  -May need some adjustments given elevated Bps    Hx of IgM MGUS and ITP  -Continue home promacta 50 mg daily  -Platelet count [id number] monitoring,  -Hem/onc following     Patient is okay for discharge from medicine standpoint, after EGD is completed    Code status: Full  Consultants: Hem/onc and GS     DVT Prophylaxis PCD's  PT/OT  Discharge planning       Walter Dominguez, APRN - CNP  2:39

## 2023-03-27 NOTE — CARE COORDINATION
Transition of care: General surgery and hem/onc following. Pt for EGD tomorrow. Met with pt and pt's daughter, David Godinez, in room. Pt lives with David Godinez in a 1 story home. Has 3 stairs with 1 rail to enter home. Needs assistance x 1 with ADLs. David Godinez provides transportation. DME- FWW, 3:1 commode, tub bench, cane, pulse ox and a bp cuff/monitor. Plan is to return home with her daughter at discharge. Pt stated she is in process of getting home care aides. Pt believes she is working with [facility]. David Godinez is to check pt's paperwork at home tonight to verify they are working with [facility]. They would like [facility] at discharge for skilled nursing and therapies. I provided them with [facility] agency list to make choices. Sw/cm will follow up tomorrow for their choices. Pt will need to work with pt/ot. PCP is Dr Sarah Loo and pharmacy is [facility] on [address] [place] [address]. Sw/cm will follow. Case Management Assessment  Initial Evaluation    Date/Time of Evaluation: 3/27/2023 4:32 PM  Assessment Completed by: Tommy Sapp RN    If patient is discharged prior to next notation, then this note serves as note for discharge by case management. Patient Name: Keira Daley                   YOB: 1931  Diagnosis: GI bleed [K92.2]  Gastrointestinal hemorrhage, unspecified gastrointestinal hemorrhage type [K92.2]  Anemia, unspecified type [D64.9]  Acute cough [R05.1]                   Date / Time: 3/25/2023  4:53 PM    Patient Admission Status: Inpatient   Readmission Risk (Low < 19, Mod (19-27), High > 27): Readmission Risk Score: 14.4    Current PCP: Shay Isbell, DO  PCP verified by CM?  Yes    Chart Reviewed: Yes      History Provided by: Patient, Child/Family  Patient Orientation: Alert and Oriented    Patient Cognition: Alert        Advance Directives:      Code Status: Full Code       Discharge Planning:    Patient lives with: Children Type of Home: House  Primary Care Giver: Self (needs assistance x 1 with ADLs)  Patient Support Systems include: Children, Family Members   Current services prior to admission: None               ADLS  Prior functional level: Assistance with the following:, Bathing, Dressing  Current functional level: Assistance with the following:, Bathing, Dressing    PT AM-PAC:   /24  OT AM-PAC:   /24    Family can provide assistance at DC: Would you like Case Management to discuss the discharge plan with any other family members/significant others, and if so, who? No  Plans to Return to Present Housing: Yes  Patient expects to discharge to: [address] [place] [address] for transportation at discharge:  family     Financial    Payor: Yasmin Cleary / Plan: [address] [employer] / Product Type: *No Product type* /     Does insurance require precert for SNF: Yes    Potential assistance Purchasing Medications:    Meds-to-Beds request: Yes    Notes: The Plan for Transition of Care is related to the following treatment goals of GI bleed [K92.2]  Gastrointestinal hemorrhage, unspecified gastrointestinal hemorrhage type [K92.2]  Anemia, unspecified type [D64.9]  Acute cough [M70.0]    IF APPLICABLE: The Patient and/or patient representative Mirna and her family were provided with a choice of provider and agrees with the discharge plan. Freedom of choice list with basic dialogue that supports the patient's individualized plan of care/goals and shares the quality data associated with the providers was provided to:     Patient     The Patient and/or Patient Representative Agree with the Discharge Plan?   Yes     Isela Grossman RN  Case Management Department  Ph: [phone]

## 2023-03-28 ENCOUNTER — ANESTHESIA EVENT (OUTPATIENT)
Dept: ENDOSCOPY | Age: 88
End: 2023-03-28
Payer: COMMERCIAL

## 2023-03-28 ENCOUNTER — ANESTHESIA (OUTPATIENT)
Dept: ENDOSCOPY | Age: 88
End: 2023-03-28
Payer: COMMERCIAL

## 2023-03-28 LAB
ANION GAP SERPL CALCULATED.3IONS-SCNC: 8 MMOL/L (ref 7–16)
BASOPHILS # BLD: 0.02 E9/L (ref 0–0.2)
BASOPHILS NFR BLD: 0.3 % (ref 0–2)
BUN SERPL-MCNC: 19 MG/DL (ref 6–23)
CALCIUM SERPL-MCNC: 9.4 MG/DL (ref 8.6–10.2)
CHLORIDE SERPL-SCNC: 107 MMOL/L (ref 98–107)
CO2 SERPL-SCNC: 27 MMOL/L (ref 22–29)
CREAT SERPL-MCNC: 0.8 MG/DL (ref 0.5–1)
EOSINOPHIL # BLD: 0.1 E9/L (ref 0.05–0.5)
EOSINOPHIL NFR BLD: 1.6 % (ref 0–6)
ERYTHROCYTE [DISTWIDTH] IN BLOOD BY AUTOMATED COUNT: 16.6 FL (ref 11.5–15)
GLUCOSE SERPL-MCNC: 87 MG/DL (ref 74–99)
HCT VFR BLD AUTO: 24.6 % (ref 34–48)
HCT VFR BLD AUTO: 28.2 % (ref 34–48)
HGB BLD-MCNC: 7.5 G/DL (ref 11.5–15.5)
HGB BLD-MCNC: 8.3 G/DL (ref 11.5–15.5)
IMM GRANULOCYTES # BLD: 0.05 E9/L
IMM GRANULOCYTES NFR BLD: 0.8 % (ref 0–5)
LYMPHOCYTES # BLD: 0.85 E9/L (ref 1.5–4)
LYMPHOCYTES NFR BLD: 13.2 % (ref 20–42)
MCH RBC QN AUTO: 28.1 PG (ref 26–35)
MCHC RBC AUTO-ENTMCNC: 30.5 % (ref 32–34.5)
MCV RBC AUTO: 92.1 FL (ref 80–99.9)
MONOCYTES # BLD: 0.92 E9/L (ref 0.1–0.95)
MONOCYTES NFR BLD: 14.3 % (ref 2–12)
NEUTROPHILS # BLD: 4.49 E9/L (ref 1.8–7.3)
NEUTS SEG NFR BLD: 69.8 % (ref 43–80)
PLATELET # BLD AUTO: 103 E9/L (ref 130–450)
PMV BLD AUTO: 14.1 FL (ref 7–12)
POTASSIUM SERPL-SCNC: 3.9 MMOL/L (ref 3.5–5)
RBC # BLD AUTO: 2.67 E12/L (ref 3.5–5.5)
SODIUM SERPL-SCNC: 142 MMOL/L (ref 132–146)
WBC # BLD: 6.4 E9/L (ref 4.5–11.5)

## 2023-03-28 PROCEDURE — 6370000000 HC RX 637 (ALT 250 FOR IP): Performed by: INTERNAL MEDICINE

## 2023-03-28 PROCEDURE — 85025 COMPLETE CBC W/AUTO DIFF WBC: CPT

## 2023-03-28 PROCEDURE — 97165 OT EVAL LOW COMPLEX 30 MIN: CPT

## 2023-03-28 PROCEDURE — 80048 BASIC METABOLIC PNL TOTAL CA: CPT

## 2023-03-28 PROCEDURE — 97530 THERAPEUTIC ACTIVITIES: CPT

## 2023-03-28 PROCEDURE — 85014 HEMATOCRIT: CPT

## 2023-03-28 PROCEDURE — 6370000000 HC RX 637 (ALT 250 FOR IP): Performed by: STUDENT IN AN ORGANIZED HEALTH CARE EDUCATION/TRAINING PROGRAM

## 2023-03-28 PROCEDURE — 36415 COLL VENOUS BLD VENIPUNCTURE: CPT

## 2023-03-28 PROCEDURE — 85018 HEMOGLOBIN: CPT

## 2023-03-28 PROCEDURE — 2140000000 HC CCU INTERMEDIATE R&B

## 2023-03-28 PROCEDURE — 97535 SELF CARE MNGMENT TRAINING: CPT

## 2023-03-28 PROCEDURE — 6360000002 HC RX W HCPCS: Performed by: STUDENT IN AN ORGANIZED HEALTH CARE EDUCATION/TRAINING PROGRAM

## 2023-03-28 PROCEDURE — C9113 INJ PANTOPRAZOLE SODIUM, VIA: HCPCS | Performed by: STUDENT IN AN ORGANIZED HEALTH CARE EDUCATION/TRAINING PROGRAM

## 2023-03-28 PROCEDURE — 97161 PT EVAL LOW COMPLEX 20 MIN: CPT

## 2023-03-28 RX ADMIN — METOPROLOL SUCCINATE 50 MG: 50 TABLET, EXTENDED RELEASE ORAL at 20:45

## 2023-03-28 RX ADMIN — HYDRALAZINE HYDROCHLORIDE 50 MG: 50 TABLET, FILM COATED ORAL at 08:23

## 2023-03-28 RX ADMIN — POLYVINYL ALCOHOL 1 DROP: 14 SOLUTION/ DROPS OPHTHALMIC at 08:26

## 2023-03-28 RX ADMIN — SUCRALFATE 1 G: 1 TABLET ORAL at 23:58

## 2023-03-28 RX ADMIN — ATORVASTATIN CALCIUM 10 MG: 10 TABLET, FILM COATED ORAL at 08:24

## 2023-03-28 RX ADMIN — HYDRALAZINE HYDROCHLORIDE 50 MG: 50 TABLET, FILM COATED ORAL at 13:33

## 2023-03-28 RX ADMIN — LOSARTAN POTASSIUM 50 MG: 50 TABLET, FILM COATED ORAL at 08:24

## 2023-03-28 RX ADMIN — HYDRALAZINE HYDROCHLORIDE 75 MG: 50 TABLET, FILM COATED ORAL at 20:45

## 2023-03-28 RX ADMIN — Medication 1 TABLET: at 08:23

## 2023-03-28 RX ADMIN — HYDRALAZINE HYDROCHLORIDE 25 MG: 25 TABLET, FILM COATED ORAL at 04:18

## 2023-03-28 RX ADMIN — ALLOPURINOL 100 MG: 100 TABLET ORAL at 08:24

## 2023-03-28 RX ADMIN — PANTOPRAZOLE SODIUM 40 MG: 40 INJECTION, POWDER, FOR SOLUTION INTRAVENOUS at 20:45

## 2023-03-28 RX ADMIN — POLYVINYL ALCOHOL 1 DROP: 14 SOLUTION/ DROPS OPHTHALMIC at 20:48

## 2023-03-28 RX ADMIN — SUCRALFATE 1 G: 1 TABLET ORAL at 04:19

## 2023-03-28 RX ADMIN — METOPROLOL SUCCINATE 50 MG: 50 TABLET, EXTENDED RELEASE ORAL at 08:23

## 2023-03-28 RX ADMIN — PANTOPRAZOLE SODIUM 40 MG: 40 INJECTION, POWDER, FOR SOLUTION INTRAVENOUS at 08:24

## 2023-03-28 NOTE — PROGRESS NOTES
Occupational Therapy  OCCUPATIONAL THERAPY INITIAL EVALUATION    [address] [address] [address]      Date:3/28/2023                                                Patient Name: Robert Barnes  MRN: [id number]  : 1931  Room: [address] [place] [address]    Evaluating OT: Rosalie Katya OTR/L #[id number]     Referring Provider: Rogelio Garcia DO  Specific Provider Orders/Date: OT eval and treat 3/27/23    Diagnosis: GI bleed [K92.2]  Gastrointestinal hemorrhage, unspecified gastrointestinal hemorrhage type [K92.2]  Anemia, unspecified type [D64.9]  Acute cough [R05.1]   Pt admitted to hospital with cough and SOB     Pertinent Medical History:  has a past medical history of Anemia, Asthma, Atrial fibrillation ([address] CVA (cerebral vascular accident) ([address] Diabetic retinopathy ([address] Gout, Hx of blood clots, Hyperlipidemia, Hypertension, MGUS (monoclonal gammopathy of unknown significance), Mild tricuspid regurgitation, PAC (premature atrial contraction), Pemphigoid, Pulmonary hypertension ([address] and Symptomatic bradycardia.        Precautions:  Fall Risk, bed/chair alarm    Assessment of current deficits    [x] Functional mobility  [x]ADLs  [x] Strength               [x]Cognition    [x] Functional transfers   [x] IADLs         [x] Safety Awareness   [x]Endurance    [] Fine Coordination              [x] Balance      [] Vision/perception   []Sensation     []Gross Motor Coordination  [] ROM  [] Delirium                   [] Motor Control     OT PLAN OF CARE   OT POC based on physician orders, patient diagnosis and results of clinical assessment    Frequency/Duration 1-3 days/wk for 2 weeks PRN   Specific OT Treatment Interventions to include:   * Instruction/training on adapted ADL techniques and AE recommendations to increase functional independence within precautions       * Training on energy conservation strategies, correct breathing pattern and techniques to improve independence/tolerance for self-care routine  * Functional transfer/mobility training/DME recommendations for increased independence, safety, and fall prevention  * Patient/Family education to increase follow through with safety techniques and functional independence  * Recommendation of environmental modifications for increased safety with functional transfers/mobility and ADLs  * Cognitive retraining/development of therapeutic activities to improve problem solving, judgement, memory, and attention for increased safety/participation in ADL/IADL tasks  * Therapeutic exercise to improve motor endurance, ROM, and functional strength for ADLs/functional transfers  * Therapeutic activities to facilitate/challenge dynamic balance, stand tolerance for increased safety and independence with ADLs  * Therapeutic activities to facilitate gross/fine motor skills for increased independence with ADLs  * Neuro-muscular re-education: facilitation of righting/equilibrium reactions, midline orientation, scapular stability/mobility, normalization of muscle tone, and facilitation of volitional active controled movement      Recommended Adaptive Equipment:  TBD     Home Living: Pt lives with daughter in a 1 story home with 3 ADAMARIS and 1 hand rail    Bathroom setup: sponge bathes    Equipment owned: w/w    Prior Level of Function: assist with ADLs , assist with IADLs; ambulated with w/w   Driving: no   Occupation: na    Pain Level: Pt denies pain this session    Cognition: A&O: grossly x3; Follows 1-2 step directions   Memory:  fair   Sequencing:  fair   Problem solving:  fair   Judgement/safety:  fair-     Functional Assessment:  AM-PAC Daily Activity Raw Score: 16/24   Initial Eval Status  Date: 3/28/23 Treatment Status  Date: STGs = LTGs  Time frame: 10-14 days   Feeding Independent      Grooming Minimal Assist     Standing at sink  Modified [place]    UB Dressing Minimal Assist     Assist threading around back  Modified

## 2023-03-28 NOTE — CARE COORDINATION
Care Coordination  The patient was npo after mn for egd with possible intervention today with Dr Gil Whitten. Pt [facility] is 15/24 and she walked 100 feet with min assist and a wheeled walker, and the patient has FWW at home already,  ot Magi Roberts is pending. Called the patients daughter to get home care choices as she is not in the patients room await a call back. Plan is to return home with her daughter at discharge. Elia Pritchard the daughter is also working on getting home care aides to assist with the patients needs through [facility] . Home care orders are in for home for skilled nursing and therapies.  Will follow

## 2023-03-28 NOTE — PROGRESS NOTES
[facility]                                Progress note    Subjective: The patient is awake and alert. Sitting up in chair comfortable  Awaiting EGD    Objective:    BP (!) 184/80   Pulse 59   Temp 96.8 °F (36 °C) (Temporal)   Resp 15   Ht 5' 2\" (1.575 m)   Wt 147 lb 11.2 oz (67 kg)   SpO2 100%   BMI 27.01 kg/m²     In: 900 [P.O.:900]  Out: 1550   In: 900   Out: 1550 [Urine:1550]    General appearance: NAD, conversant  HEENT: AT/NC, MMM  Neck: FROM, supple  Lungs: Clear to auscultation  CV: RRR, no MRGs, pacer   Vasc: Radial pulses 2+  Abdomen: Soft, non-tender; no masses or HSM  Extremities: No peripheral edema or digital cyanosis  Skin: no rash, lesions or ulcers  Psych: Alert and oriented to person, place and time  Neuro: Alert and interactive     Recent Labs     03/26/23  0508 03/27/23  1111 03/27/23  1713 03/28/23  0611   WBC 6.5 7.1  --  6.4   HGB 7.1* 8.0*  --  7.5*   HCT 23.7* 25.8* 27.1* 24.6*   * 115*  --  103*         Recent Labs     03/25/23  1400 03/27/23  1111 03/28/23  0611    142 142   K 4.6 4.3 3.9   * 108* 107   CO2 25 26 27   BUN 43* 26* 19   CREATININE 1.1* 0.9 0.8   CALCIUM 9.4 9.7 9.4         Assessment:    Principal Problem:    GI bleed  Resolved Problems:    * No resolved hospital problems.  *      Plan:  Patient is a 44-year-old female admitted to [facility] for  GI bleed  -Monitor labs  -H&H 7.1/23.7 > 8.0/25.8  -Continue to hold Eliquis and ASA  -General surgery following  -PPI twice daily  -Carafate q6h  -Plans for EGD 6/12-NS she is not certain that she wants this-prefers conservative management    Hypertension  -Continue home medications  -May need some adjustments given elevated Bps    Hx of IgM MGUS and ITP  -Continue home promacta 50 mg daily  -Platelet count 570-[id number] monitoring,  -Hem/onc following     Patient is okay for discharge from medicine standpoint, after EGD is completed    3/28/23:  -Patient for EGD today with possible intervention,   -If EGD okay patient can be discharged home with home health care for PT and OT  -Patient remains hypertensive, 184/80 this afternoon, has been receiving as needed hydralazine IV  -H/H 7.5/24.6  -Platelets 103  -Increase hydralazine to 75 3 times daily for better control of blood pressures    Code status: Full  Consultants: Hem/onc and GS     DVT Prophylaxis PCD's  PT/OT  Discharge planning     Rosita Charles MD

## 2023-03-28 NOTE — PROGRESS NOTES
Physical Therapy  Physical Therapy Initial Assessment     Name: Cherise Rouse  : 1931  MRN: [id number]      Date of Service: 3/28/2023    Evaluating PT:  Kyleigh Cifuentes PT, DPT [id number]    Room #:  6397/5013-V  Diagnosis:  GI bleed [K92.2]  Gastrointestinal hemorrhage, unspecified gastrointestinal hemorrhage type [K92.2]  Anemia, unspecified type [D64.9]  Acute cough [R05.1]  PMHx/PSHx:    Past Medical History:   Diagnosis Date    Anemia     Asthma     Atrial fibrillation ([address]     CVA (cerebral vascular accident) [address] [address]     Diabetic retinopathy ([facility] [address]     Gout     Hx of blood clots     Hyperlipidemia     Hypertension     MGUS (monoclonal gammopathy of unknown significance)     Mild tricuspid regurgitation 2015    PAC (premature atrial contraction)     Pemphigoid     Pulmonary hypertension ([facility] [address] 2015    mild    Symptomatic bradycardia      Procedure/Surgery:  None  Precautions:  Falls, bed/chair alarm  Equipment Needs:  None - pt already owns all equipment    SUBJECTIVE:    Pt lives with daughter in a 1 story home with 3 stairs to enter and 1 rail. Pt ambulated with [employer] and required assistance for ADLs PTA. OBJECTIVE:   Initial Evaluation  Date: 3/28/23 Treatment Short Term/ Long Term   Goals   AM-PAC 6 Clicks 83/20     Was pt agreeable to Eval/treatment? Yes     Does pt have pain? No c/o pain     Bed Mobility  Rolling: NT  Supine to sit: NT  Sit to supine: NT  Scooting: NT  Mod Independent   Transfers Sit to stand: [place] bed; [address] chair  Stand to sit: [place]  Stand pivot: [place] with [employer]  Mod Independent with [employer]   Ambulation   50 feet x 2 reps with [place] with [employer]  50 feet with Mod Independent with [employer]  >50 feet with Supervision with [employer]   Stair negotiation: ascended and descended NT  >3 steps with 1 rail Mi[place]ava   ROM BUE:  WFL  BLE:  WFL     Strength BUE:  WFL  BLE:  4/5  Increase by 1/3 MMT grade   Balance Sitting EOB:  Independent  Dynamic Standing:  [place] with [employer]  Sitting EOB:  -  Dynamic Standing:   Mod ASHLEY  [id number]

## 2023-03-28 NOTE — PATIENT CARE CONFERENCE
[facility] Quality Flow/Interdisciplinary Rounds Progress Note        Quality Flow Rounds held on March 28, 2023    Disciplines Attending:  Bedside Nurse, , , and Nursing Unit Leadership    Nehemias Casper was admitted on 3/25/2023  4:53 PM    Anticipated Discharge Date:       Disposition:    Luca Score:  Luca Scale Score: 17    Readmission Risk              Risk of Unplanned Readmission:  15           Discussed patient goal for the day, patient clinical progression, and barriers to discharge. The following Goal(s) of the Day/Commitment(s) have been identified:  Labs - Report Results and assess for any bleeding.       Michela Toscano RN  March 28, 2023

## 2023-03-28 NOTE — PROGRESS NOTES
Subjective:  Chart reviewed  No overnight events  Plan for EDG today  Denies chest pain, angina, dyspnea, abdominal discomfort, nausea/vomiting and diarrhea/constipation, nose bleeds, hemoptysis, blood in her urine       Objective:    BP (!) 172/84   Pulse 64   Temp 97 °F (36.1 °C) (Temporal)   Resp 17   Ht 5' 2\" (1.575 m)   Wt 147 lb 11.2 oz (67 kg)   SpO2 100%   BMI 27.01 kg/m²     General: NAD, awake and alert looks younger than stated age  HEENT: normocephalic/atraumatic, mucosa dry without ulcerations,thrush or mucositis, EOMI, PERRLA, sclera anicteric, conjuntiva pink  NECK: supple, trachea midline  Heart:  IRRR, no murmurs, gallops, or rubs.   Lungs:  CTA bilaterally, no wheeze, rales or rhonchi  Abd: BS present, nontender, nondistended, no masses  Extrem:  No clubbing, cyanosis, or edema  : no webb  Skin: ecchymosis in right groin, non-tender and not palpable, no petechia or purpura    CBC with Differential:    Lab Results   Component Value Date/Time    WBC 6.4 03/28/2023 06:11 AM    RBC 2.67 03/28/2023 06:11 AM    HGB 7.5 03/28/2023 06:11 AM    HCT 24.6 03/28/2023 06:11 AM     03/28/2023 06:11 AM    MCV 92.1 03/28/2023 06:11 AM    MCH 28.1 03/28/2023 06:11 AM    MCHC 30.5 03/28/2023 06:11 AM    RDW 16.6 03/28/2023 06:11 AM    LYMPHOPCT 13.2 03/28/2023 06:11 AM    MONOPCT 14.3 03/28/2023 06:11 AM    BASOPCT 0.3 03/28/2023 06:11 AM    MONOSABS 0.92 03/28/2023 06:11 AM    LYMPHSABS 0.85 03/28/2023 06:11 AM    EOSABS 0.10 03/28/2023 06:11 AM    BASOSABS 0.02 03/28/2023 06:11 AM     CMP:    Lab Results   Component Value Date/Time     03/28/2023 06:11 AM    K 3.9 03/28/2023 06:11 AM    K 4.6 03/25/2023 02:00 PM     03/28/2023 06:11 AM    CO2 27 03/28/2023 06:11 AM    BUN 19 03/28/2023 06:11 AM    CREATININE 0.8 03/28/2023 06:11 AM    GFRAA 51 07/27/2022 12:46 AM    LABGLOM >60 03/28/2023 06:11 AM    GLUCOSE 87 03/28/2023 06:11 AM    PROT 6.4 03/25/2023 02:00 PM    LABALBU 3.6 steroids started on Promacta 50 mg p.o. daily. Renal insufficiency anemia, Stage III CKD. A. fib status post pacemaker pacemaker battery replaced January 2023. CVA in June 2021  She is on Eliquis and aspirin for her atrial fibrillation. As long as platelet count remains above 50,000 and she can stay on these medications. She has renal insufficiency anemia but they would rather not initiate the Aranesp. Monitor MGUS  parameters. The IgM level has been stable. She will continue with small little meals to maintain her weight. She is on gabapentin and Tylenol to help alleviate some of her arthritis and arthralgias. She is on a 3 month follow-up. A/P  -ITP, controlled on Promacta 50 mg daily should continue  -anemia, FOBT+ likely multifactorial with concern for GI blood loss causing acute worsening  -anemia, Hgb dropped to 7.5 today, repeat this afternoon  -continue to hold Eliquis, H/H not stable and evaluating for etiology of acute blood loss  -reticulocyte index 0.5, decreased response to anemia likely from chronic disease/MGUS  -EGD planned today  -IgM MGUS 2010, follow no current treatments indicated      ADDENDUM: repeat Hgb 8.3, no transfusion indicated    Thank you for allowing us to participate in the care of Manpreet Patel. Isidro Prater PA-C  [phone]    Electronically signed by SYLVIA Rodgers on 3/28/2023 at 8:20 AM    Note: This report was completed using [employer] voiced recognition software. Every effort has been made to ensure accuracy; however, inadvertent computerized transcription errors may be present.

## 2023-03-28 NOTE — PROGRESS NOTES
No acute events overnight. Consent signed. All questions about procedure answered. Plan for EGD today.      Electronically signed by Navya García MD on 3/28/2023 at 6:17 AM

## 2023-03-28 NOTE — ANESTHESIA PRE PROCEDURE
AM    GFRAA 51 07/27/2022 12:46 AM    LABGLOM >60 03/28/2023 06:11 AM    GLUCOSE 87 03/28/2023 06:11 AM    PROT 6.4 03/25/2023 02:00 PM    CALCIUM 9.4 03/28/2023 06:11 AM    BILITOT 0.4 03/25/2023 02:00 PM    ALKPHOS 117 03/25/2023 02:00 PM    AST 27 03/25/2023 02:00 PM    ALT 20 03/25/2023 02:00 PM       POC Tests: No results for input(s): POCGLU, POCNA, POCK, POCCL, POCBUN, POCHEMO, POCHCT in the last 72 hours. Coags:   Lab Results   Component Value Date/Time    PROTIME 16.6 03/25/2023 05:25 PM    INR 1.5 03/25/2023 05:25 PM    APTT 32.8 01/14/2019 01:42 PM       HCG (If Applicable): No results found for: PREGTESTUR, PREGSERUM, HCG, HCGQUANT     ABGs: No results found for: PHART, PO2ART, QOH7TNW, LLZ6JLE, BEART, N7ILLSOR     Type & Screen (If Applicable):  No results found for: LABABO, LABRH    Drug/Infectious Status (If Applicable):  No results found for: HIV, HEPCAB    COVID-19 Screening (If Applicable):   Lab Results   Component Value Date/Time    COVID19 Not Detected 07/27/2022 12:46 AM           Anesthesia Evaluation  Patient summary reviewed and Nursing notes reviewed no history of anesthetic complications:   Airway: Mallampati: II  TM distance: <3 FB     Mouth opening: > = 3 FB   Dental:    (+) edentulous      Pulmonary:   (+) decreased breath sounds asthma:                            Cardiovascular:  Exercise tolerance: poor (<4 METS),   (+) hypertension: moderate, valvular problems/murmurs:, pacemaker:, dysrhythmias (CHB): atrial fibrillation, hyperlipidemia        Rhythm: regular  Rate: normal                    Neuro/Psych:   (+) CVA:, neuromuscular disease (Peripheral polyneuropathy):,             GI/Hepatic/Renal:             Endo/Other:    (+) DiabetesType II DM, , blood dyscrasia: anemia:., .                 Abdominal:             Vascular:           Other Findings:      EKG 3/25/23:  AV dual-paced rhythm  Abnormal ECG  When compared with ECG of 15-DEC-2022 17:26,  premature supraventricular

## 2023-03-29 LAB
ABO + RH BLD: NORMAL
ANION GAP SERPL CALCULATED.3IONS-SCNC: 11 MMOL/L (ref 7–16)
APTT BLD: 33.6 SEC (ref 24.5–35.1)
BLD GP AB SCN SERPL QL: NORMAL
BLOOD BANK DISPENSE STATUS: NORMAL
BLOOD BANK DISPENSE STATUS: NORMAL
BLOOD BANK PRODUCT CODE: NORMAL
BLOOD BANK PRODUCT CODE: NORMAL
BPU ID: NORMAL
BPU ID: NORMAL
BUN SERPL-MCNC: 22 MG/DL (ref 6–23)
CALCIUM SERPL-MCNC: 8.9 MG/DL (ref 8.6–10.2)
CHLORIDE SERPL-SCNC: 106 MMOL/L (ref 98–107)
CO2 SERPL-SCNC: 24 MMOL/L (ref 22–29)
CREAT SERPL-MCNC: 1.3 MG/DL (ref 0.5–1)
DESCRIPTION BLOOD BANK: NORMAL
DESCRIPTION BLOOD BANK: NORMAL
EPO SERPL-ACNC: 17 MU/ML (ref 4–27)
ERYTHROCYTE [DISTWIDTH] IN BLOOD BY AUTOMATED COUNT: 16.1 FL (ref 11.5–15)
ERYTHROCYTE [DISTWIDTH] IN BLOOD BY AUTOMATED COUNT: 16.6 FL (ref 11.5–15)
GLUCOSE SERPL-MCNC: 83 MG/DL (ref 74–99)
HCT VFR BLD AUTO: 22.1 % (ref 34–48)
HCT VFR BLD AUTO: 22.8 % (ref 34–48)
HCT VFR BLD AUTO: 28.6 % (ref 34–48)
HGB BLD-MCNC: 6.9 G/DL (ref 11.5–15.5)
HGB BLD-MCNC: 7 G/DL (ref 11.5–15.5)
HGB BLD-MCNC: 8.9 G/DL (ref 11.5–15.5)
INR BLD: 1.1
MCH RBC QN AUTO: 28.3 PG (ref 26–35)
MCH RBC QN AUTO: 29.2 PG (ref 26–35)
MCHC RBC AUTO-ENTMCNC: 31.1 % (ref 32–34.5)
MCHC RBC AUTO-ENTMCNC: 31.2 % (ref 32–34.5)
MCV RBC AUTO: 90.6 FL (ref 80–99.9)
MCV RBC AUTO: 93.8 FL (ref 80–99.9)
PLATELET # BLD AUTO: 78 E9/L (ref 130–450)
PLATELET # BLD AUTO: 87 E9/L (ref 130–450)
PLATELET CONFIRMATION: NORMAL
PLATELET CONFIRMATION: NORMAL
PMV BLD AUTO: 12.4 FL (ref 7–12)
PMV BLD AUTO: ABNORMAL FL (ref 7–12)
POTASSIUM SERPL-SCNC: 3.8 MMOL/L (ref 3.5–5)
PROTHROMBIN TIME: 11.9 SEC (ref 9.3–12.4)
RBC # BLD AUTO: 2.44 E12/L (ref 3.5–5.5)
RBC # BLD AUTO: 3.05 E12/L (ref 3.5–5.5)
SODIUM SERPL-SCNC: 141 MMOL/L (ref 132–146)
WBC # BLD: 5.1 E9/L (ref 4.5–11.5)
WBC # BLD: 6.1 E9/L (ref 4.5–11.5)

## 2023-03-29 PROCEDURE — 86850 RBC ANTIBODY SCREEN: CPT

## 2023-03-29 PROCEDURE — 2580000003 HC RX 258: Performed by: NURSE ANESTHETIST, CERTIFIED REGISTERED

## 2023-03-29 PROCEDURE — 88305 TISSUE EXAM BY PATHOLOGIST: CPT

## 2023-03-29 PROCEDURE — 3609012400 HC EGD TRANSORAL BIOPSY SINGLE/MULTIPLE: Performed by: SURGERY

## 2023-03-29 PROCEDURE — 2709999900 HC NON-CHARGEABLE SUPPLY: Performed by: SURGERY

## 2023-03-29 PROCEDURE — 6370000000 HC RX 637 (ALT 250 FOR IP): Performed by: INTERNAL MEDICINE

## 2023-03-29 PROCEDURE — 3700000001 HC ADD 15 MINUTES (ANESTHESIA): Performed by: SURGERY

## 2023-03-29 PROCEDURE — 6370000000 HC RX 637 (ALT 250 FOR IP): Performed by: STUDENT IN AN ORGANIZED HEALTH CARE EDUCATION/TRAINING PROGRAM

## 2023-03-29 PROCEDURE — 2140000000 HC CCU INTERMEDIATE R&B

## 2023-03-29 PROCEDURE — 86901 BLOOD TYPING SEROLOGIC RH(D): CPT

## 2023-03-29 PROCEDURE — 86923 COMPATIBILITY TEST ELECTRIC: CPT

## 2023-03-29 PROCEDURE — P9016 RBC LEUKOCYTES REDUCED: HCPCS

## 2023-03-29 PROCEDURE — 0DB68ZX EXCISION OF STOMACH, VIA NATURAL OR ARTIFICIAL OPENING ENDOSCOPIC, DIAGNOSTIC: ICD-10-PCS | Performed by: SURGERY

## 2023-03-29 PROCEDURE — 2500000003 HC RX 250 WO HCPCS: Performed by: NURSE ANESTHETIST, CERTIFIED REGISTERED

## 2023-03-29 PROCEDURE — 85027 COMPLETE CBC AUTOMATED: CPT

## 2023-03-29 PROCEDURE — 6360000002 HC RX W HCPCS: Performed by: NURSE ANESTHETIST, CERTIFIED REGISTERED

## 2023-03-29 PROCEDURE — 36415 COLL VENOUS BLD VENIPUNCTURE: CPT

## 2023-03-29 PROCEDURE — 3700000000 HC ANESTHESIA ATTENDED CARE: Performed by: SURGERY

## 2023-03-29 PROCEDURE — 85610 PROTHROMBIN TIME: CPT

## 2023-03-29 PROCEDURE — 85730 THROMBOPLASTIN TIME PARTIAL: CPT

## 2023-03-29 PROCEDURE — 6360000002 HC RX W HCPCS: Performed by: STUDENT IN AN ORGANIZED HEALTH CARE EDUCATION/TRAINING PROGRAM

## 2023-03-29 PROCEDURE — 85014 HEMATOCRIT: CPT

## 2023-03-29 PROCEDURE — 36430 TRANSFUSION BLD/BLD COMPNT: CPT

## 2023-03-29 PROCEDURE — 86900 BLOOD TYPING SEROLOGIC ABO: CPT

## 2023-03-29 PROCEDURE — 80048 BASIC METABOLIC PNL TOTAL CA: CPT

## 2023-03-29 PROCEDURE — C9113 INJ PANTOPRAZOLE SODIUM, VIA: HCPCS | Performed by: STUDENT IN AN ORGANIZED HEALTH CARE EDUCATION/TRAINING PROGRAM

## 2023-03-29 PROCEDURE — 7100000000 HC PACU RECOVERY - FIRST 15 MIN: Performed by: SURGERY

## 2023-03-29 PROCEDURE — 7100000001 HC PACU RECOVERY - ADDTL 15 MIN: Performed by: SURGERY

## 2023-03-29 PROCEDURE — 85018 HEMOGLOBIN: CPT

## 2023-03-29 RX ORDER — LABETALOL HYDROCHLORIDE 5 MG/ML
INJECTION, SOLUTION INTRAVENOUS PRN
Status: DISCONTINUED | OUTPATIENT
Start: 2023-03-29 | End: 2023-03-29 | Stop reason: SDUPTHER

## 2023-03-29 RX ORDER — SODIUM CHLORIDE 9 MG/ML
INJECTION, SOLUTION INTRAVENOUS PRN
Status: DISCONTINUED | OUTPATIENT
Start: 2023-03-29 | End: 2023-03-31 | Stop reason: HOSPADM

## 2023-03-29 RX ORDER — PANTOPRAZOLE SODIUM 40 MG/1
40 TABLET, DELAYED RELEASE ORAL
Status: DISCONTINUED | OUTPATIENT
Start: 2023-03-30 | End: 2023-03-31 | Stop reason: HOSPADM

## 2023-03-29 RX ORDER — PROPOFOL 10 MG/ML
INJECTION, EMULSION INTRAVENOUS PRN
Status: DISCONTINUED | OUTPATIENT
Start: 2023-03-29 | End: 2023-03-29 | Stop reason: SDUPTHER

## 2023-03-29 RX ORDER — SODIUM CHLORIDE 9 MG/ML
INJECTION, SOLUTION INTRAVENOUS CONTINUOUS PRN
Status: DISCONTINUED | OUTPATIENT
Start: 2023-03-29 | End: 2023-03-29 | Stop reason: SDUPTHER

## 2023-03-29 RX ORDER — LIDOCAINE HYDROCHLORIDE 20 MG/ML
INJECTION, SOLUTION INTRAVENOUS PRN
Status: DISCONTINUED | OUTPATIENT
Start: 2023-03-29 | End: 2023-03-29 | Stop reason: SDUPTHER

## 2023-03-29 RX ADMIN — METOPROLOL SUCCINATE 50 MG: 50 TABLET, EXTENDED RELEASE ORAL at 08:53

## 2023-03-29 RX ADMIN — ATORVASTATIN CALCIUM 10 MG: 10 TABLET, FILM COATED ORAL at 08:53

## 2023-03-29 RX ADMIN — ALLOPURINOL 100 MG: 100 TABLET ORAL at 08:53

## 2023-03-29 RX ADMIN — METOPROLOL SUCCINATE 50 MG: 50 TABLET, EXTENDED RELEASE ORAL at 21:26

## 2023-03-29 RX ADMIN — SUCRALFATE 1 G: 1 TABLET ORAL at 12:33

## 2023-03-29 RX ADMIN — PROPOFOL 80 MG: 10 INJECTION, EMULSION INTRAVENOUS at 15:15

## 2023-03-29 RX ADMIN — HYDRALAZINE HYDROCHLORIDE 75 MG: 50 TABLET, FILM COATED ORAL at 09:06

## 2023-03-29 RX ADMIN — LABETALOL HYDROCHLORIDE 5 MG: 5 INJECTION INTRAVENOUS at 15:19

## 2023-03-29 RX ADMIN — HYDRALAZINE HYDROCHLORIDE 25 MG: 25 TABLET, FILM COATED ORAL at 23:32

## 2023-03-29 RX ADMIN — LOSARTAN POTASSIUM 50 MG: 50 TABLET, FILM COATED ORAL at 08:53

## 2023-03-29 RX ADMIN — LIDOCAINE HYDROCHLORIDE 40 MG: 20 INJECTION, SOLUTION INTRAVENOUS at 15:15

## 2023-03-29 RX ADMIN — HYDRALAZINE HYDROCHLORIDE 25 MG: 25 TABLET, FILM COATED ORAL at 04:52

## 2023-03-29 RX ADMIN — PANTOPRAZOLE SODIUM 40 MG: 40 INJECTION, POWDER, FOR SOLUTION INTRAVENOUS at 08:54

## 2023-03-29 RX ADMIN — SODIUM CHLORIDE: 9 INJECTION, SOLUTION INTRAVENOUS at 15:14

## 2023-03-29 RX ADMIN — HYDRALAZINE HYDROCHLORIDE 75 MG: 50 TABLET, FILM COATED ORAL at 21:26

## 2023-03-29 RX ADMIN — SUCRALFATE 1 G: 1 TABLET ORAL at 04:53

## 2023-03-29 RX ADMIN — Medication 1 TABLET: at 08:54

## 2023-03-29 ASSESSMENT — PAIN SCALES - GENERAL
PAINLEVEL_OUTOF10: 0

## 2023-03-29 NOTE — PROGRESS NOTES
[facility]                                Progress note    Subjective: The patient is awake and alert. Resting in bed   Awaiting EGD    Objective:    /62   Pulse 70   Temp 98 °F (36.7 °C)   Resp 20   Ht 5' 2\" (1.575 m)   Wt 147 lb 11.2 oz (67 kg)   SpO2 97%   BMI 27.01 kg/m²     In: 667.3 [P.O.:300; Blood:367.3]  Out: 900   In: 667.3   Out: 900 [Urine:900]    General appearance: NAD, conversant  HEENT: AT/NC, MMM  Neck: FROM, supple  Lungs: Clear to auscultation  CV: RRR, no MRGs, pacer   Vasc: Radial pulses 2+  Abdomen: Soft, non-tender; no masses or HSM  Extremities: No peripheral edema or digital cyanosis  Skin: no rash, lesions or ulcers  Psych: Alert and oriented to person, place and time  Neuro: Alert and interactive     Recent Labs     03/27/23  1111 03/27/23  1713 03/28/23  0611 03/28/23  1319 03/29/23  0517 03/29/23  0703   WBC 7.1  --  6.4  --  5.1  --    HGB 8.0*  --  7.5* 8.3* 6.9* 7.0*   HCT 25.8*   < > 24.6* 28.2* 22.1* 22.8*   *  --  103*  --  78*  --     < > = values in this interval not displayed. Recent Labs     03/27/23  1111 03/28/23  0611 03/29/23  0517    142 141   K 4.3 3.9 3.8   * 107 106   CO2 26 27 24   BUN 26* 19 22   CREATININE 0.9 0.8 1.3*   CALCIUM 9.7 9.4 8.9         Assessment:    Principal Problem:    GI bleed  Resolved Problems:    * No resolved hospital problems.  *      Plan:  Patient is a 58-year-old female admitted to [facility] for  GI bleed  -Monitor labs  -H&H 7.1/23.7 > 8.0/25.8  -Continue to hold Eliquis and ASA  -General surgery following  -PPI twice daily  -Carafate q6h  -Plans for EGD 3/68-RK she is not certain that she wants this-prefers conservative management    Hypertension  -Continue home medications  -May need some adjustments given elevated Bps    Hx of IgM MGUS and ITP  -Continue home promacta 50 mg daily  -Platelet count 447-BISPVSSG monitoring,  -Hem/onc following     Patient is okay for

## 2023-03-29 NOTE — PROGRESS NOTES
Patient ordered 1 unit of pRBCs this AM. Plan for EGD today. All question and concerns answered.     Electronically signed by Axel Osler, MD on 3/29/2023 at 7:38 AM

## 2023-03-29 NOTE — OP NOTE
Operative Note      Patient: Wyatt Juarez  YOB: 1931  MRN: [id number]    Date of Procedure: 3/29/2023    Pre-Op Diagnosis: Anemia, unspecified type [D64.9]    Post-Op Diagnosis: The GE junction is marked at 40 cm from the incisors with changes consistent with reflux disease and a small type I hiatal hernia. Mild active gastritis  Normal duodenum       Procedure(s):  EGD BIOPSY    Surgeon(s):  Prem Loo MD    Assistant:   * No surgical staff found *    Anesthesia: Monitor Anesthesia Care    Estimated Blood Loss (mL): Minimal    Complications: None    Specimens:   ID Type Source Tests Collected by Time Destination   A : gastric antral biopsy r/o H pylori Gastric Gastric SURGICAL PATHOLOGY Prem Loo MD 3/29/2023 1517        Implants:  * No implants in log *      Drains: * No LDAs found *    Findings: As above    Detailed Description of Procedure: The patient was brought to the endoscopy suite and placed on the table in the left lateral decubitus position. The patient received anesthesia per the department of anesthesiology. A bite-block was placed. The endoscope was passed through the lumen of the esophagus, stomach and duodenum. The endoscope was retrieved. The duodenum was normal.  Upon reentry into the stomach, the patient a mild gastritis. Biopsies were obtained. No duodenal or gastric ulcerations were noted. The retroflexed view did reveal a small type I hiatal hernia. The GE junction was marked at 35 cm from the incisors. The remainder of the examination was uneventful. The remainder of the esophageal examination was grossly normal.    Assessment:    No gross evidence for an active GI source for her melena    Plan:    Monitor hemoglobin hematocrit  If the patient's hemoglobin and hematocrit continue to trend downward then the patient undergo a tagged RBC scan.       Electronically signed by Prem Loo MD on 3/29/2023 at 3:23 PM

## 2023-03-29 NOTE — PLAN OF CARE
Problem: Chronic Conditions and Co-morbidities  Goal: Patient's chronic conditions and co-morbidity symptoms are monitored and maintained or improved  Outcome: Progressing     Problem: Safety - Adult  Goal: Free from fall injury  Outcome: Progressing     Problem: Skin/Tissue Integrity  Goal: Absence of new skin breakdown  Description: 1. Monitor for areas of redness and/or skin breakdown  2. Assess vascular access sites hourly  3. Every 4-6 hours minimum:  Change oxygen saturation probe site  4. Every 4-6 hours:  If on nasal continuous positive airway pressure, respiratory therapy assess nares and determine need for appliance change or resting period.   Outcome: Progressing     Problem: ABCDS Injury Assessment  Goal: Absence of physical injury  Outcome: Progressing

## 2023-03-29 NOTE — CARE COORDINATION
CM update note: General surgery and hem/onc following. Hgb 6.9. Transfuse 1 unit prbcs. Npo for egd. Met with pt's daughter, Ling Marx, in room. No preference for hhc agency. Referral was given to Kia at [facility].     [address] [facility] accepted pt. They can start hhc on Saturday 04/01. Ling Marx was notified.

## 2023-03-29 NOTE — ANESTHESIA POSTPROCEDURE EVALUATION
Department of Anesthesiology  Postprocedure Note    Patient: Trinity Antoine  MRN: [id number]  [place]: 5/26/1931  Date of evaluation: 3/29/2023      Procedure Summary     Date: 03/29/23 Room / Location: [address] [place] [address] / [facility]    Anesthesia Start: 3259 Anesthesia Stop: 9288    Procedure: EGD BIOPSY Diagnosis:       Anemia, unspecified type      (Anemia, unspecified type [D64.9])    Surgeons: Jennifer Pulliam MD Responsible Provider: Maxwell Pham MD    Anesthesia Type: MAC ASA Status: 3          Anesthesia Type: MAC    Bianca Phase I: Bianca Score: 10    Bianca Phase II:        Anesthesia Post Evaluation    Patient location during evaluation: PACU  Patient participation: complete - patient participated  Level of consciousness: awake and alert  Airway patency: patent  Nausea & Vomiting: no nausea and no vomiting  Complications: no  Cardiovascular status: hemodynamically stable  Respiratory status: acceptable  Hydration status: euvolemic

## 2023-03-29 NOTE — PROGRESS NOTES
General surgery resident made aware via perfect serve of patient's hemoglobin of 6.9. Orders received.

## 2023-03-29 NOTE — PROGRESS NOTES
diagnosed 2010 who follows outpatient with Dr. Hilary Carranza last seen in the office 3/6/2023. MGUS parameters are stable. 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 daily. Renal insufficiency anemia, Stage III CKD. A. fib status post pacemaker pacemaker battery replaced January 2023. CVA in June 2021  She is on Eliquis and aspirin for her atrial fibrillation. As long as platelet count remains above 50,000 and she can stay on these medications. She has renal insufficiency anemia but they would rather not initiate the Aranesp. Monitor MGUS  parameters. The IgM level has been stable. She will continue with small little meals to maintain her weight. She is on gabapentin and Tylenol to help alleviate some of her arthritis and arthralgias. She is on a 3 month follow-up. A/P  -ITP, controlled on Promacta 50 mg daily should continue  -repeat platelets count in PM  -anemia, FOBT+ likely multifactorial with concern for GI blood loss causing acute worsening  -anemia, Hgb dropped to 6.9  -tranfuse 1 unit PRBC today  -continue to hold Eliquis, H/H not stable and evaluating for etiology of acute blood loss  -reticulocyte index 0.5, decreased response to anemia likely from chronic disease/MGUS  -EGD planned today  -IgM MGUS 2010, follow no current treatments indicated    Thank you for allowing us to participate in the care of Manpreet Patel. Sriram Richardson PA-C  [phone]    Electronically signed by SYLVIA Aguirre on 3/29/2023 at 7:50 AM    Note: This report was completed using [employer] voiced recognition software. Every effort has been made to ensure accuracy; however, inadvertent computerized transcription errors may be present.

## 2023-03-30 LAB
ANION GAP SERPL CALCULATED.3IONS-SCNC: 10 MMOL/L (ref 7–16)
BUN SERPL-MCNC: 20 MG/DL (ref 6–23)
CALCIUM SERPL-MCNC: 9.2 MG/DL (ref 8.6–10.2)
CHLORIDE SERPL-SCNC: 109 MMOL/L (ref 98–107)
CO2 SERPL-SCNC: 25 MMOL/L (ref 22–29)
CREAT SERPL-MCNC: 1.2 MG/DL (ref 0.5–1)
ERYTHROCYTE [DISTWIDTH] IN BLOOD BY AUTOMATED COUNT: 16.1 FL (ref 11.5–15)
GLUCOSE SERPL-MCNC: 81 MG/DL (ref 74–99)
HCT VFR BLD AUTO: 26.2 % (ref 34–48)
HGB BLD-MCNC: 8.5 G/DL (ref 11.5–15.5)
MCH RBC QN AUTO: 29.4 PG (ref 26–35)
MCHC RBC AUTO-ENTMCNC: 32.4 % (ref 32–34.5)
MCV RBC AUTO: 90.7 FL (ref 80–99.9)
PLATELET # BLD AUTO: 72 E9/L (ref 130–450)
PLATELET CONFIRMATION: NORMAL
PMV BLD AUTO: 12.4 FL (ref 7–12)
POTASSIUM SERPL-SCNC: 4 MMOL/L (ref 3.5–5)
RBC # BLD AUTO: 2.89 E12/L (ref 3.5–5.5)
SODIUM SERPL-SCNC: 144 MMOL/L (ref 132–146)
WBC # BLD: 6.1 E9/L (ref 4.5–11.5)

## 2023-03-30 PROCEDURE — 85027 COMPLETE CBC AUTOMATED: CPT

## 2023-03-30 PROCEDURE — 2140000000 HC CCU INTERMEDIATE R&B

## 2023-03-30 PROCEDURE — 6370000000 HC RX 637 (ALT 250 FOR IP): Performed by: INTERNAL MEDICINE

## 2023-03-30 PROCEDURE — 97530 THERAPEUTIC ACTIVITIES: CPT

## 2023-03-30 PROCEDURE — 6370000000 HC RX 637 (ALT 250 FOR IP): Performed by: SURGERY

## 2023-03-30 PROCEDURE — 80048 BASIC METABOLIC PNL TOTAL CA: CPT

## 2023-03-30 PROCEDURE — 36415 COLL VENOUS BLD VENIPUNCTURE: CPT

## 2023-03-30 RX ADMIN — HYDRALAZINE HYDROCHLORIDE 75 MG: 50 TABLET, FILM COATED ORAL at 20:27

## 2023-03-30 RX ADMIN — HYDRALAZINE HYDROCHLORIDE 25 MG: 25 TABLET, FILM COATED ORAL at 04:54

## 2023-03-30 RX ADMIN — Medication 1 TABLET: at 09:28

## 2023-03-30 RX ADMIN — METOPROLOL SUCCINATE 50 MG: 50 TABLET, EXTENDED RELEASE ORAL at 09:27

## 2023-03-30 RX ADMIN — PANTOPRAZOLE SODIUM 40 MG: 40 TABLET, DELAYED RELEASE ORAL at 05:47

## 2023-03-30 RX ADMIN — ATORVASTATIN CALCIUM 10 MG: 10 TABLET, FILM COATED ORAL at 09:27

## 2023-03-30 RX ADMIN — ALLOPURINOL 100 MG: 100 TABLET ORAL at 09:28

## 2023-03-30 RX ADMIN — ACETAMINOPHEN 500 MG: 500 TABLET, FILM COATED ORAL at 17:40

## 2023-03-30 RX ADMIN — METOPROLOL SUCCINATE 50 MG: 50 TABLET, EXTENDED RELEASE ORAL at 20:27

## 2023-03-30 RX ADMIN — HYDRALAZINE HYDROCHLORIDE 25 MG: 25 TABLET, FILM COATED ORAL at 17:39

## 2023-03-30 RX ADMIN — LOSARTAN POTASSIUM 50 MG: 50 TABLET, FILM COATED ORAL at 09:27

## 2023-03-30 RX ADMIN — HYDRALAZINE HYDROCHLORIDE 75 MG: 50 TABLET, FILM COATED ORAL at 14:11

## 2023-03-30 RX ADMIN — HYDRALAZINE HYDROCHLORIDE 75 MG: 50 TABLET, FILM COATED ORAL at 09:28

## 2023-03-30 ASSESSMENT — PAIN DESCRIPTION - FREQUENCY: FREQUENCY: CONTINUOUS

## 2023-03-30 ASSESSMENT — PAIN DESCRIPTION - LOCATION: LOCATION: HEAD

## 2023-03-30 ASSESSMENT — PAIN - FUNCTIONAL ASSESSMENT: PAIN_FUNCTIONAL_ASSESSMENT: ACTIVITIES ARE NOT PREVENTED

## 2023-03-30 ASSESSMENT — PAIN DESCRIPTION - ONSET: ONSET: PROGRESSIVE

## 2023-03-30 ASSESSMENT — PAIN DESCRIPTION - PAIN TYPE: TYPE: ACUTE PAIN

## 2023-03-30 ASSESSMENT — PAIN SCALES - GENERAL
PAINLEVEL_OUTOF10: 0
PAINLEVEL_OUTOF10: 0
PAINLEVEL_OUTOF10: 8

## 2023-03-30 NOTE — PROGRESS NOTES
diagnosed 2010 who follows outpatient with Dr. Kimberly Mancia last seen in the office 3/6/2023. MGUS parameters are stable. 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 daily. Renal insufficiency anemia, Stage III CKD. A. fib status post pacemaker pacemaker battery replaced January 2023. CVA in June 2021  She is on Eliquis and aspirin for her atrial fibrillation. As long as platelet count remains above 50,000 and she can stay on these medications. She has renal insufficiency anemia but they would rather not initiate the Aranesp. Monitor MGUS  parameters. The IgM level has been stable. She will continue with small little meals to maintain her weight. She is on gabapentin and Tylenol to help alleviate some of her arthritis and arthralgias. She is on a 3 month follow-up. A/P  -ITP, controlled on Promacta 50 mg daily should continue  -platelets stable, PT/INT, PTT normal  -anemia, FOBT+, no upper GI etiology on EGD, surgery holding colonoscopy currently and considering bleeding scan  -anemia, Hgb improved s/p PRBC no transfusion indicated otday  -continue to hold Eliquis, H/H not stable and evaluating for etiology of acute blood loss  -reticulocyte index 0.5, decreased response to anemia likely from chronic disease/MGUS  -EGD planned today  -IgM MGUS 2010, follow no current treatments indicated  -repeat CBC diff plt daily    Thank you for allowing us to participate in the care of Nehemias Casper. Navya Slaughter PA-C  [phone]    Electronically signed by SYLVIA Pryor on 3/30/2023 at 7:38 AM    Note: This report was completed using [employer] voiced recognition software. Every effort has been made to ensure accuracy; however, inadvertent computerized transcription errors may be present.

## 2023-03-30 NOTE — PROGRESS NOTES
GENERAL SURGERY  DAILY PROGRESS NOTE  3/30/2023  Chief Complaint   Patient presents with    Shortness of Breath     And a cough for 3-4 days, PC told her to come to the ED to get checked out. Subjective:  Patient without any abdominal pain, nausea, vomiting, bowel movements, or signs of bleeding overnight. HgB 8.5 from 8.9. Objective:  BP (!) 143/61   Pulse 87   Temp 97.3 °F (36.3 °C) (Temporal)   Resp 22   Ht 5' 2\" (1.575 m)   Wt 147 lb 11.2 oz (67 kg)   SpO2 95%   BMI 27.01 kg/m²     General appearance: alert, cooperative and in no acute distress. Eyes: grossly normal  Lungs: nonlabored breathing on room air  Heart: regular rate, hypertensive. Abdomen: soft, non-tender, non distended  Skin: No skin abnormalities  Neurologic: Alert and oriented x 3. Grossly normal  Musculoskeletal: No clubbing cyanosis or edema    Assessment/Plan:  80 y.o. female with anemia s/p EGD 3/29 with gastritis. Ok for bland diet as tolerated. Continue PPI BID. Trend H/H and transfuse as indicated. No plans for inpatient colonoscopy. If shows signs of bleeding again or HgB drops, will need tagged RBC scan.      Electronically signed by Chip Winters MD on 3/30/2023 at 8:55 AM

## 2023-03-30 NOTE — PROGRESS NOTES
techniques to improve independence/tolerance for self-care routine  * Functional transfer/mobility training/DME recommendations for increased independence, safety, and fall prevention  * Patient/Family education to increase follow through with safety techniques and functional independence  * Recommendation of environmental modifications for increased safety with functional transfers/mobility and ADLs  * Cognitive retraining/development of therapeutic activities to improve problem solving, judgement, memory, and attention for increased safety/participation in ADL/IADL tasks  * Therapeutic exercise to improve motor endurance, ROM, and functional strength for ADLs/functional transfers  * Therapeutic activities to facilitate/challenge dynamic balance, stand tolerance for increased safety and independence with ADLs  * Therapeutic activities to facilitate gross/fine motor skills for increased independence with ADLs  * Neuro-muscular re-education: facilitation of righting/equilibrium reactions, midline orientation, scapular stability/mobility, normalization of muscle tone, and facilitation of volitional active controled movement        Recommended Adaptive Equipment:  TBD      Home Living: Pt lives with daughter in a 1 story home with 3 ADAMARIS and 1 hand rail    Bathroom setup: sponge bathes    Equipment owned: w/w     Prior Level of Function: assist with ADLs , assist with IADLs; ambulated with w/w   Driving: no   Occupation: na     Pain Level: Pt complained of pain to BUE's \"rotator cuffs\"     Cognition: A&O: grossly x3; Follows 1-2 step directions             Memory:  fair             Sequencing:  fair             Problem solving:  fair             Judgement/safety:  fair-               Functional Assessment:  AM-PAC Daily Activity Raw Score: 16/24    Initial Eval Status  Date: 3/28/23 Treatment Status  Date: 3/30/23 STGs = LTGs  Time frame: 10-14 days   Feeding Independent   Independent     Grooming Minimal Assist placement with transfers, safety/balance and walker management with functional mobility and techniques to assist with LB dressing tasks    Comments: Upon arrival pt seated upright in chair, agreeable to therapy. Pt completed of transfers, functional mobility and light ADL tasks this session. PROM to BUE's was also complete for multiple reps in all planes focusing on increasing of ROM due to pt stating of being limited due to \"rotator cuffs\". Spoke to pt in regards to home setup, in which pt would benefit from and extended tub bench to assist with bathing tasks. At end of session, pt seated upright in chair, all lines and tubes intact, call light within reach. Pt has made fair progress towards set goals.    Continue with current plan of care focusing on increasing of independency with transfers and ADL tasks      Treatment Time In: 1:30pm            Treatment Time Out: 1:53pm               Treatment Charges: Mins Units   Ther Ex  15649     Manual Therapy 23690     Thera Activities 69537 15 1   ADL/Home Mgt 14169 8 1   Neuro Re-ed 17255     Group Therapy      Orthotic manage/training  [id number]     Non-Billable Time     Total Timed Treatment 23 2        Sirena Smith BHAKTA/L [id number]

## 2023-03-30 NOTE — PATIENT CARE CONFERENCE
P Quality Flow/Interdisciplinary Rounds Progress Note        Quality Flow Rounds held on March 30, 2023    Disciplines Attending:  Bedside Nurse, , , and Nursing Unit Leadership    Janeth Petty was admitted on 3/25/2023  4:53 PM    Anticipated Discharge Date:       Disposition:    Luca Score:  Luca Scale Score: 17    Readmission Risk              Risk of Unplanned Readmission:  17           Discussed patient goal for the day, patient clinical progression, and barriers to discharge.   The following Goal(s) of the Day/Commitment(s) have been identified:  report labs/diagnostics       Gordo Haynes RN  March 30, 2023

## 2023-03-30 NOTE — PLAN OF CARE
Problem: Chronic Conditions and Co-morbidities  Goal: Patient's chronic conditions and co-morbidity symptoms are monitored and maintained or improved  3/30/2023 1137 by Teresa Oh RN  Outcome: Progressing  3/30/2023 0200 by Colleen Trujillo RN  Outcome: Progressing     Problem: Discharge Planning  Goal: Discharge to home or other facility with appropriate resources  Outcome: Progressing     Problem: Safety - Adult  Goal: Free from fall injury  3/30/2023 1137 by Teresa Oh RN  Outcome: Progressing  3/30/2023 0200 by Colleen Trujillo RN  Outcome: Progressing     Problem: Nutrition Deficit:  Goal: Optimize nutritional status  3/30/2023 1137 by Teresa Oh RN  Outcome: Progressing  3/30/2023 0200 by Colleen Trujillo RN  Outcome: Progressing     Problem: Skin/Tissue Integrity  Goal: Absence of new skin breakdown  Outcome: Progressing     Problem: ABCDS Injury Assessment  Goal: Absence of physical injury  Outcome: Progressing     Problem: Pain  Goal: Verbalizes/displays adequate comfort level or baseline comfort level  Outcome: Progressing

## 2023-03-30 NOTE — PROGRESS NOTES
[facility]                                Progress note    Subjective: The patient is awake and alert. Resting in bed       Objective:    BP (!) 152/81   Pulse 62   Temp 97.3 °F (36.3 °C) (Temporal)   Resp 22   Ht 5' 2\" (1.575 m)   Wt 147 lb 11.2 oz (67 kg)   SpO2 97%   BMI 27.01 kg/m²     In: 807.3 [P.O.:240; I.V.:200; Blood:367.3]  Out: 725   In: 807.3   Out: 725 [Urine:725]    General appearance: NAD, conversant  HEENT: AT/NC, MMM  Neck: FROM, supple  Lungs: Clear to auscultation  CV: RRR, no MRGs, pacer   Vasc: Radial pulses 2+  Abdomen: Soft, non-tender; no masses or HSM  Extremities: No peripheral edema or digital cyanosis  Skin: no rash, lesions or ulcers  Psych: Alert and oriented to person, place and time  Neuro: Alert and interactive     Recent Labs     03/29/23  0517 03/29/23  0703 03/29/23  1441 03/30/23  0535   WBC 5.1  --  6.1 6.1   HGB 6.9* 7.0* 8.9* 8.5*   HCT 22.1* 22.8* 28.6* 26.2*   PLT 78*  --  87* 72*         Recent Labs     03/28/23  0611 03/29/23  0517 03/30/23  0535    141 144   K 3.9 3.8 4.0    106 109*   CO2 27 24 25   BUN 19 22 20   CREATININE 0.8 1.3* 1.2*   CALCIUM 9.4 8.9 9.2         Assessment:    Principal Problem:    GI bleed  Resolved Problems:    * No resolved hospital problems.  *      Plan:  Patient is a 70-year-old female admitted to [facility] for  GI bleed  -Monitor labs  -H&H 7.1/23.7 > 8.0/25.8  -Continue to hold Eliquis and ASA  -General surgery following  -PPI twice daily  -Carafate q6h  -Plans for EGD 1/59-BX she is not certain that she wants this-prefers conservative management    Hypertension  -Continue home medications  -May need some adjustments given elevated Bps    Hx of IgM MGUS and ITP  -Continue home promacta 50 mg daily  -Platelet count 101-MCSTQLPX monitoring,  -Hem/onc following     Patient is okay for discharge from medicine standpoint, after EGD is completed    3/28/23:  -Patient for EGD today with possible

## 2023-03-30 NOTE — PLAN OF CARE
Problem: Chronic Conditions and Co-morbidities  Goal: Patient's chronic conditions and co-morbidity symptoms are monitored and maintained or improved  Outcome: Progressing     Problem: Safety - Adult  Goal: Free from fall injury  Outcome: Progressing     Problem: Nutrition Deficit:  Goal: Optimize nutritional status  Outcome: Progressing

## 2023-03-30 NOTE — PROGRESS NOTES
Physical Therapy    Treatment Note    Name: Yoanna Vail  : 1931  MRN: [id number]      Date of Service: 3/30/2023    Evaluating PT:  Trish Camposa, PT, DPT [id number]    Room #:  1523/3059-D  Diagnosis:  GI bleed [K92.2]  Gastrointestinal hemorrhage, unspecified gastrointestinal hemorrhage type [K92.2]  Anemia, unspecified type [D64.9]  Acute cough [R05.1]  PMHx/PSHx:    Past Medical History:   Diagnosis Date    Anemia     Asthma     Atrial fibrillation ([address]     CVA (cerebral vascular accident) [facility]     Diabetic retinopathy ([facility]     Gout     Hx of blood clots     Hyperlipidemia     Hypertension     MGUS (monoclonal gammopathy of unknown significance)     Mild tricuspid regurgitation 2015    PAC (premature atrial contraction)     Pemphigoid     Pulmonary hypertension ([facility] [address] 2015    mild    Symptomatic bradycardia      Procedure/Surgery:  None  Precautions:  Falls, bed/chair alarm  Equipment Needs:  None - pt already owns all equipment    SUBJECTIVE:    Pt lives with daughter in a 1 story home with 3 stairs to enter and 1 rail. Pt ambulated with [employer] and required assistance for ADLs PTA. OBJECTIVE:   Initial Evaluation  Date: 3/28/23 Treatment Date: 3/30/23 Short Term/ Long Term   Goals   AM-PAC 6 Clicks 92/20 45/91    Was pt agreeable to Eval/treatment? Yes Yes    Does pt have pain?  No c/o pain No current complaints of pain    Bed Mobility  Rolling: NT  Supine to sit: NT  Sit to supine: NT  Scooting: NT Rolling: NT  Supine to sit: NT  Sit to supine: NT  Scooting: NT Mod Independent   Transfers Sit to stand: Miroslava bed; [address] chair  Stand to sit: Miroslava  Stand pivot: Miroslava with [employer] Sit to stand: SBA  Stand to sit: SBA  Stand pivot: Min A with [employer] Mod Independent with [employer]   Ambulation   50 feet x 2 reps with Mi[place]lava with [employer] 75 feet x2 with [employer] with Min A 50 feet with Mod Independent with [employer]  >50 feet with Supervision with [employer]   Stair negotiation: ascended and descended NT NT >3 steps with 1 rail Miroslava   ROM

## 2023-03-31 VITALS
SYSTOLIC BLOOD PRESSURE: 164 MMHG | RESPIRATION RATE: 16 BRPM | HEIGHT: 62 IN | HEART RATE: 61 BPM | DIASTOLIC BLOOD PRESSURE: 82 MMHG | OXYGEN SATURATION: 100 % | TEMPERATURE: 97 F | WEIGHT: 147.7 LBS | BODY MASS INDEX: 27.18 KG/M2

## 2023-03-31 LAB
ERYTHROCYTE [DISTWIDTH] IN BLOOD BY AUTOMATED COUNT: 15.8 FL (ref 11.5–15)
HCT VFR BLD AUTO: 26.8 % (ref 34–48)
HGB BLD-MCNC: 8.6 G/DL (ref 11.5–15.5)
MCH RBC QN AUTO: 29.4 PG (ref 26–35)
MCHC RBC AUTO-ENTMCNC: 32.1 % (ref 32–34.5)
MCV RBC AUTO: 91.5 FL (ref 80–99.9)
PLATELET # BLD AUTO: 64 E9/L (ref 130–450)
PLATELET CONFIRMATION: NORMAL
PMV BLD AUTO: ABNORMAL FL (ref 7–12)
RBC # BLD AUTO: 2.93 E12/L (ref 3.5–5.5)
WBC # BLD: 5.5 E9/L (ref 4.5–11.5)

## 2023-03-31 PROCEDURE — 6370000000 HC RX 637 (ALT 250 FOR IP): Performed by: INTERNAL MEDICINE

## 2023-03-31 PROCEDURE — 36415 COLL VENOUS BLD VENIPUNCTURE: CPT

## 2023-03-31 PROCEDURE — 85027 COMPLETE CBC AUTOMATED: CPT

## 2023-03-31 PROCEDURE — 6370000000 HC RX 637 (ALT 250 FOR IP): Performed by: SURGERY

## 2023-03-31 RX ORDER — APIXABAN 5 MG/1
5 TABLET, FILM COATED ORAL EVERY 12 HOURS
Qty: 60 TABLET | Refills: 0
Start: 2023-04-07

## 2023-03-31 RX ORDER — PANTOPRAZOLE SODIUM 40 MG/1
40 TABLET, DELAYED RELEASE ORAL
Qty: 30 TABLET | Refills: 3 | Status: SHIPPED | OUTPATIENT
Start: 2023-04-01

## 2023-03-31 RX ORDER — HYDRALAZINE HYDROCHLORIDE 25 MG/1
75 TABLET, FILM COATED ORAL 3 TIMES DAILY
Qty: 90 TABLET | Refills: 3 | Status: SHIPPED | OUTPATIENT
Start: 2023-03-31

## 2023-03-31 RX ADMIN — Medication 1 TABLET: at 08:27

## 2023-03-31 RX ADMIN — PANTOPRAZOLE SODIUM 40 MG: 40 TABLET, DELAYED RELEASE ORAL at 05:45

## 2023-03-31 RX ADMIN — ALLOPURINOL 100 MG: 100 TABLET ORAL at 08:27

## 2023-03-31 RX ADMIN — HYDRALAZINE HYDROCHLORIDE 75 MG: 50 TABLET, FILM COATED ORAL at 15:09

## 2023-03-31 RX ADMIN — HYDRALAZINE HYDROCHLORIDE 75 MG: 50 TABLET, FILM COATED ORAL at 08:26

## 2023-03-31 RX ADMIN — HYDRALAZINE HYDROCHLORIDE 25 MG: 25 TABLET, FILM COATED ORAL at 05:46

## 2023-03-31 RX ADMIN — HYDRALAZINE HYDROCHLORIDE 25 MG: 25 TABLET, FILM COATED ORAL at 11:55

## 2023-03-31 RX ADMIN — HYDRALAZINE HYDROCHLORIDE 25 MG: 25 TABLET, FILM COATED ORAL at 01:06

## 2023-03-31 RX ADMIN — METOPROLOL SUCCINATE 50 MG: 50 TABLET, EXTENDED RELEASE ORAL at 08:26

## 2023-03-31 RX ADMIN — LOSARTAN POTASSIUM 50 MG: 50 TABLET, FILM COATED ORAL at 08:26

## 2023-03-31 RX ADMIN — ATORVASTATIN CALCIUM 10 MG: 10 TABLET, FILM COATED ORAL at 08:27

## 2023-03-31 ASSESSMENT — PAIN DESCRIPTION - DESCRIPTORS
DESCRIPTORS: SORE
DESCRIPTORS: SORE

## 2023-03-31 ASSESSMENT — PAIN DESCRIPTION - PAIN TYPE
TYPE: ACUTE PAIN

## 2023-03-31 ASSESSMENT — PAIN DESCRIPTION - LOCATION
LOCATION: LEG

## 2023-03-31 ASSESSMENT — PAIN SCALES - GENERAL
PAINLEVEL_OUTOF10: 3
PAINLEVEL_OUTOF10: 0
PAINLEVEL_OUTOF10: 7
PAINLEVEL_OUTOF10: 1
PAINLEVEL_OUTOF10: 0

## 2023-03-31 ASSESSMENT — PAIN DESCRIPTION - ONSET: ONSET: GRADUAL

## 2023-03-31 ASSESSMENT — PAIN DESCRIPTION - FREQUENCY
FREQUENCY: CONTINUOUS
FREQUENCY: INTERMITTENT
FREQUENCY: INTERMITTENT

## 2023-03-31 ASSESSMENT — PAIN - FUNCTIONAL ASSESSMENT
PAIN_FUNCTIONAL_ASSESSMENT: ACTIVITIES ARE NOT PREVENTED
PAIN_FUNCTIONAL_ASSESSMENT: PREVENTS OR INTERFERES SOME ACTIVE ACTIVITIES AND ADLS
PAIN_FUNCTIONAL_ASSESSMENT: PREVENTS OR INTERFERES SOME ACTIVE ACTIVITIES AND ADLS

## 2023-03-31 ASSESSMENT — PAIN DESCRIPTION - ORIENTATION
ORIENTATION: RIGHT

## 2023-03-31 NOTE — PATIENT CARE CONFERENCE
[facility] Quality Flow/Interdisciplinary Rounds Progress Note        Quality Flow Rounds held on March 31, 2023    Disciplines Attending:  Bedside Nurse, , , and Nursing Unit Leadership    Wyatt Juarez was admitted on 3/25/2023  4:53 PM    Anticipated Discharge Date:       Disposition:    Luca Score:  Luca Scale Score: 18    Readmission Risk              Risk of Unplanned Readmission:  17           Discussed patient goal for the day, patient clinical progression, and barriers to discharge. The following Goal(s) of the Day/Commitment(s) have been identified:  Labs - Report Results and have no active bleeding.       Alisa Castillo RN  March 31, 2023

## 2023-03-31 NOTE — PROGRESS NOTES
CLINICAL PHARMACY NOTE: MEDS TO BEDS    Total # of Prescriptions Filled: 2   The following medications were delivered to the patient:  Hydralazine 25 mg  Pantoprazole 40 mg    Additional Documentation:     Picked up by daughter in the pharmacy

## 2023-03-31 NOTE — PROGRESS NOTES
Discharge instructions given to patient and family at bedside. Home Promacta given to patient for discharge home. Patient and family packed up all belongings. Verbal discharge instructions given to patient. Instructed on all follow ups. All questions answered at this time. IV removed without difficulty. Heart monitor removed and placed back at the nurses station. Patient and family instructed to  2 prescriptions from outpatient pharmacy.

## 2023-03-31 NOTE — PROGRESS NOTES
Comprehensive Nutrition Assessment    Type and Reason for Visit:  Reassess    Nutrition Recommendations/Plan:   Continue current diet  Start Glucerna to promote oral intake  Will continue to monitor ; noted pt expecting discharge     Malnutrition Assessment:  Malnutrition Status: At risk for malnutrition (Comment) (03/31/23 1032)    Context:  Chronic Illness     Findings of the 6 clinical characteristics of malnutrition:  Energy Intake:  75% or less estimated energy requirements for 1 month or longer  Weight Loss:  Mild weight loss (specify amount and time period) (~2.6% in ~ 5 mo)     Body Fat Loss:  Unable to assess     Muscle Mass Loss:  Unable to assess    Fluid Accumulation:  No significant fluid accumulation     Strength:  Not Performed    Nutrition Assessment:    pt adm d/t SOB/cough- Covid negative ;PMhx of AFIB, CVA, DM, HTN; pt w/ suspected GIB now s/p EGD biopsy showing mild gastritis; will start ONS to promote oral intake as intake is suboptimal at meals; will continue to monitor. Nutrition Related Findings:    A&Ox4; I/O WNL; U/L dentures; active BS; +1 edema Wound Type: None       Current Nutrition Intake & Therapies:    Average Meal Intake: 26-50% (avg)  Average Supplements Intake: None Ordered (noted ONS ordered as snacks however pt will not receive snack orders, therefore pt not receiving ONS; will modify.)  ADULT DIET; Regular  ADULT ORAL NUTRITION SUPPLEMENT; Breakfast, Lunch; Diabetic Oral Supplement    Anthropometric Measures:  Height: 5' 2\" (157.5 cm)  Ideal Body Weight (IBW): 110 lbs (50 kg)    Admission Body Weight: 147 lb 11.2 oz (67 kg) (3/25-SS)  Current Body Weight: 147 lb 11.2 oz (67 kg) (3/25-SS), 134.3 % IBW. Weight Source: Standing Scale  Current BMI (kg/m2): 27  Usual Body Weight: 151 lb 3 oz (68.6 kg) (10/20/22-actual)  % Weight Change (Calculated): -2.3  Weight Adjustment For: No Adjustment                 BMI Categories: Overweight (BMI 25.0-29. 9)    Estimated Daily Nutrient Needs:  Energy Requirements Based On: Formula  Weight Used for Energy Requirements: Current  Energy (kcal/day): 5920-3789  Weight Used for Protein Requirements: Ideal  Protein (g/day): 1.3-1.5g/kgxIBW=65-75g  Method Used for Fluid Requirements: 1 ml/kcal  Fluid (ml/day): 6670-4654    Nutrition Diagnosis:   Inadequate oral intake related to inadequate protein-energy intake (2/2 advanced age) as evidenced by poor intake prior to admission, intake 26-50%    Nutrition Interventions:   Food and/or Nutrient Delivery: Continue Current Diet, Start Oral Nutrition Supplement (Glucerna BID)  Nutrition Education/Counseling: Education not indicated  Coordination of Nutrition Care: Continue to monitor while inpatient       Goals:  Previous Goal Met: No Progress toward Goal(s)  Goals: other (specify)  Specify Other Goals: PO intake >50% at most meals/ONS. Nutrition Monitoring and Evaluation:   Behavioral-Environmental Outcomes: None Identified  Food/Nutrient Intake Outcomes: Food and Nutrient Intake, Supplement Intake  Physical Signs/Symptoms Outcomes: Nutrition Focused Physical Findings, Chewing or Swallowing, Biochemical Data, Skin, Weight, GI Status, Fluid Status or Edema    Discharge Planning:     Too soon to determine     [place] Signs, RD, LD  Contact: [phone]

## 2023-03-31 NOTE — CARE COORDINATION
Met with pt in room. PT note from yesterday am-pac 17/24 and ambulated 75ft x 2 with [employer] with [employer]roslava. OT note from yesterday am-pac 16/24. Plan remains to home with her daughter, Austin Fuentes, and Namita Xavier. Voiced no other needs at present. Margo Littlejohn at [place] [phone] was notified pt going home with Namita Xavier. Discharge order on chart. Kia with [address] notified pt to discharge today.

## 2023-03-31 NOTE — DISCHARGE SUMMARY
exam:->cough, SOB What reading provider will be dictating this exam?->CRC FINDINGS: The heart is enlarged. There are no findings of failure. No right or left lung infiltrate is noted. There is a dual lead cardiac pacer on the left. Minimal pleuroparenchymal scarring is seen within the left costophrenic angle. 1. Cardiomegaly. There are no findings of failure or pneumonia. Discharge Exam:    HEENT: NCAT,  PERRLA, No JVD  Heart:  RRR, no murmurs, gallops, or rubs. Lungs:  CTA bilaterally, no wheeze, rales or rhonchi  Abd: bowel sounds present, nontender, nondistended, no masses  Extrem:  No clubbing, cyanosis, or edema    Disposition: home     Patient Condition at Discharge: Stable     Patient Instructions:      Medication List        START taking these medications      pantoprazole 40 MG tablet  Commonly known as: PROTONIX  Take 1 tablet by mouth every morning (before breakfast)  Start taking on: April 1, 2023            CHANGE how you take these medications      Eliquis 5 MG Tabs tablet  Generic drug: apixaban  Take 1 tablet by mouth in the morning and 1 tablet in the evening. Do not restart taking until 4/7/23. Start taking on: April 7, 2023  What changed:   how much to take  additional instructions  These instructions start on April 7, 2023. If you are unsure what to do until then, ask your doctor or other care provider. hydrALAZINE 25 MG tablet  Commonly known as: APRESOLINE  Take 3 tablets by mouth 3 times daily  What changed:   medication strength  how much to take     losartan 50 MG tablet  Commonly known as: COZAAR  What changed: Another medication with the same name was removed. Continue taking this medication, and follow the directions you see here.             CONTINUE taking these medications      acetaminophen 500 MG tablet  Commonly known as: TYLENOL     allopurinol 100 MG tablet  Commonly known as: ZYLOPRIM     b complex vitamins capsule     IMODIUM PO     metoprolol succinate 50 MG extended release tablet  Commonly known as: TOPROL XL  Take 1 tablet by mouth 2 times daily     Omega-3 1000 MG Caps     pitavastatin 2 MG Tabs tablet  Commonly known as: LIVALO     polyethyl glycol-propyl glycol 0.4-0.3 % 0.4-0.3 % ophthalmic solution  Commonly known as: SYSTANE     Premarin 0.625 MG/GM Crea vaginal cream  Generic drug: estrogens conjugated     PROBIOTIC-10 PO     * Promacta 75 MG Tabs tablet  Generic drug: eltrombopag olamine     * Promacta 50 MG Tabs tablet  Generic drug: eltrombopag olamine     Vitamin D (Cholecalciferol) 25 MCG (1000 UT) Caps           * This list has 2 medication(s) that are the same as other medications prescribed for you. Read the directions carefully, and ask your doctor or other care provider to review them with you. STOP taking these medications      DULoxetine 30 MG extended release capsule  Commonly known as: Cymbalta     methylPREDNISolone 4 MG tablet  Commonly known as: MEDROL               Where to Get Your Medications        Information about where to get these medications is not yet available    Ask your nurse or doctor about these medications  Eliquis 5 MG Tabs tablet  hydrALAZINE 25 MG tablet  pantoprazole 40 MG tablet       Activity: activity as tolerated  Diet: cardiac diet    Pt has been advised to: Follow-up with Raul Contreras DO in 1 week.   Follow-up with consultants as recommended by them    Note that over 30 minutes was spent in preparing discharge papers, discussing discharge with patient, medication review, etc.    Signed:  TERESA Barragan CNP  3/31/2023  2:07 PM

## 2023-03-31 NOTE — DISCHARGE INSTRUCTIONS
Your information:  Name: Wyatt Juarez  : 1931    Your instructions:    Discharge home with [facility]    What to do after you leave the hospital:    Recommended diet: Regular    Recommended activity: activity as tolerated and as previously ordered. The following personal items were collected during your admission and were returned to you:    Belongings  Dental Appliances: Uppers, Lowers  Vision - Corrective Lenses: Eyeglasses  Hearing Aid: None  Clothing: Footwear, Hat, Jacket/Coat, Pants, Shirt, [employer], At bedside  Jewelry: Ring, Watch  Electronic Devices: None  Weapons (Notify Protective Services/Security): None  Home Medications: None  Valuables Given To: Family (Comment) (Lina Cortez)  Provide Name(s) of Who Valuable(s) Were Given To: Lina Cortez    Information obtained by:  By signing below, I understand that if any problems occur once I leave the hospital I am to contact a medical doctor or call 911 if an emergency. I understand and acknowledge receipt of the instructions indicated above.

## 2023-03-31 NOTE — PROGRESS NOTES
0630: Pt started to have pain in right leg around 0630 this morning. Pt stated pain of 7 on 0-10 pain scale. Pt also stated the feeling as being \"sore, tender, and somewhat numb\" and is located in the shin and radiates to the posterior thigh. This RN assessed bilateral lower extremities. Findings are moderate in strength, pedal and posterior tibial pulses 2+, and no signs of redness or swelling bilaterally. Pt was repositioned, given a warm blanket to place on right leg per request.     0725: Pt pain reassessed at this time during shift report by this RN. Pt states the pain is \"much better\" and states a pain of 3 on a 0-10 pain scale. Pt also states that massaging the extremity \"helped a lot\" and is no longer numb.      Estefana Lundborg, RN

## 2023-03-31 NOTE — PLAN OF CARE
Problem: Chronic Conditions and Co-morbidities  Goal: Patient's chronic conditions and co-morbidity symptoms are monitored and maintained or improved  3/30/2023 2206 by Andrea Newman RN  Outcome: Progressing  3/30/2023 1137 by Vanessa Aguero RN  Outcome: Progressing     Problem: Discharge Planning  Goal: Discharge to home or other facility with appropriate resources  3/30/2023 2206 by Andrea Newman RN  Outcome: Progressing  3/30/2023 1137 by Vanessa Aguero RN  Outcome: Progressing     Problem: Safety - Adult  Goal: Free from fall injury  3/30/2023 2206 by Andrea Newman RN  Outcome: Progressing  3/30/2023 1137 by Vanessa Aguero RN  Outcome: Progressing

## 2023-03-31 NOTE — PROGRESS NOTES
GLUCOSE 81 03/30/2023 05:35 AM    PROT 6.4 03/25/2023 02:00 PM    LABALBU 3.6 03/25/2023 02:00 PM    CALCIUM 9.2 03/30/2023 05:35 AM    BILITOT 0.4 03/25/2023 02:00 PM    ALKPHOS 117 03/25/2023 02:00 PM    AST 27 03/25/2023 02:00 PM    ALT 20 03/25/2023 02:00 PM          Current Facility-Administered Medications:     0.9 % sodium chloride infusion, , IntraVENous, PRN, Randee Gonzales MD    eltrombopag olamine (PROMACTA) 50 MG tablet TABS 50 mg (Patient Supplied), 50 mg, Oral, Daily, Dany Barbosa MD, 50 mg at 03/31/23 0827    pantoprazole (PROTONIX) tablet 40 mg, 40 mg, Oral, QAM AC, Lyndle Nyhan, MD, 40 mg at 03/31/23 0545    hydrALAZINE (APRESOLINE) tablet 75 mg, 75 mg, Oral, TID, Dany Barbosa MD, 75 mg at 03/31/23 1509    hydrALAZINE (APRESOLINE) tablet 25 mg, 25 mg, Oral, Q4H PRN, Gabriel Arnett MD, 25 mg at 03/31/23 1155    allopurinol (ZYLOPRIM) tablet 100 mg, 100 mg, Oral, Daily, Gabriel Arnett MD, 100 mg at 03/31/23 0827    polyvinyl alcohol (LIQUIFILM TEARS) 1.4 % ophthalmic solution 1 drop, 1 drop, Both Eyes, PRN, Gabriel Arnett MD, 1 drop at 03/28/23 2048    metoprolol succinate (TOPROL XL) extended release tablet 50 mg, 50 mg, Oral, BID, Gabriel Arnett MD, 50 mg at 03/31/23 9383    vitamin B and C (TOTAL B-C) 1 tablet, 1 tablet, Oral, Daily, Gabriel Arnett MD, 1 tablet at 03/31/23 0827    losartan (COZAAR) tablet 50 mg, 50 mg, Oral, Daily, Gabriel Arnett MD, 50 mg at 03/31/23 5671    acetaminophen (TYLENOL) tablet 500 mg, 500 mg, Oral, Q4H PRN, Gabriel Arnett MD, 500 mg at 03/30/23 1740    atorvastatin (LIPITOR) tablet 10 mg, 10 mg, Oral, Daily, Gabriel Arnett MD, 10 mg at 03/31/23 0827    XR CHEST (2 VW)   Final Result   1. Cardiomegaly. There are no findings of failure or pneumonia. 80year old woman known to Dr. Eleuterio Kasper with history of ITP, anemia of CKD, and IgM MGUS diagnosed 2010, last seen in the office 3/6/2023.  MGUS parameters stable. ITP with no response to steroids. Started Promacta 50 mg p.o. daily. Also hx of Afib s/p pacemaker pacemaker and CVA in June 2021. She is on Eliquis and aspirin for her atrial fibrillation. As long as platelet count remains above 50,000 and she can stay on these medications. She has renal insufficiency anemia but they would rather not initiate the Aranesp. Monitor MGUS  parameters. The IgM level has been stable. She will continue with small little meals to maintain her weight. She is on gabapentin and Tylenol to help alleviate some of her arthritis and arthralgias. She is on a 3 month follow-up. Admitted with GIB. Eliquis/ASA held. EDG 3/29/2023 without evidence for acute bleeding, mild gastritis    A/P  -ITP, controlled on Promacta 50 mg daily should continue  -No upper GI etiology on EGD. Hgb improved s/p PRBC no transfusion indicated today  -Eliquis to be restarted after a week.    -Outpatient CBC monitoring     Electronically signed by Ravi Whitmore MD on 3/31/2023 at 3:47 PM

## 2023-03-31 NOTE — PROGRESS NOTES
Message sent to Dickson Dean NP re:  Samira French to make sure primary was aware that the patient had recieved 3 doses of the PRN hydralazine fore elevated BPs. \" Ask for clarification on discharge med rec regarding above. Per-Adriana Kowalski NP will check with Dr. Michael Ware regarding above and either write a note or a misc order when it is OK to discharge home.      Update: Medications adjusted and OK to discharge home per primary team via [address] [place] [address]

## 2023-05-01 ENCOUNTER — APPOINTMENT (OUTPATIENT)
Dept: CT IMAGING | Age: 88
End: 2023-05-01
Payer: COMMERCIAL

## 2023-05-01 ENCOUNTER — HOSPITAL ENCOUNTER (INPATIENT)
Age: 88
LOS: 3 days | Discharge: OTHER INSTITUTION | End: 2023-05-05
Attending: STUDENT IN AN ORGANIZED HEALTH CARE EDUCATION/TRAINING PROGRAM | Admitting: INTERNAL MEDICINE
Payer: COMMERCIAL

## 2023-05-01 ENCOUNTER — APPOINTMENT (OUTPATIENT)
Dept: GENERAL RADIOLOGY | Age: 88
End: 2023-05-01
Payer: COMMERCIAL

## 2023-05-01 DIAGNOSIS — R59.1 LYMPHADENOPATHY: ICD-10-CM

## 2023-05-01 DIAGNOSIS — S22.069A CLOSED FRACTURE OF EIGHTH THORACIC VERTEBRA, UNSPECIFIED FRACTURE MORPHOLOGY, INITIAL ENCOUNTER (HCC): ICD-10-CM

## 2023-05-01 DIAGNOSIS — M54.50 ACUTE LOW BACK PAIN, UNSPECIFIED BACK PAIN LATERALITY, UNSPECIFIED WHETHER SCIATICA PRESENT: ICD-10-CM

## 2023-05-01 DIAGNOSIS — R77.8 ELEVATED TROPONIN: ICD-10-CM

## 2023-05-01 DIAGNOSIS — R91.1 PULMONARY NODULE: ICD-10-CM
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
## 2023-05-01 LAB
ANION GAP SERPL CALCULATED.3IONS-SCNC: 8 MMOL/L (ref 7–16)
BASOPHILS # BLD: 0.06 E9/L (ref 0–0.2)
BASOPHILS NFR BLD: 1.2 % (ref 0–2)
BUN SERPL-MCNC: 28 MG/DL (ref 6–23)
CALCIUM SERPL-MCNC: 12 MG/DL (ref 8.6–10.2)
CHLORIDE SERPL-SCNC: 101 MMOL/L (ref 98–107)
CHLORIDE UR-SCNC: 31 MMOL/L
CO2 SERPL-SCNC: 30 MMOL/L (ref 22–29)
CREAT SERPL-MCNC: 1.5 MG/DL (ref 0.5–1)
CREAT UR-MCNC: 116 MG/DL (ref 29–226)
EKG ATRIAL RATE: 60 BPM
EKG P-R INTERVAL: 188 MS
EKG Q-T INTERVAL: 486 MS
EKG QRS DURATION: 178 MS
EKG QTC CALCULATION (BAZETT): 486 MS
EKG R AXIS: -52 DEGREES
EKG T AXIS: 103 DEGREES
EKG VENTRICULAR RATE: 60 BPM
EOSINOPHIL # BLD: 0.06 E9/L (ref 0.05–0.5)
EOSINOPHIL NFR BLD: 1.2 % (ref 0–6)
ERYTHROCYTE [DISTWIDTH] IN BLOOD BY AUTOMATED COUNT: 17.2 FL (ref 11.5–15)
GLUCOSE SERPL-MCNC: 99 MG/DL (ref 74–99)
HCT VFR BLD AUTO: 28.8 % (ref 34–48)
HGB BLD-MCNC: 8.9 G/DL (ref 11.5–15.5)
IMM GRANULOCYTES # BLD: 0.05 E9/L
IMM GRANULOCYTES NFR BLD: 1 % (ref 0–5)
LYMPHOCYTES # BLD: 0.8 E9/L (ref 1.5–4)
LYMPHOCYTES NFR BLD: 15.6 % (ref 20–42)
MCH RBC QN AUTO: 28.9 PG (ref 26–35)
MCHC RBC AUTO-ENTMCNC: 30.9 % (ref 32–34.5)
MCV RBC AUTO: 93.5 FL (ref 80–99.9)
MONOCYTES # BLD: 0.84 E9/L (ref 0.1–0.95)
MONOCYTES NFR BLD: 16.3 % (ref 2–12)
NEUTROPHILS # BLD: 3.33 E9/L (ref 1.8–7.3)
NEUTS SEG NFR BLD: 64.7 % (ref 43–80)
OSMOLALITY URINE: 393 MOSM/KG (ref 300–900)
PLATELET # BLD AUTO: 115 E9/L (ref 130–450)
PMV BLD AUTO: 13.6 FL (ref 7–12)
POTASSIUM SERPL-SCNC: 3.9 MMOL/L (ref 3.5–5)
POTASSIUM UR-SCNC: 49.7 MMOL/L
PROT UR-MCNC: 17 MG/DL (ref 0–12)
PROTEIN/CREAT RATIO: 0.1
PROTEIN/CREAT RATIO: 0.1 (ref 0–0.2)
RBC # BLD AUTO: 3.08 E12/L (ref 3.5–5.5)
SODIUM SERPL-SCNC: 139 MMOL/L (ref 132–146)
SODIUM UR-SCNC: 51 MMOL/L
TROPONIN, HIGH SENSITIVITY: 116 NG/L (ref 0–9)
TROPONIN, HIGH SENSITIVITY: 128 NG/L (ref 0–9)
UREA NITROGEN, UR: 438 MG/DL (ref 800–1666)
WBC # BLD: 5.1 E9/L (ref 4.5–11.5)

## 2023-05-01 PROCEDURE — 2580000003 HC RX 258: Performed by: STUDENT IN AN ORGANIZED HEALTH CARE EDUCATION/TRAINING PROGRAM

## 2023-05-01 PROCEDURE — 72125 CT NECK SPINE W/O DYE: CPT

## 2023-05-01 PROCEDURE — 83935 ASSAY OF URINE OSMOLALITY: CPT

## 2023-05-01 PROCEDURE — 96374 THER/PROPH/DIAG INJ IV PUSH: CPT

## 2023-05-01 PROCEDURE — 72128 CT CHEST SPINE W/O DYE: CPT

## 2023-05-01 PROCEDURE — 82570 ASSAY OF URINE CREATININE: CPT

## 2023-05-01 PROCEDURE — 36415 COLL VENOUS BLD VENIPUNCTURE: CPT

## 2023-05-01 PROCEDURE — 81001 URINALYSIS AUTO W/SCOPE: CPT

## 2023-05-01 PROCEDURE — 96375 TX/PRO/DX INJ NEW DRUG ADDON: CPT

## 2023-05-01 PROCEDURE — 84133 ASSAY OF URINE POTASSIUM: CPT

## 2023-05-01 PROCEDURE — 84156 ASSAY OF PROTEIN URINE: CPT

## 2023-05-01 PROCEDURE — 6360000002 HC RX W HCPCS: Performed by: STUDENT IN AN ORGANIZED HEALTH CARE EDUCATION/TRAINING PROGRAM

## 2023-05-01 PROCEDURE — 70450 CT HEAD/BRAIN W/O DYE: CPT

## 2023-05-01 PROCEDURE — 83690 ASSAY OF LIPASE: CPT

## 2023-05-01 PROCEDURE — 71046 X-RAY EXAM CHEST 2 VIEWS: CPT

## 2023-05-01 PROCEDURE — 84300 ASSAY OF URINE SODIUM: CPT

## 2023-05-01 PROCEDURE — 93005 ELECTROCARDIOGRAM TRACING: CPT | Performed by: PHYSICIAN ASSISTANT

## 2023-05-01 PROCEDURE — 84484 ASSAY OF TROPONIN QUANT: CPT

## 2023-05-01 PROCEDURE — 85025 COMPLETE CBC W/AUTO DIFF WBC: CPT

## 2023-05-01 PROCEDURE — 80048 BASIC METABOLIC PNL TOTAL CA: CPT

## 2023-05-01 PROCEDURE — 99285 EMERGENCY DEPT VISIT HI MDM: CPT

## 2023-05-01 PROCEDURE — 84540 ASSAY OF URINE/UREA-N: CPT

## 2023-05-01 PROCEDURE — 72131 CT LUMBAR SPINE W/O DYE: CPT

## 2023-05-01 PROCEDURE — 82436 ASSAY OF URINE CHLORIDE: CPT

## 2023-05-01 PROCEDURE — 93010 ELECTROCARDIOGRAM REPORT: CPT | Performed by: INTERNAL MEDICINE

## 2023-05-01 RX ORDER — DIPHENHYDRAMINE HYDROCHLORIDE 50 MG/ML
50 INJECTION INTRAMUSCULAR; INTRAVENOUS ONCE
Status: COMPLETED | OUTPATIENT
Start: 2023-05-01 | End: 2023-05-01

## 2023-05-01 RX ORDER — 0.9 % SODIUM CHLORIDE 0.9 %
500 INTRAVENOUS SOLUTION INTRAVENOUS ONCE
Status: COMPLETED | OUTPATIENT
Start: 2023-05-01 | End: 2023-05-02

## 2023-05-01 RX ADMIN — HYDROCORTISONE SODIUM SUCCINATE 200 MG: 100 INJECTION, POWDER, FOR SOLUTION INTRAMUSCULAR; INTRAVENOUS at 21:30

## 2023-05-01 RX ADMIN — DIPHENHYDRAMINE HYDROCHLORIDE 50 MG: 50 INJECTION, SOLUTION INTRAMUSCULAR; INTRAVENOUS at 21:29

## 2023-05-01 RX ADMIN — SODIUM CHLORIDE 500 ML: 9 INJECTION, SOLUTION INTRAVENOUS at 21:36

## 2023-05-01 ASSESSMENT — PAIN SCALES - GENERAL: PAINLEVEL_OUTOF10: 8

## 2023-05-01 ASSESSMENT — PAIN - FUNCTIONAL ASSESSMENT: PAIN_FUNCTIONAL_ASSESSMENT: 0-10

## 2023-05-02 ENCOUNTER — APPOINTMENT (OUTPATIENT)
Dept: CT IMAGING | Age: 88
End: 2023-05-02
Payer: COMMERCIAL

## 2023-05-02 PROBLEM — S22.060A: Status: ACTIVE | Noted: 2023-05-02

## 2023-05-02 LAB
BACTERIA URNS QL MICRO: ABNORMAL /HPF
BILIRUB UR QL STRIP: NEGATIVE
CLARITY UR: CLEAR
COLOR UR: YELLOW
EPI CELLS #/AREA URNS HPF: ABNORMAL /HPF
GLUCOSE UR STRIP-MCNC: NEGATIVE MG/DL
HGB UR QL STRIP: NEGATIVE
KETONES UR STRIP-MCNC: NEGATIVE MG/DL
LEUKOCYTE ESTERASE UR QL STRIP: ABNORMAL
LIPASE: 49 U/L (ref 13–60)
NITRITE UR QL STRIP: NEGATIVE
PH UR STRIP: 5.5 [PH] (ref 5–9)
PROT UR STRIP-MCNC: NEGATIVE MG/DL
RBC #/AREA URNS HPF: ABNORMAL /HPF (ref 0–2)
SP GR UR STRIP: 1.02 (ref 1–1.03)
UROBILINOGEN UR STRIP-ACNC: 0.2 E.U./DL
WBC #/AREA URNS HPF: ABNORMAL /HPF (ref 0–5)

## 2023-05-02 PROCEDURE — 6370000000 HC RX 637 (ALT 250 FOR IP): Performed by: INTERNAL MEDICINE

## 2023-05-02 PROCEDURE — 2580000003 HC RX 258: Performed by: INTERNAL MEDICINE

## 2023-05-02 PROCEDURE — 2060000000 HC ICU INTERMEDIATE R&B

## 2023-05-02 PROCEDURE — 6360000002 HC RX W HCPCS: Performed by: STUDENT IN AN ORGANIZED HEALTH CARE EDUCATION/TRAINING PROGRAM

## 2023-05-02 PROCEDURE — 71260 CT THORAX DX C+: CPT

## 2023-05-02 PROCEDURE — 74177 CT ABD & PELVIS W/CONTRAST: CPT

## 2023-05-02 PROCEDURE — 6360000004 HC RX CONTRAST MEDICATION: Performed by: RADIOLOGY

## 2023-05-02 PROCEDURE — 99222 1ST HOSP IP/OBS MODERATE 55: CPT | Performed by: NEUROLOGICAL SURGERY

## 2023-05-02 RX ORDER — ALLOPURINOL 100 MG/1
100 TABLET ORAL DAILY
Status: DISCONTINUED | OUTPATIENT
Start: 2023-05-02 | End: 2023-05-05 | Stop reason: HOSPADM

## 2023-05-02 RX ORDER — SODIUM CHLORIDE 9 MG/ML
INJECTION, SOLUTION INTRAVENOUS PRN
Status: DISCONTINUED | OUTPATIENT
Start: 2023-05-02 | End: 2023-05-05 | Stop reason: HOSPADM

## 2023-05-02 RX ORDER — ACETAMINOPHEN 325 MG/1
650 TABLET ORAL EVERY 6 HOURS PRN
Status: DISCONTINUED | OUTPATIENT
Start: 2023-05-02 | End: 2023-05-05 | Stop reason: HOSPADM

## 2023-05-02 RX ORDER — SODIUM CHLORIDE 0.9 % (FLUSH) 0.9 %
5-40 SYRINGE (ML) INJECTION PRN
Status: DISCONTINUED | OUTPATIENT
Start: 2023-05-02 | End: 2023-05-05 | Stop reason: HOSPADM

## 2023-05-02 RX ORDER — POLYETHYLENE GLYCOL 3350 17 G/17G
17 POWDER, FOR SOLUTION ORAL DAILY PRN
Status: DISCONTINUED | OUTPATIENT
Start: 2023-05-02 | End: 2023-05-05 | Stop reason: HOSPADM

## 2023-05-02 RX ORDER — MORPHINE SULFATE 4 MG/ML
4 INJECTION, SOLUTION INTRAMUSCULAR; INTRAVENOUS ONCE
Status: COMPLETED | OUTPATIENT
Start: 2023-05-02 | End: 2023-05-02

## 2023-05-02 RX ORDER — ONDANSETRON 4 MG/1
4 TABLET, ORALLY DISINTEGRATING ORAL EVERY 8 HOURS PRN
Status: DISCONTINUED | OUTPATIENT
Start: 2023-05-02 | End: 2023-05-05 | Stop reason: HOSPADM

## 2023-05-02 RX ORDER — HYDRALAZINE HYDROCHLORIDE 20 MG/ML
10 INJECTION INTRAMUSCULAR; INTRAVENOUS ONCE
Status: DISCONTINUED | OUTPATIENT
Start: 2023-05-02 | End: 2023-05-02

## 2023-05-02 RX ORDER — SODIUM CHLORIDE 0.9 % (FLUSH) 0.9 %
5-40 SYRINGE (ML) INJECTION EVERY 12 HOURS SCHEDULED
Status: DISCONTINUED | OUTPATIENT
Start: 2023-05-02 | End: 2023-05-05 | Stop reason: HOSPADM

## 2023-05-02 RX ORDER — LOSARTAN POTASSIUM 50 MG/1
50 TABLET ORAL DAILY
Status: DISCONTINUED | OUTPATIENT
Start: 2023-05-02 | End: 2023-05-05 | Stop reason: HOSPADM

## 2023-05-02 RX ORDER — ONDANSETRON 2 MG/ML
4 INJECTION INTRAMUSCULAR; INTRAVENOUS EVERY 6 HOURS PRN
Status: DISCONTINUED | OUTPATIENT
Start: 2023-05-02 | End: 2023-05-05 | Stop reason: HOSPADM

## 2023-05-02 RX ORDER — ACETAMINOPHEN 650 MG/1
650 SUPPOSITORY RECTAL EVERY 6 HOURS PRN
Status: DISCONTINUED | OUTPATIENT
Start: 2023-05-02 | End: 2023-05-05 | Stop reason: HOSPADM

## 2023-05-02 RX ORDER — METOPROLOL SUCCINATE 50 MG/1
50 TABLET, EXTENDED RELEASE ORAL 2 TIMES DAILY
Status: DISCONTINUED | OUTPATIENT
Start: 2023-05-02 | End: 2023-05-05 | Stop reason: HOSPADM

## 2023-05-02 RX ORDER — ONDANSETRON 2 MG/ML
4 INJECTION INTRAMUSCULAR; INTRAVENOUS ONCE
Status: COMPLETED | OUTPATIENT
Start: 2023-05-02 | End: 2023-05-02

## 2023-05-02 RX ORDER — POLYVINYL ALCOHOL 14 MG/ML
1 SOLUTION/ DROPS OPHTHALMIC PRN
Status: DISCONTINUED | OUTPATIENT
Start: 2023-05-02 | End: 2023-05-05 | Stop reason: HOSPADM

## 2023-05-02 RX ORDER — PANTOPRAZOLE SODIUM 40 MG/1
40 TABLET, DELAYED RELEASE ORAL
Status: DISCONTINUED | OUTPATIENT
Start: 2023-05-02 | End: 2023-05-05 | Stop reason: HOSPADM

## 2023-05-02 RX ADMIN — LOSARTAN POTASSIUM 50 MG: 50 TABLET, FILM COATED ORAL at 08:40

## 2023-05-02 RX ADMIN — METOPROLOL SUCCINATE 50 MG: 50 TABLET, EXTENDED RELEASE ORAL at 20:19

## 2023-05-02 RX ADMIN — METOPROLOL SUCCINATE 50 MG: 50 TABLET, EXTENDED RELEASE ORAL at 08:39

## 2023-05-02 RX ADMIN — HYDRALAZINE HYDROCHLORIDE 75 MG: 50 TABLET, FILM COATED ORAL at 20:19

## 2023-05-02 RX ADMIN — ALLOPURINOL 100 MG: 100 TABLET ORAL at 08:40

## 2023-05-02 RX ADMIN — SODIUM CHLORIDE, PRESERVATIVE FREE 10 ML: 5 INJECTION INTRAVENOUS at 08:30

## 2023-05-02 RX ADMIN — ACETAMINOPHEN 650 MG: 325 TABLET ORAL at 13:38

## 2023-05-02 RX ADMIN — HYDROCORTISONE SODIUM SUCCINATE 200 MG: 100 INJECTION, POWDER, FOR SOLUTION INTRAMUSCULAR; INTRAVENOUS at 08:30

## 2023-05-02 RX ADMIN — PANTOPRAZOLE SODIUM 40 MG: 40 TABLET, DELAYED RELEASE ORAL at 08:40

## 2023-05-02 RX ADMIN — ONDANSETRON 4 MG: 2 INJECTION INTRAMUSCULAR; INTRAVENOUS at 01:31

## 2023-05-02 RX ADMIN — SODIUM CHLORIDE, PRESERVATIVE FREE 10 ML: 5 INJECTION INTRAVENOUS at 20:20

## 2023-05-02 RX ADMIN — HYDRALAZINE HYDROCHLORIDE 75 MG: 50 TABLET, FILM COATED ORAL at 08:40

## 2023-05-02 RX ADMIN — IOPAMIDOL 75 ML: 755 INJECTION, SOLUTION INTRAVENOUS at 01:19

## 2023-05-02 RX ADMIN — HYDRALAZINE HYDROCHLORIDE 75 MG: 50 TABLET, FILM COATED ORAL at 13:33

## 2023-05-02 RX ADMIN — MORPHINE SULFATE 4 MG: 4 INJECTION, SOLUTION INTRAMUSCULAR; INTRAVENOUS at 01:31

## 2023-05-02 ASSESSMENT — PAIN DESCRIPTION - LOCATION
LOCATION: ABDOMEN;BACK
LOCATION: BACK
LOCATION: SHOULDER
LOCATION: BACK

## 2023-05-02 ASSESSMENT — PAIN DESCRIPTION - DESCRIPTORS
DESCRIPTORS: ACHING
DESCRIPTORS: ACHING

## 2023-05-02 ASSESSMENT — PAIN DESCRIPTION - PAIN TYPE
TYPE: ACUTE PAIN
TYPE: ACUTE PAIN

## 2023-05-02 ASSESSMENT — PAIN DESCRIPTION - ORIENTATION
ORIENTATION: LEFT
ORIENTATION: LOWER
ORIENTATION: LOWER

## 2023-05-02 ASSESSMENT — PAIN SCALES - GENERAL
PAINLEVEL_OUTOF10: 5
PAINLEVEL_OUTOF10: 8
PAINLEVEL_OUTOF10: 7
PAINLEVEL_OUTOF10: 10

## 2023-05-02 NOTE — ED NOTES
Patient straight cathed per order. Output of 150 mL noted. Patient tolerated well. Urine specimen collected and sent to lab.      Grisel Levy RN  05/01/23 1181
Radiology Procedure Waiver   Name: Emigdio Jo  : 1931  MRN: [id number]    Date:  23    Time: 9:29 PM EDT    Benefits of immediately proceeding with Radiology exam(s) without pre-testing outweigh the risks or are not indicated as specified below and therefore the following is/are being waived:    [] Pregnancy test   [] Patients LMP on-time and regular.   [] Patient had Tubal Ligation or has other Contraception Device. [] Patient  is Menopausal or Premenarcheal.    [] Patient had Full or Partial Hysterectomy. [x] Protocol for Iodine allergy    [] MRI Questionnaire     [x] BUN/Creatinine   [] Patient age w/no hx of renal dysfunction. [] Patient on Dialysis. [] Recent Normal Labs.   Electronically signed by Kings Banerjee MD on 23 at 9:29 PM EDT               Lalita Leal MD  Resident  23 [phone]
Received call from [facility] that pt is outside window for CT since she was medicated at 2130.  Spoke with Dr Juan C Barrios who stated that due to pt's age and reaction being itching she can have CT and if pt starts to have itching upon return to ER she will medicate at that time     Kane Bauman RN  05/02/23 0168
Report called to receiving nurse on 4SE. Will put in transport when bed is clean.        Saeed Dover RN  05/02/23 3782
Care      ---END OF FIRST PROVIDER CONTACT ASSESSMENT NOTE---  Electronically signed by SYLVIA Castillo   DD: 5/1/23       Santa Castillo  05/01/23 1430

## 2023-05-02 NOTE — CARE COORDINATION
EMMA transition of care. Pt presented to [facility] ER secondary to back pain. Had a fall at home a couple of days ago. Admitted to PICU with T8 vertebral fracture. NS on consult. Pt/ot evals ordered. Met with pt and her daughter at the bedside to discuss d/c planning. They live together in a single story home with 3 steps to enter. Pt normally ambulates with a ww and requires assistance with ADLs and IADLs. Pt daughter assists pt at home. Pt owns a bsc, tub bench, cane, BP cuff, and pox. She has uses [facility] in the past.  No hx of JOAN. PCP is Dr Yohana Castillo and uses [facility] on [address] [facility]. Pt will need therapy evals once medically appropriate. Pt and daughter are agreeable to JOAN if needed. [place] list left with pt daughter. CM/SW to follow. [place] [phone]. Case Management Assessment  Initial Evaluation    Date/Time of Evaluation: 5/2/2023 3:58 PM  Assessment Completed by: Jenna Howell RN    If patient is discharged prior to next notation, then this note serves as note for discharge by case management. Patient Name: Rizwana Orta                   YOB: 1931  Diagnosis: Lymphadenopathy [R59.1]  Pulmonary nodule [R91.1]  Elevated troponin [R77.8]  Fall, initial encounter [W19. XXXA]  Closed wedge compression fracture of T8 vertebra, initial encounter ([facility] [address] [S22.060A]  Closed fracture of eighth thoracic vertebra, unspecified fracture morphology, initial encounter [facility] [S22.069A]  Acute low back pain, unspecified back pain laterality, unspecified whether sciatica present [M54.50]                   Date / Time: 5/1/2023  8:34 PM    Patient Admission Status: Inpatient   Readmission Risk (Low < 19, Mod (19-27), High > 27): Readmission Risk Score: 20.5    Current PCP: Lily Calabrese, DO  PCP verified by CM?  Yes    Chart Reviewed: Yes      History Provided by: Patient, Child/Family, Medical Record  Patient Orientation: Alert and Oriented    Patient

## 2023-05-02 NOTE — H&P
[address]                  [address] [facility]  [facility]                              HISTORY AND PHYSICAL    PATIENT NAME: Hortencia Carrasco                        :        1931  MED REC NO:   [id number]                            ROOM:       4504  ACCOUNT NO:   [de-identified]                           ADMIT DATE: 2023  PROVIDER:     Dimitri Son DO    CHIEF COMPLAINT:  Status post fall, back pain. HISTORY OF PRESENT ILLNESS:  The patient is a 77-year-old black female  who presented to the emergency room after she fell at home. She landed  on her back. She had pain in the back. She was seen in the emergency  room. X-rays and imaging revealed T8 vertebral fracture. She was  admitted for further evaluation and treatment. PAST MEDICAL HISTORY:  Paroxysmal atrial fibrillation, chronic Eliquis  therapy, hypertension, hyperlipidemia, diabetes mellitus type 2,  obesity, monoclonal gammopathy of unknown significance, and ITP. PAST SURGICAL HISTORY:  Appendectomy, cholecystectomy, hysterectomy,  bilateral eye cataract surgery, cardiac catheterization, and pacemaker  placement. SOCIAL HISTORY:  No tobacco, no alcohol. REVIEW OF SYSTEMS:  Remarkable for above-stated chief complaint. ALLERGIES:  AMPICILLIN and IODINE. MEDICATIONS PRIOR TO ADMISSION:  Hydralazine, Protonix, Eliquis,  Promacta, Tylenol p.r.n., Imodium p.r.n., Premarin vaginal cream,  vitamin D, omega-3, Cozaar, Livalo, vitamin B complex, Toprol XL,  Systane eyedrops, and allopurinol. PHYSICAL EXAMINATION:  GENERAL APPEARANCE:  Reveals a 77-year-old black female who is alert,  cooperative, and a fair historian. VITAL SIGNS:  On admission; temperature 97.1, pulse 62, respirations 19,  blood pressure 157/82. HEENT:  Head:  Normocephalic, atraumatic. Eyes:  Pupils equal and  reactive to light. Extraocular muscles intact. Fundi not well  visualized.   Nose:  No

## 2023-05-02 NOTE — ACP (ADVANCE CARE PLANNING)
Advance Care Planning   Healthcare Decision Maker:    Primary Decision Maker: Tesha Quintana - Child - [phone]    Click here to complete Healthcare Decision Makers including selection of the Healthcare Decision Maker Relationship (ie \"Primary\"). Today we documented Decision Maker(s) consistent with Legal Next of Kin hierarchy.        If the relationship to the patient does NOT follow our state's Next of Kin hierarchy, the patient MUST complete an ACP Document to allow him/her to act on the patient's behalf. :

## 2023-05-02 NOTE — ED PROVIDER NOTES
[address]        Pt Name: Melanie Wilkinson  MRN: [id number]  [place] 5/26/1931  Date of evaluation: 5/1/2023  Provider: Elder Norman MD  PCP: Salena Schwab DO  Note Started: 8:54 PM EDT 5/1/23    CHIEF COMPLAINT       Chief Complaint   Patient presents with    Fall     On Saturday, legs gave out with walker. She fell and now back hurts        HISTORY OF PRESENT ILLNESS: 1 or more Elements        Limitations to history : None    Melanie Wilkinson is a 80 y.o. female who presents to the emergency room for back pain. Patient states couple days ago she was going to the kitchen to have a sandwich when her walker slipped out from in front of her, and she fell landing on her back. She is complaining of back pain, as well as abdominal pain and chest pain, states when she moves her back and chest hurt. She denies any nausea, vomiting, diarrhea. Denies any dysuria or hematuria. Denies any numbness in her buttocks or groin, denies any loss of bowel or bladder control. Nursing Notes were all reviewed and agreed with or any disagreements were addressed in the HPI. REVIEW OF EXTERNAL NOTE :       As below    REVIEW OF SYSTEMS :      Positives and Pertinent negatives as per HPI.      SURGICAL HISTORY     Past Surgical History:   Procedure Laterality Date    APPENDECTOMY      CARDIAC CATHETERIZATION  03/12/2015    Dr Jeremie Easley  05/06/2015    D-PPM  (On license of [facility])   GEMMA    CATARACT REMOVAL      CHOLECYSTECTOMY      HYSTERECTOMY (CERVIX STATUS UNKNOWN)      PACEMAKER CHANGE Left 12/15/2022    [employer] Dual PPM-GR (Dr. Dayanara Fitzgerald)    UPPER GASTROINTESTINAL ENDOSCOPY N/A 3/29/2023    EGD BIOPSY performed by Naz Zeng MD at [address] [place] [address]       Previous Medications    ACETAMINOPHEN (TYLENOL) 500 MG TABLET    Take 500 mg by mouth as needed    ALLOPURINOL (ZYLOPRIM) 100 MG

## 2023-05-03 LAB
BASOPHILS # BLD: 0.04 E9/L (ref 0–0.2)
BASOPHILS NFR BLD: 0.6 % (ref 0–2)
EOSINOPHIL # BLD: 0.05 E9/L (ref 0.05–0.5)
EOSINOPHIL NFR BLD: 0.8 % (ref 0–6)
ERYTHROCYTE [DISTWIDTH] IN BLOOD BY AUTOMATED COUNT: 17.1 FL (ref 11.5–15)
HCT VFR BLD AUTO: 25.6 % (ref 34–48)
HGB BLD-MCNC: 8.2 G/DL (ref 11.5–15.5)
IMM GRANULOCYTES # BLD: 0.06 E9/L
IMM GRANULOCYTES NFR BLD: 0.9 % (ref 0–5)
LYMPHOCYTES # BLD: 0.94 E9/L (ref 1.5–4)
LYMPHOCYTES NFR BLD: 14.2 % (ref 20–42)
MCH RBC QN AUTO: 29.6 PG (ref 26–35)
MCHC RBC AUTO-ENTMCNC: 32 % (ref 32–34.5)
MCV RBC AUTO: 92.4 FL (ref 80–99.9)
MONOCYTES # BLD: 1.13 E9/L (ref 0.1–0.95)
MONOCYTES NFR BLD: 17 % (ref 2–12)
NEUTROPHILS # BLD: 4.42 E9/L (ref 1.8–7.3)
NEUTS SEG NFR BLD: 66.5 % (ref 43–80)
PLATELET # BLD AUTO: 129 E9/L (ref 130–450)
PMV BLD AUTO: 12.8 FL (ref 7–12)
RBC # BLD AUTO: 2.77 E12/L (ref 3.5–5.5)
WBC # BLD: 6.6 E9/L (ref 4.5–11.5)

## 2023-05-03 PROCEDURE — 2060000000 HC ICU INTERMEDIATE R&B

## 2023-05-03 PROCEDURE — 97530 THERAPEUTIC ACTIVITIES: CPT

## 2023-05-03 PROCEDURE — 97161 PT EVAL LOW COMPLEX 20 MIN: CPT

## 2023-05-03 PROCEDURE — 97165 OT EVAL LOW COMPLEX 30 MIN: CPT

## 2023-05-03 PROCEDURE — L0464 TLSO 4MOD SACRO-SCAP PRE: HCPCS

## 2023-05-03 PROCEDURE — 85025 COMPLETE CBC W/AUTO DIFF WBC: CPT

## 2023-05-03 PROCEDURE — 2580000003 HC RX 258: Performed by: INTERNAL MEDICINE

## 2023-05-03 PROCEDURE — 99232 SBSQ HOSP IP/OBS MODERATE 35: CPT | Performed by: NEUROLOGICAL SURGERY

## 2023-05-03 PROCEDURE — 6370000000 HC RX 637 (ALT 250 FOR IP): Performed by: INTERNAL MEDICINE

## 2023-05-03 PROCEDURE — 97535 SELF CARE MNGMENT TRAINING: CPT

## 2023-05-03 PROCEDURE — 36415 COLL VENOUS BLD VENIPUNCTURE: CPT

## 2023-05-03 RX ORDER — DOCUSATE SODIUM 100 MG/1
100 CAPSULE, LIQUID FILLED ORAL DAILY
Status: DISCONTINUED | OUTPATIENT
Start: 2023-05-03 | End: 2023-05-05

## 2023-05-03 RX ADMIN — PANTOPRAZOLE SODIUM 40 MG: 40 TABLET, DELAYED RELEASE ORAL at 05:40

## 2023-05-03 RX ADMIN — SODIUM CHLORIDE, PRESERVATIVE FREE 10 ML: 5 INJECTION INTRAVENOUS at 20:03

## 2023-05-03 RX ADMIN — APIXABAN 2.5 MG: 2.5 TABLET, FILM COATED ORAL at 20:02

## 2023-05-03 RX ADMIN — DOCUSATE SODIUM 100 MG: 100 CAPSULE, LIQUID FILLED ORAL at 08:39

## 2023-05-03 RX ADMIN — LOSARTAN POTASSIUM 50 MG: 50 TABLET, FILM COATED ORAL at 08:39

## 2023-05-03 RX ADMIN — HYDRALAZINE HYDROCHLORIDE 75 MG: 50 TABLET, FILM COATED ORAL at 16:26

## 2023-05-03 RX ADMIN — HYDRALAZINE HYDROCHLORIDE 75 MG: 50 TABLET, FILM COATED ORAL at 08:39

## 2023-05-03 RX ADMIN — APIXABAN 2.5 MG: 2.5 TABLET, FILM COATED ORAL at 08:39

## 2023-05-03 RX ADMIN — ALLOPURINOL 100 MG: 100 TABLET ORAL at 08:39

## 2023-05-03 RX ADMIN — METOPROLOL SUCCINATE 50 MG: 50 TABLET, EXTENDED RELEASE ORAL at 20:02

## 2023-05-03 RX ADMIN — SODIUM CHLORIDE, PRESERVATIVE FREE 10 ML: 5 INJECTION INTRAVENOUS at 08:39

## 2023-05-03 RX ADMIN — HYDRALAZINE HYDROCHLORIDE 75 MG: 50 TABLET, FILM COATED ORAL at 20:02

## 2023-05-03 RX ADMIN — ACETAMINOPHEN 650 MG: 325 TABLET ORAL at 16:26

## 2023-05-03 RX ADMIN — METOPROLOL SUCCINATE 50 MG: 50 TABLET, EXTENDED RELEASE ORAL at 08:39

## 2023-05-03 ASSESSMENT — PAIN SCALES - GENERAL: PAINLEVEL_OUTOF10: 3

## 2023-05-03 ASSESSMENT — PAIN DESCRIPTION - DESCRIPTORS: DESCRIPTORS: ACHING;DISCOMFORT;DULL

## 2023-05-03 ASSESSMENT — PAIN DESCRIPTION - LOCATION: LOCATION: BACK

## 2023-05-03 ASSESSMENT — PAIN DESCRIPTION - ORIENTATION: ORIENTATION: MID

## 2023-05-03 NOTE — CONSULTS
[address]                  [address] [place],  [facility]                                  CONSULTATION    PATIENT NAME: Charly Dominguez                        :        1931  MED REC NO:   [id number]                            ROOM:       4504  ACCOUNT NO:   [de-identified]                           ADMIT DATE: 2023  PROVIDER:     Shashank Espinoza MD    CONSULT DATE:  2023    NEUROSURGERY CONSULTATION    REASON FOR CONSULTATION:  T8 compression fracture. HISTORY OF PRESENT ILLNESS:  The patient is a 80-year-old lady, who  presented to the hospital with excruciating back pain. She states that  she fell several days ago. Since then, she has had worsening back pain. It is mainly in her back, does not radiate into her legs. She denies  any new numbness, tingling or weakness, or loss of control of bowel or  bladder function. She subsequently called the fire department and she  was brought to the hospital for further evaluation and management. At  this time, she describes the pain as a sharp stabbing pain. She states  that it is about an 8/10. It is made worse with activity and better  with rest.  She denies any loss of control of bowel or bladder function. PAST MEDICAL HISTORY:  Positive for anemia, asthma, atrial fibrillation,  stroke, diabetic retinopathy, gout, hyperlipidemia, hypertension. PAST SURGICAL HISTORY:  Positive for appendectomy, cardiac  catheterization, pacemaker placement. FAMILY HISTORY:  Noncontributory. SOCIAL HISTORY:  Negative for tobacco or alcohol use. ALLERGIES:  Include AMPICILLIN and IODINE. HOME MEDICATIONS:  Include Eliquis. REVIEW OF SYSTEMS:  HEENT:  Negative for headache, double vision, or  blurry vision. CARDIOVASCULAR:  Positive for irregular heart rate. RESPIRATORY:  Negative for shortness of breath, asthma, bronchitis, or  pneumonia.   GASTROINTESTINAL:  Negative for heartburn, nausea,

## 2023-05-03 NOTE — CARE COORDINATION
CM note. Pt remains on PICU. TLSO brace now in place. Asked therapy to see pt. Am-pac for PT is 11 and OT is 13. Pt could only ambulate 5 feet with mod assist and chair following behind. Met with pt daughter for SNF choices. She has chosen 1. [facility]. 2. [facility] 3. [facility] and 4. [facility]. Referral called to [facility] with [facility]. She will review. Will follow. Roxanne Sands [phone].

## 2023-05-04 LAB
B PARAP IS1001 DNA NPH QL NAA+NON-PROBE: NOT DETECTED
B PERT.PT PRMT NPH QL NAA+NON-PROBE: NOT DETECTED
BASOPHILS # BLD: 0.02 E9/L (ref 0–0.2)
BASOPHILS NFR BLD: 0.3 % (ref 0–2)
C PNEUM DNA NPH QL NAA+NON-PROBE: NOT DETECTED
EOSINOPHIL # BLD: 0.1 E9/L (ref 0.05–0.5)
EOSINOPHIL NFR BLD: 1.7 % (ref 0–6)
ERYTHROCYTE [DISTWIDTH] IN BLOOD BY AUTOMATED COUNT: 17.2 FL (ref 11.5–15)
FLUAV RNA NPH QL NAA+NON-PROBE: NOT DETECTED
FLUBV RNA NPH QL NAA+NON-PROBE: NOT DETECTED
HADV DNA NPH QL NAA+NON-PROBE: NOT DETECTED
HCOV 229E RNA NPH QL NAA+NON-PROBE: NOT DETECTED
HCOV HKU1 RNA NPH QL NAA+NON-PROBE: NOT DETECTED
HCOV NL63 RNA NPH QL NAA+NON-PROBE: NOT DETECTED
HCOV OC43 RNA NPH QL NAA+NON-PROBE: NOT DETECTED
HCT VFR BLD AUTO: 26.8 % (ref 34–48)
HGB BLD-MCNC: 8.3 G/DL (ref 11.5–15.5)
HMPV RNA NPH QL NAA+NON-PROBE: NOT DETECTED
HPIV1 RNA NPH QL NAA+NON-PROBE: NOT DETECTED
HPIV2 RNA NPH QL NAA+NON-PROBE: NOT DETECTED
HPIV3 RNA NPH QL NAA+NON-PROBE: NOT DETECTED
HPIV4 RNA NPH QL NAA+NON-PROBE: NOT DETECTED
IMM GRANULOCYTES # BLD: 0.07 E9/L
IMM GRANULOCYTES NFR BLD: 1.2 % (ref 0–5)
LYMPHOCYTES # BLD: 1.07 E9/L (ref 1.5–4)
LYMPHOCYTES NFR BLD: 18.6 % (ref 20–42)
M PNEUMO DNA NPH QL NAA+NON-PROBE: NOT DETECTED
MCH RBC QN AUTO: 28.6 PG (ref 26–35)
MCHC RBC AUTO-ENTMCNC: 31 % (ref 32–34.5)
MCV RBC AUTO: 92.4 FL (ref 80–99.9)
MONOCYTES # BLD: 0.92 E9/L (ref 0.1–0.95)
MONOCYTES NFR BLD: 16 % (ref 2–12)
NEUTROPHILS # BLD: 3.56 E9/L (ref 1.8–7.3)
NEUTS SEG NFR BLD: 62.2 % (ref 43–80)
PLATELET # BLD AUTO: 129 E9/L (ref 130–450)
PMV BLD AUTO: 13 FL (ref 7–12)
RBC # BLD AUTO: 2.9 E12/L (ref 3.5–5.5)
RSV RNA NPH QL NAA+NON-PROBE: NOT DETECTED
RV+EV RNA NPH QL NAA+NON-PROBE: NOT DETECTED
SARS-COV-2 RNA NPH QL NAA+NON-PROBE: NOT DETECTED
WBC # BLD: 5.7 E9/L (ref 4.5–11.5)

## 2023-05-04 PROCEDURE — 6370000000 HC RX 637 (ALT 250 FOR IP): Performed by: PHYSICIAN ASSISTANT

## 2023-05-04 PROCEDURE — 97530 THERAPEUTIC ACTIVITIES: CPT

## 2023-05-04 PROCEDURE — 85025 COMPLETE CBC W/AUTO DIFF WBC: CPT

## 2023-05-04 PROCEDURE — 2060000000 HC ICU INTERMEDIATE R&B

## 2023-05-04 PROCEDURE — 36415 COLL VENOUS BLD VENIPUNCTURE: CPT

## 2023-05-04 PROCEDURE — 99232 SBSQ HOSP IP/OBS MODERATE 35: CPT | Performed by: NEUROLOGICAL SURGERY

## 2023-05-04 PROCEDURE — 97535 SELF CARE MNGMENT TRAINING: CPT

## 2023-05-04 PROCEDURE — 6370000000 HC RX 637 (ALT 250 FOR IP): Performed by: INTERNAL MEDICINE

## 2023-05-04 PROCEDURE — 0202U NFCT DS 22 TRGT SARS-COV-2: CPT

## 2023-05-04 PROCEDURE — 2580000003 HC RX 258: Performed by: INTERNAL MEDICINE

## 2023-05-04 RX ORDER — TRAMADOL HYDROCHLORIDE 50 MG/1
50 TABLET ORAL EVERY 6 HOURS PRN
Status: DISCONTINUED | OUTPATIENT
Start: 2023-05-04 | End: 2023-05-05 | Stop reason: HOSPADM

## 2023-05-04 RX ORDER — GUAIFENESIN/DEXTROMETHORPHAN 100-10MG/5
10 SYRUP ORAL EVERY 6 HOURS PRN
Status: DISCONTINUED | OUTPATIENT
Start: 2023-05-04 | End: 2023-05-05 | Stop reason: HOSPADM

## 2023-05-04 RX ADMIN — APIXABAN 2.5 MG: 2.5 TABLET, FILM COATED ORAL at 08:45

## 2023-05-04 RX ADMIN — SODIUM CHLORIDE, PRESERVATIVE FREE 10 ML: 5 INJECTION INTRAVENOUS at 19:34

## 2023-05-04 RX ADMIN — GUAIFENESIN SYRUP AND DEXTROMETHORPHAN 10 ML: 100; 10 SYRUP ORAL at 08:44

## 2023-05-04 RX ADMIN — APIXABAN 2.5 MG: 2.5 TABLET, FILM COATED ORAL at 19:34

## 2023-05-04 RX ADMIN — SODIUM CHLORIDE, PRESERVATIVE FREE 10 ML: 5 INJECTION INTRAVENOUS at 08:45

## 2023-05-04 RX ADMIN — DOCUSATE SODIUM 100 MG: 100 CAPSULE, LIQUID FILLED ORAL at 08:44

## 2023-05-04 RX ADMIN — METOPROLOL SUCCINATE 50 MG: 50 TABLET, EXTENDED RELEASE ORAL at 08:44

## 2023-05-04 RX ADMIN — METOPROLOL SUCCINATE 50 MG: 50 TABLET, EXTENDED RELEASE ORAL at 19:34

## 2023-05-04 RX ADMIN — HYDRALAZINE HYDROCHLORIDE 75 MG: 50 TABLET, FILM COATED ORAL at 12:30

## 2023-05-04 RX ADMIN — ALLOPURINOL 100 MG: 100 TABLET ORAL at 08:45

## 2023-05-04 RX ADMIN — PANTOPRAZOLE SODIUM 40 MG: 40 TABLET, DELAYED RELEASE ORAL at 05:19

## 2023-05-04 RX ADMIN — POLYETHYLENE GLYCOL 3350 17 G: 17 POWDER, FOR SOLUTION ORAL at 17:24

## 2023-05-04 RX ADMIN — LOSARTAN POTASSIUM 50 MG: 50 TABLET, FILM COATED ORAL at 08:45

## 2023-05-04 RX ADMIN — HYDRALAZINE HYDROCHLORIDE 75 MG: 50 TABLET, FILM COATED ORAL at 19:34

## 2023-05-04 RX ADMIN — HYDRALAZINE HYDROCHLORIDE 75 MG: 50 TABLET, FILM COATED ORAL at 08:45

## 2023-05-04 RX ADMIN — ACETAMINOPHEN 650 MG: 325 TABLET ORAL at 21:07

## 2023-05-04 RX ADMIN — TRAMADOL HYDROCHLORIDE 50 MG: 50 TABLET, COATED ORAL at 21:07

## 2023-05-04 ASSESSMENT — PAIN SCALES - GENERAL
PAINLEVEL_OUTOF10: 6
PAINLEVEL_OUTOF10: 9

## 2023-05-04 ASSESSMENT — PAIN DESCRIPTION - LOCATION: LOCATION: BACK

## 2023-05-04 ASSESSMENT — PAIN DESCRIPTION - DESCRIPTORS: DESCRIPTORS: ACHING;SORE

## 2023-05-04 NOTE — CARE COORDINATION
CM note. [facility] declined. Referral called to [facility] with [facility] and to [facility] with [facility]. [facility] is not able to accept. [facility] is able to accept and will start insurance precert. PASRR completed, spencer updated, ambulance form and envelope placed in the soft chart. Updated pt daughter. CM/SW to follow. [facility] [phone].

## 2023-05-05 VITALS
SYSTOLIC BLOOD PRESSURE: 155 MMHG | TEMPERATURE: 96.9 F | BODY MASS INDEX: 26.74 KG/M2 | DIASTOLIC BLOOD PRESSURE: 70 MMHG | OXYGEN SATURATION: 96 % | HEART RATE: 66 BPM | WEIGHT: 145.3 LBS | HEIGHT: 62 IN | RESPIRATION RATE: 14 BRPM

## 2023-05-05 LAB
BASOPHILS # BLD: 0.03 E9/L (ref 0–0.2)
BASOPHILS NFR BLD: 0.6 % (ref 0–2)
EOSINOPHIL # BLD: 0.1 E9/L (ref 0.05–0.5)
EOSINOPHIL NFR BLD: 2 % (ref 0–6)
ERYTHROCYTE [DISTWIDTH] IN BLOOD BY AUTOMATED COUNT: 17 FL (ref 11.5–15)
HCT VFR BLD AUTO: 26.9 % (ref 34–48)
HGB BLD-MCNC: 8.6 G/DL (ref 11.5–15.5)
IMM GRANULOCYTES # BLD: 0.06 E9/L
IMM GRANULOCYTES NFR BLD: 1.2 % (ref 0–5)
LYMPHOCYTES # BLD: 0.98 E9/L (ref 1.5–4)
LYMPHOCYTES NFR BLD: 19.2 % (ref 20–42)
MCH RBC QN AUTO: 28.9 PG (ref 26–35)
MCHC RBC AUTO-ENTMCNC: 32 % (ref 32–34.5)
MCV RBC AUTO: 90.3 FL (ref 80–99.9)
MONOCYTES # BLD: 0.71 E9/L (ref 0.1–0.95)
MONOCYTES NFR BLD: 13.9 % (ref 2–12)
NEUTROPHILS # BLD: 3.23 E9/L (ref 1.8–7.3)
NEUTS SEG NFR BLD: 63.1 % (ref 43–80)
PLATELET # BLD AUTO: 140 E9/L (ref 130–450)
PMV BLD AUTO: 13 FL (ref 7–12)
RBC # BLD AUTO: 2.98 E12/L (ref 3.5–5.5)
WBC # BLD: 5.1 E9/L (ref 4.5–11.5)

## 2023-05-05 PROCEDURE — 97530 THERAPEUTIC ACTIVITIES: CPT

## 2023-05-05 PROCEDURE — 2580000003 HC RX 258: Performed by: INTERNAL MEDICINE

## 2023-05-05 PROCEDURE — 6370000000 HC RX 637 (ALT 250 FOR IP): Performed by: PHYSICIAN ASSISTANT

## 2023-05-05 PROCEDURE — 6370000000 HC RX 637 (ALT 250 FOR IP): Performed by: INTERNAL MEDICINE

## 2023-05-05 PROCEDURE — 97535 SELF CARE MNGMENT TRAINING: CPT

## 2023-05-05 PROCEDURE — 36415 COLL VENOUS BLD VENIPUNCTURE: CPT

## 2023-05-05 PROCEDURE — 85025 COMPLETE CBC W/AUTO DIFF WBC: CPT

## 2023-05-05 RX ORDER — GUAIFENESIN/DEXTROMETHORPHAN 100-10MG/5
10 SYRUP ORAL EVERY 6 HOURS PRN
Qty: 120 ML | Refills: 0 | Status: ON HOLD
Start: 2023-05-05 | End: 2023-05-15

## 2023-05-05 RX ORDER — PSEUDOEPHEDRINE HCL 30 MG
100 TABLET ORAL DAILY
Status: ON HOLD | COMMUNITY
Start: 2023-05-06 | End: 2023-05-09

## 2023-05-05 RX ORDER — HYDRALAZINE HYDROCHLORIDE 50 MG/1
100 TABLET, FILM COATED ORAL 3 TIMES DAILY
Status: DISCONTINUED | OUTPATIENT
Start: 2023-05-05 | End: 2023-05-05 | Stop reason: HOSPADM

## 2023-05-05 RX ORDER — HYDRALAZINE HYDROCHLORIDE 100 MG/1
100 TABLET, FILM COATED ORAL 3 TIMES DAILY
Qty: 90 TABLET | Refills: 3 | Status: ON HOLD
Start: 2023-05-05

## 2023-05-05 RX ORDER — POLYETHYLENE GLYCOL 3350 17 G/17G
17 POWDER, FOR SOLUTION ORAL DAILY PRN
Qty: 527 G | Refills: 1 | Status: ON HOLD
Start: 2023-05-05 | End: 2023-06-04

## 2023-05-05 RX ORDER — BISACODYL 5 MG/1
10 TABLET, DELAYED RELEASE ORAL ONCE
Status: COMPLETED | OUTPATIENT
Start: 2023-05-05 | End: 2023-05-05

## 2023-05-05 RX ORDER — SENNA AND DOCUSATE SODIUM 50; 8.6 MG/1; MG/1
2 TABLET, FILM COATED ORAL 2 TIMES DAILY
Status: DISCONTINUED | OUTPATIENT
Start: 2023-05-05 | End: 2023-05-05 | Stop reason: HOSPADM

## 2023-05-05 RX ORDER — ACETAMINOPHEN 325 MG/1
650 TABLET ORAL EVERY 6 HOURS PRN
Qty: 120 TABLET | Refills: 3 | Status: ON HOLD | COMMUNITY
Start: 2023-05-05

## 2023-05-05 RX ADMIN — HYDRALAZINE HYDROCHLORIDE 100 MG: 50 TABLET, FILM COATED ORAL at 09:08

## 2023-05-05 RX ADMIN — APIXABAN 2.5 MG: 2.5 TABLET, FILM COATED ORAL at 09:08

## 2023-05-05 RX ADMIN — SODIUM CHLORIDE, PRESERVATIVE FREE 10 ML: 5 INJECTION INTRAVENOUS at 09:12

## 2023-05-05 RX ADMIN — LOSARTAN POTASSIUM 50 MG: 50 TABLET, FILM COATED ORAL at 09:08

## 2023-05-05 RX ADMIN — DOCUSATE SODIUM 100 MG: 100 CAPSULE, LIQUID FILLED ORAL at 09:08

## 2023-05-05 RX ADMIN — PANTOPRAZOLE SODIUM 40 MG: 40 TABLET, DELAYED RELEASE ORAL at 05:18

## 2023-05-05 RX ADMIN — DOCUSATE SODIUM AND SENNOSIDES 2 TABLET: 8.6; 5 TABLET, FILM COATED ORAL at 16:15

## 2023-05-05 RX ADMIN — METOPROLOL SUCCINATE 50 MG: 50 TABLET, EXTENDED RELEASE ORAL at 09:08

## 2023-05-05 RX ADMIN — ALLOPURINOL 100 MG: 100 TABLET ORAL at 09:08

## 2023-05-05 RX ADMIN — BISACODYL 10 MG: 5 TABLET, COATED ORAL at 09:08

## 2023-05-05 RX ADMIN — HYDRALAZINE HYDROCHLORIDE 100 MG: 50 TABLET, FILM COATED ORAL at 16:16

## 2023-05-05 NOTE — CARE COORDINATION
CM note. Per Vaibhav Round with [facility] they can bring pt in today with precert pending. D/c order in. No covid test required. [facility] has no availability until 1830 to transport. Called  [facility] information faxed. Estimated time of  is [phone]. Nurse will need to call report to [phone]. Notified facility liaison, pt, pt daughter, and RN of  time. Ambulance form and envelope is on the soft chart. Preston Amaya [facility] [phone].

## 2023-05-05 NOTE — DISCHARGE INSTR - COC
SIGNATURE:  Electronically signed by Steven Robertson RN on 5/5/23 at 3:50 PM EDT    CASE MANAGEMENT/SOCIAL WORK SECTION    Inpatient Status Date: 05/01/23    Readmission Risk Assessment Score:  Readmission Risk              Risk of Unplanned Readmission:  18           Discharging to Facility/ Agency   Name: [facility]  Address:  Phone: [phone]  Fax: [phone]    Dialysis Facility (if applicable)   Name:  Address:  Dialysis Schedule:  Phone:  Fax:    / signature: Electronically signed by Lola Craig RN on 5/5/23 at 8:02 AM EDT    PHYSICIAN SECTION    Prognosis: {Prognosis:3486046293}    Condition at Discharge: [address] [place] [address] Patient Condition:[id number]}    Rehab Potential (if transferring to Rehab): {Prognosis:3148713097}    Recommended Labs or Other Treatments After Discharge: ***    Physician Certification: I certify the above information and transfer of Simone Barroso  is necessary for the continuing treatment of the diagnosis listed and that she requires {Admit to Appropriate Level of Care:31144} for {GREATER/LESS:828535795} 30 days.      Update Admission H&P: {CHP DME Changes in PHTDW:741463764}    PHYSICIAN SIGNATURE:  {Esignature:[id number]}Electronically signed by Ameya Gan DO on 5/5/2023 at 1:38 PM

## 2023-05-05 NOTE — PROGRESS NOTES
Admitted from ER  via stretcher. No acute distress.
Department of Neurosurgery  Progress Note    CHIEF COMPLAINT: T8 compression fracture    SUBJECTIVE:  c/o mild back pain    REVIEW OF SYSTEMS :  Constitutional: Negative for chills and fever. Neurological: Negative for dizziness, tremors and speech change.      OBJECTIVE:   VITALS:  BP (!) 148/62   Pulse 60   Temp 97.6 °F (36.4 °C) (Temporal)   Resp 14   Ht 5' 2\" (1.575 m)   Wt 145 lb 4.8 oz (65.9 kg)   SpO2 98%   BMI 26.58 kg/m²   PHYSICAL:  CONSTITUTIONAL:  awake, alert, cooperative, no apparent distress, and appears stated age    DATA:  CBC:   Lab Results   Component Value Date/Time    WBC 5.1 05/01/2023 02:46 PM    RBC 3.08 05/01/2023 02:46 PM    HGB 8.9 05/01/2023 02:46 PM    HCT 28.8 05/01/2023 02:46 PM    MCV 93.5 05/01/2023 02:46 PM    MCH 28.9 05/01/2023 02:46 PM    MCHC 30.9 05/01/2023 02:46 PM    RDW 17.2 05/01/2023 02:46 PM     05/01/2023 02:46 PM    MPV 13.6 05/01/2023 02:46 PM     BMP:    Lab Results   Component Value Date/Time     05/01/2023 02:46 PM    K 3.9 05/01/2023 02:46 PM    K 4.6 03/25/2023 02:00 PM     05/01/2023 02:46 PM    CO2 30 05/01/2023 02:46 PM    BUN 28 05/01/2023 02:46 PM    LABALBU 3.6 03/25/2023 02:00 PM    CREATININE 1.5 05/01/2023 02:46 PM    CALCIUM 12.0 05/01/2023 02:46 PM    GFRAA 51 07/27/2022 12:46 AM    LABGLOM 33 05/01/2023 02:46 PM    GLUCOSE 99 05/01/2023 02:46 PM     PT/INR:    Lab Results   Component Value Date/Time    PROTIME 11.9 03/29/2023 02:41 PM    INR 1.1 03/29/2023 02:41 PM     PTT:    Lab Results   Component Value Date/Time    APTT 33.6 03/29/2023 02:41 PM   [APTT}    Current Inpatient Medications  Current Facility-Administered Medications: docusate sodium (COLACE) capsule 100 mg, 100 mg, Oral, Daily  allopurinol (ZYLOPRIM) tablet 100 mg, 100 mg, Oral, Daily  apixaban (ELIQUIS) tablet 2.5 mg, 2.5 mg, Oral, Q12H  hydrALAZINE (APRESOLINE) tablet 75 mg, 75 mg, Oral, TID  losartan (COZAAR) tablet 50 mg, 50 mg, Oral, Daily  metoprolol
Department of Neurosurgery  Progress Note    CHIEF COMPLAINT: T8 compression fracture    SUBJECTIVE:  continued back pain. TLSO received. REVIEW OF SYSTEMS :  Constitutional: Negative for chills and fever. Neurological: Negative for dizziness, tremors and speech change.      OBJECTIVE:   VITALS:  BP (!) 160/58   Pulse 60   Temp 98.2 °F (36.8 °C) (Temporal)   Resp 18   Ht 5' 2\" (1.575 m)   Wt 145 lb 4.8 oz (65.9 kg)   SpO2 97%   BMI 26.58 kg/m²   PHYSICAL:  CONSTITUTIONAL:  awake, alert, cooperative, no apparent distress, and appears stated age    DATA:  CBC:   Lab Results   Component Value Date/Time    WBC 5.7 05/04/2023 04:51 AM    RBC 2.90 05/04/2023 04:51 AM    HGB 8.3 05/04/2023 04:51 AM    HCT 26.8 05/04/2023 04:51 AM    MCV 92.4 05/04/2023 04:51 AM    MCH 28.6 05/04/2023 04:51 AM    MCHC 31.0 05/04/2023 04:51 AM    RDW 17.2 05/04/2023 04:51 AM     05/04/2023 04:51 AM    MPV 13.0 05/04/2023 04:51 AM     BMP:    Lab Results   Component Value Date/Time     05/01/2023 02:46 PM    K 3.9 05/01/2023 02:46 PM    K 4.6 03/25/2023 02:00 PM     05/01/2023 02:46 PM    CO2 30 05/01/2023 02:46 PM    BUN 28 05/01/2023 02:46 PM    LABALBU 3.6 03/25/2023 02:00 PM    CREATININE 1.5 05/01/2023 02:46 PM    CALCIUM 12.0 05/01/2023 02:46 PM    GFRAA 51 07/27/2022 12:46 AM    LABGLOM 33 05/01/2023 02:46 PM    GLUCOSE 99 05/01/2023 02:46 PM     PT/INR:    Lab Results   Component Value Date/Time    PROTIME 11.9 03/29/2023 02:41 PM    INR 1.1 03/29/2023 02:41 PM     PTT:    Lab Results   Component Value Date/Time    APTT 33.6 03/29/2023 02:41 PM   [APTT}    Current Inpatient Medications  Current Facility-Administered Medications: guaiFENesin-dextromethorphan (ROBITUSSIN DM) 100-10 MG/5ML syrup 10 mL, 10 mL, Oral, Q6H PRN  traMADol (ULTRAM) tablet 50 mg, 50 mg, Oral, Q6H PRN  docusate sodium (COLACE) capsule 100 mg, 100 mg, Oral, Daily  allopurinol (ZYLOPRIM) tablet 100 mg, 100 mg, Oral, Daily  apixaban
Hospital Medicine    Subjective:  pt alert conversive c/o cough and back pain      Current Facility-Administered Medications:     guaiFENesin-dextromethorphan (ROBITUSSIN DM) 100-10 MG/5ML syrup 10 mL, 10 mL, Oral, Q6H PRN, Dominic Ruvalcaba DO    docusate sodium (COLACE) capsule 100 mg, 100 mg, Oral, Daily, Dominic Ruvalcaba, DO, 100 mg at 05/03/23 7795    allopurinol (ZYLOPRIM) tablet 100 mg, 100 mg, Oral, Daily, Jonah Mcdonnell, DO, 100 mg at 05/03/23 8762    apixaban (ELIQUIS) tablet 2.5 mg, 2.5 mg, Oral, Q12H, Dominic Ruvalcaba DO, 2.5 mg at 05/03/23 2002    hydrALAZINE (APRESOLINE) tablet 75 mg, 75 mg, Oral, TID, Dominic Ruvalcaba, DO, 75 mg at 05/03/23 2002    losartan (COZAAR) tablet 50 mg, 50 mg, Oral, Daily, Dominic Ruvalcaba DO, 50 mg at 05/03/23 1507    metoprolol succinate (TOPROL XL) extended release tablet 50 mg, 50 mg, Oral, BID, Dominic Ruvalcaba DO, 50 mg at 05/03/23 2002    pantoprazole (PROTONIX) tablet 40 mg, 40 mg, Oral, QAM AC, Dominic Ruvalcaba, DO, 40 mg at 05/04/23 0519    [START ON 5/7/2023] pitavastatin (LIVALO) tablet 2 mg ( Patient Supplied) (Patient Supplied), 2 mg, Oral, Weekly, Dominic Ruvalcaba, DO    polyvinyl alcohol (LIQUIFILM TEARS) 1.4 % ophthalmic solution 1 drop, 1 drop, Both Eyes, PRN, Dominic Ruvalcaba DO    sodium chloride flush 0.9 % injection 5-40 mL, 5-40 mL, IntraVENous, 2 times per day, Jonah Mcdonnell DO, 10 mL at 05/03/23 2003    sodium chloride flush 0.9 % injection 5-40 mL, 5-40 mL, IntraVENous, PRN, Dominic Ruvalcaba DO    0.9 % sodium chloride infusion, , IntraVENous, PRN, Dominic Ruvalcaba,     ondansetron (ZOFRAN-ODT) disintegrating tablet 4 mg, 4 mg, Oral, Q8H PRN **OR** ondansetron (ZOFRAN) injection 4 mg, 4 mg, IntraVENous, Q6H PRN, Dominic Ruvalcaba,     polyethylene glycol (GLYCOLAX) packet 17 g, 17 g, Oral, Daily PRN, Dominic Ruvalcaba DO    acetaminophen (TYLENOL) tablet 650 mg, 650 mg, Oral, Q6H PRN, 650 mg at 05/03/23 1626 **OR** acetaminophen
Hospital Medicine    Subjective:  pt c/o back pain constipation      Current Facility-Administered Medications:     hydrALAZINE (APRESOLINE) tablet 100 mg, 100 mg, Oral, TID, Dez Ruvalcaba, DO    guaiFENesin-dextromethorphan (ROBITUSSIN DM) 100-10 MG/5ML syrup 10 mL, 10 mL, Oral, Q6H PRN, Dez Ruvalcaba, DO, 10 mL at 05/04/23 0844    traMADol (ULTRAM) tablet 50 mg, 50 mg, Oral, Q6H PRN, Prince SONA Goldberg, 50 mg at 05/04/23 2107    eltrombopag olamine (PROMACTA) 50 MG tablet TABS 50 mg (Patient Supplied), 50 mg, Oral, Daily, SYLVIA Reynolds 50 mg at 05/04/23 1231    docusate sodium (COLACE) capsule 100 mg, 100 mg, Oral, Daily, Dez Ruvalcaba, DO, 100 mg at 05/04/23 0844    allopurinol (ZYLOPRIM) tablet 100 mg, 100 mg, Oral, Daily, Dez Krishna Malmer, DO, 100 mg at 05/04/23 0845    apixaban (ELIQUIS) tablet 2.5 mg, 2.5 mg, Oral, Q12H, Dez Krishna Malmer, DO, 2.5 mg at 05/04/23 1934    losartan (COZAAR) tablet 50 mg, 50 mg, Oral, Daily, Dez Krishna Malmer, DO, 50 mg at 05/04/23 0845    metoprolol succinate (TOPROL XL) extended release tablet 50 mg, 50 mg, Oral, BID, Dez Krishan Malmer, DO, 50 mg at 05/04/23 1934    pantoprazole (PROTONIX) tablet 40 mg, 40 mg, Oral, QAM AC, Dez Krishna Malmer, DO, 40 mg at 05/05/23 0518    [START ON 5/7/2023] pitavastatin (LIVALO) tablet 2 mg ( Patient Supplied) (Patient Supplied), 2 mg, Oral, Weekly, Dez Krishna Malmer, DO    polyvinyl alcohol (LIQUIFILM TEARS) 1.4 % ophthalmic solution 1 drop, 1 drop, Both Eyes, PRN, Dez Ruvalcaba, DO    sodium chloride flush 0.9 % injection 5-40 mL, 5-40 mL, IntraVENous, 2 times per day, Eliseo Banks DO, 10 mL at 05/04/23 1934    sodium chloride flush 0.9 % injection 5-40 mL, 5-40 mL, IntraVENous, PRN, Dez Ruvalcaba, DO    0.9 % sodium chloride infusion, , IntraVENous, PRN, Dez Ruvalcaba, DO    ondansetron (ZOFRAN-ODT) disintegrating tablet 4 mg, 4 mg, Oral, Q8H PRN **OR** ondansetron (ZOFRAN) injection 4 mg, 4 mg, IntraVENous, Q6H
[facility] Medicine    Subjective:  pt alert conversive daughter at bedside      Current Facility-Administered Medications:     allopurinol (ZYLOPRIM) tablet 100 mg, 100 mg, Oral, Daily, Lauren Nares Malmer, DO, 100 mg at 05/02/23 0840    [Held by provider] apixaban (ELIQUIS) tablet 2.5 mg, 2.5 mg, Oral, Q12H, Lauren Nares Malmer, DO    hydrALAZINE (APRESOLINE) tablet 75 mg, 75 mg, Oral, TID, Lauren Nares Malmer, DO, 75 mg at 05/02/23 2019    losartan (COZAAR) tablet 50 mg, 50 mg, Oral, Daily, Lauren Nares Malmer, DO, 50 mg at 05/02/23 0840    metoprolol succinate (TOPROL XL) extended release tablet 50 mg, 50 mg, Oral, BID, Lauren Nares Malmer, DO, 50 mg at 05/02/23 2019    pantoprazole (PROTONIX) tablet 40 mg, 40 mg, Oral, QAM AC, Lauren Nares Malmer, DO, 40 mg at 05/03/23 0540    [START ON 5/7/2023] pitavastatin (LIVALO) tablet 2 mg ( Patient Supplied) (Patient Supplied), 2 mg, Oral, Weekly, Lauren Nares Malmer, DO    polyvinyl alcohol (LIQUIFILM TEARS) 1.4 % ophthalmic solution 1 drop, 1 drop, Both Eyes, PRN, Lauren Nares Malmer, DO    sodium chloride flush 0.9 % injection 5-40 mL, 5-40 mL, IntraVENous, 2 times per day, Roger Garrido, DO, 10 mL at 05/02/23 2020    sodium chloride flush 0.9 % injection 5-40 mL, 5-40 mL, IntraVENous, PRN, Lauren Nares Malmer, DO    0.9 % sodium chloride infusion, , IntraVENous, PRN, Lauren Nares Malmer, DO    ondansetron (ZOFRAN-ODT) disintegrating tablet 4 mg, 4 mg, Oral, Q8H PRN **OR** ondansetron (ZOFRAN) injection 4 mg, 4 mg, IntraVENous, Q6H PRN, Lauren Nares Malmer, DO    polyethylene glycol (GLYCOLAX) packet 17 g, 17 g, Oral, Daily PRN, Lauren Ruvalcaba DO    acetaminophen (TYLENOL) tablet 650 mg, 650 mg, Oral, Q6H PRN, 650 mg at 05/02/23 1338 **OR** acetaminophen (TYLENOL) suppository 650 mg, 650 mg, Rectal, Q6H PRN, Lauren Ruvalcaba DO    Objective:    BP (!) 118/92   Pulse 64   Temp 97.6 °F (36.4 °C) (Temporal)   Resp 17   Ht 5' 2\" (1.575 m)   Wt 145 lb 4.8 oz (65.9 kg)   SpO2 98%   BMI 26.58
Livalo 2 mg is non-formulary and will not be dispensed by the pharmacy at this time. Please have the patient's supply of this medication brought in from home and delivered it to pharmacy for identification and barcode.   #      Maritza Billingsley, PharmD 5/2/2023 8:28 AM
Occupational Therapy  OCCUPATIONAL THERAPY BEDSIDE TREATMENT NOTE   [address]     Date:2023  Patient Name: Tiffany Lund  MRN: [id number]  : 1931  Room: [address] [place] [address]     Evaluating OT: ANAMARIA Montelongo, OTR/L  # [id number]     Referring Provider:  Ronen Gordon DO  Specific Provider Orders:  \"OT Eval and Treat\"  23     Diagnosis: Lymphadenopathy [R59.1]  Pulmonary nodule [R91.1]  Elevated troponin [R77.8]  Fall, initial encounter [W19. XXXA]  Closed wedge compression fracture of T8 vertebra, initial encounter ([facility] [S22.060A]  Closed fracture of eighth thoracic vertebra, unspecified fracture morphology, initial encounter ([facility] [S22.069A]  Acute low back pain, unspecified back pain laterality, unspecified whether sciatica present [M54.50]     Pt was admitted after falling at home, LEs \"gave out\". Sustained T8 Compression Fx     Pertinent Medical History:  Pt has a past medical history of Anemia, Asthma, Atrial fibrillation ([address] CVA (cerebral vascular accident) ([address] Diabetic retinopathy ([address] Gout, Hx of blood clots, Hyperlipidemia, Hypertension, MGUS (monoclonal gammopathy of unknown significance), Mild tricuspid regurgitation, PAC (premature atrial contraction), Pemphigoid, Pulmonary hypertension ([address] and Symptomatic bradycardia.,  has a past surgical history that includes Appendectomy; Cholecystectomy; Hysterectomy; Cataract removal; Cardiac catheterization (2015); Cardiac pacemaker placement (2015); Pacemaker Change (Left, 12/15/2022); and Upper gastrointestinal endoscopy (N/A, 3/29/2023).      Surgeries this admission: None      Precautions:  Fall Risk  Spinal Precautions  Soft TLSO when > 45*  Kyphotic Posture     Assessment of current deficits   [x] Functional mobility             [x]ADLs           [x] Strength                  []Cognition   [x] Functional transfers           [x] IADLs
Occupational Therapy  OCCUPATIONAL THERAPY BEDSIDE TREATMENT NOTE   [facility] [address] [facility]     Date:2023  Patient Name: Edd Fuentes  MRN: [id number]  : 1931  Room: [address] [place] [address]     Evaluating OT: ANAMARIA Green, OTR/L  # [id number]     Referring Provider:  Ynes Coelho DO  Specific Provider Orders:  \"OT Eval and Treat\"  23     Diagnosis: Lymphadenopathy [R59.1]  Pulmonary nodule [R91.1]  Elevated troponin [R77.8]  Fall, initial encounter [W19. XXXA]  Closed wedge compression fracture of T8 vertebra, initial encounter ([facility] [S22.060A]  Closed fracture of eighth thoracic vertebra, unspecified fracture morphology, [facility] encounter ([facility] [S22.069A]  Acute low back pain, unspecified back pain laterality, unspecified whether sciatica present [M54.50]     Pt was admitted after falling at home, LEs \"gave out\". Sustained T8 Compression Fx     Pertinent Medical History:  Pt has a past medical history of Anemia, Asthma, Atrial fibrillation ([address] CVA (cerebral vascular accident) ([address] Diabetic retinopathy ([address] Gout, Hx of blood clots, Hyperlipidemia, Hypertension, MGUS (monoclonal gammopathy of unknown significance), Mild tricuspid regurgitation, PAC (premature atrial contraction), Pemphigoid, Pulmonary hypertension ([address] and Symptomatic bradycardia.,  has a past surgical history that includes Appendectomy; Cholecystectomy; Hysterectomy; Cataract removal; Cardiac catheterization (2015); Cardiac pacemaker placement (2015); Pacemaker Change (Left, 12/15/2022); and Upper gastrointestinal endoscopy (N/A, 3/29/2023).      Surgeries this admission: None      Precautions:  Fall Risk  Spinal Precautions  Soft TLSO when > 45*  Kyphotic Posture     Assessment of current deficits   [x] Functional mobility             [x]ADLs           [x] Strength                  []Cognition   [x] Functional transfers           [x] IADLs
Physical Therapy  Physical Therapy Initial Assessment    Name: Jaun Gonsales  : 1931  MRN: [id number]      Date of Service: 2023    Evaluating PT:  Jeramie Natividad PT, DPT [id number]    Room #:  9487/3554-C  Diagnosis:  Lymphadenopathy [R59.1]  Pulmonary nodule [R91.1]  Elevated troponin [R77.8]  Fall, initial encounter [W19. XXXA]  Closed wedge compression fracture of T8 vertebra, initial encounter ([facility] [S22.060A]  Closed fracture of eighth thoracic vertebra, unspecified fracture morphology, initial encounter ([facility] [S22.069A]  Acute low back pain, unspecified back pain laterality, unspecified whether sciatica present [M54.50]  PMHx/PSHx:   has a past medical history of Anemia, Asthma, Atrial fibrillation ([address] CVA (cerebral vascular accident) ([address] Diabetic retinopathy ([address] Gout, Hx of blood clots, Hyperlipidemia, Hypertension, MGUS (monoclonal gammopathy of unknown significance), Mild tricuspid regurgitation, PAC (premature atrial contraction), Pemphigoid, Pulmonary hypertension ([address] and Symptomatic bradycardia. has a past surgical history that includes Appendectomy; Cholecystectomy; Hysterectomy; Cataract removal; Cardiac catheterization (2015); Cardiac pacemaker placement (2015); Pacemaker Change (Left, 12/15/2022); and Upper gastrointestinal endoscopy (N/A, 3/29/2023). Procedure/Surgery:  None  Precautions:  Falls, spinal precautions, TLSO, Severe kyphosis  Equipment Needs:  TBD  Equipment Owned:  FWW, cane, wheelchair    SUBJECTIVE:    Pt lives with daughter in a 1 story home with 3 stair(s) to enter and 1 rail(s). Pt ambulated with FWW PTA. OBJECTIVE:   Initial Evaluation  Date: 5/3/2023 Treatment Date: 23 Short Term/ Long Term   Goals   AM-PAC 6 Clicks     Was pt agreeable to Eval/treatment? Yes yes    Does pt have pain?  Moderate back Moderate back pain    Bed Mobility  Rolling: ModA  Supine to sit: ModA  Sit to supine: NT  Scooting: ModA (seated) Rolling:
Place order for non-formulary Promacta, patient to bring in from home.   Thank you  Christine Perez PA-C
Promacta given to daughter. Dentures and glasses on patient.
Subjective:  No overnight events  Still no BM  Lower abdominal pain on admission but improved and uncertain what made it better  Tolerating some PO intake  Pain still present and patient has been reluctant to take anything other than tylenol  C/o constipation, denies SOB, CP, N/V    Objective:    BP (!) 155/70   Pulse 66   Temp 96.9 °F (36.1 °C) (Temporal)   Resp 14   Ht 5' 2\" (1.575 m)   Wt 145 lb 4.8 oz (65.9 kg)   SpO2 96%   BMI 26.58 kg/m²     Gen: awake and alert, NAD, very pleasant  HEENT: mucosal moist, sclera anicteric  Neck: supple  Heart: RRR, no murmur  Lungs: easy, relatively CTA  Abd: soft, NT, BS+  Ext: no edema    CBC with Differential:    Lab Results   Component Value Date/Time    WBC 5.1 05/05/2023 04:40 AM    RBC 2.98 05/05/2023 04:40 AM    HGB 8.6 05/05/2023 04:40 AM    HCT 26.9 05/05/2023 04:40 AM     05/05/2023 04:40 AM    MCV 90.3 05/05/2023 04:40 AM    MCH 28.9 05/05/2023 04:40 AM    MCHC 32.0 05/05/2023 04:40 AM    RDW 17.0 05/05/2023 04:40 AM    LYMPHOPCT 19.2 05/05/2023 04:40 AM    MONOPCT 13.9 05/05/2023 04:40 AM    BASOPCT 0.6 05/05/2023 04:40 AM    MONOSABS 0.71 05/05/2023 04:40 AM    LYMPHSABS 0.98 05/05/2023 04:40 AM    EOSABS 0.10 05/05/2023 04:40 AM    BASOSABS 0.03 05/05/2023 04:40 AM     CMP:    Lab Results   Component Value Date/Time     05/01/2023 02:46 PM    K 3.9 05/01/2023 02:46 PM    K 4.6 03/25/2023 02:00 PM     05/01/2023 02:46 PM    CO2 30 05/01/2023 02:46 PM    BUN 28 05/01/2023 02:46 PM    CREATININE 1.5 05/01/2023 02:46 PM    GFRAA 51 07/27/2022 12:46 AM    LABGLOM 33 05/01/2023 02:46 PM    GLUCOSE 99 05/01/2023 02:46 PM    PROT 6.4 03/25/2023 02:00 PM    LABALBU 3.6 03/25/2023 02:00 PM    CALCIUM 12.0 05/01/2023 02:46 PM    BILITOT 0.4 03/25/2023 02:00 PM    ALKPHOS 117 03/25/2023 02:00 PM    AST 27 03/25/2023 02:00 PM    ALT 20 03/25/2023 02:00 PM          Current Facility-Administered Medications:     hydrALAZINE (APRESOLINE) tablet 100
Subjective:  Patient admitted with pain after fall and new T8 compression fracture  Seen by neurosurgery, patient elected brace initially    Objective:    BP (!) 148/62   Pulse 60   Temp 97.6 °F (36.4 °C) (Temporal)   Resp 14   Ht 5' 2\" (1.575 m)   Wt 145 lb 4.8 oz (65.9 kg)   SpO2 98%   BMI 26.58 kg/m²     H/H 8.2/25.6  Plt 129    CBC with Differential:    Lab Results   Component Value Date/Time    WBC 5.1 05/01/2023 02:46 PM    RBC 3.08 05/01/2023 02:46 PM    HGB 8.9 05/01/2023 02:46 PM    HCT 28.8 05/01/2023 02:46 PM     05/01/2023 02:46 PM    MCV 93.5 05/01/2023 02:46 PM    MCH 28.9 05/01/2023 02:46 PM    MCHC 30.9 05/01/2023 02:46 PM    RDW 17.2 05/01/2023 02:46 PM    LYMPHOPCT 15.6 05/01/2023 02:46 PM    MONOPCT 16.3 05/01/2023 02:46 PM    BASOPCT 1.2 05/01/2023 02:46 PM    MONOSABS 0.84 05/01/2023 02:46 PM    LYMPHSABS 0.80 05/01/2023 02:46 PM    EOSABS 0.06 05/01/2023 02:46 PM    BASOSABS 0.06 05/01/2023 02:46 PM     CMP:    Lab Results   Component Value Date/Time     05/01/2023 02:46 PM    K 3.9 05/01/2023 02:46 PM    K 4.6 03/25/2023 02:00 PM     05/01/2023 02:46 PM    CO2 30 05/01/2023 02:46 PM    BUN 28 05/01/2023 02:46 PM    CREATININE 1.5 05/01/2023 02:46 PM    GFRAA 51 07/27/2022 12:46 AM    LABGLOM 33 05/01/2023 02:46 PM    GLUCOSE 99 05/01/2023 02:46 PM    PROT 6.4 03/25/2023 02:00 PM    LABALBU 3.6 03/25/2023 02:00 PM    CALCIUM 12.0 05/01/2023 02:46 PM    BILITOT 0.4 03/25/2023 02:00 PM    ALKPHOS 117 03/25/2023 02:00 PM    AST 27 03/25/2023 02:00 PM    ALT 20 03/25/2023 02:00 PM          Current Facility-Administered Medications:     docusate sodium (COLACE) capsule 100 mg, 100 mg, Oral, Daily, Zach Ruvalcaba, DO, 100 mg at 05/03/23 2984    allopurinol (ZYLOPRIM) tablet 100 mg, 100 mg, Oral, Daily, Zach Ruvalcaba, DO, 100 mg at 05/03/23 1951    apixaban (ELIQUIS) tablet 2.5 mg, 2.5 mg, Oral, Q12H, Zach Ruvalcaba, DO, 2.5 mg at 05/03/23 0839    hydrALAZINE
Venita Mcclendon contacted via [employer] this AM with no response, message left. Abena contacted [employer], which routed to the [facility] and they are re-routing back through Venita Mcclendon, so they are contacting a different physician. Awaiting response. Promacta 50mg needs to be ordered by them in order to be resumed.  Electronically signed by Jessica Castillo RN on 5/4/23 at 10:16 AM EDT
Supervision  Scooting: Supervision   Transfers Sit to stand: [address] [place]  Stand to sit: ModA  Stand pivot: ModA with [employer]  Sit to stand: SBA  Stand to sit: SBA  Stand pivot: SBA with AAD   Ambulation    3', 5' with [employer] ModA + chair follow  50 feet with AAD SBA   Stair negotiation: ascended and descended  NT  3 step(s) with 1 rail(s) SBA   ROM BUE:  See OT note  BLE:  WFL     Strength BUE:  See OT note  BLE:  Knee extension 4+/5, ankle dorsiflexion 5/5     Balance Sitting EOB:  SBA  Dynamic Standing:  ModA with [employer]  Sitting EOB:  Supervision  Dynamic Standing:  SBA with AAD     Pt is A & O x 4  Sensation:  No reports of numbness/tingling to extremities  Edema:  Unremarkable    Vitals:   HR 60-63, SpO2 96-97% with activity. Patient education  Pt educated on role of PT, spinal precautions, safety during functional mobility, use of call light for assistance. Patient response to education:   Pt verbalized understanding Pt demonstrated skill Pt requires further education in this area   Yes Yes Yes     ASSESSMENT:    Conditions Requiring Skilled Therapeutic Intervention:    [x]Decreased strength     []Decreased ROM  [x]Decreased functional mobility  [x]Decreased balance   [x]Decreased endurance   [x]Decreased posture  []Decreased sensation  []Decreased coordination   []Decreased vision  [x]Decreased safety awareness   [x]Increased pain       Comments:  Patient in semi-Malik's position with TLSO donned upon arrival; agreeable to PT session with OT collaboration. Required increased time, assistance of trunk to perform bed mobility via log roll technique. Tolerated sitting EOB for extended period of time while interdisciplinary care was provided. Required increased time, verbal/tactile cues related to positioning/sequencing to ensure safety during functional transfers. Static standing performed before ambulating from bed to bedside chair.  Following seated rest break, static standing performed before ambulating greater distance
Patient practiced and was instructed in the following treatment:    Bed mobility- verbal cues to facilitate independence  Functional transfers-Verbal cues for proper positioning and sequencing to perform transfers safely with maximum independence. Gait training-Verbal cues for proper positioning and sequencing using assistive device to maximize functional mobility independence. Pt's/ family goals   1. None stated    Prognosis is good for reaching above PT goals. Patient and or family understand(s) diagnosis, prognosis, and plan of care. Yes    PHYSICAL THERAPY PLAN OF CARE:    PT POC is established based on physician order and patient diagnosis     Referring provider/PT Order:    05/02/23 0830  PT eval and treat  Start:  05/02/23 0830,   End:  05/02/23 0830,   ONE TIME,   Standing Count:  1 Occurrences,   R         Delavan Pulse Jordon,      Diagnosis:  Lymphadenopathy [R59.1]  Pulmonary nodule [R91.1]  Elevated troponin [R77.8]  Fall, initial encounter [W19. XXXA]  Closed wedge compression fracture of T8 vertebra, [facility] [S22.060A]  Closed fracture of eighth thoracic vertebra, unspecified fracture morphology, initial encounter [facility] [S22.069A]  Acute low back pain, unspecified back pain laterality, unspecified whether sciatica present [M54.50]  Specific instructions for next treatment:  Continue to facilitate safe performance of functional mobility; progress as appropriate.     Current Treatment Recommendations:     [x] Strengthening to improve independence with functional mobility   [x] ROM to improve independence with functional mobility   [x] Balance Training to improve static/dynamic balance and to reduce fall risk  [x] Endurance Training to improve activity tolerance during functional mobility   [x] Transfer Training to improve safety and independence with all functional transfers   [x] Gait Training to improve gait mechanics, endurance and assess need for appropriate assistive
w/ bathing, pt reported she could dress herself INDly, however, pt and family reported to the  the day prior that pt required Much assist w/ all tasks      Home Living:  Pt lives with her Dtr and Granddaughter in a 1-story house, 3 ADAMARIS w/ 1 HR. Bed/bath on the main floor. Bathroom setup:  Tub-Shower, Standard-height Commode   Equipment owned:  [address], [address] [facility], [employer], W/C    Available Family Assist:  Dtr can provide assist PRN - uncertain if able to provide 00/3 Assist;  uncertain of her ability to safely provide Hands-on assist w/ Functional Ax    Prior Level of Function:  Pt reported being IND w/ Dressing/Toileting, Assist w/ bathing, IND w/ Transfers and Mobility using [employer] for household ambulation. Able to complete Light Meal Prep  Driving:  No  Occupation:  Not reported    Pain Level:  4/10 Back pain at rest prior to initiation of ax, increased to 8/10 pain w/ bed mobility and ax/mobility ; Relief w/ Rest and Repositioning, Nsg Notified   Additional Complaints:  None    Cognition: A & O x 3 - Min-Mod VCs to state , current date,  able to report current place and general situation - Tangential - Mod VCs to focus on task/topic of conversation   Able to Follow Multi-Step Commands w/ Min-Mod VCs   Memory:  fair    Sequencing:  fair    Problem solving:  fair    Judgement/safety:  fair   Additional Comments:  Pt was pleasant and cooperative.   Flat affect, fair eye contact    Vitals/Lab Values:  WFL Room air       Functional Assessment:  AM-PAC Daily Activity Raw Score:      Initial Eval Status  Date: 5-3-23   Treatment Status  Date: STGs = LTGs  Time frame: 10-14 days   Feeding IND after set up      NA   Grooming Min A/Set up    Able to wash hands/face w/ SUP after set up in semi-supine, Mod A for hair care  Unable to tolerate ambulating to or standing at sink    SUP/Set up  Standing at the Sink   UB Dressing Max A/Set up    Max A to don brace in supine, Mod A to don gown seated EOB (simulated)    Min A
discuss additional workup with attending to get recommendations then with patient and daughter to see if they would want to pursue additional testing or possibly monitor. Patient is a very functional 81 yo with good baseline performance status.     Electronically signed by SYLVIA Pandey on 5/4/2023 at 9:33 AM

## 2023-05-08 ENCOUNTER — HOSPITAL ENCOUNTER (INPATIENT)
Age: 88
LOS: 11 days | Discharge: NURSING, REHAB OR LONG-TERM CARE | DRG: 640 | End: 2023-05-19
Attending: EMERGENCY MEDICINE | Admitting: INTERNAL MEDICINE
Payer: COMMERCIAL

## 2023-05-08 ENCOUNTER — APPOINTMENT (OUTPATIENT)
Dept: CT IMAGING | Age: 88
DRG: 640 | End: 2023-05-08
Payer: COMMERCIAL

## 2023-05-08 ENCOUNTER — APPOINTMENT (OUTPATIENT)
Dept: GENERAL RADIOLOGY | Age: 88
DRG: 640 | End: 2023-05-08
Payer: COMMERCIAL

## 2023-05-08 DIAGNOSIS — N17.9 AKI (ACUTE KIDNEY INJURY) (HCC): ICD-10-CM

## 2023-05-08 DIAGNOSIS — I10 ESSENTIAL HYPERTENSION: ICD-10-CM

## 2023-05-08 DIAGNOSIS — G93.41 METABOLIC ENCEPHALOPATHY: ICD-10-CM

## 2023-05-08 DIAGNOSIS — N30.01 ACUTE CYSTITIS WITH HEMATURIA: ICD-10-CM

## 2023-05-08 DIAGNOSIS — D47.2 MONOCLONAL GAMMOPATHY OF UNKNOWN SIGNIFICANCE (MGUS): ICD-10-CM

## 2023-05-08 DIAGNOSIS — E83.52 HYPERCALCEMIA: Primary | ICD-10-CM

## 2023-05-08 LAB
ALBUMIN SERPL-MCNC: 2.9 G/DL (ref 3.5–5.2)
ALP SERPL-CCNC: 134 U/L (ref 35–104)
ALT SERPL-CCNC: 12 U/L (ref 0–32)
AMMONIA PLAS-SCNC: 13 UMOL/L (ref 11–51)
ANION GAP SERPL CALCULATED.3IONS-SCNC: 7 MMOL/L (ref 7–16)
AST SERPL-CCNC: 26 U/L (ref 0–31)
BACTERIA URNS QL MICRO: ABNORMAL /HPF
BASOPHILS # BLD: 0.06 E9/L (ref 0–0.2)
BASOPHILS NFR BLD: 1 % (ref 0–2)
BILIRUB SERPL-MCNC: 0.4 MG/DL (ref 0–1.2)
BILIRUB UR QL STRIP: NEGATIVE
BUN SERPL-MCNC: 34 MG/DL (ref 6–23)
CA-I BLD-SCNC: 1.85 MMOL/L (ref 1.15–1.33)
CALCIUM SERPL-MCNC: 13.4 MG/DL (ref 8.6–10.2)
CHLORIDE SERPL-SCNC: 99 MMOL/L (ref 98–107)
CLARITY UR: ABNORMAL
CO2 SERPL-SCNC: 31 MMOL/L (ref 22–29)
COLOR UR: YELLOW
CREAT SERPL-MCNC: 1.6 MG/DL (ref 0.5–1)
EKG ATRIAL RATE: 61 BPM
EKG P AXIS: 75 DEGREES
EKG P-R INTERVAL: 222 MS
EKG Q-T INTERVAL: 424 MS
EKG QRS DURATION: 160 MS
EKG QTC CALCULATION (BAZETT): 426 MS
EKG R AXIS: -64 DEGREES
EKG T AXIS: 107 DEGREES
EKG VENTRICULAR RATE: 61 BPM
EOSINOPHIL # BLD: 0.08 E9/L (ref 0.05–0.5)
EOSINOPHIL NFR BLD: 1.4 % (ref 0–6)
ERYTHROCYTE [DISTWIDTH] IN BLOOD BY AUTOMATED COUNT: 17 FL (ref 11.5–15)
GLUCOSE SERPL-MCNC: 139 MG/DL (ref 74–99)
GLUCOSE UR STRIP-MCNC: NEGATIVE MG/DL
HCT VFR BLD AUTO: 28.4 % (ref 34–48)
HGB BLD-MCNC: 8.8 G/DL (ref 11.5–15.5)
HGB UR QL STRIP: ABNORMAL
IMM GRANULOCYTES # BLD: 0.06 E9/L
IMM GRANULOCYTES NFR BLD: 1 % (ref 0–5)
KETONES UR STRIP-MCNC: NEGATIVE MG/DL
LACTATE BLDV-SCNC: 1.5 MMOL/L (ref 0.5–2.2)
LEUKOCYTE ESTERASE UR QL STRIP: ABNORMAL
LIPASE: 91 U/L (ref 13–60)
LYMPHOCYTES # BLD: 0.78 E9/L (ref 1.5–4)
LYMPHOCYTES NFR BLD: 13.4 % (ref 20–42)
MCH RBC QN AUTO: 28.7 PG (ref 26–35)
MCHC RBC AUTO-ENTMCNC: 31 % (ref 32–34.5)
MCV RBC AUTO: 92.5 FL (ref 80–99.9)
METER GLUCOSE: 152 MG/DL (ref 74–99)
MONOCYTES # BLD: 0.84 E9/L (ref 0.1–0.95)
MONOCYTES NFR BLD: 14.4 % (ref 2–12)
NEUTROPHILS # BLD: 4.01 E9/L (ref 1.8–7.3)
NEUTS SEG NFR BLD: 68.8 % (ref 43–80)
NITRITE UR QL STRIP: NEGATIVE
PH UR STRIP: 7.5 [PH] (ref 5–9)
PLATELET # BLD AUTO: 116 E9/L (ref 130–450)
PMV BLD AUTO: 13.4 FL (ref 7–12)
POTASSIUM SERPL-SCNC: 3.6 MMOL/L (ref 3.5–5)
PROT SERPL-MCNC: 5.9 G/DL (ref 6.4–8.3)
PROT UR STRIP-MCNC: 30 MG/DL
PTH-INTACT SERPL-MCNC: 29 PG/ML (ref 15–65)
RBC # BLD AUTO: 3.07 E12/L (ref 3.5–5.5)
RBC #/AREA URNS HPF: ABNORMAL /HPF (ref 0–2)
SARS-COV-2 RDRP RESP QL NAA+PROBE: NOT DETECTED
SODIUM SERPL-SCNC: 137 MMOL/L (ref 132–146)
SP GR UR STRIP: 1.01 (ref 1–1.03)
TROPONIN, HIGH SENSITIVITY: 141 NG/L (ref 0–9)
UROBILINOGEN UR STRIP-ACNC: 0.2 E.U./DL
WBC # BLD: 5.8 E9/L (ref 4.5–11.5)
WBC #/AREA URNS HPF: ABNORMAL /HPF (ref 0–5)

## 2023-05-08 PROCEDURE — 2580000003 HC RX 258: Performed by: EMERGENCY MEDICINE

## 2023-05-08 PROCEDURE — 96375 TX/PRO/DX INJ NEW DRUG ADDON: CPT

## 2023-05-08 PROCEDURE — 6370000000 HC RX 637 (ALT 250 FOR IP): Performed by: INTERNAL MEDICINE

## 2023-05-08 PROCEDURE — 6360000002 HC RX W HCPCS: Performed by: EMERGENCY MEDICINE

## 2023-05-08 PROCEDURE — 81001 URINALYSIS AUTO W/SCOPE: CPT

## 2023-05-08 PROCEDURE — 2060000000 HC ICU INTERMEDIATE R&B

## 2023-05-08 PROCEDURE — 83690 ASSAY OF LIPASE: CPT

## 2023-05-08 PROCEDURE — 84484 ASSAY OF TROPONIN QUANT: CPT

## 2023-05-08 PROCEDURE — 70450 CT HEAD/BRAIN W/O DYE: CPT

## 2023-05-08 PROCEDURE — 83970 ASSAY OF PARATHORMONE: CPT

## 2023-05-08 PROCEDURE — 96374 THER/PROPH/DIAG INJ IV PUSH: CPT

## 2023-05-08 PROCEDURE — 93010 ELECTROCARDIOGRAM REPORT: CPT | Performed by: INTERNAL MEDICINE

## 2023-05-08 PROCEDURE — 87088 URINE BACTERIA CULTURE: CPT

## 2023-05-08 PROCEDURE — 82140 ASSAY OF AMMONIA: CPT

## 2023-05-08 PROCEDURE — 85025 COMPLETE CBC W/AUTO DIFF WBC: CPT

## 2023-05-08 PROCEDURE — 99285 EMERGENCY DEPT VISIT HI MDM: CPT

## 2023-05-08 PROCEDURE — 82962 GLUCOSE BLOOD TEST: CPT

## 2023-05-08 PROCEDURE — 83605 ASSAY OF LACTIC ACID: CPT

## 2023-05-08 PROCEDURE — 71045 X-RAY EXAM CHEST 1 VIEW: CPT

## 2023-05-08 PROCEDURE — 6360000002 HC RX W HCPCS: Performed by: INTERNAL MEDICINE

## 2023-05-08 PROCEDURE — 93005 ELECTROCARDIOGRAM TRACING: CPT | Performed by: EMERGENCY MEDICINE

## 2023-05-08 PROCEDURE — 82330 ASSAY OF CALCIUM: CPT

## 2023-05-08 PROCEDURE — 80053 COMPREHEN METABOLIC PANEL: CPT

## 2023-05-08 PROCEDURE — 87186 SC STD MICRODIL/AGAR DIL: CPT

## 2023-05-08 PROCEDURE — 87635 SARS-COV-2 COVID-19 AMP PRB: CPT

## 2023-05-08 RX ORDER — SODIUM CHLORIDE 9 MG/ML
INJECTION, SOLUTION INTRAVENOUS PRN
Status: DISCONTINUED | OUTPATIENT
Start: 2023-05-08 | End: 2023-05-19 | Stop reason: HOSPADM

## 2023-05-08 RX ORDER — PANTOPRAZOLE SODIUM 40 MG/1
40 TABLET, DELAYED RELEASE ORAL
Status: DISCONTINUED | OUTPATIENT
Start: 2023-05-09 | End: 2023-05-19 | Stop reason: HOSPADM

## 2023-05-08 RX ORDER — ACETAMINOPHEN 650 MG/1
650 SUPPOSITORY RECTAL EVERY 6 HOURS PRN
Status: DISCONTINUED | OUTPATIENT
Start: 2023-05-08 | End: 2023-05-19 | Stop reason: HOSPADM

## 2023-05-08 RX ORDER — SODIUM CHLORIDE 0.9 % (FLUSH) 0.9 %
5-40 SYRINGE (ML) INJECTION EVERY 12 HOURS SCHEDULED
Status: DISCONTINUED | OUTPATIENT
Start: 2023-05-08 | End: 2023-05-19 | Stop reason: HOSPADM

## 2023-05-08 RX ORDER — LEVOFLOXACIN 5 MG/ML
250 INJECTION, SOLUTION INTRAVENOUS EVERY 24 HOURS
Status: DISPENSED | OUTPATIENT
Start: 2023-05-08 | End: 2023-05-15

## 2023-05-08 RX ORDER — POLYVINYL ALCOHOL 14 MG/ML
1 SOLUTION/ DROPS OPHTHALMIC PRN
Status: DISCONTINUED | OUTPATIENT
Start: 2023-05-08 | End: 2023-05-19 | Stop reason: HOSPADM

## 2023-05-08 RX ORDER — SODIUM CHLORIDE 0.9 % (FLUSH) 0.9 %
5-40 SYRINGE (ML) INJECTION PRN
Status: DISCONTINUED | OUTPATIENT
Start: 2023-05-08 | End: 2023-05-19 | Stop reason: HOSPADM

## 2023-05-08 RX ORDER — GUAIFENESIN/DEXTROMETHORPHAN 100-10MG/5
10 SYRUP ORAL EVERY 6 HOURS PRN
Status: DISCONTINUED | OUTPATIENT
Start: 2023-05-08 | End: 2023-05-19 | Stop reason: HOSPADM

## 2023-05-08 RX ORDER — ALLOPURINOL 100 MG/1
100 TABLET ORAL DAILY
Status: DISCONTINUED | OUTPATIENT
Start: 2023-05-09 | End: 2023-05-19 | Stop reason: HOSPADM

## 2023-05-08 RX ORDER — FENTANYL CITRATE 50 UG/ML
25 INJECTION, SOLUTION INTRAMUSCULAR; INTRAVENOUS ONCE
Status: COMPLETED | OUTPATIENT
Start: 2023-05-08 | End: 2023-05-08

## 2023-05-08 RX ORDER — CALCITONIN SALMON 200 [USP'U]/ML
4 INJECTION, SOLUTION INTRAMUSCULAR; SUBCUTANEOUS 2 TIMES DAILY
Status: DISPENSED | OUTPATIENT
Start: 2023-05-08 | End: 2023-05-09

## 2023-05-08 RX ORDER — DOCUSATE SODIUM 100 MG/1
100 CAPSULE, LIQUID FILLED ORAL DAILY
Status: DISCONTINUED | OUTPATIENT
Start: 2023-05-09 | End: 2023-05-19 | Stop reason: HOSPADM

## 2023-05-08 RX ORDER — ACETAMINOPHEN 325 MG/1
650 TABLET ORAL EVERY 6 HOURS PRN
Status: DISCONTINUED | OUTPATIENT
Start: 2023-05-08 | End: 2023-05-19 | Stop reason: HOSPADM

## 2023-05-08 RX ORDER — SODIUM CHLORIDE 9 MG/ML
INJECTION, SOLUTION INTRAVENOUS CONTINUOUS
Status: DISCONTINUED | OUTPATIENT
Start: 2023-05-08 | End: 2023-05-11

## 2023-05-08 RX ORDER — HYDRALAZINE HYDROCHLORIDE 50 MG/1
100 TABLET, FILM COATED ORAL 3 TIMES DAILY
Status: DISCONTINUED | OUTPATIENT
Start: 2023-05-08 | End: 2023-05-19 | Stop reason: HOSPADM

## 2023-05-08 RX ORDER — HYDRALAZINE HYDROCHLORIDE 20 MG/ML
10 INJECTION INTRAMUSCULAR; INTRAVENOUS ONCE
Status: COMPLETED | OUTPATIENT
Start: 2023-05-08 | End: 2023-05-08

## 2023-05-08 RX ORDER — 0.9 % SODIUM CHLORIDE 0.9 %
1000 INTRAVENOUS SOLUTION INTRAVENOUS ONCE
Status: COMPLETED | OUTPATIENT
Start: 2023-05-08 | End: 2023-05-08

## 2023-05-08 RX ORDER — POLYETHYLENE GLYCOL 3350 17 G/17G
17 POWDER, FOR SOLUTION ORAL DAILY PRN
Status: DISCONTINUED | OUTPATIENT
Start: 2023-05-08 | End: 2023-05-19 | Stop reason: HOSPADM

## 2023-05-08 RX ORDER — METOPROLOL SUCCINATE 50 MG/1
50 TABLET, EXTENDED RELEASE ORAL 2 TIMES DAILY
Status: DISCONTINUED | OUTPATIENT
Start: 2023-05-08 | End: 2023-05-19 | Stop reason: HOSPADM

## 2023-05-08 RX ORDER — ONDANSETRON 2 MG/ML
4 INJECTION INTRAMUSCULAR; INTRAVENOUS EVERY 6 HOURS PRN
Status: DISCONTINUED | OUTPATIENT
Start: 2023-05-08 | End: 2023-05-19 | Stop reason: HOSPADM

## 2023-05-08 RX ORDER — HYDROCODONE BITARTRATE AND ACETAMINOPHEN 5; 325 MG/1; MG/1
1 TABLET ORAL EVERY 6 HOURS PRN
Status: DISCONTINUED | OUTPATIENT
Start: 2023-05-08 | End: 2023-05-17

## 2023-05-08 RX ORDER — LOSARTAN POTASSIUM 50 MG/1
50 TABLET ORAL DAILY
Status: DISCONTINUED | OUTPATIENT
Start: 2023-05-09 | End: 2023-05-09

## 2023-05-08 RX ORDER — ONDANSETRON 4 MG/1
4 TABLET, ORALLY DISINTEGRATING ORAL EVERY 8 HOURS PRN
Status: DISCONTINUED | OUTPATIENT
Start: 2023-05-08 | End: 2023-05-19 | Stop reason: HOSPADM

## 2023-05-08 RX ADMIN — METOPROLOL SUCCINATE 50 MG: 50 TABLET, EXTENDED RELEASE ORAL at 22:26

## 2023-05-08 RX ADMIN — LEVOFLOXACIN 250 MG: 5 INJECTION, SOLUTION INTRAVENOUS at 21:14

## 2023-05-08 RX ADMIN — HYDRALAZINE HYDROCHLORIDE 10 MG: 20 INJECTION INTRAMUSCULAR; INTRAVENOUS at 21:11

## 2023-05-08 RX ADMIN — FENTANYL CITRATE 25 MCG: 50 INJECTION, SOLUTION INTRAMUSCULAR; INTRAVENOUS at 21:11

## 2023-05-08 RX ADMIN — SODIUM CHLORIDE 1000 ML: 9 INJECTION, SOLUTION INTRAVENOUS at 18:26

## 2023-05-08 RX ADMIN — HYDRALAZINE HYDROCHLORIDE 10 MG: 20 INJECTION INTRAMUSCULAR; INTRAVENOUS at 18:02

## 2023-05-08 RX ADMIN — HYDRALAZINE HYDROCHLORIDE 100 MG: 50 TABLET, FILM COATED ORAL at 22:26

## 2023-05-08 RX ADMIN — FENTANYL CITRATE 25 MCG: 50 INJECTION, SOLUTION INTRAMUSCULAR; INTRAVENOUS at 16:59

## 2023-05-08 RX ADMIN — SODIUM CHLORIDE: 9 INJECTION, SOLUTION INTRAVENOUS at 18:25

## 2023-05-08 RX ADMIN — HYDROCODONE BITARTRATE AND ACETAMINOPHEN 1 TABLET: 5; 325 TABLET ORAL at 22:26

## 2023-05-08 RX ADMIN — APIXABAN 2.5 MG: 5 TABLET, FILM COATED ORAL at 22:26

## 2023-05-08 ASSESSMENT — PAIN DESCRIPTION - LOCATION: LOCATION: BACK

## 2023-05-08 ASSESSMENT — PAIN SCALES - GENERAL: PAINLEVEL_OUTOF10: 7

## 2023-05-08 ASSESSMENT — PAIN - FUNCTIONAL ASSESSMENT: PAIN_FUNCTIONAL_ASSESSMENT: 0-10

## 2023-05-09 LAB
ALBUMIN SERPL-MCNC: 2.9 G/DL (ref 3.5–5.2)
ALP SERPL-CCNC: 139 U/L (ref 35–104)
ALT SERPL-CCNC: 12 U/L (ref 0–32)
ANION GAP SERPL CALCULATED.3IONS-SCNC: 9 MMOL/L (ref 7–16)
AST SERPL-CCNC: 26 U/L (ref 0–31)
BILIRUB SERPL-MCNC: 0.4 MG/DL (ref 0–1.2)
BUN SERPL-MCNC: 28 MG/DL (ref 6–23)
CA-I BLD-SCNC: 1.77 MMOL/L (ref 1.15–1.33)
CALCIUM SERPL-MCNC: 12 MG/DL (ref 8.6–10.2)
CHLORIDE SERPL-SCNC: 100 MMOL/L (ref 98–107)
CO2 SERPL-SCNC: 28 MMOL/L (ref 22–29)
CREAT SERPL-MCNC: 1.4 MG/DL (ref 0.5–1)
GLUCOSE SERPL-MCNC: 89 MG/DL (ref 74–99)
PHOSPHATE SERPL-MCNC: 3.4 MG/DL (ref 2.5–4.5)
POTASSIUM SERPL-SCNC: 3.5 MMOL/L (ref 3.5–5)
PROT SERPL-MCNC: 6.1 G/DL (ref 6.4–8.3)
SODIUM SERPL-SCNC: 137 MMOL/L (ref 132–146)
VITAMIN D 25-HYDROXY: 55 NG/ML (ref 30–100)

## 2023-05-09 PROCEDURE — 6360000002 HC RX W HCPCS: Performed by: INTERNAL MEDICINE

## 2023-05-09 PROCEDURE — 2580000003 HC RX 258: Performed by: INTERNAL MEDICINE

## 2023-05-09 PROCEDURE — 80053 COMPREHEN METABOLIC PANEL: CPT

## 2023-05-09 PROCEDURE — 84100 ASSAY OF PHOSPHORUS: CPT

## 2023-05-09 PROCEDURE — 82784 ASSAY IGA/IGD/IGG/IGM EACH: CPT

## 2023-05-09 PROCEDURE — 82652 VIT D 1 25-DIHYDROXY: CPT

## 2023-05-09 PROCEDURE — 2060000000 HC ICU INTERMEDIATE R&B

## 2023-05-09 PROCEDURE — 6370000000 HC RX 637 (ALT 250 FOR IP): Performed by: INTERNAL MEDICINE

## 2023-05-09 PROCEDURE — 82330 ASSAY OF CALCIUM: CPT

## 2023-05-09 PROCEDURE — 84165 PROTEIN E-PHORESIS SERUM: CPT

## 2023-05-09 PROCEDURE — 36415 COLL VENOUS BLD VENIPUNCTURE: CPT

## 2023-05-09 PROCEDURE — 86334 IMMUNOFIX E-PHORESIS SERUM: CPT

## 2023-05-09 PROCEDURE — 82306 VITAMIN D 25 HYDROXY: CPT

## 2023-05-09 RX ORDER — AMLODIPINE BESYLATE 10 MG/1
10 TABLET ORAL DAILY
Status: DISCONTINUED | OUTPATIENT
Start: 2023-05-09 | End: 2023-05-19 | Stop reason: HOSPADM

## 2023-05-09 RX ORDER — OMEPRAZOLE 20 MG/1
20 CAPSULE, DELAYED RELEASE ORAL DAILY
COMMUNITY

## 2023-05-09 RX ORDER — DOCUSATE SODIUM 100 MG/1
100 CAPSULE, LIQUID FILLED ORAL DAILY
COMMUNITY
End: 2023-05-21

## 2023-05-09 RX ORDER — NIFEDIPINE 60 MG/1
60 TABLET, FILM COATED, EXTENDED RELEASE ORAL DAILY PRN
Status: ON HOLD | COMMUNITY
End: 2023-05-18 | Stop reason: HOSPADM

## 2023-05-09 RX ORDER — ATORVASTATIN CALCIUM 10 MG/1
10 TABLET, FILM COATED ORAL NIGHTLY
Status: ON HOLD | COMMUNITY
End: 2023-05-18 | Stop reason: HOSPADM

## 2023-05-09 RX ORDER — BISACODYL 10 MG
10 SUPPOSITORY, RECTAL RECTAL DAILY
Status: ON HOLD | COMMUNITY
End: 2023-05-18 | Stop reason: HOSPADM

## 2023-05-09 RX ORDER — MINERAL OIL 100 G/100G
1 OIL RECTAL
COMMUNITY

## 2023-05-09 RX ADMIN — LEVOFLOXACIN 250 MG: 5 INJECTION, SOLUTION INTRAVENOUS at 21:07

## 2023-05-09 RX ADMIN — ACETAMINOPHEN 650 MG: 325 TABLET ORAL at 08:14

## 2023-05-09 RX ADMIN — ALLOPURINOL 100 MG: 100 TABLET ORAL at 08:14

## 2023-05-09 RX ADMIN — METOPROLOL SUCCINATE 50 MG: 50 TABLET, EXTENDED RELEASE ORAL at 21:07

## 2023-05-09 RX ADMIN — APIXABAN 2.5 MG: 5 TABLET, FILM COATED ORAL at 21:07

## 2023-05-09 RX ADMIN — HYDRALAZINE HYDROCHLORIDE 100 MG: 50 TABLET, FILM COATED ORAL at 21:07

## 2023-05-09 RX ADMIN — APIXABAN 2.5 MG: 5 TABLET, FILM COATED ORAL at 08:14

## 2023-05-09 RX ADMIN — HYDRALAZINE HYDROCHLORIDE 100 MG: 50 TABLET, FILM COATED ORAL at 08:14

## 2023-05-09 RX ADMIN — CALCITONIN SALMON 264 UNITS: 200 INJECTION, SOLUTION INTRAMUSCULAR; SUBCUTANEOUS at 02:05

## 2023-05-09 RX ADMIN — METOPROLOL SUCCINATE 50 MG: 50 TABLET, EXTENDED RELEASE ORAL at 08:13

## 2023-05-09 RX ADMIN — LOSARTAN POTASSIUM 50 MG: 50 TABLET, FILM COATED ORAL at 08:14

## 2023-05-09 RX ADMIN — HYDRALAZINE HYDROCHLORIDE 100 MG: 50 TABLET, FILM COATED ORAL at 14:44

## 2023-05-09 RX ADMIN — PANTOPRAZOLE SODIUM 40 MG: 40 TABLET, DELAYED RELEASE ORAL at 08:14

## 2023-05-09 RX ADMIN — PAMIDRONATE DISODIUM 30 MG: 3 INJECTION INTRAVENOUS at 12:35

## 2023-05-09 RX ADMIN — Medication 10 ML: at 08:15

## 2023-05-09 RX ADMIN — AMLODIPINE BESYLATE 10 MG: 10 TABLET ORAL at 16:18

## 2023-05-09 ASSESSMENT — PAIN SCALES - GENERAL: PAINLEVEL_OUTOF10: 0

## 2023-05-10 ENCOUNTER — APPOINTMENT (OUTPATIENT)
Dept: GENERAL RADIOLOGY | Age: 88
DRG: 640 | End: 2023-05-10
Payer: COMMERCIAL

## 2023-05-10 LAB
1,25(OH)2D3 SERPL-MCNC: 139 PG/ML (ref 19.9–79.3)
ALBUMIN SERPL-MCNC: 2.6 G/DL (ref 3.5–4.7)
ALPHA1 GLOB SERPL ELPH-MCNC: 0.3 G/DL (ref 0.2–0.4)
ALPHA2 GLOB SERPL ELPH-MCNC: 0.5 G/DL (ref 0.5–1)
ANION GAP SERPL CALCULATED.3IONS-SCNC: 13 MMOL/L (ref 7–16)
B-GLOBULIN SERPL ELPH-MCNC: 1 G/DL (ref 0.8–1.3)
BUN SERPL-MCNC: 23 MG/DL (ref 6–23)
CA-I BLD-SCNC: 1.53 MMOL/L (ref 1.15–1.33)
CALCIUM SERPL-MCNC: 10.8 MG/DL (ref 8.6–10.2)
CHLORIDE SERPL-SCNC: 103 MMOL/L (ref 98–107)
CO2 SERPL-SCNC: 23 MMOL/L (ref 22–29)
CREAT SERPL-MCNC: 1.3 MG/DL (ref 0.5–1)
ELECTROPHORESIS: ABNORMAL
GAMMA GLOB SERPL ELPH-MCNC: 1.2 G/DL (ref 0.7–1.6)
GLUCOSE SERPL-MCNC: 69 MG/DL (ref 74–99)
IGM SERPL-MCNC: 743 MG/DL (ref 40–230)
IMMUNOFIXATION RESULT, SERUM: NORMAL
PHOSPHATE SERPL-MCNC: 2.8 MG/DL (ref 2.5–4.5)
POTASSIUM SERPL-SCNC: 3.5 MMOL/L (ref 3.5–5)
PROT SERPL-MCNC: 5.6 G/DL (ref 6.4–8.3)
SODIUM SERPL-SCNC: 139 MMOL/L (ref 132–146)

## 2023-05-10 PROCEDURE — 92526 ORAL FUNCTION THERAPY: CPT

## 2023-05-10 PROCEDURE — 92611 MOTION FLUOROSCOPY/SWALLOW: CPT

## 2023-05-10 PROCEDURE — 2580000003 HC RX 258: Performed by: INTERNAL MEDICINE

## 2023-05-10 PROCEDURE — 6370000000 HC RX 637 (ALT 250 FOR IP): Performed by: INTERNAL MEDICINE

## 2023-05-10 PROCEDURE — 2580000003 HC RX 258: Performed by: PHYSICIAN ASSISTANT

## 2023-05-10 PROCEDURE — 6360000002 HC RX W HCPCS: Performed by: PHYSICIAN ASSISTANT

## 2023-05-10 PROCEDURE — 80048 BASIC METABOLIC PNL TOTAL CA: CPT

## 2023-05-10 PROCEDURE — 74230 X-RAY XM SWLNG FUNCJ C+: CPT

## 2023-05-10 PROCEDURE — 6360000002 HC RX W HCPCS: Performed by: INTERNAL MEDICINE

## 2023-05-10 PROCEDURE — 2060000000 HC ICU INTERMEDIATE R&B

## 2023-05-10 PROCEDURE — 84100 ASSAY OF PHOSPHORUS: CPT

## 2023-05-10 PROCEDURE — 36415 COLL VENOUS BLD VENIPUNCTURE: CPT

## 2023-05-10 PROCEDURE — 77075 RADEX OSSEOUS SURVEY COMPL: CPT

## 2023-05-10 PROCEDURE — 82330 ASSAY OF CALCIUM: CPT

## 2023-05-10 RX ADMIN — ALLOPURINOL 100 MG: 100 TABLET ORAL at 08:31

## 2023-05-10 RX ADMIN — AMLODIPINE BESYLATE 10 MG: 10 TABLET ORAL at 08:32

## 2023-05-10 RX ADMIN — HYDROCODONE BITARTRATE AND ACETAMINOPHEN 1 TABLET: 5; 325 TABLET ORAL at 02:48

## 2023-05-10 RX ADMIN — HYDROCODONE BITARTRATE AND ACETAMINOPHEN 1 TABLET: 5; 325 TABLET ORAL at 17:26

## 2023-05-10 RX ADMIN — Medication 10 ML: at 08:32

## 2023-05-10 RX ADMIN — APIXABAN 2.5 MG: 5 TABLET, FILM COATED ORAL at 22:31

## 2023-05-10 RX ADMIN — HYDRALAZINE HYDROCHLORIDE 100 MG: 50 TABLET, FILM COATED ORAL at 17:25

## 2023-05-10 RX ADMIN — HYDRALAZINE HYDROCHLORIDE 100 MG: 50 TABLET, FILM COATED ORAL at 08:31

## 2023-05-10 RX ADMIN — METOPROLOL SUCCINATE 50 MG: 50 TABLET, EXTENDED RELEASE ORAL at 22:31

## 2023-05-10 RX ADMIN — APIXABAN 2.5 MG: 5 TABLET, FILM COATED ORAL at 08:31

## 2023-05-10 RX ADMIN — LEVOFLOXACIN 250 MG: 5 INJECTION, SOLUTION INTRAVENOUS at 22:32

## 2023-05-10 RX ADMIN — HYDRALAZINE HYDROCHLORIDE 100 MG: 50 TABLET, FILM COATED ORAL at 22:31

## 2023-05-10 RX ADMIN — SODIUM CHLORIDE: 9 INJECTION, SOLUTION INTRAVENOUS at 08:36

## 2023-05-10 RX ADMIN — METOPROLOL SUCCINATE 50 MG: 50 TABLET, EXTENDED RELEASE ORAL at 08:31

## 2023-05-10 RX ADMIN — PAMIDRONATE DISODIUM 30 MG: 3 INJECTION INTRAVENOUS at 12:40

## 2023-05-10 ASSESSMENT — PAIN SCALES - GENERAL: PAINLEVEL_OUTOF10: 9

## 2023-05-11 PROBLEM — E44.0 MODERATE PROTEIN-CALORIE MALNUTRITION (HCC): Chronic | Status: ACTIVE | Noted: 2023-01-01

## 2023-05-11 LAB
ANION GAP SERPL CALCULATED.3IONS-SCNC: 7 MMOL/L (ref 7–16)
BACTERIA UR CULT: ABNORMAL
BUN SERPL-MCNC: 19 MG/DL (ref 6–23)
CA-I BLD-SCNC: 1.51 MMOL/L (ref 1.15–1.33)
CALCIUM SERPL-MCNC: 10 MG/DL (ref 8.6–10.2)
CHLORIDE SERPL-SCNC: 107 MMOL/L (ref 98–107)
CO2 SERPL-SCNC: 25 MMOL/L (ref 22–29)
CREAT SERPL-MCNC: 1.1 MG/DL (ref 0.5–1)
GLUCOSE SERPL-MCNC: 79 MG/DL (ref 74–99)
ORGANISM: ABNORMAL
PHOSPHATE SERPL-MCNC: 2.4 MG/DL (ref 2.5–4.5)
POTASSIUM SERPL-SCNC: 3.1 MMOL/L (ref 3.5–5)
SODIUM SERPL-SCNC: 139 MMOL/L (ref 132–146)

## 2023-05-11 PROCEDURE — 97165 OT EVAL LOW COMPLEX 30 MIN: CPT

## 2023-05-11 PROCEDURE — 36415 COLL VENOUS BLD VENIPUNCTURE: CPT

## 2023-05-11 PROCEDURE — 82542 COL CHROMOTOGRAPHY QUAL/QUAN: CPT

## 2023-05-11 PROCEDURE — 6370000000 HC RX 637 (ALT 250 FOR IP): Performed by: INTERNAL MEDICINE

## 2023-05-11 PROCEDURE — 83883 ASSAY NEPHELOMETRY NOT SPEC: CPT

## 2023-05-11 PROCEDURE — 2060000000 HC ICU INTERMEDIATE R&B

## 2023-05-11 PROCEDURE — 97530 THERAPEUTIC ACTIVITIES: CPT

## 2023-05-11 PROCEDURE — 82330 ASSAY OF CALCIUM: CPT

## 2023-05-11 PROCEDURE — 80048 BASIC METABOLIC PNL TOTAL CA: CPT

## 2023-05-11 PROCEDURE — 2500000003 HC RX 250 WO HCPCS: Performed by: INTERNAL MEDICINE

## 2023-05-11 PROCEDURE — 84100 ASSAY OF PHOSPHORUS: CPT

## 2023-05-11 PROCEDURE — 97535 SELF CARE MNGMENT TRAINING: CPT

## 2023-05-11 PROCEDURE — 2580000003 HC RX 258: Performed by: INTERNAL MEDICINE

## 2023-05-11 PROCEDURE — 6360000002 HC RX W HCPCS: Performed by: INTERNAL MEDICINE

## 2023-05-11 RX ORDER — POTASSIUM CHLORIDE 7.45 MG/ML
10 INJECTION INTRAVENOUS
Status: COMPLETED | OUTPATIENT
Start: 2023-05-11 | End: 2023-05-11

## 2023-05-11 RX ORDER — DEXTROSE, SODIUM CHLORIDE, AND POTASSIUM CHLORIDE 5; .9; .15 G/100ML; G/100ML; G/100ML
INJECTION INTRAVENOUS CONTINUOUS
Status: DISCONTINUED | OUTPATIENT
Start: 2023-05-11 | End: 2023-05-17

## 2023-05-11 RX ADMIN — HYDRALAZINE HYDROCHLORIDE 100 MG: 50 TABLET, FILM COATED ORAL at 21:22

## 2023-05-11 RX ADMIN — PANTOPRAZOLE SODIUM 40 MG: 40 TABLET, DELAYED RELEASE ORAL at 07:03

## 2023-05-11 RX ADMIN — HYDROCODONE BITARTRATE AND ACETAMINOPHEN 1 TABLET: 5; 325 TABLET ORAL at 21:21

## 2023-05-11 RX ADMIN — HYDROCODONE BITARTRATE AND ACETAMINOPHEN 1 TABLET: 5; 325 TABLET ORAL at 07:03

## 2023-05-11 RX ADMIN — POTASSIUM CHLORIDE, DEXTROSE MONOHYDRATE AND SODIUM CHLORIDE: 150; 5; 900 INJECTION, SOLUTION INTRAVENOUS at 12:02

## 2023-05-11 RX ADMIN — POTASSIUM CHLORIDE 10 MEQ: 10 INJECTION, SOLUTION INTRAVENOUS at 13:00

## 2023-05-11 RX ADMIN — LEVOFLOXACIN 250 MG: 5 INJECTION, SOLUTION INTRAVENOUS at 21:29

## 2023-05-11 RX ADMIN — APIXABAN 2.5 MG: 5 TABLET, FILM COATED ORAL at 21:22

## 2023-05-11 RX ADMIN — POTASSIUM CHLORIDE 10 MEQ: 10 INJECTION, SOLUTION INTRAVENOUS at 12:17

## 2023-05-11 RX ADMIN — POTASSIUM CHLORIDE 10 MEQ: 10 INJECTION, SOLUTION INTRAVENOUS at 11:10

## 2023-05-11 RX ADMIN — ALLOPURINOL 100 MG: 100 TABLET ORAL at 08:45

## 2023-05-11 RX ADMIN — POTASSIUM CHLORIDE, DEXTROSE MONOHYDRATE AND SODIUM CHLORIDE: 150; 5; 900 INJECTION, SOLUTION INTRAVENOUS at 11:59

## 2023-05-11 RX ADMIN — APIXABAN 2.5 MG: 5 TABLET, FILM COATED ORAL at 08:45

## 2023-05-11 RX ADMIN — HYDROCODONE BITARTRATE AND ACETAMINOPHEN 1 TABLET: 5; 325 TABLET ORAL at 14:36

## 2023-05-11 RX ADMIN — METOPROLOL SUCCINATE 50 MG: 50 TABLET, EXTENDED RELEASE ORAL at 21:21

## 2023-05-11 RX ADMIN — HYDRALAZINE HYDROCHLORIDE 100 MG: 50 TABLET, FILM COATED ORAL at 08:44

## 2023-05-11 RX ADMIN — SODIUM PHOSPHATE, MONOBASIC, MONOHYDRATE AND SODIUM PHOSPHATE, DIBASIC, ANHYDROUS 15 MMOL: 276; 142 INJECTION, SOLUTION INTRAVENOUS at 17:31

## 2023-05-11 RX ADMIN — METOPROLOL SUCCINATE 50 MG: 50 TABLET, EXTENDED RELEASE ORAL at 08:45

## 2023-05-11 RX ADMIN — HYDROCODONE BITARTRATE AND ACETAMINOPHEN 1 TABLET: 5; 325 TABLET ORAL at 00:18

## 2023-05-11 RX ADMIN — HYDRALAZINE HYDROCHLORIDE 100 MG: 50 TABLET, FILM COATED ORAL at 17:32

## 2023-05-11 RX ADMIN — AMLODIPINE BESYLATE 10 MG: 10 TABLET ORAL at 08:45

## 2023-05-11 ASSESSMENT — PAIN SCALES - GENERAL
PAINLEVEL_OUTOF10: 5
PAINLEVEL_OUTOF10: 5
PAINLEVEL_OUTOF10: 6

## 2023-05-11 ASSESSMENT — PAIN DESCRIPTION - LOCATION: LOCATION: BACK

## 2023-05-11 ASSESSMENT — PAIN DESCRIPTION - DESCRIPTORS: DESCRIPTORS: DISCOMFORT

## 2023-05-12 ENCOUNTER — APPOINTMENT (OUTPATIENT)
Dept: CT IMAGING | Age: 88
DRG: 640 | End: 2023-05-12
Payer: COMMERCIAL

## 2023-05-12 ENCOUNTER — APPOINTMENT (OUTPATIENT)
Dept: GENERAL RADIOLOGY | Age: 88
DRG: 640 | End: 2023-05-12
Payer: COMMERCIAL

## 2023-05-12 LAB
ALBUMIN SERPL-MCNC: 2.8 G/DL (ref 3.5–5.2)
ALP SERPL-CCNC: 141 U/L (ref 35–104)
ALT SERPL-CCNC: 20 U/L (ref 0–32)
ANION GAP SERPL CALCULATED.3IONS-SCNC: 7 MMOL/L (ref 7–16)
ANION GAP SERPL CALCULATED.3IONS-SCNC: 8 MMOL/L (ref 7–16)
AST SERPL-CCNC: 34 U/L (ref 0–31)
B.E.: -2.5 MMOL/L (ref -3–3)
B.E.: -2.8 MMOL/L (ref -3–3)
B.E.: -7.3 MMOL/L (ref -3–3)
BASOPHILS # BLD: 0.05 E9/L (ref 0–0.2)
BASOPHILS NFR BLD: 0.6 % (ref 0–2)
BILIRUB SERPL-MCNC: <0.2 MG/DL (ref 0–1.2)
BNP BLD-MCNC: 9823 PG/ML (ref 0–450)
BUN SERPL-MCNC: 17 MG/DL (ref 6–23)
BUN SERPL-MCNC: 17 MG/DL (ref 6–23)
CALCIUM SERPL-MCNC: 10.3 MG/DL (ref 8.6–10.2)
CALCIUM SERPL-MCNC: 9.8 MG/DL (ref 8.6–10.2)
CHLORIDE SERPL-SCNC: 108 MMOL/L (ref 98–107)
CHLORIDE SERPL-SCNC: 109 MMOL/L (ref 98–107)
CO2 SERPL-SCNC: 23 MMOL/L (ref 22–29)
CO2 SERPL-SCNC: 26 MMOL/L (ref 22–29)
COHB: 0.6 % (ref 0–1.5)
COHB: 0.7 % (ref 0–1.5)
COHB: 0.7 % (ref 0–1.5)
CREAT SERPL-MCNC: 1.1 MG/DL (ref 0.5–1)
CREAT SERPL-MCNC: 1.1 MG/DL (ref 0.5–1)
CRITICAL: ABNORMAL
DATE ANALYZED: ABNORMAL
DATE OF COLLECTION: ABNORMAL
EOSINOPHIL # BLD: 0.11 E9/L (ref 0.05–0.5)
EOSINOPHIL NFR BLD: 1.4 % (ref 0–6)
ERYTHROCYTE [DISTWIDTH] IN BLOOD BY AUTOMATED COUNT: 17.2 FL (ref 11.5–15)
ERYTHROCYTE [DISTWIDTH] IN BLOOD BY AUTOMATED COUNT: 17.5 FL (ref 11.5–15)
GLUCOSE SERPL-MCNC: 135 MG/DL (ref 74–99)
GLUCOSE SERPL-MCNC: 99 MG/DL (ref 74–99)
HCO3: 22.2 MMOL/L (ref 22–26)
HCO3: 23.5 MMOL/L (ref 22–26)
HCO3: 25.7 MMOL/L (ref 22–26)
HCT VFR BLD AUTO: 26.5 % (ref 34–48)
HCT VFR BLD AUTO: 30.1 % (ref 34–48)
HGB BLD-MCNC: 8.2 G/DL (ref 11.5–15.5)
HGB BLD-MCNC: 8.8 G/DL (ref 11.5–15.5)
HGB BLD-MCNC: 9.1 G/DL (ref 11.5–15.5)
HHB: 0.5 % (ref 0–5)
HHB: 1.6 % (ref 0–5)
HHB: 3.2 % (ref 0–5)
IMM GRANULOCYTES # BLD: 0.13 E9/L
IMM GRANULOCYTES NFR BLD: 1.6 % (ref 0–5)
LAB: ABNORMAL
LACTATE BLDV-SCNC: 2 MMOL/L (ref 0.5–2.2)
LIPASE: 66 U/L (ref 13–60)
LYMPHOCYTES # BLD: 2.56 E9/L (ref 1.5–4)
LYMPHOCYTES NFR BLD: 32.1 % (ref 20–42)
Lab: ABNORMAL
MCH RBC QN AUTO: 28.5 PG (ref 26–35)
MCH RBC QN AUTO: 29 PG (ref 26–35)
MCHC RBC AUTO-ENTMCNC: 30.2 % (ref 32–34.5)
MCHC RBC AUTO-ENTMCNC: 30.9 % (ref 32–34.5)
MCV RBC AUTO: 92 FL (ref 80–99.9)
MCV RBC AUTO: 95.9 FL (ref 80–99.9)
METHB: 0.3 % (ref 0–1.5)
METHB: 0.4 % (ref 0–1.5)
METHB: 0.5 % (ref 0–1.5)
MODE: ABNORMAL
MONOCYTES # BLD: 1.29 E9/L (ref 0.1–0.95)
MONOCYTES NFR BLD: 16.2 % (ref 2–12)
NEUTROPHILS # BLD: 3.83 E9/L (ref 1.8–7.3)
NEUTS SEG NFR BLD: 48.1 % (ref 43–80)
O2 CONTENT: 13.3 ML/DL
O2 CONTENT: 13.8 ML/DL
O2 CONTENT: 15.4 ML/DL
O2 SATURATION: 96.8 % (ref 92–98.5)
O2 SATURATION: 98.4 % (ref 92–98.5)
O2 SATURATION: 99.5 % (ref 92–98.5)
O2HB: 95.6 % (ref 94–97)
O2HB: 97.4 % (ref 94–97)
O2HB: 98.5 % (ref 94–97)
OPERATOR ID: 2156
OPERATOR ID: 2220
OPERATOR ID: 7490
PATIENT TEMP: 37 C
PCO2: 110.5 MMHG (ref 35–45)
PCO2: 39.2 MMHG (ref 35–45)
PCO2: 46.1 MMHG (ref 35–45)
PH BLOOD GAS: 6.98 (ref 7.35–7.45)
PH BLOOD GAS: 7.33 (ref 7.35–7.45)
PH BLOOD GAS: 7.37 (ref 7.35–7.45)
PHOSPHATE SERPL-MCNC: 3.2 MG/DL (ref 2.5–4.5)
PLATELET # BLD AUTO: 123 E9/L (ref 130–450)
PLATELET # BLD AUTO: 175 E9/L (ref 130–450)
PMV BLD AUTO: 12.5 FL (ref 7–12)
PMV BLD AUTO: 13.1 FL (ref 7–12)
PO2: 136.7 MMHG (ref 75–100)
PO2: 328.7 MMHG (ref 75–100)
PO2: 94.6 MMHG (ref 75–100)
POTASSIUM SERPL-SCNC: 3.6 MMOL/L (ref 3.5–5)
POTASSIUM SERPL-SCNC: 3.88 MMOL/L (ref 3.5–5)
POTASSIUM SERPL-SCNC: 5.1 MMOL/L (ref 3.5–5)
PROCALCITONIN: 0.21 NG/ML (ref 0–0.08)
PROT SERPL-MCNC: 5.9 G/DL (ref 6.4–8.3)
RBC # BLD AUTO: 2.88 E12/L (ref 3.5–5.5)
RBC # BLD AUTO: 3.14 E12/L (ref 3.5–5.5)
REASON FOR REJECTION: NORMAL
REJECTED TEST: NORMAL
SODIUM SERPL-SCNC: 138 MMOL/L (ref 132–146)
SODIUM SERPL-SCNC: 143 MMOL/L (ref 132–146)
SOURCE, BLOOD GAS: ABNORMAL
THB: 10.5 G/DL (ref 11.5–16.5)
THB: 9.8 G/DL (ref 11.5–16.5)
THB: 9.9 G/DL (ref 11.5–16.5)
TIME ANALYZED: 1026
TIME ANALYZED: 1122
TIME ANALYZED: 1639
TROPONIN, HIGH SENSITIVITY: 74 NG/L (ref 0–9)
WBC # BLD: 4.6 E9/L (ref 4.5–11.5)
WBC # BLD: 8 E9/L (ref 4.5–11.5)

## 2023-05-12 PROCEDURE — 6370000000 HC RX 637 (ALT 250 FOR IP): Performed by: STUDENT IN AN ORGANIZED HEALTH CARE EDUCATION/TRAINING PROGRAM

## 2023-05-12 PROCEDURE — 2580000003 HC RX 258: Performed by: INTERNAL MEDICINE

## 2023-05-12 PROCEDURE — 6370000000 HC RX 637 (ALT 250 FOR IP): Performed by: INTERNAL MEDICINE

## 2023-05-12 PROCEDURE — 51702 INSERT TEMP BLADDER CATH: CPT

## 2023-05-12 PROCEDURE — 97161 PT EVAL LOW COMPLEX 20 MIN: CPT

## 2023-05-12 PROCEDURE — 85027 COMPLETE CBC AUTOMATED: CPT

## 2023-05-12 PROCEDURE — 83690 ASSAY OF LIPASE: CPT

## 2023-05-12 PROCEDURE — 97530 THERAPEUTIC ACTIVITIES: CPT

## 2023-05-12 PROCEDURE — 82805 BLOOD GASES W/O2 SATURATION: CPT

## 2023-05-12 PROCEDURE — 2500000003 HC RX 250 WO HCPCS: Performed by: INTERNAL MEDICINE

## 2023-05-12 PROCEDURE — 70450 CT HEAD/BRAIN W/O DYE: CPT

## 2023-05-12 PROCEDURE — 84145 PROCALCITONIN (PCT): CPT

## 2023-05-12 PROCEDURE — 84484 ASSAY OF TROPONIN QUANT: CPT

## 2023-05-12 PROCEDURE — 80048 BASIC METABOLIC PNL TOTAL CA: CPT

## 2023-05-12 PROCEDURE — 2000000000 HC ICU R&B

## 2023-05-12 PROCEDURE — 85018 HEMOGLOBIN: CPT

## 2023-05-12 PROCEDURE — 84132 ASSAY OF SERUM POTASSIUM: CPT

## 2023-05-12 PROCEDURE — 94664 DEMO&/EVAL PT USE INHALER: CPT

## 2023-05-12 PROCEDURE — 99222 1ST HOSP IP/OBS MODERATE 55: CPT

## 2023-05-12 PROCEDURE — 97535 SELF CARE MNGMENT TRAINING: CPT

## 2023-05-12 PROCEDURE — 83880 ASSAY OF NATRIURETIC PEPTIDE: CPT

## 2023-05-12 PROCEDURE — 71045 X-RAY EXAM CHEST 1 VIEW: CPT

## 2023-05-12 PROCEDURE — 6360000002 HC RX W HCPCS: Performed by: INTERNAL MEDICINE

## 2023-05-12 PROCEDURE — 6360000002 HC RX W HCPCS

## 2023-05-12 PROCEDURE — 84100 ASSAY OF PHOSPHORUS: CPT

## 2023-05-12 PROCEDURE — 36415 COLL VENOUS BLD VENIPUNCTURE: CPT

## 2023-05-12 PROCEDURE — 94640 AIRWAY INHALATION TREATMENT: CPT

## 2023-05-12 PROCEDURE — 82164 ANGIOTENSIN I ENZYME TEST: CPT

## 2023-05-12 PROCEDURE — 99291 CRITICAL CARE FIRST HOUR: CPT | Performed by: INTERNAL MEDICINE

## 2023-05-12 PROCEDURE — 80053 COMPREHEN METABOLIC PANEL: CPT

## 2023-05-12 PROCEDURE — 83605 ASSAY OF LACTIC ACID: CPT

## 2023-05-12 PROCEDURE — 85025 COMPLETE CBC W/AUTO DIFF WBC: CPT

## 2023-05-12 RX ORDER — HYDRALAZINE HYDROCHLORIDE 20 MG/ML
20 INJECTION INTRAMUSCULAR; INTRAVENOUS ONCE
Status: COMPLETED | OUTPATIENT
Start: 2023-05-12 | End: 2023-05-12

## 2023-05-12 RX ORDER — METHYLPREDNISOLONE SODIUM SUCCINATE 125 MG/2ML
INJECTION, POWDER, LYOPHILIZED, FOR SOLUTION INTRAMUSCULAR; INTRAVENOUS
Status: COMPLETED
Start: 2023-05-12 | End: 2023-05-12

## 2023-05-12 RX ORDER — FUROSEMIDE 10 MG/ML
40 INJECTION INTRAMUSCULAR; INTRAVENOUS ONCE
Status: COMPLETED | OUTPATIENT
Start: 2023-05-12 | End: 2023-05-12

## 2023-05-12 RX ORDER — LEVOFLOXACIN 250 MG/1
250 TABLET ORAL DAILY
Status: DISCONTINUED | OUTPATIENT
Start: 2023-05-12 | End: 2023-05-13

## 2023-05-12 RX ORDER — FUROSEMIDE 10 MG/ML
INJECTION INTRAMUSCULAR; INTRAVENOUS
Status: COMPLETED
Start: 2023-05-12 | End: 2023-05-12

## 2023-05-12 RX ORDER — IPRATROPIUM BROMIDE AND ALBUTEROL SULFATE 2.5; .5 MG/3ML; MG/3ML
1 SOLUTION RESPIRATORY (INHALATION)
Status: DISCONTINUED | OUTPATIENT
Start: 2023-05-12 | End: 2023-05-17

## 2023-05-12 RX ORDER — HYDRALAZINE HYDROCHLORIDE 20 MG/ML
INJECTION INTRAMUSCULAR; INTRAVENOUS
Status: COMPLETED
Start: 2023-05-12 | End: 2023-05-12

## 2023-05-12 RX ADMIN — POTASSIUM CHLORIDE, DEXTROSE MONOHYDRATE AND SODIUM CHLORIDE: 150; 5; 900 INJECTION, SOLUTION INTRAVENOUS at 14:43

## 2023-05-12 RX ADMIN — IPRATROPIUM BROMIDE AND ALBUTEROL SULFATE 1 AMPULE: .5; 2.5 SOLUTION RESPIRATORY (INHALATION) at 20:22

## 2023-05-12 RX ADMIN — RACEPINEPHRINE HYDROCHLORIDE 11.25 MG: 11.25 SOLUTION RESPIRATORY (INHALATION) at 10:35

## 2023-05-12 RX ADMIN — IPRATROPIUM BROMIDE AND ALBUTEROL SULFATE 1 AMPULE: .5; 2.5 SOLUTION RESPIRATORY (INHALATION) at 15:01

## 2023-05-12 RX ADMIN — IPRATROPIUM BROMIDE AND ALBUTEROL SULFATE 1 AMPULE: .5; 2.5 SOLUTION RESPIRATORY (INHALATION) at 10:25

## 2023-05-12 RX ADMIN — HYDRALAZINE HYDROCHLORIDE 20 MG: 20 INJECTION, SOLUTION INTRAMUSCULAR; INTRAVENOUS at 11:40

## 2023-05-12 RX ADMIN — ALLOPURINOL 100 MG: 100 TABLET ORAL at 12:37

## 2023-05-12 RX ADMIN — Medication 10 ML: at 20:24

## 2023-05-12 RX ADMIN — HYDRALAZINE HYDROCHLORIDE 100 MG: 50 TABLET, FILM COATED ORAL at 20:24

## 2023-05-12 RX ADMIN — FUROSEMIDE: 10 INJECTION, SOLUTION INTRAMUSCULAR; INTRAVENOUS at 11:40

## 2023-05-12 RX ADMIN — GUAIFENESIN SYRUP AND DEXTROMETHORPHAN 10 ML: 100; 10 SYRUP ORAL at 00:00

## 2023-05-12 RX ADMIN — Medication 10 ML: at 12:48

## 2023-05-12 RX ADMIN — HYDROCODONE BITARTRATE AND ACETAMINOPHEN 1 TABLET: 5; 325 TABLET ORAL at 05:24

## 2023-05-12 RX ADMIN — PANTOPRAZOLE SODIUM 40 MG: 40 TABLET, DELAYED RELEASE ORAL at 05:24

## 2023-05-12 RX ADMIN — METOPROLOL SUCCINATE 50 MG: 50 TABLET, EXTENDED RELEASE ORAL at 20:25

## 2023-05-12 RX ADMIN — FUROSEMIDE 40 MG: 10 INJECTION, SOLUTION INTRAMUSCULAR; INTRAVENOUS at 16:52

## 2023-05-12 RX ADMIN — METHYLPREDNISOLONE SODIUM SUCCINATE: 125 INJECTION, POWDER, FOR SOLUTION INTRAMUSCULAR; INTRAVENOUS at 11:41

## 2023-05-12 RX ADMIN — APIXABAN 2.5 MG: 5 TABLET, FILM COATED ORAL at 12:37

## 2023-05-12 RX ADMIN — HYDRALAZINE HYDROCHLORIDE 100 MG: 50 TABLET, FILM COATED ORAL at 14:37

## 2023-05-12 RX ADMIN — LEVOFLOXACIN 250 MG: 500 TABLET, FILM COATED ORAL at 12:37

## 2023-05-12 RX ADMIN — HYDRALAZINE HYDROCHLORIDE 20 MG: 20 INJECTION INTRAMUSCULAR; INTRAVENOUS at 11:40

## 2023-05-12 RX ADMIN — APIXABAN 2.5 MG: 5 TABLET, FILM COATED ORAL at 20:25

## 2023-05-12 ASSESSMENT — PAIN SCALES - GENERAL
PAINLEVEL_OUTOF10: 0
PAINLEVEL_OUTOF10: 5
PAINLEVEL_OUTOF10: 0

## 2023-05-12 ASSESSMENT — PAIN DESCRIPTION - DESCRIPTORS: DESCRIPTORS: DISCOMFORT

## 2023-05-12 ASSESSMENT — PAIN DESCRIPTION - LOCATION: LOCATION: BACK

## 2023-05-13 LAB
ANION GAP SERPL CALCULATED.3IONS-SCNC: 9 MMOL/L (ref 7–16)
BUN SERPL-MCNC: 23 MG/DL (ref 6–23)
CA-I BLD-SCNC: 1.51 MMOL/L (ref 1.15–1.33)
CALCIUM SERPL-MCNC: 10.1 MG/DL (ref 8.6–10.2)
CHLORIDE SERPL-SCNC: 105 MMOL/L (ref 98–107)
CO2 SERPL-SCNC: 24 MMOL/L (ref 22–29)
CREAT SERPL-MCNC: 1.2 MG/DL (ref 0.5–1)
ERYTHROCYTE [DISTWIDTH] IN BLOOD BY AUTOMATED COUNT: 17.1 FL (ref 11.5–15)
FERRITIN SERPL-MCNC: 441 NG/ML
FOLATE SERPL-MCNC: 5.8 NG/ML (ref 4.8–24.2)
GLUCOSE SERPL-MCNC: 138 MG/DL (ref 74–99)
HCT VFR BLD AUTO: 25.4 % (ref 34–48)
HGB BLD-MCNC: 8.1 G/DL (ref 11.5–15.5)
IRON SATN MFR SERPL: 50 % (ref 15–50)
IRON SERPL-MCNC: 127 MCG/DL (ref 37–145)
MAGNESIUM SERPL-MCNC: 1.4 MG/DL (ref 1.6–2.6)
MCH RBC QN AUTO: 28.9 PG (ref 26–35)
MCHC RBC AUTO-ENTMCNC: 31.9 % (ref 32–34.5)
MCV RBC AUTO: 90.7 FL (ref 80–99.9)
PHOSPHATE SERPL-MCNC: 3.5 MG/DL (ref 2.5–4.5)
PLATELET # BLD AUTO: 140 E9/L (ref 130–450)
PMV BLD AUTO: 12.6 FL (ref 7–12)
POTASSIUM SERPL-SCNC: 3.9 MMOL/L (ref 3.5–5)
RBC # BLD AUTO: 2.8 E12/L (ref 3.5–5.5)
SODIUM SERPL-SCNC: 138 MMOL/L (ref 132–146)
TIBC SERPL-MCNC: 253 MCG/DL (ref 250–450)
VIT B12 SERPL-MCNC: >2000 PG/ML (ref 211–946)
WBC # BLD: 3.9 E9/L (ref 4.5–11.5)

## 2023-05-13 PROCEDURE — 6370000000 HC RX 637 (ALT 250 FOR IP): Performed by: INTERNAL MEDICINE

## 2023-05-13 PROCEDURE — 85027 COMPLETE CBC AUTOMATED: CPT

## 2023-05-13 PROCEDURE — 6370000000 HC RX 637 (ALT 250 FOR IP): Performed by: STUDENT IN AN ORGANIZED HEALTH CARE EDUCATION/TRAINING PROGRAM

## 2023-05-13 PROCEDURE — 84100 ASSAY OF PHOSPHORUS: CPT

## 2023-05-13 PROCEDURE — 6360000002 HC RX W HCPCS

## 2023-05-13 PROCEDURE — 82330 ASSAY OF CALCIUM: CPT

## 2023-05-13 PROCEDURE — 82746 ASSAY OF FOLIC ACID SERUM: CPT

## 2023-05-13 PROCEDURE — 99291 CRITICAL CARE FIRST HOUR: CPT | Performed by: INTERNAL MEDICINE

## 2023-05-13 PROCEDURE — 80048 BASIC METABOLIC PNL TOTAL CA: CPT

## 2023-05-13 PROCEDURE — 83735 ASSAY OF MAGNESIUM: CPT

## 2023-05-13 PROCEDURE — 36415 COLL VENOUS BLD VENIPUNCTURE: CPT

## 2023-05-13 PROCEDURE — 2580000003 HC RX 258: Performed by: INTERNAL MEDICINE

## 2023-05-13 PROCEDURE — 6360000002 HC RX W HCPCS: Performed by: INTERNAL MEDICINE

## 2023-05-13 PROCEDURE — 83540 ASSAY OF IRON: CPT

## 2023-05-13 PROCEDURE — 1200000000 HC SEMI PRIVATE

## 2023-05-13 PROCEDURE — 82728 ASSAY OF FERRITIN: CPT

## 2023-05-13 PROCEDURE — 83550 IRON BINDING TEST: CPT

## 2023-05-13 PROCEDURE — 94640 AIRWAY INHALATION TREATMENT: CPT

## 2023-05-13 PROCEDURE — 82607 VITAMIN B-12: CPT

## 2023-05-13 RX ORDER — HYDRALAZINE HYDROCHLORIDE 20 MG/ML
10 INJECTION INTRAMUSCULAR; INTRAVENOUS EVERY 6 HOURS PRN
Status: DISCONTINUED | OUTPATIENT
Start: 2023-05-13 | End: 2023-05-19 | Stop reason: HOSPADM

## 2023-05-13 RX ORDER — MAGNESIUM SULFATE IN WATER 40 MG/ML
2000 INJECTION, SOLUTION INTRAVENOUS ONCE
Status: COMPLETED | OUTPATIENT
Start: 2023-05-13 | End: 2023-05-13

## 2023-05-13 RX ORDER — FUROSEMIDE 10 MG/ML
40 INJECTION INTRAMUSCULAR; INTRAVENOUS ONCE
Status: COMPLETED | OUTPATIENT
Start: 2023-05-13 | End: 2023-05-13

## 2023-05-13 RX ADMIN — FUROSEMIDE 40 MG: 10 INJECTION, SOLUTION INTRAMUSCULAR; INTRAVENOUS at 09:55

## 2023-05-13 RX ADMIN — AMLODIPINE BESYLATE 10 MG: 10 TABLET ORAL at 08:48

## 2023-05-13 RX ADMIN — ALLOPURINOL 100 MG: 100 TABLET ORAL at 08:46

## 2023-05-13 RX ADMIN — HYDRALAZINE HYDROCHLORIDE 100 MG: 50 TABLET, FILM COATED ORAL at 22:17

## 2023-05-13 RX ADMIN — IPRATROPIUM BROMIDE AND ALBUTEROL SULFATE 1 AMPULE: .5; 2.5 SOLUTION RESPIRATORY (INHALATION) at 16:01

## 2023-05-13 RX ADMIN — IPRATROPIUM BROMIDE AND ALBUTEROL SULFATE 1 AMPULE: .5; 2.5 SOLUTION RESPIRATORY (INHALATION) at 07:57

## 2023-05-13 RX ADMIN — LEVOFLOXACIN 250 MG: 500 TABLET, FILM COATED ORAL at 08:48

## 2023-05-13 RX ADMIN — Medication 10 ML: at 08:48

## 2023-05-13 RX ADMIN — HYDRALAZINE HYDROCHLORIDE 100 MG: 50 TABLET, FILM COATED ORAL at 08:46

## 2023-05-13 RX ADMIN — IPRATROPIUM BROMIDE AND ALBUTEROL SULFATE 1 AMPULE: .5; 2.5 SOLUTION RESPIRATORY (INHALATION) at 12:29

## 2023-05-13 RX ADMIN — IPRATROPIUM BROMIDE AND ALBUTEROL SULFATE 1 AMPULE: .5; 2.5 SOLUTION RESPIRATORY (INHALATION) at 20:34

## 2023-05-13 RX ADMIN — Medication 10 ML: at 22:18

## 2023-05-13 RX ADMIN — APIXABAN 2.5 MG: 5 TABLET, FILM COATED ORAL at 08:48

## 2023-05-13 RX ADMIN — APIXABAN 2.5 MG: 5 TABLET, FILM COATED ORAL at 22:17

## 2023-05-13 RX ADMIN — ONDANSETRON 4 MG: 2 INJECTION INTRAMUSCULAR; INTRAVENOUS at 02:22

## 2023-05-13 RX ADMIN — PANTOPRAZOLE SODIUM 40 MG: 40 TABLET, DELAYED RELEASE ORAL at 06:24

## 2023-05-13 RX ADMIN — DOCUSATE SODIUM 100 MG: 100 CAPSULE, LIQUID FILLED ORAL at 08:49

## 2023-05-13 RX ADMIN — MAGNESIUM SULFATE HEPTAHYDRATE 2000 MG: 40 INJECTION, SOLUTION INTRAVENOUS at 06:43

## 2023-05-13 RX ADMIN — METOPROLOL SUCCINATE 50 MG: 50 TABLET, EXTENDED RELEASE ORAL at 22:17

## 2023-05-13 ASSESSMENT — PAIN SCALES - GENERAL
PAINLEVEL_OUTOF10: 0

## 2023-05-14 LAB
ACE SERPL-CCNC: 46 U/L (ref 16–85)
ANION GAP SERPL CALCULATED.3IONS-SCNC: 8 MMOL/L (ref 7–16)
BNP BLD-MCNC: 9484 PG/ML (ref 0–450)
BUN SERPL-MCNC: 32 MG/DL (ref 6–23)
CA-I BLD-SCNC: 1.47 MMOL/L (ref 1.15–1.33)
CALCIUM SERPL-MCNC: 10.3 MG/DL (ref 8.6–10.2)
CHLORIDE SERPL-SCNC: 107 MMOL/L (ref 98–107)
CO2 SERPL-SCNC: 24 MMOL/L (ref 22–29)
CREAT SERPL-MCNC: 2 MG/DL (ref 0.5–1)
DEPRECATED KAPPA LC FREE/LAMBDA SER: 1.08 {RATIO} (ref 0.26–1.65)
ERYTHROCYTE [DISTWIDTH] IN BLOOD BY AUTOMATED COUNT: 17.4 FL (ref 11.5–15)
GLUCOSE SERPL-MCNC: 90 MG/DL (ref 74–99)
HCT VFR BLD AUTO: 23.7 % (ref 34–48)
HGB BLD-MCNC: 7.6 G/DL (ref 11.5–15.5)
KAPPA LC FREE SER-MCNC: 84.79 MG/L (ref 3.3–19.4)
LAMBDA LC FREE SERPL-MCNC: 78.27 MG/L (ref 5.71–26.3)
MAGNESIUM SERPL-MCNC: 1.8 MG/DL (ref 1.6–2.6)
MCH RBC QN AUTO: 29.6 PG (ref 26–35)
MCHC RBC AUTO-ENTMCNC: 32.1 % (ref 32–34.5)
MCV RBC AUTO: 92.2 FL (ref 80–99.9)
PHOSPHATE SERPL-MCNC: 3.7 MG/DL (ref 2.5–4.5)
PLATELET # BLD AUTO: 115 E9/L (ref 130–450)
PMV BLD AUTO: 11.5 FL (ref 7–12)
POTASSIUM SERPL-SCNC: 4.3 MMOL/L (ref 3.5–5)
RBC # BLD AUTO: 2.57 E12/L (ref 3.5–5.5)
SODIUM SERPL-SCNC: 139 MMOL/L (ref 132–146)
WBC # BLD: 8.4 E9/L (ref 4.5–11.5)

## 2023-05-14 PROCEDURE — 2580000003 HC RX 258: Performed by: INTERNAL MEDICINE

## 2023-05-14 PROCEDURE — 6370000000 HC RX 637 (ALT 250 FOR IP): Performed by: INTERNAL MEDICINE

## 2023-05-14 PROCEDURE — 51798 US URINE CAPACITY MEASURE: CPT

## 2023-05-14 PROCEDURE — 36415 COLL VENOUS BLD VENIPUNCTURE: CPT

## 2023-05-14 PROCEDURE — 84100 ASSAY OF PHOSPHORUS: CPT

## 2023-05-14 PROCEDURE — 6370000000 HC RX 637 (ALT 250 FOR IP): Performed by: STUDENT IN AN ORGANIZED HEALTH CARE EDUCATION/TRAINING PROGRAM

## 2023-05-14 PROCEDURE — 94640 AIRWAY INHALATION TREATMENT: CPT

## 2023-05-14 PROCEDURE — 83880 ASSAY OF NATRIURETIC PEPTIDE: CPT

## 2023-05-14 PROCEDURE — 82330 ASSAY OF CALCIUM: CPT

## 2023-05-14 PROCEDURE — 85027 COMPLETE CBC AUTOMATED: CPT

## 2023-05-14 PROCEDURE — 83735 ASSAY OF MAGNESIUM: CPT

## 2023-05-14 PROCEDURE — 80048 BASIC METABOLIC PNL TOTAL CA: CPT

## 2023-05-14 PROCEDURE — 1200000000 HC SEMI PRIVATE

## 2023-05-14 RX ADMIN — METOPROLOL SUCCINATE 50 MG: 50 TABLET, EXTENDED RELEASE ORAL at 09:56

## 2023-05-14 RX ADMIN — Medication 10 ML: at 21:08

## 2023-05-14 RX ADMIN — HYDRALAZINE HYDROCHLORIDE 100 MG: 50 TABLET, FILM COATED ORAL at 10:01

## 2023-05-14 RX ADMIN — IPRATROPIUM BROMIDE AND ALBUTEROL SULFATE 1 AMPULE: .5; 2.5 SOLUTION RESPIRATORY (INHALATION) at 09:06

## 2023-05-14 RX ADMIN — IPRATROPIUM BROMIDE AND ALBUTEROL SULFATE 1 AMPULE: .5; 2.5 SOLUTION RESPIRATORY (INHALATION) at 16:48

## 2023-05-14 RX ADMIN — DOCUSATE SODIUM 100 MG: 100 CAPSULE, LIQUID FILLED ORAL at 09:56

## 2023-05-14 RX ADMIN — PANTOPRAZOLE SODIUM 40 MG: 40 TABLET, DELAYED RELEASE ORAL at 06:58

## 2023-05-14 RX ADMIN — ALLOPURINOL 100 MG: 100 TABLET ORAL at 09:56

## 2023-05-14 RX ADMIN — Medication 10 ML: at 10:01

## 2023-05-14 RX ADMIN — METOPROLOL SUCCINATE 50 MG: 50 TABLET, EXTENDED RELEASE ORAL at 21:08

## 2023-05-14 RX ADMIN — HYDRALAZINE HYDROCHLORIDE 100 MG: 50 TABLET, FILM COATED ORAL at 14:33

## 2023-05-14 RX ADMIN — APIXABAN 2.5 MG: 5 TABLET, FILM COATED ORAL at 21:07

## 2023-05-14 RX ADMIN — HYDRALAZINE HYDROCHLORIDE 100 MG: 50 TABLET, FILM COATED ORAL at 21:07

## 2023-05-14 RX ADMIN — APIXABAN 2.5 MG: 5 TABLET, FILM COATED ORAL at 09:57

## 2023-05-14 RX ADMIN — AMLODIPINE BESYLATE 10 MG: 10 TABLET ORAL at 09:57

## 2023-05-14 ASSESSMENT — PAIN SCALES - GENERAL: PAINLEVEL_OUTOF10: 0

## 2023-05-15 LAB
ANION GAP SERPL CALCULATED.3IONS-SCNC: 12 MMOL/L (ref 7–16)
BUN SERPL-MCNC: 39 MG/DL (ref 6–23)
CA-I BLD-SCNC: 1.44 MMOL/L (ref 1.15–1.33)
CALCIUM SERPL-MCNC: 9.9 MG/DL (ref 8.6–10.2)
CHLORIDE SERPL-SCNC: 104 MMOL/L (ref 98–107)
CO2 SERPL-SCNC: 23 MMOL/L (ref 22–29)
CREAT SERPL-MCNC: 2.2 MG/DL (ref 0.5–1)
ERYTHROCYTE [DISTWIDTH] IN BLOOD BY AUTOMATED COUNT: 17.3 FL (ref 11.5–15)
GLUCOSE SERPL-MCNC: 80 MG/DL (ref 74–99)
HCT VFR BLD AUTO: 23.6 % (ref 34–48)
HGB BLD-MCNC: 7.2 G/DL (ref 11.5–15.5)
MAGNESIUM SERPL-MCNC: 1.8 MG/DL (ref 1.6–2.6)
MCH RBC QN AUTO: 28.7 PG (ref 26–35)
MCHC RBC AUTO-ENTMCNC: 30.5 % (ref 32–34.5)
MCV RBC AUTO: 94 FL (ref 80–99.9)
PHOSPHATE SERPL-MCNC: 3.5 MG/DL (ref 2.5–4.5)
PLATELET # BLD AUTO: 85 E9/L (ref 130–450)
PLATELET CONFIRMATION: NORMAL
PMV BLD AUTO: 11.9 FL (ref 7–12)
POTASSIUM SERPL-SCNC: 4.2 MMOL/L (ref 3.5–5)
PTH RELATED PROT SERPL-SCNC: 2.3 PMOL/L (ref 0–3.4)
RBC # BLD AUTO: 2.51 E12/L (ref 3.5–5.5)
SODIUM SERPL-SCNC: 139 MMOL/L (ref 132–146)
WBC # BLD: 6.7 E9/L (ref 4.5–11.5)

## 2023-05-15 PROCEDURE — 2700000000 HC OXYGEN THERAPY PER DAY

## 2023-05-15 PROCEDURE — 2580000003 HC RX 258: Performed by: INTERNAL MEDICINE

## 2023-05-15 PROCEDURE — 85027 COMPLETE CBC AUTOMATED: CPT

## 2023-05-15 PROCEDURE — 1200000000 HC SEMI PRIVATE

## 2023-05-15 PROCEDURE — 6370000000 HC RX 637 (ALT 250 FOR IP): Performed by: INTERNAL MEDICINE

## 2023-05-15 PROCEDURE — 36415 COLL VENOUS BLD VENIPUNCTURE: CPT

## 2023-05-15 PROCEDURE — 83735 ASSAY OF MAGNESIUM: CPT

## 2023-05-15 PROCEDURE — 84100 ASSAY OF PHOSPHORUS: CPT

## 2023-05-15 PROCEDURE — 99232 SBSQ HOSP IP/OBS MODERATE 35: CPT

## 2023-05-15 PROCEDURE — 6370000000 HC RX 637 (ALT 250 FOR IP): Performed by: STUDENT IN AN ORGANIZED HEALTH CARE EDUCATION/TRAINING PROGRAM

## 2023-05-15 PROCEDURE — 82330 ASSAY OF CALCIUM: CPT

## 2023-05-15 PROCEDURE — 80048 BASIC METABOLIC PNL TOTAL CA: CPT

## 2023-05-15 PROCEDURE — 94640 AIRWAY INHALATION TREATMENT: CPT

## 2023-05-15 RX ADMIN — HYDROCODONE BITARTRATE AND ACETAMINOPHEN 1 TABLET: 5; 325 TABLET ORAL at 17:14

## 2023-05-15 RX ADMIN — IPRATROPIUM BROMIDE AND ALBUTEROL SULFATE 1 AMPULE: .5; 2.5 SOLUTION RESPIRATORY (INHALATION) at 16:02

## 2023-05-15 RX ADMIN — IPRATROPIUM BROMIDE AND ALBUTEROL SULFATE 1 AMPULE: .5; 2.5 SOLUTION RESPIRATORY (INHALATION) at 07:55

## 2023-05-15 RX ADMIN — AMLODIPINE BESYLATE 10 MG: 10 TABLET ORAL at 08:23

## 2023-05-15 RX ADMIN — IPRATROPIUM BROMIDE AND ALBUTEROL SULFATE 1 AMPULE: .5; 2.5 SOLUTION RESPIRATORY (INHALATION) at 19:58

## 2023-05-15 RX ADMIN — Medication 10 ML: at 08:23

## 2023-05-15 RX ADMIN — METOPROLOL SUCCINATE 50 MG: 50 TABLET, EXTENDED RELEASE ORAL at 21:21

## 2023-05-15 RX ADMIN — IPRATROPIUM BROMIDE AND ALBUTEROL SULFATE 1 AMPULE: .5; 2.5 SOLUTION RESPIRATORY (INHALATION) at 12:43

## 2023-05-15 RX ADMIN — METOPROLOL SUCCINATE 50 MG: 50 TABLET, EXTENDED RELEASE ORAL at 08:23

## 2023-05-15 RX ADMIN — PANTOPRAZOLE SODIUM 40 MG: 40 TABLET, DELAYED RELEASE ORAL at 05:30

## 2023-05-15 RX ADMIN — Medication 10 ML: at 21:23

## 2023-05-15 RX ADMIN — DOCUSATE SODIUM 100 MG: 100 CAPSULE, LIQUID FILLED ORAL at 08:23

## 2023-05-15 RX ADMIN — HYDRALAZINE HYDROCHLORIDE 100 MG: 50 TABLET, FILM COATED ORAL at 21:20

## 2023-05-15 RX ADMIN — APIXABAN 2.5 MG: 5 TABLET, FILM COATED ORAL at 21:21

## 2023-05-15 RX ADMIN — HYDRALAZINE HYDROCHLORIDE 100 MG: 50 TABLET, FILM COATED ORAL at 08:22

## 2023-05-15 RX ADMIN — ALLOPURINOL 100 MG: 100 TABLET ORAL at 08:22

## 2023-05-15 RX ADMIN — APIXABAN 2.5 MG: 5 TABLET, FILM COATED ORAL at 08:23

## 2023-05-15 NOTE — CARE COORDINATION
Social Work/Case Management Transition of Care Planning [facility] [phone]):  Per report and chart review, patient was transferred to ICU on 5/12. She was stabilized and returned to the unit. She is now on room air at 93%. She is on IV Levaquin q24. Per attending, she is stable from fluid overload. Renal is following for hypercalcemia. Porter was reinserted for retention. Palliative care is following and code status was changed to [facility]. Met with patient and her daughter, Candi Summers, at bedside. Discharge plan is to return to [facility]. Per Mino Taylor, she can return with pre-cert pending. Discharge envelope with ambulance form is in the soft chart and will need completed day of discharge. CM/CLARICE will follow.  KATHY Hartley  5/15/2023

## 2023-05-16 LAB
ANION GAP SERPL CALCULATED.3IONS-SCNC: 9 MMOL/L (ref 7–16)
BUN SERPL-MCNC: 43 MG/DL (ref 6–23)
CA-I BLD-SCNC: 1.46 MMOL/L (ref 1.15–1.33)
CALCIUM SERPL-MCNC: 10.2 MG/DL (ref 8.6–10.2)
CHLORIDE SERPL-SCNC: 106 MMOL/L (ref 98–107)
CO2 SERPL-SCNC: 25 MMOL/L (ref 22–29)
CREAT SERPL-MCNC: 2.1 MG/DL (ref 0.5–1)
ERYTHROCYTE [DISTWIDTH] IN BLOOD BY AUTOMATED COUNT: 17.1 FL (ref 11.5–15)
GLUCOSE SERPL-MCNC: 91 MG/DL (ref 74–99)
HCT VFR BLD AUTO: 23.1 % (ref 34–48)
HGB BLD-MCNC: 7.4 G/DL (ref 11.5–15.5)
MAGNESIUM SERPL-MCNC: 1.9 MG/DL (ref 1.6–2.6)
MCH RBC QN AUTO: 29.1 PG (ref 26–35)
MCHC RBC AUTO-ENTMCNC: 32 % (ref 32–34.5)
MCV RBC AUTO: 90.9 FL (ref 80–99.9)
PHOSPHATE SERPL-MCNC: 4.1 MG/DL (ref 2.5–4.5)
PLATELET # BLD AUTO: 68 E9/L (ref 130–450)
PLATELET CONFIRMATION: NORMAL
PMV BLD AUTO: 12.1 FL (ref 7–12)
POTASSIUM SERPL-SCNC: 4.4 MMOL/L (ref 3.5–5)
RBC # BLD AUTO: 2.54 E12/L (ref 3.5–5.5)
SODIUM SERPL-SCNC: 140 MMOL/L (ref 132–146)
WBC # BLD: 8.5 E9/L (ref 4.5–11.5)

## 2023-05-16 PROCEDURE — 6370000000 HC RX 637 (ALT 250 FOR IP): Performed by: INTERNAL MEDICINE

## 2023-05-16 PROCEDURE — 85027 COMPLETE CBC AUTOMATED: CPT

## 2023-05-16 PROCEDURE — 94640 AIRWAY INHALATION TREATMENT: CPT

## 2023-05-16 PROCEDURE — 83735 ASSAY OF MAGNESIUM: CPT

## 2023-05-16 PROCEDURE — 1200000000 HC SEMI PRIVATE

## 2023-05-16 PROCEDURE — 2700000000 HC OXYGEN THERAPY PER DAY

## 2023-05-16 PROCEDURE — 36415 COLL VENOUS BLD VENIPUNCTURE: CPT

## 2023-05-16 PROCEDURE — 6370000000 HC RX 637 (ALT 250 FOR IP): Performed by: STUDENT IN AN ORGANIZED HEALTH CARE EDUCATION/TRAINING PROGRAM

## 2023-05-16 PROCEDURE — 2580000003 HC RX 258: Performed by: INTERNAL MEDICINE

## 2023-05-16 PROCEDURE — 80048 BASIC METABOLIC PNL TOTAL CA: CPT

## 2023-05-16 PROCEDURE — 82330 ASSAY OF CALCIUM: CPT

## 2023-05-16 PROCEDURE — 84100 ASSAY OF PHOSPHORUS: CPT

## 2023-05-16 RX ADMIN — IPRATROPIUM BROMIDE AND ALBUTEROL SULFATE 1 AMPULE: .5; 2.5 SOLUTION RESPIRATORY (INHALATION) at 20:16

## 2023-05-16 RX ADMIN — PANTOPRAZOLE SODIUM 40 MG: 40 TABLET, DELAYED RELEASE ORAL at 06:03

## 2023-05-16 RX ADMIN — ALLOPURINOL 100 MG: 100 TABLET ORAL at 08:50

## 2023-05-16 RX ADMIN — METOPROLOL SUCCINATE 50 MG: 50 TABLET, EXTENDED RELEASE ORAL at 08:51

## 2023-05-16 RX ADMIN — APIXABAN 2.5 MG: 5 TABLET, FILM COATED ORAL at 08:51

## 2023-05-16 RX ADMIN — IPRATROPIUM BROMIDE AND ALBUTEROL SULFATE 1 AMPULE: .5; 2.5 SOLUTION RESPIRATORY (INHALATION) at 08:04

## 2023-05-16 RX ADMIN — IPRATROPIUM BROMIDE AND ALBUTEROL SULFATE 1 AMPULE: .5; 2.5 SOLUTION RESPIRATORY (INHALATION) at 16:19

## 2023-05-16 RX ADMIN — HYDRALAZINE HYDROCHLORIDE 100 MG: 50 TABLET, FILM COATED ORAL at 08:50

## 2023-05-16 RX ADMIN — METOPROLOL SUCCINATE 50 MG: 50 TABLET, EXTENDED RELEASE ORAL at 22:30

## 2023-05-16 RX ADMIN — AMLODIPINE BESYLATE 10 MG: 10 TABLET ORAL at 08:51

## 2023-05-16 RX ADMIN — APIXABAN 2.5 MG: 5 TABLET, FILM COATED ORAL at 22:08

## 2023-05-16 RX ADMIN — Medication 10 ML: at 08:51

## 2023-05-16 RX ADMIN — DOCUSATE SODIUM 100 MG: 100 CAPSULE, LIQUID FILLED ORAL at 08:51

## 2023-05-16 RX ADMIN — HYDRALAZINE HYDROCHLORIDE 100 MG: 50 TABLET, FILM COATED ORAL at 22:31

## 2023-05-16 RX ADMIN — Medication 10 ML: at 22:09

## 2023-05-16 NOTE — CARE COORDINATION
Care Coordination  The patient was admitted from [address] [place] [address] with altered mental status. She had a decrease in oral intake nephrology is following the patient due to hypercalcemia and today its 10.2. She was also found to be in chf. The patient remains lethargic. The patient remains a dnr-cca. Today she is on 02 5l n/c . Her webb was reinserted for retention. Her plan is to return to [facility] once she is medically stable. She can return with precert pending if needed. Discharge envelope with ambulance form is in the soft chart and will need completed day of discharge.  Will follow

## 2023-05-16 NOTE — PLAN OF CARE
Problem: Chronic Conditions and Co-morbidities  Goal: Patient's chronic conditions and co-morbidity symptoms are monitored and maintained or improved  Outcome: Progressing     Problem: Discharge Planning  Goal: Discharge to home or other facility with appropriate resources  Outcome: Progressing     Problem: Pain  Goal: Verbalizes/displays adequate comfort level or baseline comfort level  Outcome: Progressing     Problem: Skin/Tissue Integrity  Goal: Absence of new skin breakdown  Description: 1. Monitor for areas of redness and/or skin breakdown  2. Assess vascular access sites hourly  3. Every 4-6 hours minimum:  Change oxygen saturation probe site  4. Every 4-6 hours:  If on nasal continuous positive airway pressure, respiratory therapy assess nares and determine need for appliance change or resting period. Outcome: Progressing     Problem: ABCDS Injury Assessment  Goal: Absence of physical injury  Outcome: Progressing     Problem: Confusion  Goal: Confusion, delirium, dementia, or psychosis is improved or at baseline  Description: INTERVENTIONS:  1. Assess for possible contributors to thought disturbance, including medications, impaired vision or hearing, underlying metabolic abnormalities, dehydration, psychiatric diagnoses, and notify attending LIP  2. [place] high risk fall precautions, as indicated  3. Provide frequent short contacts to provide reality reorientation, refocusing and direction  4. Decrease environmental stimuli, including noise as appropriate  5. Monitor and intervene to maintain adequate nutrition, hydration, elimination, sleep and activity  6. If unable to ensure safety without constant attention obtain sitter and review sitter guidelines with assigned personnel  7.  Initiate Psychosocial CNS and Spiritual Care consult, as indicated  Outcome: Progressing     Problem: Safety - Adult  Goal: Free from fall injury  Outcome: Progressing     Problem: Nutrition Deficit:  Goal: Optimize

## 2023-05-17 LAB
ANION GAP SERPL CALCULATED.3IONS-SCNC: 10 MMOL/L (ref 7–16)
BUN SERPL-MCNC: 53 MG/DL (ref 6–23)
CA-I BLD-SCNC: 1.48 MMOL/L (ref 1.15–1.33)
CALCIUM SERPL-MCNC: 10.5 MG/DL (ref 8.6–10.2)
CHLORIDE SERPL-SCNC: 107 MMOL/L (ref 98–107)
CO2 SERPL-SCNC: 23 MMOL/L (ref 22–29)
CREAT SERPL-MCNC: 1.9 MG/DL (ref 0.5–1)
ERYTHROCYTE [DISTWIDTH] IN BLOOD BY AUTOMATED COUNT: 17.2 FL (ref 11.5–15)
GLUCOSE SERPL-MCNC: 109 MG/DL (ref 74–99)
HCT VFR BLD AUTO: 23.5 % (ref 34–48)
HGB BLD-MCNC: 7.3 G/DL (ref 11.5–15.5)
MAGNESIUM SERPL-MCNC: 2.1 MG/DL (ref 1.6–2.6)
MCH RBC QN AUTO: 29 PG (ref 26–35)
MCHC RBC AUTO-ENTMCNC: 31.1 % (ref 32–34.5)
MCV RBC AUTO: 93.3 FL (ref 80–99.9)
PHOSPHATE SERPL-MCNC: 4.6 MG/DL (ref 2.5–4.5)
PLATELET # BLD AUTO: 62 E9/L (ref 130–450)
PLATELET CONFIRMATION: NORMAL
PMV BLD AUTO: 12.8 FL (ref 7–12)
POTASSIUM SERPL-SCNC: 4.1 MMOL/L (ref 3.5–5)
RBC # BLD AUTO: 2.52 E12/L (ref 3.5–5.5)
REASON FOR REJECTION: NORMAL
REJECTED TEST: NORMAL
SODIUM SERPL-SCNC: 140 MMOL/L (ref 132–146)
WBC # BLD: 7.7 E9/L (ref 4.5–11.5)

## 2023-05-17 PROCEDURE — 82330 ASSAY OF CALCIUM: CPT

## 2023-05-17 PROCEDURE — 6360000002 HC RX W HCPCS: Performed by: INTERNAL MEDICINE

## 2023-05-17 PROCEDURE — 6370000000 HC RX 637 (ALT 250 FOR IP): Performed by: INTERNAL MEDICINE

## 2023-05-17 PROCEDURE — 36415 COLL VENOUS BLD VENIPUNCTURE: CPT

## 2023-05-17 PROCEDURE — 83735 ASSAY OF MAGNESIUM: CPT

## 2023-05-17 PROCEDURE — 1200000000 HC SEMI PRIVATE

## 2023-05-17 PROCEDURE — 85027 COMPLETE CBC AUTOMATED: CPT

## 2023-05-17 PROCEDURE — 84100 ASSAY OF PHOSPHORUS: CPT

## 2023-05-17 PROCEDURE — 2700000000 HC OXYGEN THERAPY PER DAY

## 2023-05-17 PROCEDURE — 80048 BASIC METABOLIC PNL TOTAL CA: CPT

## 2023-05-17 PROCEDURE — 2580000003 HC RX 258: Performed by: INTERNAL MEDICINE

## 2023-05-17 RX ORDER — CALCITONIN SALMON 200 [USP'U]/ML
4 INJECTION, SOLUTION INTRAMUSCULAR; SUBCUTANEOUS 2 TIMES DAILY
Status: COMPLETED | OUTPATIENT
Start: 2023-05-17 | End: 2023-05-17

## 2023-05-17 RX ORDER — IPRATROPIUM BROMIDE AND ALBUTEROL SULFATE 2.5; .5 MG/3ML; MG/3ML
1 SOLUTION RESPIRATORY (INHALATION) EVERY 4 HOURS PRN
Status: DISCONTINUED | OUTPATIENT
Start: 2023-05-17 | End: 2023-05-19 | Stop reason: HOSPADM

## 2023-05-17 RX ADMIN — APIXABAN 2.5 MG: 5 TABLET, FILM COATED ORAL at 21:07

## 2023-05-17 RX ADMIN — ALLOPURINOL 100 MG: 100 TABLET ORAL at 09:19

## 2023-05-17 RX ADMIN — CALCITONIN SALMON 290 UNITS: 200 INJECTION, SOLUTION INTRAMUSCULAR; SUBCUTANEOUS at 21:08

## 2023-05-17 RX ADMIN — METOPROLOL SUCCINATE 50 MG: 50 TABLET, EXTENDED RELEASE ORAL at 21:07

## 2023-05-17 RX ADMIN — PANTOPRAZOLE SODIUM 40 MG: 40 TABLET, DELAYED RELEASE ORAL at 05:31

## 2023-05-17 RX ADMIN — DOCUSATE SODIUM 100 MG: 100 CAPSULE, LIQUID FILLED ORAL at 09:20

## 2023-05-17 RX ADMIN — Medication 10 ML: at 09:20

## 2023-05-17 RX ADMIN — CALCITONIN SALMON 290 UNITS: 200 INJECTION, SOLUTION INTRAMUSCULAR; SUBCUTANEOUS at 12:30

## 2023-05-17 RX ADMIN — Medication 10 ML: at 21:07

## 2023-05-17 RX ADMIN — APIXABAN 2.5 MG: 5 TABLET, FILM COATED ORAL at 09:20

## 2023-05-17 RX ADMIN — HYDRALAZINE HYDROCHLORIDE 100 MG: 50 TABLET, FILM COATED ORAL at 21:08

## 2023-05-17 ASSESSMENT — PAIN SCALES - GENERAL
PAINLEVEL_OUTOF10: 0
PAINLEVEL_OUTOF10: 0

## 2023-05-17 NOTE — CARE COORDINATION
Social Work/Case Management Transition of Care Planning (Akila Diaz [facility] [phone]): Per report and chart review, patient remains lethargic. She is on 5L NC at 96%. Wean oxygen as able. Nephrology is following for DEB. Cr is trending down at 1.9 today. Calcium is noted to be10.5. Oral intake is improving with staff or family feeding her. Hem/onc is following supportively. Met with patient and her daughter, True Bonner, at bedside. Patient was much more alert and was talking at times. Discharge plan is to return to [facility]. Per Tashia Poon, she can return with pre-cert pending. Discharge envelope with ambulance form is in the soft chart and will need completed day of discharge. CM/SW will follow.  Akila Diaz, KATHY  5/17/2023

## 2023-05-18 VITALS
HEART RATE: 70 BPM | TEMPERATURE: 96.4 F | WEIGHT: 159.83 LBS | BODY MASS INDEX: 29.41 KG/M2 | OXYGEN SATURATION: 95 % | SYSTOLIC BLOOD PRESSURE: 149 MMHG | RESPIRATION RATE: 18 BRPM | DIASTOLIC BLOOD PRESSURE: 64 MMHG | HEIGHT: 62 IN

## 2023-05-18 LAB
ANION GAP SERPL CALCULATED.3IONS-SCNC: 9 MMOL/L (ref 7–16)
BUN SERPL-MCNC: 53 MG/DL (ref 6–23)
CA-I BLD-SCNC: 1.61 MMOL/L (ref 1.15–1.33)
CALCIUM SERPL-MCNC: 10.7 MG/DL (ref 8.6–10.2)
CHLORIDE SERPL-SCNC: 109 MMOL/L (ref 98–107)
CO2 SERPL-SCNC: 25 MMOL/L (ref 22–29)
CREAT SERPL-MCNC: 1.5 MG/DL (ref 0.5–1)
ERYTHROCYTE [DISTWIDTH] IN BLOOD BY AUTOMATED COUNT: 17.3 FL (ref 11.5–15)
GLUCOSE SERPL-MCNC: 106 MG/DL (ref 74–99)
HCT VFR BLD AUTO: 24.4 % (ref 34–48)
HGB BLD-MCNC: 7.5 G/DL (ref 11.5–15.5)
MAGNESIUM SERPL-MCNC: 2 MG/DL (ref 1.6–2.6)
MCH RBC QN AUTO: 28.5 PG (ref 26–35)
MCHC RBC AUTO-ENTMCNC: 30.7 % (ref 32–34.5)
MCV RBC AUTO: 92.8 FL (ref 80–99.9)
PHOSPHATE SERPL-MCNC: 4.5 MG/DL (ref 2.5–4.5)
PLATELET # BLD AUTO: 57 E9/L (ref 130–450)
PLATELET CONFIRMATION: NORMAL
PMV BLD AUTO: 11.8 FL (ref 7–12)
POTASSIUM SERPL-SCNC: 3.9 MMOL/L (ref 3.5–5)
RBC # BLD AUTO: 2.63 E12/L (ref 3.5–5.5)
SODIUM SERPL-SCNC: 143 MMOL/L (ref 132–146)
WBC # BLD: 7 E9/L (ref 4.5–11.5)

## 2023-05-18 PROCEDURE — 80048 BASIC METABOLIC PNL TOTAL CA: CPT

## 2023-05-18 PROCEDURE — 2580000003 HC RX 258: Performed by: INTERNAL MEDICINE

## 2023-05-18 PROCEDURE — 6370000000 HC RX 637 (ALT 250 FOR IP): Performed by: INTERNAL MEDICINE

## 2023-05-18 PROCEDURE — 82330 ASSAY OF CALCIUM: CPT

## 2023-05-18 PROCEDURE — 85027 COMPLETE CBC AUTOMATED: CPT

## 2023-05-18 PROCEDURE — 36415 COLL VENOUS BLD VENIPUNCTURE: CPT

## 2023-05-18 PROCEDURE — 2700000000 HC OXYGEN THERAPY PER DAY

## 2023-05-18 PROCEDURE — 83735 ASSAY OF MAGNESIUM: CPT

## 2023-05-18 PROCEDURE — 1200000000 HC SEMI PRIVATE

## 2023-05-18 PROCEDURE — 6360000002 HC RX W HCPCS: Performed by: INTERNAL MEDICINE

## 2023-05-18 PROCEDURE — 84100 ASSAY OF PHOSPHORUS: CPT

## 2023-05-18 RX ORDER — AMLODIPINE BESYLATE 10 MG/1
10 TABLET ORAL DAILY
Qty: 30 TABLET | Refills: 3
Start: 2023-05-19

## 2023-05-18 RX ORDER — GUAIFENESIN/DEXTROMETHORPHAN 100-10MG/5
10 SYRUP ORAL EVERY 6 HOURS PRN
Qty: 120 ML | Refills: 0 | COMMUNITY
Start: 2023-05-18 | End: 2023-05-28

## 2023-05-18 RX ORDER — CALCITONIN SALMON 200 [USP'U]/ML
4 INJECTION, SOLUTION INTRAMUSCULAR; SUBCUTANEOUS 2 TIMES DAILY
Status: COMPLETED | OUTPATIENT
Start: 2023-05-18 | End: 2023-05-18

## 2023-05-18 RX ADMIN — CALCITONIN SALMON 290 UNITS: 200 INJECTION, SOLUTION INTRAMUSCULAR; SUBCUTANEOUS at 10:54

## 2023-05-18 RX ADMIN — HYDRALAZINE HYDROCHLORIDE 100 MG: 50 TABLET, FILM COATED ORAL at 09:24

## 2023-05-18 RX ADMIN — METOPROLOL SUCCINATE 50 MG: 50 TABLET, EXTENDED RELEASE ORAL at 21:17

## 2023-05-18 RX ADMIN — DOCUSATE SODIUM 100 MG: 100 CAPSULE, LIQUID FILLED ORAL at 09:24

## 2023-05-18 RX ADMIN — AMLODIPINE BESYLATE 10 MG: 10 TABLET ORAL at 09:24

## 2023-05-18 RX ADMIN — PANTOPRAZOLE SODIUM 40 MG: 40 TABLET, DELAYED RELEASE ORAL at 05:52

## 2023-05-18 RX ADMIN — CALCITONIN SALMON 290 UNITS: 200 INJECTION, SOLUTION INTRAMUSCULAR; SUBCUTANEOUS at 21:17

## 2023-05-18 RX ADMIN — ALLOPURINOL 100 MG: 100 TABLET ORAL at 09:24

## 2023-05-18 RX ADMIN — HYDRALAZINE HYDROCHLORIDE 100 MG: 50 TABLET, FILM COATED ORAL at 21:17

## 2023-05-18 RX ADMIN — APIXABAN 2.5 MG: 5 TABLET, FILM COATED ORAL at 21:17

## 2023-05-18 RX ADMIN — METOPROLOL SUCCINATE 50 MG: 50 TABLET, EXTENDED RELEASE ORAL at 09:24

## 2023-05-18 RX ADMIN — Medication 10 ML: at 09:23

## 2023-05-18 RX ADMIN — APIXABAN 2.5 MG: 5 TABLET, FILM COATED ORAL at 09:24

## 2023-05-18 ASSESSMENT — PAIN SCALES - GENERAL: PAINLEVEL_OUTOF10: 0

## 2023-05-18 NOTE — CARE COORDINATION
5/18, Patient is a return to [facility] when medically cleared for discharge. Patient can return to facility precert does not need obtained for patient to return. Envelope with ambulance form on soft chart and will need completed day of discharge. Nephrology on. Patient remains on 5L 02.  SW to follow.       Gina Yepez, [facility] Case Management  [phone]

## 2023-05-18 NOTE — DISCHARGE INSTR - COC
Continuity of Care Form    Patient Name: Vandana Aldana   :  1931  MRN:  [id number]    [facility] date:  2023  Discharge date:  2023    Code Status Order: DNR-CCA   Advance Directives:     Admitting Physician:  Ilene Vale DO  PCP: No primary care provider on file.     Discharging Nurse: Debbie Marinelli RN  [address] Unit/Room#: 6187/6729-M  Discharging Unit Phone Number: [phone]    Emergency Contact:   Extended Emergency Contact Information  Primary Emergency Contact: Rae Teixeira  Address: [address], [address] Phone: [phone]  Mobile Phone: [phone]  Relation: Child  Secondary Emergency Contact: [address] [place] [address]  Mobile Phone: [phone]  Relation: Grandchild    Past Surgical History:  Past Surgical History:   Procedure Laterality Date    APPENDECTOMY      CARDIAC CATHETERIZATION  2015    Dr Acacia Greer  2015    D-PPM  ([facility])   GEMMA    CATARACT REMOVAL      CHOLECYSTECTOMY      HYSTERECTOMY (CERVIX STATUS UNKNOWN)      PACEMAKER CHANGE Left 12/15/2022    readfy Dual PPM-GR (Dr. Ravin Castaneda)    UPPER GASTROINTESTINAL ENDOSCOPY N/A 3/29/2023    EGD BIOPSY performed by Rea Whitley MD at [facility] ENDOSCOPY       Immunization History:   Immunization History   Administered Date(s) Administered    COVID-19, MODERNA BLUE border, Primary or Immunocompromised, (age 12y+), IM, 100 mcg/0.5mL 2021, 2021, 2022    Influenza Vaccine, unspecified formulation 2016    Influenza Virus Vaccine 10/26/2020, 2021    Pneumococcal, PCV-13, PREVNAR 13, (age 6w+), IM, 0.5mL 2016, 2016    TDaP, ADACEL (age 10y-63y), [address] [facility] (age 10y+), IM, 0.5mL 2015    Td, (Adult) Not Adsorbed 2009    Zoster Live (Zostavax) 2014, 10/01/2014       Active Problems:  Patient Active Problem List   Diagnosis Code    Mixed hyperlipidemia E78.2    Asthma

## 2023-05-18 NOTE — CARE COORDINATION
5/18, Discharge acknowledged. [facility] to pick patient up at 6pm later today to go back to [facility]. Those informed:  nursing, family in room, Momeyer from facility. Ambulance form and envelope on soft chart. Ambulance form to be signed and dated on day of discharge. Precert is not needed for patient to return to facility. SW to follow.     Beatriz Torres [facility] Case Management  [phone]

## 2023-05-18 NOTE — PLAN OF CARE
Problem: Chronic Conditions and Co-morbidities  Goal: Patient's chronic conditions and co-morbidity symptoms are monitored and maintained or improved  Outcome: Progressing     Problem: Discharge Planning  Goal: Discharge to home or other facility with appropriate resources  Outcome: Progressing     Problem: Pain  Goal: Verbalizes/displays adequate comfort level or baseline comfort level  Outcome: Progressing     Problem: Skin/Tissue Integrity  Goal: Absence of new skin breakdown  Description: 1. Monitor for areas of redness and/or skin breakdown  2. Assess vascular access sites hourly  3. Every 4-6 hours minimum:  Change oxygen saturation probe site  4. Every 4-6 hours:  If on nasal continuous positive airway pressure, respiratory therapy assess nares and determine need for appliance change or resting period. Outcome: Progressing     Problem: ABCDS Injury Assessment  Goal: Absence of physical injury  Outcome: Progressing     Problem: Confusion  Goal: Confusion, delirium, dementia, or psychosis is improved or at baseline  Description: INTERVENTIONS:  1. Assess for possible contributors to thought disturbance, including medications, impaired vision or hearing, underlying metabolic abnormalities, dehydration, psychiatric diagnoses, and notify attending LIP  2. [place] high risk fall precautions, as indicated  3. Provide frequent short contacts to provide reality reorientation, refocusing and direction  4. Decrease environmental stimuli, including noise as appropriate  5. Monitor and intervene to maintain adequate nutrition, hydration, elimination, sleep and activity  6. If unable to ensure safety without constant attention obtain sitter and review sitter guidelines with assigned personnel  7.  Initiate Psychosocial CNS and Spiritual Care consult, as indicated  Outcome: Progressing     Problem: Safety - Adult  Goal: Free from fall injury  Outcome: Progressing

## 2023-05-19 ENCOUNTER — TELEPHONE (OUTPATIENT)
Dept: NEUROSURGERY | Age: 88
End: 2023-05-19

## 2023-05-19 NOTE — PROGRESS NOTES
Chart was reviewed, and patient was seen at the bedside, patient daughter Desiree Medel present in the room. Patient is more alert, responding to questions. Desiree Medel stated her mother has shown some small improvement, and her goal is for patient to return to [facility]. If patient condition worsen while at [facility], she will consider and discussing hospice. CODE STATUS has been established DNR CCA. She voiced no further need from palliative medicine. Goal of care and CODE STATUS has been established. No further PM needs has been identified. Going to sign off for now. Please reconsult if new PM needs arises.
Comprehensive Nutrition Assessment    Type and Reason for Visit:  Reassess    Nutrition Recommendations/Plan:   Continue Diet. Will Re-Start ONS to aid in PO intake and monitor. If minimal intake continues x next ~1-2d, would then highly recommend to consider EN support. Please consult for updated rec's if EN needed. Malnutrition Assessment:  Malnutrition Status: Moderate malnutrition (05/11/23 1444)    Context:  Chronic Illness     Findings of the 6 clinical characteristics of malnutrition:  Energy Intake:  75% or less estimated energy requirements for 1 month or longer (per pt's daughter- pt's appetite/oral intake as been poor for a while)  Weight Loss:  Mild weight loss (specify amount and time period) (~4% x ~7 mo)     Body Fat Loss:   (moderate ; difficult to assess d/t advanced age) Buccal region, Orbital, Triceps   Muscle Mass Loss:   (moderate ; difficult to assess d/t advanced age) Temples (temporalis), Clavicles (pectoralis & deltoids), Calf (gastrocnemius)  Fluid Accumulation:  No significant fluid accumulation     Strength:  Not Performed    Nutrition Assessment:    pt adm d/t AMS; s/p recent adm for closed T8 comp frx;  PMHx AF, CVA (21'), HTN, PVD, DM (although family denies DM dx currently per previous d/w RD); Adm w/ DEB/CKD, hypercalcemia, ITP hx. Noted RRT for resp. distress 5/12. Remains at nutritional risk d/t +moderate malnutrition w/ ongoing poor [place]te/intake w/ dysphagia x ~7d now since adm. Will Re-Start ONS to aid in PO intake and will monitor. Nutrition Related Findings: AMSx3, upper dentures, distended/non-tender Abd, +BS, Trace/+2 edema, I/O's WNL, elevated BUN/Cr/LFT's Wound Type: None (blisters x 2 to Lt hand noted)       Current Nutrition Intake & Therapies:    Average Meal Intake: 1-25%  Average Supplements Intake: None Ordered  ADULT DIET; Dysphagia - Pureed;  Low Sodium (2 gm)    Anthropometric Measures:  Height: 5' 2\" (157.5 cm)  Ideal Body Weight (IBW): 110
Hospital Medicine    Subjective:  pt more alert conversive today      Current Facility-Administered Medications:     hydrALAZINE (APRESOLINE) injection 10 mg, 10 mg, IntraVENous, Q6H PRN, TERESA Arnold - LEÓN    ipratropium-albuterol (DUONEB) nebulizer solution 1 ampule, 1 ampule, Inhalation, Q4H WA, Tere Garzon MD, 1 ampule at 05/16/23 2016    amLODIPine (NORVASC) tablet 10 mg, 10 mg, Oral, Daily, Mannie Bee MD, 10 mg at 05/16/23 0851    allopurinol (ZYLOPRIM) tablet 100 mg, 100 mg, Oral, Daily, Stacie Ruvalcaba DO, 100 mg at 05/16/23 0850    apixaban (ELIQUIS) tablet 2.5 mg, 2.5 mg, Oral, BID, Titi Sutton DO, 2.5 mg at 05/16/23 2208    docusate sodium (COLACE) capsule 100 mg, 100 mg, Oral, Daily, Stacie Ruvalcaba DO, 100 mg at 05/16/23 0851    guaiFENesin-dextromethorphan (ROBITUSSIN DM) 100-10 MG/5ML syrup 10 mL, 10 mL, Oral, Q6H PRN, Stacie Ruvalcaba DO, 10 mL at 05/12/23 0000    hydrALAZINE (APRESOLINE) tablet 100 mg, 100 mg, Oral, TID, Mannie Bee MD, 100 mg at 05/16/23 2231    metoprolol succinate (TOPROL XL) extended release tablet 50 mg, 50 mg, Oral, BID, Mannie Bee MD, 50 mg at 05/16/23 2230    pantoprazole (PROTONIX) tablet 40 mg, 40 mg, Oral, QAM AC, Stacie Ruvalcaba DO, 40 mg at 05/17/23 0531    polyvinyl alcohol (LIQUIFILM TEARS) 1.4 % ophthalmic solution 1 drop, 1 drop, Both Eyes, PRN, Stacie Ruvalcaba DO    eltrombopag olamine (PROMACTA) 50 MG tablet TABS 50 mg (Patient Supplied), 50 mg, Oral, Daily, Stacie Ruvalcaab DO    sodium chloride flush 0.9 % injection 5-40 mL, 5-40 mL, IntraVENous, 2 times per day, Stacie Ruvalcaba DO, 10 mL at 05/16/23 2209    sodium chloride flush 0.9 % injection 5-40 mL, 5-40 mL, IntraVENous, PRN, Stacie Ruvalcaba DO    0.9 % sodium chloride infusion, , IntraVENous, PRN, Stacie Ruvalcaba DO    ondansetron (ZOFRAN-ODT) disintegrating tablet 4 mg, 4 mg, Oral, Q8H PRN **OR** ondansetron (ZOFRAN) injection 4 mg, 4 mg, IntraVENous, Q6H
Hospital Medicine    Subjective:  pt responsive to verbal stimuli      Current Facility-Administered Medications:     hydrALAZINE (APRESOLINE) injection 10 mg, 10 mg, IntraVENous, Q6H PRN, TERESA Brenner - CNP    ipratropium-albuterol (DUONEB) nebulizer solution 1 ampule, 1 ampule, Inhalation, Q4H WA, Debbi Schafer MD, 1 ampule at 05/14/23 1648    [Held by provider] dextrose 5 % and 0.9 % NaCl with KCl 20 mEq infusion, , IntraVENous, Continuous, Donavon Rodarte MD, Stopped at 05/12/23 1651    amLODIPine (NORVASC) tablet 10 mg, 10 mg, Oral, Daily, Donavon Rodarte MD, 10 mg at 05/14/23 0957    allopurinol (ZYLOPRIM) tablet 100 mg, 100 mg, Oral, Daily, Mayela Ruvalcaba DO, 100 mg at 05/14/23 2636    apixaban (ELIQUIS) tablet 2.5 mg, 2.5 mg, Oral, BID, Kamala Sol DO, 2.5 mg at 05/14/23 2107    docusate sodium (COLACE) capsule 100 mg, 100 mg, Oral, Daily, Mayela Ruvalcaba DO, 100 mg at 05/14/23 4769    guaiFENesin-dextromethorphan (ROBITUSSIN DM) 100-10 MG/5ML syrup 10 mL, 10 mL, Oral, Q6H PRN, Mayela Ruvalcaba DO, 10 mL at 05/12/23 0000    hydrALAZINE (APRESOLINE) tablet 100 mg, 100 mg, Oral, TID, Donavon Rodarte MD, 100 mg at 05/14/23 2107    metoprolol succinate (TOPROL XL) extended release tablet 50 mg, 50 mg, Oral, BID, Donavon Rodarte MD, 50 mg at 05/14/23 2108    pantoprazole (PROTONIX) tablet 40 mg, 40 mg, Oral, QAM AC, Mayela Ruvalcaba DO, 40 mg at 05/15/23 0530    polyvinyl alcohol (LIQUIFILM TEARS) 1.4 % ophthalmic solution 1 drop, 1 drop, Both Eyes, PRN, Mayela Ruvalcaba DO    eltrombopag olamine (PROMACTA) 50 MG tablet TABS 50 mg (Patient Supplied), 50 mg, Oral, Daily, Mayela Ruvalcaba DO    sodium chloride flush 0.9 % injection 5-40 mL, 5-40 mL, IntraVENous, 2 times per day, Kamala Sol DO, 10 mL at 05/14/23 2108    sodium chloride flush 0.9 % injection 5-40 mL, 5-40 mL, IntraVENous, PRN, Mayela Ruvalcaba DO    0.9 % sodium chloride infusion, , IntraVENous, PRN, Kamala Sol
Nephrology notified of critical ionized calcium of 1.61. New orders placed.
Nephrology progress Note      Events reviewed. SUBJECTIVES: We are following Mrs. Lizette Branham for hypercalcemia. Reports no complaints.     Physical exam:   Constitutional:    Vitals: /62   Pulse 62   Temp 99.5 °F (37.5 °C) (Temporal)   Resp 16   Ht 5' 2\" (1.575 m)   Wt 159 lb 13.3 oz (72.5 kg)   SpO2 93%   BMI 29.23 kg/m²       Intake/Output Summary (Last 24 hours) at 5/15/2023 0930  Last data filed at 5/15/2023 1503  Gross per 24 hour   Intake 240 ml   Output 450 ml   Net -210 ml         PE:  General: awake, in no distress  Skin: no rash, turgor wnl  Heent:  eomi, mmm  Neck: no bruits or jvd noted  Cardiovascular:  S1, S2 without m/r/g  Respiratory: CTA B without w/r/r  Abdomen:  +bs, soft, nt, nd  Ext: no lower extremity edema  Musculoskeletal:  Rom, muscular strength intact    Scheduled Meds:   ipratropium-albuterol  1 ampule Inhalation Q4H WA    amLODIPine  10 mg Oral Daily    allopurinol  100 mg Oral Daily    apixaban  2.5 mg Oral BID    docusate sodium  100 mg Oral Daily    hydrALAZINE  100 mg Oral TID    metoprolol succinate  50 mg Oral BID    pantoprazole  40 mg Oral QAM AC    eltrombopag olamine  50 mg Oral Daily    sodium chloride flush  5-40 mL IntraVENous 2 times per day    [Held by provider] levofloxacin  250 mg IntraVENous Q24H     Continuous Infusions:   [Held by provider] dextrose 5% and 0.9% NaCl with KCl 20 mEq Stopped (05/12/23 1651)    sodium chloride       PRN Meds:.hydrALAZINE, guaiFENesin-dextromethorphan, polyvinyl alcohol, sodium chloride flush, sodium chloride, ondansetron **OR** ondansetron, polyethylene glycol, acetaminophen **OR** acetaminophen, HYDROcodone 5 mg - acetaminophen    Data:   Labs:  CBC:   Lab Results   Component Value Date/Time    WBC 6.7 05/15/2023 05:03 AM    RBC 2.51 05/15/2023 05:03 AM    HGB 7.2 05/15/2023 05:03 AM    HCT 23.6 05/15/2023 05:03 AM    MCV 94.0 05/15/2023 05:03 AM    MCH 28.7 05/15/2023 05:03 AM    MCHC 30.5 05/15/2023 05:03 AM    RDW
Palliative Care Department  [phone]  Palliative Care Progress Note  Provider Juanda Fothergill, APRN - CNP      PATIENT: Ramón Whitney  : 1931  MRN: [id number]  ADMISSION DATE: 2023  1:54 PM  Referring Provider: Mike Madrigal DO    Palliative Medicine was consulted on hospital day 7 for assistance with Goals of care, Code Status Discussion, Family support  HPI:     Michael Krause is a 80 y.o. y/o female with a history of ITP, A-fib on Eliquis, hyperlipidemia, hypertension, diabetes mellitus, obesity, monoclonal gammopathy of unknown significance who [facility] to [facility] on 2023 from skilled facility with decreased oral intake, weakness and altered mental status. Patient had a recent hospitalization for spinal fracture, was seen by neurosurgery who recommended brace, and she was sent to SNF. At the emergency room, patient consults level were elevated. She follows of the [facility] for cancer care for her ITP, controlled on Promacta 50 mg daily. Nephrology, and oncology following. On 2023 RRT was called due to respiratory distress while doing physical therapy. Use during RRT ABGs shows pH 6.98, PCO2 110.5, PO2 328. She was given IV Lasix, hydralazine, Duoneb, Solu-Medrol, and placed on BiPAP, currently on 5 L nasal cannula. Patient was then transferred to MICU for further medical management. Palliative medicine consulted to discuss goal of care, CODE STATUS discussion, family support. ASSESSMENT/PLAN:     Pertinent Hospital Diagnoses     Acute encephalopathy  Hypercalcemia  UTI  ITP following hem-onc      Palliative Care Encounter / Counseling Regarding Goals of Care  Please see detailed goals of care discussion as below  At this time, Ramón Whitney, Does Not have capacity for medical decision-making.   Capacity is time limited and situation/question specific  During encounter Duyen Higuera was surrogate medical decision-maker  Outcome of goals of care meeting:  Continue
Physician Progress Note      PATIENT:               Andrea De La Cruz  CSN #:                  [id number]  :                       1931  ADMIT DATE:       2023 1:54 PM  DISCH DATE:  RESPONDING  PROVIDER #: Chelle Etienne DO          QUERY TEXT:    Dear Provider,    Pt develops fluid overload and has HFpEF documented. If possible, please   document in progress notes and discharge summary further specificity regarding   the acuity of HFpEF:    The medical record reflects the following:  Risk Factors: HFpEF, Afib, HTN  Clinical Indicators:   Internal medicine note:  She was seen by services   of renal and yesterday she had a rapid response team for presumed respiratory   insufficiency due to fluid overload.  CXR: CHF pattern with interstitial edema, worse at the right lung. Also   bibasilar atelectatic changes. Consider volume overload.  Hematology/Oncology: VOlume overload resolved with diuresis  5/15 Nephrology note: HFpEF with GIDD, EF 55-60% 10/2022  Last echo(10/22) LVEF 55-60%, Mild left ventricular concentric hypertrophy,   Stage I Diastolic function  : ProBNP 9,823  : Pro BNP 9484  Treatment: IV lasix, Porter placed, I & O    Thank you,  Yue Rodriguez RN  Clinical Documentation Improvement  [phone]  Options provided:  -- Acute on Chronic Diastolic CHF/HFpEF  -- Other - I will add my own diagnosis  -- Disagree - Not applicable / Not valid  -- Disagree - Clinically unable to determine / Unknown  -- Refer to Clinical Documentation Reviewer    PROVIDER RESPONSE TEXT:    This patient is in acute on chronic diastolic CHF/HFpEF.     Query created by: Ned Gary on 2023 3:44 PM      Electronically signed by:  Chelle Etienne DO 2023 9:05 AM
Physician Progress Note      PATIENT:               Tristian Carr  CSN #:                  [id number]  :                       1931  ADMIT DATE:       2023 1:54 PM  DISCH DATE:  RESPONDING  PROVIDER #: Saúl Kenny DO          QUERY TEXT:    Dear Provider,    Pt presents with AMS found to have  hypercalcemia , DEB  and UTI. Noted ED   physician impression of \"metabolic encephalopathy\". If possible, please   document in progress notes and discharge summary:    The medical record reflects the following:  Risk Factors: 79 yo w/UTI, hypercalcemia, and DEB  Clinical Indicators: ED note:89 y.o. female presenting to the ED for acute   onset altered mental status, moderate in severity with impression of   \"metabolic encephalopathy\".  & 5/10 Nephrology progress note: Acute encephalopathy likely   multifactorial, hypercalcemia vs UTI  CT head: No acute findings  I calcium 1.85-1.77-1.53-1.51  Serum calcium 13.4 -12-10.8-10  BUN 34/Cr 1.5/GFR 30-BUN 28/Cr 1.4/GFR 35. .. BUN 19/Cr 1.1/GFR 47  Urine cx: >100,000 CFU/ml Escherichia coli  Treatment: Imaging, Labs, 1L NS bolus, continued inpatient monitoring    Thank you,  Roberta Garcia RN  Clinical Documentation Improvement  [phone]  Options provided:  -- Metabolic encephalopathy confirmed present on admission  -- Other - I will add my own diagnosis  -- Disagree - Not applicable / Not valid  -- Disagree - Clinically unable to determine / Unknown  -- Refer to Clinical Documentation Reviewer    PROVIDER RESPONSE TEXT:    The diagnosis of metabolic encephalopathy was confirmed as present on   admission.     Query created by: Bita Troy on 2023 2:20 PM      Electronically signed by:  Saúl Kenny DO 5/15/2023 6:00 PM
Physicians called and checked ETA for pickup. They state around 2 more hours.
Subjective:  No overnight events  Patient with continued improvement of mental status  Patient woke. She is feeling better this morning. Her daughter and family members are at bedside.   She ate this morning and was out of bed  She denies any nausea vomiting, shortness of breath or abnormal bleeding    Objective:    BP (!) 158/50   Pulse 59   Temp (!) 96.4 °F (35.8 °C) (Temporal)   Resp 18   Ht 5' 2\" (1.575 m)   Wt 159 lb 13.3 oz (72.5 kg)   SpO2 96%   BMI 29.23 kg/m²     Gen: Awake more alert, NAD   HEENT: mucosal dry, sclera anicteric  Neck: supple  Heart: RRR, no murmur  Lungs: easy, poor effort but CTA  Abd: soft, NT, BS+  Ext: edema in the hands  : Porter  Skin: no ecchymosis, rash       CBC with Differential:    Lab Results   Component Value Date/Time    WBC 7.7 05/17/2023 07:21 AM    RBC 2.52 05/17/2023 07:21 AM    HGB 7.3 05/17/2023 07:21 AM    HCT 23.5 05/17/2023 07:21 AM    PLT 62 05/17/2023 07:21 AM    MCV 93.3 05/17/2023 07:21 AM    MCH 29.0 05/17/2023 07:21 AM    MCHC 31.1 05/17/2023 07:21 AM    RDW 17.2 05/17/2023 07:21 AM    LYMPHOPCT 32.1 05/12/2023 10:25 AM    MONOPCT 16.2 05/12/2023 10:25 AM    BASOPCT 0.6 05/12/2023 10:25 AM    MONOSABS 1.29 05/12/2023 10:25 AM    LYMPHSABS 2.56 05/12/2023 10:25 AM    EOSABS 0.11 05/12/2023 10:25 AM    BASOSABS 0.05 05/12/2023 10:25 AM     CMP:    Lab Results   Component Value Date/Time     05/17/2023 04:45 AM    K 4.1 05/17/2023 04:45 AM    K 4.6 03/25/2023 02:00 PM     05/17/2023 04:45 AM    CO2 23 05/17/2023 04:45 AM    BUN 53 05/17/2023 04:45 AM    CREATININE 1.9 05/17/2023 04:45 AM    GFRAA 51 07/27/2022 12:46 AM    LABGLOM 24 05/17/2023 04:45 AM    GLUCOSE 109 05/17/2023 04:45 AM    PROT 5.9 05/12/2023 10:25 AM    LABALBU 2.8 05/12/2023 10:25 AM    CALCIUM 10.5 05/17/2023 04:45 AM    BILITOT <0.2 05/12/2023 10:25 AM    ALKPHOS 141 05/12/2023 10:25 AM    AST 34 05/12/2023 10:25 AM    ALT 20 05/12/2023 10:25 AM          Current
We are checking with nephrology to see if we can remove webb and discharge?
Lasha Benedict,     ondansetron (ZOFRAN-ODT) disintegrating tablet 4 mg, 4 mg, Oral, Q8H PRN **OR** ondansetron (ZOFRAN) injection 4 mg, 4 mg, IntraVENous, Q6H PRN, Merle Glaze Malmer, DO, 4 mg at 05/13/23 0222    polyethylene glycol (GLYCOLAX) packet 17 g, 17 g, Oral, Daily PRN, Merle Glaze Malmer, DO    acetaminophen (TYLENOL) tablet 650 mg, 650 mg, Oral, Q6H PRN, 650 mg at 05/09/23 0814 **OR** acetaminophen (TYLENOL) suppository 650 mg, 650 mg, Rectal, Q6H PRN, Merle Glaze Malmer, DO    HYDROcodone-acetaminophen (NORCO) 5-325 MG per tablet 1 tablet, 1 tablet, Oral, Q6H PRN, Merle Glaze Malmer, DO, 1 tablet at 05/15/23 1714    Objective:    BP (!) 130/56   Pulse 60   Temp 97.2 °F (36.2 °C) (Temporal)   Resp 17   Ht 5' 2\" (1.575 m)   Wt 159 lb 13.3 oz (72.5 kg)   SpO2 92%   BMI 29.23 kg/m²     Heart:  reg  Lungs:  rhonchi  Abd: + bs soft nontender  Extrem:  edema    CBC with Differential:    Lab Results   Component Value Date/Time    WBC 8.5 05/16/2023 04:45 AM    RBC 2.54 05/16/2023 04:45 AM    HGB 7.4 05/16/2023 04:45 AM    HCT 23.1 05/16/2023 04:45 AM    PLT 68 05/16/2023 04:45 AM    MCV 90.9 05/16/2023 04:45 AM    MCH 29.1 05/16/2023 04:45 AM    MCHC 32.0 05/16/2023 04:45 AM    RDW 17.1 05/16/2023 04:45 AM    LYMPHOPCT 32.1 05/12/2023 10:25 AM    MONOPCT 16.2 05/12/2023 10:25 AM    BASOPCT 0.6 05/12/2023 10:25 AM    MONOSABS 1.29 05/12/2023 10:25 AM    LYMPHSABS 2.56 05/12/2023 10:25 AM    EOSABS 0.11 05/12/2023 10:25 AM    BASOSABS 0.05 05/12/2023 10:25 AM     CMP:    Lab Results   Component Value Date/Time     05/16/2023 04:45 AM    K 4.4 05/16/2023 04:45 AM    K 4.6 03/25/2023 02:00 PM     05/16/2023 04:45 AM    CO2 25 05/16/2023 04:45 AM    BUN 43 05/16/2023 04:45 AM    CREATININE 2.1 05/16/2023 04:45 AM    GFRAA 51 07/27/2022 12:46 AM    LABGLOM 22 05/16/2023 04:45 AM    GLUCOSE 91 05/16/2023 04:45 AM    PROT 5.9 05/12/2023 10:25 AM    LABALBU 2.8 05/12/2023 10:25 AM    CALCIUM 10.2
PRN **OR** ondansetron (ZOFRAN) injection 4 mg, 4 mg, IntraVENous, Q6H PRN, Oleksandr Ruvalcaba DO, 4 mg at 05/13/23 0222    polyethylene glycol (GLYCOLAX) packet 17 g, 17 g, Oral, Daily PRN, Oleksandr Ruvalcaba DO    acetaminophen (TYLENOL) tablet 650 mg, 650 mg, Oral, Q6H PRN, 650 mg at 05/09/23 0814 **OR** acetaminophen (TYLENOL) suppository 650 mg, 650 mg, Rectal, Q6H PRN, Oleksandr Ruvalcaba DO    Objective:    BP (!) 135/56   Pulse 70   Temp 97.1 °F (36.2 °C) (Temporal)   Resp 16   Ht 5' 2\" (1.575 m)   Wt 159 lb 13.3 oz (72.5 kg)   SpO2 94%   BMI 29.23 kg/m²     Heart:  reg  Lungs:  ctab  Abd: + bs soft nontender  Extrem:  min edema arms    CBC with Differential:    Lab Results   Component Value Date/Time    WBC 7.0 05/18/2023 05:01 AM    RBC 2.63 05/18/2023 05:01 AM    HGB 7.5 05/18/2023 05:01 AM    HCT 24.4 05/18/2023 05:01 AM    PLT 57 05/18/2023 05:01 AM    MCV 92.8 05/18/2023 05:01 AM    MCH 28.5 05/18/2023 05:01 AM    MCHC 30.7 05/18/2023 05:01 AM    RDW 17.3 05/18/2023 05:01 AM    LYMPHOPCT 32.1 05/12/2023 10:25 AM    MONOPCT 16.2 05/12/2023 10:25 AM    BASOPCT 0.6 05/12/2023 10:25 AM    MONOSABS 1.29 05/12/2023 10:25 AM    LYMPHSABS 2.56 05/12/2023 10:25 AM    EOSABS 0.11 05/12/2023 10:25 AM    BASOSABS 0.05 05/12/2023 10:25 AM     CMP:    Lab Results   Component Value Date/Time     05/18/2023 05:01 AM    K 3.9 05/18/2023 05:01 AM    K 4.6 03/25/2023 02:00 PM     05/18/2023 05:01 AM    CO2 25 05/18/2023 05:01 AM    BUN 53 05/18/2023 05:01 AM    CREATININE 1.5 05/18/2023 05:01 AM    GFRAA 51 07/27/2022 12:46 AM    LABGLOM 32 05/18/2023 05:01 AM    GLUCOSE 106 05/18/2023 05:01 AM    PROT 5.9 05/12/2023 10:25 AM    LABALBU 2.8 05/12/2023 10:25 AM    CALCIUM 10.7 05/18/2023 05:01 AM    BILITOT <0.2 05/12/2023 10:25 AM    ALKPHOS 141 05/12/2023 10:25 AM    AST 34 05/12/2023 10:25 AM    ALT 20 05/12/2023 10:25 AM     Warfarin PT/INR:    Lab Results   Component Value Date    INR 1.1 03/29/2023
1.85, reason for this consultation. In the ER was hypertensive with SBP in 200's and confused. Other pertinent laboratory tests showed Na 137, BUN 34, Cr 1.6 (1.5 mg/dL on 5/1/2023), bicarb 31, glucose 139, WBC 5.8, UA significant for pyuria, bacteriuria, and hematuria. She was given NS 1 L bolus followed by 100 cc/hr infusion and hydralazine for BP control. Levaquin was started for UTI. Home medications include cholecalciferol 1000 units daily, losartan, metoprolol, hydralazine, pantoprazole, allopurinol, Eliquis, and promacta. Problems resolved: DEB stage 1, volume responsive pre-renal DEB 2/2 urinalysis related to hypercalcemia, poor intake. Resolved, renal function improving with IV fluids administration. Creatinine level down to 1.1 mg deciliter  Hypokalemia, 2/2 decrease potassium intake and probably urinary losses induced by high urinary outflow. Hypophosphatemia, 2/2 decreased oral intake and probably urinary losses  Acute encephalopathy likely multifactorial, hypercalcemia vs UTI    Impression and recommendations:     Recurrent DEB, probably 2/2 ischemic ATN 2/2 significant drop in blood pressure, renal function relatively stable for the past 48 hours, creatinine 2.1 mg/dL today. Urine output poor. CKD stage 3, without proteinuria, baseline Cr 0.9-1.1mg/dL    Hypercalcemia, non-PTH dependent (PTH: 29), rule out malignancy. Vitamin D 25 level normal at 55, very high vitamin D1-25 (139). Rule out sarcoidosis? ,  Lymphoma? . Bone survey negative for lytic or blastic lesions. Recent CT chest without mediastinal or hilar lymphadenopathy, scattered nodules in the lower lungs. Calcium level improved with IV hydration, calcitonin and pamidronate. PTHrp 2.3.     HTN, on amlodipine, hydralazine, metoprolol  HFpEF with GIDD, EF 55-60% 10/2022, proBNP 9,484  ------------------------------------------------------------------------------------------  UTI, on levofloxacin  MGUS following heme/onc  ITP
tests showed Na 137, BUN 34, Cr 1.6 (1.5 mg/dL on 5/1/2023), bicarb 31, glucose 139, WBC 5.8, UA significant for pyuria, bacteriuria, and hematuria. She was given NS 1 L bolus followed by 100 cc/hr infusion and hydralazine for BP control. Levaquin was started for UTI. Home medications include cholecalciferol 1000 units daily, losartan, metoprolol, hydralazine, pantoprazole, allopurinol, Eliquis, and promacta. Problems resolved: DEB stage 1, volume responsive pre-renal DEB 2/2 urinalysis related to hypercalcemia, poor intake. Resolved, renal function improving with IV fluids administration. Creatinine level down to 1.1 mg deciliter  Hypokalemia, 2/2 decrease potassium intake and probably urinary losses induced by high urinary outflow. Hypophosphatemia, 2/2 decreased oral intake and probably urinary losses  Acute encephalopathy likely multifactorial, hypercalcemia vs UTI    Impression and recommendations:     Recurrent DEB, probably 2/2 ischemic ATN 2/2 significant drop in blood pressure, renal function relatively stable for the past 48 hours, creatinine 1.9 mg/dL today. Urine output remains poor. CKD stage 3, without proteinuria, baseline Cr 0.9-1.1mg/dL    Hypercalcemia, non-PTH dependent (PTH: 29), rule out malignancy. Vitamin D 25 level normal at 55, very high vitamin D1-25 (139). Rule out sarcoidosis? ,  Lymphoma? . Bone survey negative for lytic or blastic lesions. Recent CT chest without mediastinal or hilar lymphadenopathy, scattered nodules in the lower lungs. PTHrp 2.3. S/p calcitonin, pamidronate, IVF, will repeat calcitonin.     HTN, on amlodipine, hydralazine, metoprolol  HFpEF with GIDD, EF 55-60% 10/2022, proBNP 9,484  ------------------------------------------------------------------------------------------  UTI, on levofloxacin  MGUS following heme/onc  ITP following heme/onc  HLD on lipitor   Paroxysmal A fib on apixaban with pacemaker in place  Hx of CVA   Hx of gout on allopurinol
>2000, folate 5.8, ferritin 441, iron saturation 50%  Recent T8 compression fracture (5/2/2023)    Plan:    Calcitonin 4 units/kg twice daily x2  Continue amlodipine 10 mg p.o. daily, hydralazine 100 mg 3 times daily, metoprolol succinate 50 mg twice daily  Okay to discharge from renal POV  Will defer long-term management of hypercalcemia to heme-onc      Electronically signed by TERESA Kothari CNP on 5/18/2023 at 9:13 AM
admission for GI bleed and Eliquis and ASA were held. Recent admission with compression fracture T8, conservative treatment discharge 5/5  CT of abdomen and pelvis was completed during this admission as well with retroperitoneal and pelvic lymphadenopathy. Biopsy delayed during previous admission with acute T8 vertebral fracture and patient's inability to lie flat and uncertainty if additional interventions would be pursued. M-spike 0.4 gm    A/P  -thrombocytopenia, ITP, controlled on Promacta 50 mg daily should continue when able to take PO (currently medication stable at nursing facility)  -anemia, multifactorial CKD, MGUS, chronic on aranesp and history of GI bleed  -hypercalcemia improved but increased again today, nephrology following  -MGUS, IgM serum improved from last result, Kappa/lambda light chains normal ratio, skeletal survey negative  We are following supportively.   Counts are stable with no transfusions indicated    Electronically signed by SYLVIA Curran on 5/16/2023 at 8:43 AM.
following. We are following supportively.   Counts are stable with no transfusions indicated    Electronically signed by SYLVIA Anderson on 5/15/2023 at 9:36 AM.

## 2023-05-19 NOTE — TELEPHONE ENCOUNTER
Physical therapy at [facility] called and wants to know if there can be an order for patient to initiate from sit to stand positions while wearing her TSLO brace with a olga lift

## 2023-05-19 NOTE — ADT AUTH CERT
Utilization Reviews       Respiratory Failure GRG - Care Day 5 (5/16/2023) by Gloria Wilde RN       Review Status Review Entered   Completed 5/19/2023 1120       Created By   Gloria Wilde RN      Criteria Review      Care Day: 5 Care Date: 5/16/2023 Level of Care: Inpatient Floor    Guideline Day 2    Level Of Care    ( ) ICU or ventilator-capable area    5/19/2023 11:20 AM EDT by Sudhir Arana      m/s    Clinical Status    ( ) * Weaning assessment performed    5/19/2023 11:20 AM EDT by Sudhir Arana      still with o2 desaturation at spo2 89 at 5 lpm nc    Interventions    (X) Inpatient interventions continue    5/19/2023 11:20 AM EDT by Sudhir Arana      labs ordered: complete blood count comprehensive metabolic panel, platelet confirmation  consult to Pulmonology, hematology oncology, IM  continues on supplemental oxygen at 5lpm nc    * Milestone   Additional Notes   DATE: 5/16/23         RELEVANT BASELINES: (lab values, vitals, o2 amount/delivery, etc.)    baseline Cr 0.9-1.1mg/dL,         PERTINENT UPDATES:   remains lethargic   Nephrology, no HD appears planned still   seems to be eating and talking slightly more today   dc planning to snf/rehab   Still with tachypnea, remains on 5 lpm nc      VITALS:   temp 96.5 (35.8)    rr 18 24 20   pr 60   bp 140/60 140/49 140/48   spo2 89 5 lpm nc          ABNL/PERTINENT LABS/RADIOLOGY/DIAGNOSTIC STUDIES:   BUN,BUNPL: 43 (H)   Creatinine: 2.1 (H)   Anion Gap: 9   Calcium, Ionized: 1.46 (H)      RBC: 2.54 (L)   Hemoglobin Quant: 7.4 (L)   Hematocrit: 23.1 (L)   Platelet Count: 68 (L)         PHYSICAL EXAM:   Heart:  reg   Lungs:  rhonchi   Abd: + bs soft nontender   Extrem:  edema         MD CONSULTS/ASSESSMENT AND PLAN:   **HEMATOLOGY ONCOLOGY   A/P   -thrombocytopenia, ITP, controlled on Promacta 50 mg daily should continue when able to take PO (currently medication stable at nursing facility)   -anemia, multifactorial CKD, MGUS, chronic on

## 2023-05-22 NOTE — ED NOTES
Spoke with [facility], they are releasing the body.   REFERENCE # [id number]     Jose Juan Castillo RN  05/22/23 0118       Jose Juan Castillo RN  05/22/23 2162

## 2023-05-22 NOTE — ED NOTES
Patient absent resp/apical/BP pupil response.  Dr. Jac Chirinos at bedside TOD       45 Maisha De La Garza RN  05/22/23 8310

## 2023-05-22 NOTE — PROGRESS NOTES
Patient code status a DNR CC. Patient only responds to pain. Per Dr. Dominique Mclean to leave patient on BIPAP at this time for comfort.

## 2023-05-22 NOTE — ED NOTES
Order placed to remove patients bypap, placed patient on non rebreathe at 15L. Daughter is at bed side. Daughter is aware and all questions were answered.       Holly Linares RN  05/21/23 1889

## 2023-05-22 NOTE — ED NOTES
Patient resting comfortably in bed with daughter at bedside. Only responds to pain. Patient was cleaned up X2 for loose stool. Bipap remains intact.       Mikaela Gleason RN  05/21/23 2132       Demond Giraldo RN  05/21/23 2138

## (undated) DEVICE — FORCEPS BX L160CM JAW DIA2.4MM YEL L CAP W/ NDL DISP RAD

## (undated) DEVICE — DEFENDO AIR WATER SUCTION AND BIOPSY VALVE KIT FOR  OLYMPUS: Brand: DEFENDO AIR/WATER/SUCTION AND BIOPSY VALVE

## (undated) DEVICE — Z DISCONTINUED NO SUB IDED TUBING ETCO2 AD L6.5FT NSL ORAL CVD PRNG NONFLARED TIP OVR

## (undated) DEVICE — CONTAINER SPEC 60ML PH 7NEUTRAL BUFF FRMLN RDY TO USE

## (undated) DEVICE — BITEBLOCK 54FR W/ DENT RIM BLOX

## (undated) DEVICE — CONNECTOR IRRIGATION AUXILIARY H2O JET W/ PRT MTL THRD HYDR

## (undated) DEVICE — GAUZE,SPONGE,4"X4",8PLY,STRL,LF,10/TRAY: Brand: MEDLINE